# Patient Record
Sex: MALE | Race: WHITE | Employment: OTHER | URBAN - NONMETROPOLITAN AREA
[De-identification: names, ages, dates, MRNs, and addresses within clinical notes are randomized per-mention and may not be internally consistent; named-entity substitution may affect disease eponyms.]

---

## 2018-02-18 ENCOUNTER — HOSPITAL ENCOUNTER (INPATIENT)
Age: 68
LOS: 2 days | Discharge: HOME | DRG: 189 | End: 2018-02-20
Attending: EMERGENCY MEDICINE | Admitting: INTERNAL MEDICINE
Payer: MEDICARE

## 2018-02-18 ENCOUNTER — APPOINTMENT (OUTPATIENT)
Dept: GENERAL RADIOLOGY | Age: 68
DRG: 189 | End: 2018-02-18
Payer: MEDICARE

## 2018-02-18 DIAGNOSIS — J44.1 COPD EXACERBATION (HCC): Primary | ICD-10-CM

## 2018-02-18 DIAGNOSIS — N17.9 AKI (ACUTE KIDNEY INJURY) (HCC): ICD-10-CM

## 2018-02-18 LAB
ALLEN TEST: ABNORMAL
ANION GAP SERPL CALCULATED.3IONS-SCNC: 12 MMOL/L (ref 9–17)
BNP INTERPRETATION: NORMAL
BUN BLDV-MCNC: 41 MG/DL (ref 8–23)
BUN/CREAT BLD: 26 (ref 9–20)
CALCIUM SERPL-MCNC: 9.7 MG/DL (ref 8.6–10.4)
CARBOXYHEMOGLOBIN: ABNORMAL % (ref 0–5)
CHLORIDE BLD-SCNC: 100 MMOL/L (ref 98–107)
CO2: 27 MMOL/L (ref 20–31)
CREAT SERPL-MCNC: 1.58 MG/DL (ref 0.7–1.2)
DIRECT EXAM: NORMAL
FIO2: ABNORMAL
GFR AFRICAN AMERICAN: 53 ML/MIN
GFR NON-AFRICAN AMERICAN: 44 ML/MIN
GFR SERPL CREATININE-BSD FRML MDRD: ABNORMAL ML/MIN/{1.73_M2}
GFR SERPL CREATININE-BSD FRML MDRD: ABNORMAL ML/MIN/{1.73_M2}
GLUCOSE BLD-MCNC: 163 MG/DL (ref 70–99)
HCO3 VENOUS: 25.5 MMOL/L (ref 24–30)
HCT VFR BLD CALC: 33.1 % (ref 41–53)
HEMOGLOBIN: 11.7 G/DL (ref 13.5–17)
LACTIC ACID, WHOLE BLOOD: NORMAL MMOL/L (ref 0.7–2.1)
LACTIC ACID: 1.8 MMOL/L (ref 0.5–2.2)
Lab: NORMAL
MCH RBC QN AUTO: 33.3 PG (ref 26–34)
MCHC RBC AUTO-ENTMCNC: 35.4 G/DL (ref 31–37)
MCV RBC AUTO: 94 FL (ref 80–100)
METHEMOGLOBIN: ABNORMAL % (ref 0–1.9)
MODE: ABNORMAL
NEGATIVE BASE EXCESS, VEN: 0.7 MMOL/L (ref 0–2)
NOTIFICATION TIME: ABNORMAL
NOTIFICATION: ABNORMAL
NRBC AUTOMATED: ABNORMAL PER 100 WBC
O2 DEVICE/FLOW/%: ABNORMAL
O2 SAT, VEN: 94.8 % (ref 60–85)
OXYHEMOGLOBIN: ABNORMAL % (ref 95–98)
PATIENT TEMP: 37
PCO2, VEN, TEMP ADJ: ABNORMAL MMHG (ref 39–55)
PCO2, VEN: 47.6 (ref 39–55)
PDW BLD-RTO: 12.5 % (ref 12.1–15.2)
PEEP/CPAP: ABNORMAL
PH VENOUS: 7.35 (ref 7.32–7.42)
PH, VEN, TEMP ADJ: ABNORMAL (ref 7.32–7.42)
PLATELET # BLD: 289 K/UL (ref 140–450)
PMV BLD AUTO: 8.9 FL (ref 6–12)
PO2, VEN, TEMP ADJ: ABNORMAL MMHG (ref 30–50)
PO2, VEN: 77.5 (ref 30–50)
POSITIVE BASE EXCESS, VEN: ABNORMAL MMOL/L (ref 0–2)
POTASSIUM SERPL-SCNC: 4.3 MMOL/L (ref 3.7–5.3)
PRO-BNP: 235 PG/ML
PSV: ABNORMAL
PT. POSITION: ABNORMAL
RBC # BLD: 3.52 M/UL (ref 4.5–5.9)
RESPIRATORY RATE: ABNORMAL
SAMPLE SITE: ABNORMAL
SET RATE: ABNORMAL
SODIUM BLD-SCNC: 139 MMOL/L (ref 135–144)
SPECIMEN DESCRIPTION: NORMAL
STATUS: NORMAL
TEXT FOR RESPIRATORY: ABNORMAL
TOTAL HB: ABNORMAL G/DL (ref 12–16)
TOTAL RATE: ABNORMAL
TROPONIN INTERP: NORMAL
TROPONIN T: <0.03 NG/ML
VT: ABNORMAL
WBC # BLD: 8.8 K/UL (ref 3.5–11)

## 2018-02-18 PROCEDURE — 71045 X-RAY EXAM CHEST 1 VIEW: CPT

## 2018-02-18 PROCEDURE — 94645 CONT INHLJ TX EACH ADDL HOUR: CPT

## 2018-02-18 PROCEDURE — 83880 ASSAY OF NATRIURETIC PEPTIDE: CPT

## 2018-02-18 PROCEDURE — 87040 BLOOD CULTURE FOR BACTERIA: CPT

## 2018-02-18 PROCEDURE — 82805 BLOOD GASES W/O2 SATURATION: CPT

## 2018-02-18 PROCEDURE — 6360000002 HC RX W HCPCS: Performed by: INTERNAL MEDICINE

## 2018-02-18 PROCEDURE — 83605 ASSAY OF LACTIC ACID: CPT

## 2018-02-18 PROCEDURE — 6370000000 HC RX 637 (ALT 250 FOR IP): Performed by: INTERNAL MEDICINE

## 2018-02-18 PROCEDURE — 6360000002 HC RX W HCPCS: Performed by: EMERGENCY MEDICINE

## 2018-02-18 PROCEDURE — 36415 COLL VENOUS BLD VENIPUNCTURE: CPT

## 2018-02-18 PROCEDURE — 2580000003 HC RX 258: Performed by: EMERGENCY MEDICINE

## 2018-02-18 PROCEDURE — 99285 EMERGENCY DEPT VISIT HI MDM: CPT

## 2018-02-18 PROCEDURE — 94660 CPAP INITIATION&MGMT: CPT

## 2018-02-18 PROCEDURE — 96366 THER/PROPH/DIAG IV INF ADDON: CPT

## 2018-02-18 PROCEDURE — 93005 ELECTROCARDIOGRAM TRACING: CPT

## 2018-02-18 PROCEDURE — 96365 THER/PROPH/DIAG IV INF INIT: CPT

## 2018-02-18 PROCEDURE — 96375 TX/PRO/DX INJ NEW DRUG ADDON: CPT

## 2018-02-18 PROCEDURE — 2580000003 HC RX 258: Performed by: INTERNAL MEDICINE

## 2018-02-18 PROCEDURE — 87804 INFLUENZA ASSAY W/OPTIC: CPT

## 2018-02-18 PROCEDURE — 6360000002 HC RX W HCPCS

## 2018-02-18 PROCEDURE — 84484 ASSAY OF TROPONIN QUANT: CPT

## 2018-02-18 PROCEDURE — 80048 BASIC METABOLIC PNL TOTAL CA: CPT

## 2018-02-18 PROCEDURE — 2000000000 HC ICU R&B

## 2018-02-18 PROCEDURE — 94644 CONT INHLJ TX 1ST HOUR: CPT

## 2018-02-18 PROCEDURE — 85027 COMPLETE CBC AUTOMATED: CPT

## 2018-02-18 RX ORDER — CEFTRIAXONE 1 G/1
INJECTION, POWDER, FOR SOLUTION INTRAMUSCULAR; INTRAVENOUS
Status: COMPLETED
Start: 2018-02-18 | End: 2018-02-18

## 2018-02-18 RX ORDER — AMLODIPINE BESYLATE 10 MG/1
10 TABLET ORAL DAILY
Status: DISCONTINUED | OUTPATIENT
Start: 2018-02-19 | End: 2018-02-20 | Stop reason: HOSPADM

## 2018-02-18 RX ORDER — MAGNESIUM SULFATE IN WATER 40 MG/ML
2 INJECTION, SOLUTION INTRAVENOUS ONCE
Status: COMPLETED | OUTPATIENT
Start: 2018-02-18 | End: 2018-02-18

## 2018-02-18 RX ORDER — METHYLPREDNISOLONE SODIUM SUCCINATE 40 MG/ML
40 INJECTION, POWDER, LYOPHILIZED, FOR SOLUTION INTRAMUSCULAR; INTRAVENOUS EVERY 12 HOURS
Status: DISCONTINUED | OUTPATIENT
Start: 2018-02-18 | End: 2018-02-20 | Stop reason: HOSPADM

## 2018-02-18 RX ORDER — RANITIDINE 150 MG/1
150 CAPSULE ORAL 2 TIMES DAILY
COMMUNITY
End: 2018-02-23 | Stop reason: DRUGHIGH

## 2018-02-18 RX ORDER — SODIUM CHLORIDE 0.9 % (FLUSH) 0.9 %
10 SYRINGE (ML) INJECTION PRN
Status: DISCONTINUED | OUTPATIENT
Start: 2018-02-18 | End: 2018-02-20 | Stop reason: HOSPADM

## 2018-02-18 RX ORDER — SODIUM CHLORIDE 9 MG/ML
INJECTION, SOLUTION INTRAVENOUS CONTINUOUS
Status: DISCONTINUED | OUTPATIENT
Start: 2018-02-18 | End: 2018-02-19

## 2018-02-18 RX ORDER — IPRATROPIUM BROMIDE AND ALBUTEROL SULFATE 2.5; .5 MG/3ML; MG/3ML
1 SOLUTION RESPIRATORY (INHALATION)
Status: DISCONTINUED | OUTPATIENT
Start: 2018-02-19 | End: 2018-02-19

## 2018-02-18 RX ORDER — MONTELUKAST SODIUM 10 MG/1
10 TABLET ORAL NIGHTLY
Status: DISCONTINUED | OUTPATIENT
Start: 2018-02-18 | End: 2018-02-20 | Stop reason: HOSPADM

## 2018-02-18 RX ORDER — CETIRIZINE HYDROCHLORIDE 10 MG/1
10 TABLET ORAL DAILY
COMMUNITY

## 2018-02-18 RX ORDER — METHYLPREDNISOLONE SODIUM SUCCINATE 125 MG/2ML
125 INJECTION, POWDER, LYOPHILIZED, FOR SOLUTION INTRAMUSCULAR; INTRAVENOUS ONCE
Status: COMPLETED | OUTPATIENT
Start: 2018-02-18 | End: 2018-02-18

## 2018-02-18 RX ORDER — 0.9 % SODIUM CHLORIDE 0.9 %
500 INTRAVENOUS SOLUTION INTRAVENOUS ONCE
Status: COMPLETED | OUTPATIENT
Start: 2018-02-18 | End: 2018-02-18

## 2018-02-18 RX ORDER — SODIUM CHLORIDE 0.9 % (FLUSH) 0.9 %
10 SYRINGE (ML) INJECTION EVERY 12 HOURS SCHEDULED
Status: DISCONTINUED | OUTPATIENT
Start: 2018-02-18 | End: 2018-02-20 | Stop reason: HOSPADM

## 2018-02-18 RX ORDER — ONDANSETRON 2 MG/ML
4 INJECTION INTRAMUSCULAR; INTRAVENOUS EVERY 6 HOURS PRN
Status: DISCONTINUED | OUTPATIENT
Start: 2018-02-18 | End: 2018-02-20 | Stop reason: HOSPADM

## 2018-02-18 RX ORDER — FAMOTIDINE 20 MG/1
20 TABLET, FILM COATED ORAL 2 TIMES DAILY
Status: DISCONTINUED | OUTPATIENT
Start: 2018-02-18 | End: 2018-02-20 | Stop reason: HOSPADM

## 2018-02-18 RX ORDER — LISINOPRIL AND HYDROCHLOROTHIAZIDE 25; 20 MG/1; MG/1
1 TABLET ORAL DAILY
COMMUNITY
End: 2018-02-23 | Stop reason: ALTCHOICE

## 2018-02-18 RX ORDER — ACETAMINOPHEN 325 MG/1
650 TABLET ORAL EVERY 4 HOURS PRN
Status: DISCONTINUED | OUTPATIENT
Start: 2018-02-18 | End: 2018-02-20 | Stop reason: HOSPADM

## 2018-02-18 RX ORDER — ALBUTEROL SULFATE 2.5 MG/3ML
SOLUTION RESPIRATORY (INHALATION)
Status: COMPLETED
Start: 2018-02-18 | End: 2018-02-18

## 2018-02-18 RX ORDER — LISINOPRIL AND HYDROCHLOROTHIAZIDE 25; 20 MG/1; MG/1
1 TABLET ORAL DAILY
Status: DISCONTINUED | OUTPATIENT
Start: 2018-02-19 | End: 2018-02-20 | Stop reason: HOSPADM

## 2018-02-18 RX ADMIN — ALBUTEROL SULFATE 7.5 MG: 2.5 SOLUTION RESPIRATORY (INHALATION) at 21:08

## 2018-02-18 RX ADMIN — CEFTRIAXONE: 1 INJECTION, POWDER, FOR SOLUTION INTRAMUSCULAR; INTRAVENOUS at 23:32

## 2018-02-18 RX ADMIN — SODIUM CHLORIDE: 9 INJECTION, SOLUTION INTRAVENOUS at 22:54

## 2018-02-18 RX ADMIN — ALBUTEROL SULFATE 7.5 MG: 2.5 SOLUTION RESPIRATORY (INHALATION) at 20:08

## 2018-02-18 RX ADMIN — FAMOTIDINE 20 MG: 20 TABLET, FILM COATED ORAL at 22:59

## 2018-02-18 RX ADMIN — CEFTRIAXONE 1 G: 1 INJECTION, POWDER, FOR SOLUTION INTRAMUSCULAR; INTRAVENOUS at 23:32

## 2018-02-18 RX ADMIN — MAGNESIUM SULFATE HEPTAHYDRATE 2 G: 40 INJECTION, SOLUTION INTRAVENOUS at 19:56

## 2018-02-18 RX ADMIN — METHYLPREDNISOLONE SODIUM SUCCINATE 125 MG: 125 INJECTION, POWDER, FOR SOLUTION INTRAMUSCULAR; INTRAVENOUS at 19:55

## 2018-02-18 RX ADMIN — MONTELUKAST SODIUM 10 MG: 10 TABLET, FILM COATED ORAL at 22:59

## 2018-02-18 RX ADMIN — SODIUM CHLORIDE 500 ML: 9 INJECTION, SOLUTION INTRAVENOUS at 21:17

## 2018-02-18 ASSESSMENT — ENCOUNTER SYMPTOMS
NAUSEA: 0
ABDOMINAL DISTENTION: 0
COUGH: 1
SORE THROAT: 0
SHORTNESS OF BREATH: 1
VOMITING: 0
DIARRHEA: 0
WHEEZING: 0
CONSTIPATION: 0
RHINORRHEA: 1

## 2018-02-18 ASSESSMENT — PAIN SCALES - GENERAL: PAINLEVEL_OUTOF10: 0

## 2018-02-19 LAB
ANION GAP SERPL CALCULATED.3IONS-SCNC: 12 MMOL/L (ref 9–17)
BUN BLDV-MCNC: 40 MG/DL (ref 8–23)
BUN/CREAT BLD: 25 (ref 9–20)
CALCIUM SERPL-MCNC: 9.6 MG/DL (ref 8.6–10.4)
CHLORIDE BLD-SCNC: 100 MMOL/L (ref 98–107)
CO2: 26 MMOL/L (ref 20–31)
CREAT SERPL-MCNC: 1.61 MG/DL (ref 0.7–1.2)
EKG ATRIAL RATE: 88 BPM
EKG P AXIS: 83 DEGREES
EKG P-R INTERVAL: 142 MS
EKG Q-T INTERVAL: 358 MS
EKG QRS DURATION: 82 MS
EKG QTC CALCULATION (BAZETT): 433 MS
EKG R AXIS: -54 DEGREES
EKG T AXIS: 88 DEGREES
EKG VENTRICULAR RATE: 88 BPM
GFR AFRICAN AMERICAN: 52 ML/MIN
GFR NON-AFRICAN AMERICAN: 43 ML/MIN
GFR SERPL CREATININE-BSD FRML MDRD: ABNORMAL ML/MIN/{1.73_M2}
GFR SERPL CREATININE-BSD FRML MDRD: ABNORMAL ML/MIN/{1.73_M2}
GLUCOSE BLD-MCNC: 175 MG/DL (ref 70–99)
POTASSIUM SERPL-SCNC: 5 MMOL/L (ref 3.7–5.3)
SODIUM BLD-SCNC: 138 MMOL/L (ref 135–144)

## 2018-02-19 PROCEDURE — 6360000002 HC RX W HCPCS: Performed by: INTERNAL MEDICINE

## 2018-02-19 PROCEDURE — 94664 DEMO&/EVAL PT USE INHALER: CPT

## 2018-02-19 PROCEDURE — 80048 BASIC METABOLIC PNL TOTAL CA: CPT

## 2018-02-19 PROCEDURE — 6370000000 HC RX 637 (ALT 250 FOR IP): Performed by: INTERNAL MEDICINE

## 2018-02-19 PROCEDURE — 94660 CPAP INITIATION&MGMT: CPT

## 2018-02-19 PROCEDURE — 1200000000 HC SEMI PRIVATE

## 2018-02-19 PROCEDURE — 94640 AIRWAY INHALATION TREATMENT: CPT

## 2018-02-19 PROCEDURE — 2580000003 HC RX 258: Performed by: INTERNAL MEDICINE

## 2018-02-19 PROCEDURE — 36415 COLL VENOUS BLD VENIPUNCTURE: CPT

## 2018-02-19 RX ORDER — ALBUTEROL SULFATE 2.5 MG/3ML
2.5 SOLUTION RESPIRATORY (INHALATION) EVERY 4 HOURS PRN
Status: DISCONTINUED | OUTPATIENT
Start: 2018-02-19 | End: 2018-02-20 | Stop reason: HOSPADM

## 2018-02-19 RX ORDER — IPRATROPIUM BROMIDE AND ALBUTEROL SULFATE 2.5; .5 MG/3ML; MG/3ML
1 SOLUTION RESPIRATORY (INHALATION) 4 TIMES DAILY
Status: DISCONTINUED | OUTPATIENT
Start: 2018-02-19 | End: 2018-02-20 | Stop reason: HOSPADM

## 2018-02-19 RX ADMIN — METHYLPREDNISOLONE SODIUM SUCCINATE 40 MG: 40 INJECTION, POWDER, FOR SOLUTION INTRAMUSCULAR; INTRAVENOUS at 22:51

## 2018-02-19 RX ADMIN — MOMETASONE FUROATE AND FORMOTEROL FUMARATE DIHYDRATE 2 PUFF: 200; 5 AEROSOL RESPIRATORY (INHALATION) at 12:04

## 2018-02-19 RX ADMIN — Medication 10 ML: at 20:30

## 2018-02-19 RX ADMIN — MOMETASONE FUROATE AND FORMOTEROL FUMARATE DIHYDRATE 2 PUFF: 200; 5 AEROSOL RESPIRATORY (INHALATION) at 21:49

## 2018-02-19 RX ADMIN — MONTELUKAST SODIUM 10 MG: 10 TABLET, FILM COATED ORAL at 20:30

## 2018-02-19 RX ADMIN — LISINOPRIL AND HYDROCHLOROTHIAZIDE 1 TABLET: 25; 20 TABLET ORAL at 08:26

## 2018-02-19 RX ADMIN — FAMOTIDINE 20 MG: 20 TABLET, FILM COATED ORAL at 20:30

## 2018-02-19 RX ADMIN — METHYLPREDNISOLONE SODIUM SUCCINATE 40 MG: 40 INJECTION, POWDER, FOR SOLUTION INTRAMUSCULAR; INTRAVENOUS at 10:30

## 2018-02-19 RX ADMIN — Medication 10 ML: at 08:30

## 2018-02-19 RX ADMIN — FAMOTIDINE 20 MG: 20 TABLET, FILM COATED ORAL at 08:26

## 2018-02-19 RX ADMIN — IPRATROPIUM BROMIDE AND ALBUTEROL SULFATE 1 AMPULE: .5; 3 SOLUTION RESPIRATORY (INHALATION) at 11:54

## 2018-02-19 RX ADMIN — SODIUM CHLORIDE 1 G: 9 INJECTION INTRAMUSCULAR; INTRAVENOUS; SUBCUTANEOUS at 22:51

## 2018-02-19 RX ADMIN — ENOXAPARIN SODIUM 40 MG: 40 INJECTION SUBCUTANEOUS at 08:26

## 2018-02-19 RX ADMIN — AMLODIPINE BESYLATE 10 MG: 10 TABLET ORAL at 08:26

## 2018-02-19 RX ADMIN — SODIUM CHLORIDE: 9 INJECTION, SOLUTION INTRAVENOUS at 06:43

## 2018-02-19 RX ADMIN — IPRATROPIUM BROMIDE AND ALBUTEROL SULFATE 1 AMPULE: .5; 3 SOLUTION RESPIRATORY (INHALATION) at 17:21

## 2018-02-19 RX ADMIN — IPRATROPIUM BROMIDE AND ALBUTEROL SULFATE 1 AMPULE: .5; 3 SOLUTION RESPIRATORY (INHALATION) at 07:01

## 2018-02-19 RX ADMIN — IPRATROPIUM BROMIDE AND ALBUTEROL SULFATE 1 AMPULE: .5; 3 SOLUTION RESPIRATORY (INHALATION) at 21:48

## 2018-02-19 RX ADMIN — METHYLPREDNISOLONE SODIUM SUCCINATE 40 MG: 40 INJECTION, POWDER, FOR SOLUTION INTRAMUSCULAR; INTRAVENOUS at 04:47

## 2018-02-19 ASSESSMENT — PAIN SCALES - GENERAL
PAINLEVEL_OUTOF10: 0

## 2018-02-19 NOTE — H&P
Component Value Date    WBC 8.8 2018    HGB 11.7 (L) 2018     2018       Lab Results   Component Value Date    BUN 40 (H) 2018    CREATININE 1.61 (H) 2018     2018    K 5.0 2018    CALCIUM 9.6 2018     2018    CO2 26 2018    LABGLOM 43 (L) 2018       No results found for: Kathryn Post, EPITHUA, LEUKOCYTESUR, SPECGRAV, GLUCOSEU, KETUA, PROTEINU, HGBUR, CASTUA, CRYSTUA, BACTERIA, YEAST    Lab Results   Component Value Date    TROPONINT <0.03 2018    PROBNP 235 2018       Xr Chest 1 Vw    Result Date: 2018  FINAL REPORT EXAM:  XR CHEST 1 VIEW HISTORY:  shortness of breath,diffuse wheezing X 2 DAYS TECHNIQUE:  Portable frontal chest x-ray Comparison: None FINDINGS:  Lungs are well expanded and mildly hyperlucent most suggestive of underlying emphysema. There is aortic atherosclerotic calcification. There are no focal infiltrates. Costophrenic angles are sharp laterally. Bones are osteopenic. Impression:  Probable underlying emphysema. No acute cardiopulmonary disease. Electronically Signed By: Benedicto Zimmerman   on  2018  20:23        Assessment:    Active Problems:    COPD exacerbation ([address]  Resolved Problems:    * No resolved hospital problems.  *      Patient Active Problem List    Diagnosis Date Noted    COPD exacerbation ([facility] [address] 2018       Plan:     · This patient requires inpatient admission because of COPD exacerbation  · Factors affecting the medical complexity of this patient include   · Steroids, nebulizers, and antibiotics  · Estimated length of stay is 3 days  ·   · High risk medication monitorin Dirk Jeong md  2018, 5:10 PM

## 2018-02-19 NOTE — ED PROVIDER NOTES
(2.5 MG/3ML) 0.083% nebulizer solution     Liliana Fitzgerald: cabinet override    0.9 % sodium chloride bolus       DIAGNOSTIC RESULTS / EMERGENCY DEPARTMENT COURSE / MDM     LABS:  Results for orders placed or performed during the hospital encounter of 02/18/18   Rapid influenza A/B antigens   Result Value Ref Range    Specimen Description . NASOPHARYNGEAL SWAB     Special Requests NOT REPORTED     Direct Exam       Presumptive negative for the presence of Influenza A and Influenza B antigen.     Direct Exam        PCR confirmation of negative results is recommended, since the antigen present    Direct Exam        in the specimen may be below the detection limit of the test.    Direct Exam       Performed at [facility] [address] [facility]    Direct Exam  ([phone]     Status FINAL 02/18/2018    CBC   Result Value Ref Range    WBC 8.8 3.5 - 11.0 k/uL    RBC 3.52 (L) 4.5 - 5.9 m/uL    Hemoglobin 11.7 (L) 13.5 - 17.0 g/dL    Hematocrit 33.1 (L) 41 - 53 %    MCV 94.0 80 - 100 fL    MCH 33.3 26 - 34 pg    MCHC 35.4 31 - 37 g/dL    RDW 12.5 12.1 - 15.2 %    Platelets 337 450 - 012 k/uL    MPV 8.9 6.0 - 12.0 fL    NRBC Automated NOT REPORTED per 100 WBC   Basic Metabolic Panel   Result Value Ref Range    Glucose 163 (H) 70 - 99 mg/dL    BUN 41 (H) 8 - 23 mg/dL    CREATININE 1.58 (H) 0.70 - 1.20 mg/dL    Bun/Cre Ratio 26 (H) 9 - 20    Calcium 9.7 8.6 - 10.4 mg/dL    Sodium 139 135 - 144 mmol/L    Potassium 4.3 3.7 - 5.3 mmol/L    Chloride 100 98 - 107 mmol/L    CO2 27 20 - 31 mmol/L    Anion Gap 12 9 - 17 mmol/L    GFR Non-African American 44 (L) >60 mL/min    GFR  53 (L) >60 mL/min    GFR Comment          GFR Staging         Troponin   Result Value Ref Range    Troponin T <0.03 <0.03 ng/mL    Troponin Interp         Lactic Acid, Plasma   Result Value Ref Range    Lactic Acid 1.8 0.5 - 2.2 mmol/L    Lactic Acid, Whole Blood NOT REPORTED 0.7 - 2.1 mmol/L   Brain

## 2018-02-19 NOTE — PLAN OF CARE
Problem: Gas Exchange - Impaired:  Goal: Levels of oxygenation will improve  Levels of oxygenation will improve  Outcome: Ongoing  Lung sounds noted to be diminished with expiratory wheezing. Pt on bipap 30%. SOB with exertion and at rest noted. Will continue to monitor. Problem: SAFETY  Goal: Free from accidental physical injury  Outcome: Ongoing  Fall risk assessment complete. Fall band on patient and fall sign on doorway. Bed in lowest position with bed wheels locked. Call light within reach. Will continue to monitor. Problem: PAIN  Goal: Patient's pain/discomfort is manageable  Outcome: Ongoing  Pain assessment complete during hourly rounding. Pain scale of 0-10 being used to assess patients pain level. Pain medication administered as ordered. Will continue to monitor. Problem: SKIN INTEGRITY  Goal: Skin integrity is maintained or improved  Outcome: Ongoing  Frequent and close monitoring of pt's skin being assessed with assessments and prn. Pt turns and repositions self frequently in bed. Will continue to monitor for any s/s of impaired skin integrity.

## 2018-02-19 NOTE — ED NOTES
Patient improved with sitting down. SOB worse with activity.       1108 Kris Hernandez RN  02/18/18 2013

## 2018-02-19 NOTE — ED NOTES
Respiratory called made aware of tx and venous blood gas.       1108 Kris Hernandez RN  02/18/18 2007

## 2018-02-20 VITALS
HEART RATE: 94 BPM | BODY MASS INDEX: 25.62 KG/M2 | HEIGHT: 66 IN | SYSTOLIC BLOOD PRESSURE: 144 MMHG | RESPIRATION RATE: 18 BRPM | WEIGHT: 159.4 LBS | TEMPERATURE: 98.4 F | OXYGEN SATURATION: 96 % | DIASTOLIC BLOOD PRESSURE: 81 MMHG

## 2018-02-20 PROCEDURE — 6370000000 HC RX 637 (ALT 250 FOR IP): Performed by: INTERNAL MEDICINE

## 2018-02-20 PROCEDURE — 94664 DEMO&/EVAL PT USE INHALER: CPT

## 2018-02-20 PROCEDURE — 94640 AIRWAY INHALATION TREATMENT: CPT

## 2018-02-20 RX ORDER — PREDNISONE 10 MG/1
TABLET ORAL
Qty: 30 TABLET | Refills: 0 | Status: SHIPPED | OUTPATIENT
Start: 2018-02-20 | End: 2018-04-06 | Stop reason: ALTCHOICE

## 2018-02-20 RX ORDER — CEFUROXIME AXETIL 250 MG/1
250 TABLET ORAL 2 TIMES DAILY
Qty: 14 TABLET | Refills: 0 | Status: SHIPPED | OUTPATIENT
Start: 2018-02-20 | End: 2018-02-27

## 2018-02-20 RX ADMIN — IPRATROPIUM BROMIDE AND ALBUTEROL SULFATE 1 AMPULE: .5; 3 SOLUTION RESPIRATORY (INHALATION) at 05:57

## 2018-02-20 NOTE — PROGRESS NOTES
2nd continuous nebulizer treatment in line via BIPAP, given per verbal order Dr. Eden Hernández. 3 unit dose albuterol and 3 ml NS used per neb insert.
Nutrition Assessment    Type and Reason for Visit: Initial    Nutrition Recommendations:  Encourage oral fluids    Malnutrition Assessment:  · Malnutrition Status: No malnutrition  · Context: Acute illness or injury  · Findings of the 6 clinical characteristics of malnutrition (Minimum of 2 out of 6 clinical characteristics is required to make the diagnosis of moderate or severe Protein Calorie Malnutrition based on AND/ASPEN Guidelines):  1. Energy Intake-Not available,      2. Weight Loss-No significant weight loss,    3. Fat Loss-No significant subcutaneous fat loss,    4. Muscle Loss-No significant muscle mass loss,    5. Fluid Accumulation-Mild fluid accumulation, Extremities  6.  Strength-Not measured    Nutrition Diagnosis:   · Problem: Altered nutrition-related lab values  · Etiology: related to Endocrine dysfunction     Signs and symptoms:  as evidenced by Lab values (glucose elevations with steroids)    Nutrition Assessment:  · Subjective Assessment: \"getting fat\". Good appetite, with smaller breakfast usually.    · Nutrition-Focused Physical Findings:  edema in LE  · Wound Type: None  · Current Nutrition Therapies:  · Oral Diet Orders: General   · Oral Diet intake: Unable to assess ( No records)  · Oral Nutrition Supplement (ONS) Orders: None  · Anthropometric Measures:  · Ht: 5' 6\" (167.6 cm)   · Current Body Wt: 162 lb 3.2 oz (73.6 kg)  · Admission Body Wt: 152 lb (68.9 kg)  · Usual Body Wt:  (used to be in in 150s, but really doesn't know)  · BMI Classification: BMI 25.0 - 29.9 Overweight    Lab Results   Component Value Date     02/19/2018    K 5.0 02/19/2018     02/19/2018    CO2 26 02/19/2018    BUN 40 (H) 02/19/2018    CREATININE 1.61 (H) 02/19/2018    GLUCOSE 175 (H) 02/19/2018    CALCIUM 9.6 02/19/2018    LABGLOM 43 (L) 02/19/2018    GFRAA 52 (L) 02/19/2018     No results found for: LABA1C  No results found for: EAG  No results found for: VITD25    Estimated Intake vs Estimated
Pt admitted from ER via bed with wife present. Pt A&O, pleasant. SOB with exertion and at rest noted. Oriented pt to room, call light and surroundings, verbalizes understanding. Pt placed on 30% bipap with Sp02 maintained greater than 90%. Denies any pain or needs at this time. Call light in reach.
RESPIRATORY ASSESSMENT PROTOCOL                                                                                              Patient Name: Ismael [facility] Room#: O291/W286-76 : 1950     Admitting diagnosis: COPD exacerbation ([facility] [J44.1]       Medical History:   Past Medical History:   Diagnosis Date    COPD (chronic obstructive pulmonary disease) ([facility]     Essential hypertension        PATIENT ASSESSMENT    LABORATORY DATA  Hematology:   Lab Results   Component Value Date    WBC 8.8 2018    RBC 3.52 2018    HGB 11.7 2018    HCT 33.1 2018     2018     Chemistry:  No results found for: PHART, TUR5EPL, PO2ART, A6GCZTEP, JGT2XEY, PBEA    Blood Culture:   Sputum Culture:     VITALS  Pulse: 83   Resp: 11  BP: (!) 130/90  SpO2: 96 % O2 Device: Bi-PAP  Temp: 98.6 °F (37 °C)  Comment:     SKIN COLOR  [x] Normal  [] Pale  [] Dusky  [] Cyanotic      RESPIRATORY PATTERN  [] Normal  [x] Dyspnea  [] Cheyne-Jensen  [] Kussmaul  [] Biots    AMBULATORY  [x] Yes  [] No  [] With Assistance    PEAK FLOW  Predicted:     Personal Best:        VITAL CAPACITY  Predicted value:  ml  Actual Value:  ml  30% of Predicted:  Ml    Patient Acuity 0 1 2 3 4 Score   Level of Concious (LOC) [x]  Alert & Oriented or Pt normal LOC []  Confused;follows directions []  Confused & uncooper-ative []  Obtunded []  Comatose 0   Respiratory Rate  (RR) []  Reg. rate & pattern. 12 - 20 bpm  []  Increased RR. Greater than 20 bpm   []  SOB w/ exertion or RR greater than 24 bpm []  Access- ory muscle use at rest. Abn.  resp. [x]  SOB at rest.   4   Bilateral Breath Sounds (BBS) []  Clear []  Diminish-ed bases  []  Diminish-ed t/o, or rales   []  Sporadic, scattered wheezes or rhonchi [x]  Persistentwheezes and, or absent BBS 4   Cough [x]  Strong, effective, & non-prod. []  Effective & prod.  Less than 25 ml (2 TBSP) over past 24 hrs []  Ineffective & non-prod to less than 25 ML over past 24 hrs []  Ineffective and,
[]  Ineffective and, or greater than 25 ml sputum prod. past 24 hrs. []  Nonspon- taneous; Requires suctioning 0   Pulmonary History  (PULM HX) []  No smoking and no chronic pulmonaryhistory []  Former smoker. Quit over 12 mos. ago []  Current smoker or quit w/ in 12 mos []  Pulm. History and, or 20 pk/yr smoking hx [x]  Admitted w/ acute pulm. dx and, or has been admitted w/ pulm. dx 2 or more times over past 12 mos 4   Surgical History this Admit  (SURG HX) [x]  No surgery []  General surgery []  Lower abdominal []  Thoracic or upper abdominal   []  Thoracic w/ pulm. disease 0   Chest X-Ray (CXR)/CT Scan [x]  Clear or not applicable []  Not available []  Atelect- asis or pleural effusions []  Localized infiltrate or pulm. edema []  Con-solidated Infiltrates, bilateral, or in more than 1 lobe 0   Slow or Forced VC, FEV1 OR PEFR (PULM FXN)  [x]  80% or greater, or not indicated []  Pt. unable to perform []  FEV1 or PEFR or VC 51-79%. []  FEV1 or PEFR or VC  30-49%   []  FEV1 or PEFR or VC less than 30%   0   TOTAL ACUITY: 9       CARE PLAN    If Acuity Level is 2, 3, or 4 in any of the following:    [x] BILATERAL BREATH SOUNDS (BBS)     [x] PULMONARY HISTORY (PULM HX)  [] PULMONARY FUNCTION (PULM FX)    Goal: Improve respiratory functions in patients with airway disease and decrease WOB    [x] AEROSOL PROTOCOL    Total Acuity:   16-32  []  Secondary Assessment in 24 hrs Total Acuity:  9-15  [x]  Secondary Assessment in 24 hrs Total Acuity:  4-8  []  Secondary Assessment in 48 hrs Total Acuity:  0-3  []  Secondary Assessment in 72 hrs   HHN AEROSOL THERAPY with  [physician-ordered bronchodilator(s)] q 4 & Albuterol PRN q2 hrs. Breath-Actuated Neb if BBS Acuity = 4, and pt. can use MP. Notify physician if condition deteriorates. [facility]N AEROSOL THERAPY with  [physician-ordered bronchodilator(s)]  QID and Albuterol PRN q4 hrs. Breath-Actuated Neb if BBS Acuity = 4, and pt. can use MP.   Notify physician if condition

## 2018-02-20 NOTE — DISCHARGE SUMMARY
capsule  Inhale 18 mcg into the lungs daily. Consultants:  none     Hospital Course:   Karen Ellington is a 75 y.o. male admitted with COPD exacerbation    Exam: regular. Trace wheezes left.     Disposition:   Home    Patient will be followed by Rachel Awan MD in 2 weeks    Signed:  Rachel Awan  2/22/2018, 3:56 PM

## 2018-02-22 PROBLEM — N17.9 ACUTE NONTRAUMATIC KIDNEY INJURY (HCC): Status: ACTIVE | Noted: 2018-02-22

## 2018-02-22 PROBLEM — J96.01 ACUTE RESPIRATORY FAILURE WITH HYPOXIA (HCC): Status: ACTIVE | Noted: 2018-02-22

## 2018-02-23 ENCOUNTER — TELEPHONE (OUTPATIENT)
Dept: PHARMACY | Facility: CLINIC | Age: 68
End: 2018-02-23

## 2018-02-23 DIAGNOSIS — J44.1 COPD EXACERBATION (HCC): Primary | ICD-10-CM

## 2018-02-23 PROCEDURE — 1111F DSCHRG MED/CURRENT MED MERGE: CPT | Performed by: PHARMACIST

## 2018-02-23 RX ORDER — AMLODIPINE BESYLATE 10 MG/1
10 TABLET ORAL DAILY
Status: ON HOLD | COMMUNITY
End: 2019-05-21 | Stop reason: HOSPADM

## 2018-02-23 RX ORDER — RANITIDINE 150 MG/1
150 TABLET ORAL DAILY
COMMUNITY
End: 2018-04-06 | Stop reason: ALTCHOICE

## 2018-02-23 RX ORDER — LISINOPRIL 20 MG/1
40 TABLET ORAL DAILY
Status: ON HOLD | COMMUNITY
End: 2018-10-31 | Stop reason: HOSPADM

## 2018-02-23 NOTE — TELEPHONE ENCOUNTER
CLINICAL PHARMACY NOTE - Post-Discharge Transitions of Care ([facility]BIKA)  Patient outreach to review discharge medications and provide medication review and management. Spoke with patient. Non-face-to-face services provided:  Assessment and support for treatment adherence and medication management-Medication Review. SUBJECTIVE/OBJECTIVE:     Mady Lewis is a 75 y.o. male discharged from [facility] on 02/20/2018. Primary diagnosis: COPD exacerbation    Discharge Medications (as per discharging medication list found on the AVS)  New Medications  Medication Sig Comments    predniSONE (DELTASONE) 10 MG tablet 4 tabs daily for 3 days, 3 tabs daily for 3 days, 2 tabs daily for 3 days, 1 tabs daily for 3 days Taking as prescribed    cefUROXime (CEFTIN) 250 MG tablet Take 1 tablet by mouth 2 times daily for 7 days Taking as prescribed     Continued Medications  Medication Sig Comments    ranitidine (ZANTAC) 150 MG capsule Take 150 mg by mouth 2 times daily Takes once daily    cetirizine (ZYRTEC) 10 MG tablet Take 10 mg by mouth daily Taking as prescribed    albuterol (PROVENTIL) (2.5 MG/3ML) 0.083% nebulizer solution Take 3 mLs by nebulization every 6 hours as needed for Wheezing Nebulizer treatment Taking as prescribed    montelukast (SINGULAIR) 10 MG tablet Take 10 mg by mouth nightly Taking as prescribed    tiotropium (SPIRIVA) 18 MCG inhalation capsule Inhale 18 mcg into the lungs daily. Patient has respimat inhaler    budesonide-formoterol (SYMBICORT) 160-4.5 MCG/ACT AERO Inhale 2 puffs into the lungs 2 times daily. Taking as prescribed    albuterol (PROVENTIL HFA;VENTOLIN HFA) 108 (90 BASE) MCG/ACT inhaler Inhale 2 puffs into the lungs every 6 hours as needed.  Taking as prescribed    amLODIPine (NORVASC) 5 MG tablet Take 10 mg by mouth daily  Takes 10mg tabs    lisinopril-hydrochlorothiazide (PRINZIDE;ZESTORETIC) 20-25 MG per tablet Take 1 tablet by mouth daily Only on lisinopril

## 2018-02-24 LAB
CULTURE: NORMAL
Lab: NORMAL
Lab: NORMAL
SPECIMEN DESCRIPTION: NORMAL
SPECIMEN DESCRIPTION: NORMAL
STATUS: NORMAL
STATUS: NORMAL

## 2018-03-06 PROBLEM — J44.9 CHRONIC OBSTRUCTIVE PULMONARY DISEASE (HCC): Status: ACTIVE | Noted: 2018-02-18

## 2018-06-01 PROBLEM — C61 PROSTATE CANCER (HCC): Status: ACTIVE | Noted: 2018-06-01

## 2018-08-06 ENCOUNTER — OFFICE VISIT (OUTPATIENT)
Dept: UROLOGY | Age: 68
End: 2018-08-06
Payer: MEDICARE

## 2018-08-06 VITALS
WEIGHT: 167 LBS | HEIGHT: 66 IN | BODY MASS INDEX: 26.84 KG/M2 | DIASTOLIC BLOOD PRESSURE: 76 MMHG | SYSTOLIC BLOOD PRESSURE: 142 MMHG

## 2018-08-06 DIAGNOSIS — C61 PROSTATE CANCER (HCC): Primary | ICD-10-CM

## 2018-08-06 PROCEDURE — 99204 OFFICE O/P NEW MOD 45 MIN: CPT | Performed by: UROLOGY

## 2018-08-06 ASSESSMENT — ENCOUNTER SYMPTOMS
ABDOMINAL PAIN: 0
EYE REDNESS: 0
BACK PAIN: 0
VOMITING: 0
SHORTNESS OF BREATH: 0
COUGH: 0
WHEEZING: 0
NAUSEA: 0
EYE PAIN: 0
COLOR CHANGE: 0

## 2018-08-06 NOTE — PROGRESS NOTES
albuterol (PROVENTIL) (2.5 MG/3ML) 0.083% nebulizer solution Take 3 mLs by nebulization every 6 hours as needed for Wheezing Nebulizer treatment 120 each 0     No facility-administered encounter medications on file as of 8/6/2018. Current Outpatient Prescriptions on File Prior to Visit   Medication Sig Dispense Refill    amLODIPine (NORVASC) 10 MG tablet Take 10 mg by mouth daily      lisinopril (PRINIVIL;ZESTRIL) 20 MG tablet Take 40 mg by mouth daily      tiotropium (SPIRIVA RESPIMAT) 1.25 MCG/ACT AERS inhaler Inhale 2 puffs into the lungs daily      cetirizine (ZYRTEC) 10 MG tablet Take 10 mg by mouth daily      montelukast (SINGULAIR) 10 MG tablet Take 10 mg by mouth nightly      albuterol sulfate  (90 Base) MCG/ACT inhaler Inhale 2 puffs into the lungs every 6 hours as needed for Wheezing or Shortness of Breath 1 Inhaler 11    albuterol (PROVENTIL) (2.5 MG/3ML) 0.083% nebulizer solution Take 3 mLs by nebulization every 6 hours as needed for Wheezing Nebulizer treatment 120 each 0     No current facility-administered medications on file prior to visit. Patient has no known allergies. History reviewed. No pertinent family history. History   Smoking Status    Former Smoker    Types: Cigarettes    Quit date: 7/30/2013   Smokeless Tobacco    Never Used        History   Alcohol Use No       Vitals:    08/06/18 1140   BP: (!) 142/76     Constitutional: Patient in no acute distress; Neuro: alert and oriented to person place and time. Psych: Mood and affect normal.  Skin: Normal  Lungs: Respiratory effort normal  Cardiovascular:  Normal peripheral pulses  Abdomen: Soft, non-tender, non-distended with no CVA, flank pain, hepatosplenomegaly or hernia. Kidneys normal.  Bladder non-tender and not distended.   Lymphatics: no palpable lymphadenopathy  Penis normal and circumcised  Urethral meatus normal  Scrotal exam normal  Testicles normal bilaterally  Epididymis normal bilaterally  No

## 2018-08-20 ENCOUNTER — OFFICE VISIT (OUTPATIENT)
Dept: UROLOGY | Age: 68
End: 2018-08-20
Payer: MEDICARE

## 2018-08-20 VITALS
SYSTOLIC BLOOD PRESSURE: 142 MMHG | HEIGHT: 66 IN | WEIGHT: 168 LBS | DIASTOLIC BLOOD PRESSURE: 80 MMHG | BODY MASS INDEX: 27 KG/M2

## 2018-08-20 DIAGNOSIS — C61 PROSTATE CANCER (HCC): Primary | ICD-10-CM

## 2018-08-20 PROCEDURE — 99213 OFFICE O/P EST LOW 20 MIN: CPT | Performed by: UROLOGY

## 2018-08-20 PROCEDURE — 96402 CHEMO HORMON ANTINEOPL SQ/IM: CPT | Performed by: UROLOGY

## 2018-08-20 ASSESSMENT — ENCOUNTER SYMPTOMS
COLOR CHANGE: 0
VOMITING: 0
BACK PAIN: 0
SHORTNESS OF BREATH: 0
NAUSEA: 0
WHEEZING: 0
EYE REDNESS: 0
EYE PAIN: 0
COUGH: 0
ABDOMINAL PAIN: 0

## 2018-08-20 NOTE — PROGRESS NOTES
Patient given [facility] loading dose in abdomen subQ. Patient tolerated procedure well.     Lot # [id number]  Exp date: 05/2020

## 2018-08-20 NOTE — PROGRESS NOTES
Subjective:      Patient ID: Olga Ferreira is a 75 y.o. male. HPI  Patient is a 75 y.o. male in no acute distress and alert and oriented to person, place and time. History  Prostate bx 5/22/2018 2 cores [place] 3+4  PSA 14.59     Currently  Patient is here today to begin androgen deprivation therapy. He will do this in conjunction with IMRT radiation. Patient has been doing well. No recent gross hematuria dysuria. He reports no new symptoms. Patient reports no pain today. Patient is comfortable with his treatment choice. He has more questions concerning this. Past Medical History:   Diagnosis Date    [facility] (chronic obstructive [facility] disease) ([facility] [address]     Essential hypertension     Wears glasses      Past Surgical History:   Procedure Laterality Date    COLONOSCOPY  2015    Normal    HERNIA REPAIR Right 1975     Family History   Problem Relation Age of Onset    No Known Problems Father     Cancer Mother      Current Outpatient Prescriptions on File Prior to Visit   Medication Sig Dispense Refill    albuterol sulfate  (90 Base) MCG/ACT inhaler Inhale 2 puffs into the lungs every 6 hours as needed for Wheezing or Shortness of Breath 1 Inhaler 11    amLODIPine (NORVASC) 10 MG tablet Take 10 mg by mouth daily      lisinopril (PRINIVIL;ZESTRIL) 20 MG tablet Take 40 mg by mouth daily      tiotropium (SPIRIVA RESPIMAT) 1.25 MCG/ACT AERS inhaler Inhale 2 puffs into the lungs daily      cetirizine (ZYRTEC) 10 MG tablet Take 10 mg by mouth daily      montelukast (SINGULAIR) 10 MG tablet Take 10 mg by mouth nightly      albuterol (PROVENTIL) (2.5 MG/3ML) 0.083% nebulizer solution Take 3 mLs by nebulization every 6 hours as needed for Wheezing Nebulizer treatment 120 each 0     No current facility-administered medications on file prior to visit.         Outpatient Encounter Prescriptions as of 8/20/2018   Medication Sig Dispense Refill    albuterol sulfate  (90 Base) MCG/ACT inhaler

## 2018-09-24 ENCOUNTER — OFFICE VISIT (OUTPATIENT)
Dept: UROLOGY | Age: 68
End: 2018-09-24
Payer: MEDICARE

## 2018-09-24 VITALS
DIASTOLIC BLOOD PRESSURE: 78 MMHG | HEIGHT: 66 IN | SYSTOLIC BLOOD PRESSURE: 140 MMHG | WEIGHT: 168 LBS | BODY MASS INDEX: 27 KG/M2

## 2018-09-24 DIAGNOSIS — C61 PROSTATE CANCER (HCC): Primary | ICD-10-CM

## 2018-09-24 PROCEDURE — 96402 CHEMO HORMON ANTINEOPL SQ/IM: CPT | Performed by: NURSE PRACTITIONER

## 2018-09-24 PROCEDURE — 99213 OFFICE O/P EST LOW 20 MIN: CPT | Performed by: NURSE PRACTITIONER

## 2018-09-24 ASSESSMENT — ENCOUNTER SYMPTOMS
EYE REDNESS: 0
BACK PAIN: 0
CONSTIPATION: 0
EYE PAIN: 0
NAUSEA: 0
ABDOMINAL PAIN: 0
SHORTNESS OF BREATH: 0
COUGH: 0
VOMITING: 0
COLOR CHANGE: 0
WHEEZING: 0

## 2018-09-24 NOTE — PROGRESS NOTES
Subjective:      Patient ID: Susi Gordon is a 75 y.o. male. HPI  Patient is a 75 y.o. male in no acute distress and alert and oriented to person, place and time. History  5/22/2018 Prostate biopsy, two cores [place] 3+4. PSA 14.59     8/2018 Started Daria Gaytan in conjunction with IMRT radiation. Today  Here today for a one month follow-up for prostate cancer and for firmagon injection. He tolerated his last injection with minimal complaints. He does have a raised area where he received the last injection. He denies hot flashes, pain at the injection site, weight loss, nausea or vomiting. He starts radiation tomorrow with Dr. Gretchen Anderson. Past Medical History:   Diagnosis Date    COPD (chronic obstructive pulmonary disease) ([facility] [address]     Essential hypertension     Wears glasses      Past Surgical History:   Procedure Laterality Date    COLONOSCOPY  2015    Normal    HERNIA REPAIR Right 1975     Family History   Problem Relation Age of Onset    No Known Problems Father     Cancer Mother      Current Outpatient Prescriptions on File Prior to Visit   Medication Sig Dispense Refill    albuterol sulfate  (90 Base) MCG/ACT inhaler Inhale 2 puffs into the lungs every 6 hours as needed for Wheezing or Shortness of Breath 1 Inhaler 11    amLODIPine (NORVASC) 10 MG tablet Take 10 mg by mouth daily      lisinopril (PRINIVIL;ZESTRIL) 20 MG tablet Take 40 mg by mouth daily      tiotropium (SPIRIVA RESPIMAT) 1.25 MCG/ACT AERS inhaler Inhale 2 puffs into the lungs daily      cetirizine (ZYRTEC) 10 MG tablet Take 10 mg by mouth daily      albuterol (PROVENTIL) (2.5 MG/3ML) 0.083% nebulizer solution Take 3 mLs by nebulization every 6 hours as needed for Wheezing Nebulizer treatment 120 each 0    montelukast (SINGULAIR) 10 MG tablet Take 10 mg by mouth nightly       No current facility-administered medications on file prior to visit.         Outpatient Encounter Prescriptions as of 9/24/2018   Medication Sig Dispense Refill    albuterol sulfate  (90 Base) MCG/ACT inhaler Inhale 2 puffs into the lungs every 6 hours as needed for Wheezing or Shortness of Breath 1 Inhaler 11    amLODIPine (NORVASC) 10 MG tablet Take 10 mg by mouth daily      lisinopril (PRINIVIL;ZESTRIL) 20 MG tablet Take 40 mg by mouth daily      tiotropium (SPIRIVA RESPIMAT) 1.25 MCG/ACT AERS inhaler Inhale 2 puffs into the lungs daily      cetirizine (ZYRTEC) 10 MG tablet Take 10 mg by mouth daily      albuterol (PROVENTIL) (2.5 MG/3ML) 0.083% nebulizer solution Take 3 mLs by nebulization every 6 hours as needed for Wheezing Nebulizer treatment 120 each 0    montelukast (SINGULAIR) 10 MG tablet Take 10 mg by mouth nightly       No facility-administered encounter medications on file as of 9/24/2018. Patient has no known allergies. History   Smoking Status    Former Smoker    Types: Cigarettes    Quit date: 7/30/2013   Smokeless Tobacco    Never Used       History   Alcohol Use No     Comment: rare       Vitals:    09/24/18 0950   BP: (!) 140/78       Constitutional: Patient in no acute distress; Neuro: alert and oriented to person place and time. Psych: Mood and affect normal.  Skin: Normal  Lungs: Respiratory effort normal  Cardiovascular:  Normal peripheral pulses  Abdomen: Soft, non-tender, non-distended with no CVA, flank pain, hepatosplenomegaly or hernia. Kidneys normal.  Bladder non-tender and not distended. Lymphatics: no palpable lymphadenopathy  Penis normal  Urethral meatus normal  Scrotal exam normal  Testicles normal bilaterally    Lab Results   Component Value Date    PSA 14.59 03/01/2018     Lab Results   Component Value Date    BUN 39 03/01/2018     Lab Results   Component Value Date    CREATININE 1.63 03/01/2018       No results found for this visit on 09/24/18. Review of Systems   Constitutional: Negative for appetite change, chills and fever. Eyes: Negative for pain, redness and visual disturbance. Respiratory: Negative for cough, shortness of breath and wheezing. Cardiovascular: Negative for chest pain and leg swelling. Gastrointestinal: Negative for abdominal pain, constipation, nausea and vomiting. Genitourinary: Negative for decreased urine volume, difficulty urinating, discharge, dysuria, enuresis, flank pain, frequency, hematuria, penile pain, scrotal swelling, testicular pain and urgency. Musculoskeletal: Negative for back pain, joint swelling and myalgias. Skin: Negative for color change, rash and wound. Neurological: Negative for dizziness, tremors and numbness. Hematological: Negative for adenopathy. Does not bruise/bleed easily. Objective:   Physical Exam    Assessment:       Diagnosis Orders   1. Prostate cancer ([facility] [address] / Biopsy 2018  ME INJECTION,THERAP/PROPH/DIAGNOST, IM OR SUBCUT    degarelix (FIRMAGON) 80 mg subcutaneous           Plan:      He will return in one month for repeat Firmagon injection.          EVER Salas - CNP

## 2018-10-05 ENCOUNTER — TELEPHONE (OUTPATIENT)
Dept: UROLOGY | Age: 68
End: 2018-10-05

## 2018-10-08 NOTE — TELEPHONE ENCOUNTER
Patient called and reports that he is having some blood tinged seepage from injection site on abdomen. Denies fever/chills.  Agreeable to scheduling

## 2018-10-10 ENCOUNTER — OFFICE VISIT (OUTPATIENT)
Dept: UROLOGY | Age: 68
End: 2018-10-10
Payer: MEDICARE

## 2018-10-10 VITALS
HEIGHT: 66 IN | WEIGHT: 168 LBS | DIASTOLIC BLOOD PRESSURE: 80 MMHG | SYSTOLIC BLOOD PRESSURE: 130 MMHG | BODY MASS INDEX: 27 KG/M2

## 2018-10-10 DIAGNOSIS — C61 PROSTATE CANCER (HCC): Primary | ICD-10-CM

## 2018-10-10 DIAGNOSIS — L02.211 ABDOMINAL WALL ABSCESS: ICD-10-CM

## 2018-10-10 PROCEDURE — 99214 OFFICE O/P EST MOD 30 MIN: CPT | Performed by: UROLOGY

## 2018-10-10 RX ORDER — CEPHALEXIN 500 MG/1
500 CAPSULE ORAL 3 TIMES DAILY
Qty: 30 CAPSULE | Refills: 0 | Status: SHIPPED | OUTPATIENT
Start: 2018-10-10 | End: 2018-10-20

## 2018-10-10 ASSESSMENT — ENCOUNTER SYMPTOMS
EYE PAIN: 0
EYE REDNESS: 0
COUGH: 0
WHEEZING: 0
ABDOMINAL PAIN: 0
VOMITING: 0
NAUSEA: 0
SHORTNESS OF BREATH: 0
COLOR CHANGE: 0
BACK PAIN: 0

## 2018-10-25 ENCOUNTER — HOSPITAL ENCOUNTER (OUTPATIENT)
Age: 68
Setting detail: SPECIMEN
Discharge: HOME | End: 2018-10-25
Payer: MEDICARE

## 2018-10-25 ENCOUNTER — OFFICE VISIT (OUTPATIENT)
Dept: UROLOGY | Age: 68
End: 2018-10-25
Payer: MEDICARE

## 2018-10-25 VITALS
HEART RATE: 64 BPM | HEIGHT: 66 IN | SYSTOLIC BLOOD PRESSURE: 140 MMHG | BODY MASS INDEX: 26.68 KG/M2 | TEMPERATURE: 98 F | WEIGHT: 166 LBS | DIASTOLIC BLOOD PRESSURE: 80 MMHG | RESPIRATION RATE: 20 BRPM

## 2018-10-25 VITALS — WEIGHT: 166 LBS | BODY MASS INDEX: 26.79 KG/M2 | DIASTOLIC BLOOD PRESSURE: 68 MMHG | SYSTOLIC BLOOD PRESSURE: 126 MMHG

## 2018-10-25 DIAGNOSIS — L02.211 ABDOMINAL WALL ABSCESS: Primary | ICD-10-CM

## 2018-10-25 DIAGNOSIS — C61 PROSTATE CANCER (HCC): Primary | ICD-10-CM

## 2018-10-25 DIAGNOSIS — L02.211 ABDOMINAL WALL ABSCESS: ICD-10-CM

## 2018-10-25 PROCEDURE — 87070 CULTURE OTHR SPECIMN AEROBIC: CPT

## 2018-10-25 PROCEDURE — 10060 I&D ABSCESS SIMPLE/SINGLE: CPT | Performed by: NURSE PRACTITIONER

## 2018-10-25 PROCEDURE — 87205 SMEAR GRAM STAIN: CPT

## 2018-10-25 PROCEDURE — 99214 OFFICE O/P EST MOD 30 MIN: CPT | Performed by: NURSE PRACTITIONER

## 2018-10-25 PROCEDURE — 96402 CHEMO HORMON ANTINEOPL SQ/IM: CPT | Performed by: NURSE PRACTITIONER

## 2018-10-25 RX ORDER — SULFAMETHOXAZOLE AND TRIMETHOPRIM 800; 160 MG/1; MG/1
1 TABLET ORAL 2 TIMES DAILY
Qty: 20 TABLET | Refills: 0 | Status: ON HOLD | OUTPATIENT
Start: 2018-10-25 | End: 2018-10-31 | Stop reason: HOSPADM

## 2018-10-25 ASSESSMENT — ENCOUNTER SYMPTOMS
EYE PAIN: 0
COLOR CHANGE: 0
COUGH: 0
SHORTNESS OF BREATH: 0
VOMITING: 0
WHEEZING: 0
NAUSEA: 0
EYE REDNESS: 0
CONSTIPATION: 0
BACK PAIN: 0
ABDOMINAL PAIN: 0

## 2018-10-25 NOTE — PROGRESS NOTES
nebulization every 6 hours as needed for Wheezing Nebulizer treatment 120 each 0    montelukast (SINGULAIR) 10 MG tablet Take 10 mg by mouth nightly       No current facility-administered medications on file prior to visit. Outpatient Encounter Prescriptions as of 10/25/2018   Medication Sig Dispense Refill    albuterol sulfate  (90 Base) MCG/ACT inhaler Inhale 2 puffs into the lungs every 6 hours as needed for Wheezing or Shortness of Breath 1 Inhaler 11    amLODIPine (NORVASC) 10 MG tablet Take 10 mg by mouth daily      lisinopril (PRINIVIL;ZESTRIL) 20 MG tablet Take 40 mg by mouth daily      tiotropium (SPIRIVA RESPIMAT) 1.25 MCG/ACT AERS inhaler Inhale 2 puffs into the lungs daily      cetirizine (ZYRTEC) 10 MG tablet Take 10 mg by mouth daily      albuterol (PROVENTIL) (2.5 MG/3ML) 0.083% nebulizer solution Take 3 mLs by nebulization every 6 hours as needed for Wheezing Nebulizer treatment 120 each 0    montelukast (SINGULAIR) 10 MG tablet Take 10 mg by mouth nightly      [] degarelix (FIRMAGON) 80 mg subcutaneous        No facility-administered encounter medications on file as of 10/25/2018. Patient has no known allergies. History   Smoking Status    Former Smoker    Types: Cigarettes    Quit date: 2013   Smokeless Tobacco    Never Used       History   Alcohol Use No     Comment: rare       Vitals:    10/25/18 0848   BP: (!) 140/80   Pulse: 64   Resp: 20   Temp: 98 °F (36.7 °C)       Constitutional: Patient in no acute distress; Neuro: alert and oriented to person place and time. Psych: Mood and affect normal.  Skin: Normal  Lungs: Respiratory effort normal  Cardiovascular:  Normal peripheral pulses  Abdomen: There is a 4 cm fluctuant, indurated erythematous area to the right lower quadrant with spontaneous serous sanguinous drainage. Soft, non-tender, non-distended with no CVA, flank pain, hepatosplenomegaly or hernia.   Kidneys normal.  Bladder non-tender and

## 2018-10-26 ENCOUNTER — OFFICE VISIT (OUTPATIENT)
Dept: UROLOGY | Age: 68
End: 2018-10-26

## 2018-10-26 VITALS — WEIGHT: 166 LBS | SYSTOLIC BLOOD PRESSURE: 114 MMHG | DIASTOLIC BLOOD PRESSURE: 62 MMHG | BODY MASS INDEX: 26.79 KG/M2

## 2018-10-26 DIAGNOSIS — L02.211 ABDOMINAL WALL ABSCESS: Primary | ICD-10-CM

## 2018-10-26 PROCEDURE — 99024 POSTOP FOLLOW-UP VISIT: CPT | Performed by: NURSE PRACTITIONER

## 2018-10-27 LAB
CULTURE: ABNORMAL
DIRECT EXAM: ABNORMAL
DIRECT EXAM: ABNORMAL
Lab: ABNORMAL
SPECIMEN DESCRIPTION: ABNORMAL
STATUS: ABNORMAL

## 2018-10-29 ENCOUNTER — APPOINTMENT (OUTPATIENT)
Dept: GENERAL RADIOLOGY | Age: 68
DRG: 191 | End: 2018-10-29
Payer: MEDICARE

## 2018-10-29 ENCOUNTER — HOSPITAL ENCOUNTER (INPATIENT)
Age: 68
LOS: 2 days | Discharge: HOME | DRG: 191 | End: 2018-10-31
Attending: EMERGENCY MEDICINE | Admitting: INTERNAL MEDICINE
Payer: MEDICARE

## 2018-10-29 DIAGNOSIS — J44.1 COPD EXACERBATION (HCC): Primary | ICD-10-CM

## 2018-10-29 DIAGNOSIS — N17.9 AKI (ACUTE KIDNEY INJURY) (HCC): ICD-10-CM

## 2018-10-29 LAB
ABSOLUTE EOS #: 0.65 K/UL (ref 0–0.44)
ABSOLUTE IMMATURE GRANULOCYTE: 0.04 K/UL (ref 0–0.3)
ABSOLUTE LYMPH #: 0.77 K/UL (ref 1.1–3.7)
ABSOLUTE MONO #: 0.72 K/UL (ref 0.1–1.2)
ALBUMIN SERPL-MCNC: 4 G/DL (ref 3.5–5.2)
ALBUMIN/GLOBULIN RATIO: 1.1 (ref 1–2.5)
ALP BLD-CCNC: 83 U/L (ref 40–129)
ALT SERPL-CCNC: 28 U/L (ref 5–41)
ANION GAP SERPL CALCULATED.3IONS-SCNC: 11 MMOL/L (ref 9–17)
AST SERPL-CCNC: 25 U/L
BASOPHILS # BLD: 0 % (ref 0–2)
BASOPHILS ABSOLUTE: <0.03 K/UL (ref 0–0.2)
BILIRUB SERPL-MCNC: 0.22 MG/DL (ref 0.3–1.2)
BUN BLDV-MCNC: 51 MG/DL (ref 8–23)
BUN/CREAT BLD: 21 (ref 9–20)
CALCIUM SERPL-MCNC: 9.7 MG/DL (ref 8.6–10.4)
CHLORIDE BLD-SCNC: 101 MMOL/L (ref 98–107)
CO2: 24 MMOL/L (ref 20–31)
CREAT SERPL-MCNC: 2.38 MG/DL (ref 0.7–1.2)
DIFFERENTIAL TYPE: ABNORMAL
EKG ATRIAL RATE: 84 BPM
EKG P AXIS: 83 DEGREES
EKG P-R INTERVAL: 158 MS
EKG Q-T INTERVAL: 362 MS
EKG QRS DURATION: 88 MS
EKG QTC CALCULATION (BAZETT): 427 MS
EKG R AXIS: -18 DEGREES
EKG T AXIS: 79 DEGREES
EKG VENTRICULAR RATE: 84 BPM
EOSINOPHILS RELATIVE PERCENT: 10 % (ref 1–4)
GFR AFRICAN AMERICAN: 33 ML/MIN
GFR NON-AFRICAN AMERICAN: 27 ML/MIN
GFR SERPL CREATININE-BSD FRML MDRD: ABNORMAL ML/MIN/{1.73_M2}
GFR SERPL CREATININE-BSD FRML MDRD: ABNORMAL ML/MIN/{1.73_M2}
GLUCOSE BLD-MCNC: 124 MG/DL (ref 70–99)
HCT VFR BLD CALC: 32.8 % (ref 40.7–50.3)
HEMOGLOBIN: 10.9 G/DL (ref 13–17)
IMMATURE GRANULOCYTES: 1 %
LACTIC ACID: 1.1 MMOL/L (ref 0.5–2.2)
LYMPHOCYTES # BLD: 11 % (ref 24–43)
MCH RBC QN AUTO: 32.5 PG (ref 25.2–33.5)
MCHC RBC AUTO-ENTMCNC: 33.2 G/DL (ref 28.4–34.8)
MCV RBC AUTO: 97.9 FL (ref 82.6–102.9)
MONOCYTES # BLD: 11 % (ref 3–12)
NRBC AUTOMATED: 0 PER 100 WBC
PDW BLD-RTO: 13.2 % (ref 11.8–14.4)
PLATELET # BLD: 177 K/UL (ref 138–453)
PLATELET ESTIMATE: ABNORMAL
PMV BLD AUTO: 10.9 FL (ref 8.1–13.5)
POTASSIUM SERPL-SCNC: 5.3 MMOL/L (ref 3.7–5.3)
RBC # BLD: 3.35 M/UL (ref 4.21–5.77)
RBC # BLD: ABNORMAL 10*6/UL
SEG NEUTROPHILS: 67 % (ref 36–65)
SEGMENTED NEUTROPHILS ABSOLUTE COUNT: 4.54 K/UL (ref 1.5–8.1)
SODIUM BLD-SCNC: 136 MMOL/L (ref 135–144)
TOTAL PROTEIN: 7.5 G/DL (ref 6.4–8.3)
TROPONIN INTERP: NORMAL
TROPONIN T: <0.03 NG/ML
WBC # BLD: 6.7 K/UL (ref 3.5–11.3)
WBC # BLD: ABNORMAL 10*3/UL

## 2018-10-29 PROCEDURE — 80053 COMPREHEN METABOLIC PANEL: CPT

## 2018-10-29 PROCEDURE — 83605 ASSAY OF LACTIC ACID: CPT

## 2018-10-29 PROCEDURE — 6360000002 HC RX W HCPCS: Performed by: EMERGENCY MEDICINE

## 2018-10-29 PROCEDURE — 2580000003 HC RX 258: Performed by: EMERGENCY MEDICINE

## 2018-10-29 PROCEDURE — 6370000000 HC RX 637 (ALT 250 FOR IP): Performed by: INTERNAL MEDICINE

## 2018-10-29 PROCEDURE — G0378 HOSPITAL OBSERVATION PER HR: HCPCS

## 2018-10-29 PROCEDURE — 87205 SMEAR GRAM STAIN: CPT

## 2018-10-29 PROCEDURE — 6360000002 HC RX W HCPCS: Performed by: INTERNAL MEDICINE

## 2018-10-29 PROCEDURE — 1200000000 HC SEMI PRIVATE

## 2018-10-29 PROCEDURE — 36415 COLL VENOUS BLD VENIPUNCTURE: CPT

## 2018-10-29 PROCEDURE — 94760 N-INVAS EAR/PLS OXIMETRY 1: CPT

## 2018-10-29 PROCEDURE — 87040 BLOOD CULTURE FOR BACTERIA: CPT

## 2018-10-29 PROCEDURE — 96372 THER/PROPH/DIAG INJ SC/IM: CPT

## 2018-10-29 PROCEDURE — 71045 X-RAY EXAM CHEST 1 VIEW: CPT

## 2018-10-29 PROCEDURE — 94664 DEMO&/EVAL PT USE INHALER: CPT

## 2018-10-29 PROCEDURE — 94640 AIRWAY INHALATION TREATMENT: CPT

## 2018-10-29 PROCEDURE — 85025 COMPLETE CBC W/AUTO DIFF WBC: CPT

## 2018-10-29 PROCEDURE — 84484 ASSAY OF TROPONIN QUANT: CPT

## 2018-10-29 PROCEDURE — 99285 EMERGENCY DEPT VISIT HI MDM: CPT

## 2018-10-29 PROCEDURE — 87070 CULTURE OTHR SPECIMN AEROBIC: CPT

## 2018-10-29 PROCEDURE — 6370000000 HC RX 637 (ALT 250 FOR IP): Performed by: EMERGENCY MEDICINE

## 2018-10-29 PROCEDURE — 93005 ELECTROCARDIOGRAM TRACING: CPT

## 2018-10-29 PROCEDURE — 96374 THER/PROPH/DIAG INJ IV PUSH: CPT

## 2018-10-29 RX ORDER — AMLODIPINE BESYLATE 10 MG/1
10 TABLET ORAL DAILY
Status: DISCONTINUED | OUTPATIENT
Start: 2018-10-29 | End: 2018-10-31 | Stop reason: HOSPADM

## 2018-10-29 RX ORDER — MONTELUKAST SODIUM 10 MG/1
10 TABLET ORAL NIGHTLY
Status: DISCONTINUED | OUTPATIENT
Start: 2018-10-29 | End: 2018-10-31 | Stop reason: HOSPADM

## 2018-10-29 RX ORDER — ALBUTEROL SULFATE 2.5 MG/3ML
2.5 SOLUTION RESPIRATORY (INHALATION) EVERY 6 HOURS PRN
Status: DISCONTINUED | OUTPATIENT
Start: 2018-10-29 | End: 2018-10-29

## 2018-10-29 RX ORDER — ALBUTEROL SULFATE 2.5 MG/3ML
2.5 SOLUTION RESPIRATORY (INHALATION) 4 TIMES DAILY
Status: DISCONTINUED | OUTPATIENT
Start: 2018-10-29 | End: 2018-10-30

## 2018-10-29 RX ORDER — ALBUTEROL SULFATE 2.5 MG/3ML
2.5 SOLUTION RESPIRATORY (INHALATION) EVERY 4 HOURS PRN
Status: DISCONTINUED | OUTPATIENT
Start: 2018-10-29 | End: 2018-10-31 | Stop reason: HOSPADM

## 2018-10-29 RX ORDER — ALBUTEROL SULFATE 2.5 MG/3ML
2.5 SOLUTION RESPIRATORY (INHALATION)
Status: DISCONTINUED | OUTPATIENT
Start: 2018-10-29 | End: 2018-10-29

## 2018-10-29 RX ORDER — IPRATROPIUM BROMIDE AND ALBUTEROL SULFATE 2.5; .5 MG/3ML; MG/3ML
1 SOLUTION RESPIRATORY (INHALATION) ONCE
Status: COMPLETED | OUTPATIENT
Start: 2018-10-29 | End: 2018-10-29

## 2018-10-29 RX ORDER — ALBUTEROL SULFATE 2.5 MG/3ML
2.5 SOLUTION RESPIRATORY (INHALATION)
Status: DISPENSED | OUTPATIENT
Start: 2018-10-29 | End: 2018-10-29

## 2018-10-29 RX ORDER — SODIUM CHLORIDE 0.9 % (FLUSH) 0.9 %
10 SYRINGE (ML) INJECTION EVERY 12 HOURS SCHEDULED
Status: DISCONTINUED | OUTPATIENT
Start: 2018-10-29 | End: 2018-10-31 | Stop reason: HOSPADM

## 2018-10-29 RX ORDER — ONDANSETRON 2 MG/ML
4 INJECTION INTRAMUSCULAR; INTRAVENOUS EVERY 6 HOURS PRN
Status: DISCONTINUED | OUTPATIENT
Start: 2018-10-29 | End: 2018-10-31 | Stop reason: HOSPADM

## 2018-10-29 RX ORDER — CETIRIZINE HYDROCHLORIDE 10 MG/1
5 TABLET ORAL DAILY
Status: DISCONTINUED | OUTPATIENT
Start: 2018-10-29 | End: 2018-10-31 | Stop reason: HOSPADM

## 2018-10-29 RX ORDER — SODIUM CHLORIDE 9 MG/ML
INJECTION, SOLUTION INTRAVENOUS CONTINUOUS
Status: DISCONTINUED | OUTPATIENT
Start: 2018-10-29 | End: 2018-10-31 | Stop reason: HOSPADM

## 2018-10-29 RX ORDER — DOXYCYCLINE HYCLATE 100 MG/1
100 CAPSULE ORAL EVERY 12 HOURS
Status: DISCONTINUED | OUTPATIENT
Start: 2018-10-29 | End: 2018-10-31 | Stop reason: HOSPADM

## 2018-10-29 RX ORDER — FAMOTIDINE 20 MG/1
20 TABLET, FILM COATED ORAL DAILY
Status: DISCONTINUED | OUTPATIENT
Start: 2018-10-29 | End: 2018-10-31 | Stop reason: HOSPADM

## 2018-10-29 RX ORDER — METHYLPREDNISOLONE SODIUM SUCCINATE 40 MG/ML
40 INJECTION, POWDER, LYOPHILIZED, FOR SOLUTION INTRAMUSCULAR; INTRAVENOUS DAILY
Status: DISCONTINUED | OUTPATIENT
Start: 2018-10-30 | End: 2018-10-31 | Stop reason: HOSPADM

## 2018-10-29 RX ORDER — SODIUM CHLORIDE 0.9 % (FLUSH) 0.9 %
10 SYRINGE (ML) INJECTION PRN
Status: DISCONTINUED | OUTPATIENT
Start: 2018-10-29 | End: 2018-10-31 | Stop reason: HOSPADM

## 2018-10-29 RX ORDER — METHYLPREDNISOLONE SODIUM SUCCINATE 125 MG/2ML
125 INJECTION, POWDER, LYOPHILIZED, FOR SOLUTION INTRAMUSCULAR; INTRAVENOUS ONCE
Status: COMPLETED | OUTPATIENT
Start: 2018-10-29 | End: 2018-10-29

## 2018-10-29 RX ADMIN — DOXYCYCLINE HYCLATE 100 MG: 100 CAPSULE ORAL at 10:22

## 2018-10-29 RX ADMIN — ALBUTEROL SULFATE 2.5 MG: 2.5 SOLUTION RESPIRATORY (INHALATION) at 07:14

## 2018-10-29 RX ADMIN — DOXYCYCLINE HYCLATE 100 MG: 100 CAPSULE ORAL at 20:45

## 2018-10-29 RX ADMIN — SODIUM CHLORIDE: 9 INJECTION, SOLUTION INTRAVENOUS at 18:58

## 2018-10-29 RX ADMIN — SODIUM CHLORIDE: 9 INJECTION, SOLUTION INTRAVENOUS at 06:40

## 2018-10-29 RX ADMIN — IPRATROPIUM BROMIDE AND ALBUTEROL SULFATE 1 AMPULE: .5; 3 SOLUTION RESPIRATORY (INHALATION) at 07:09

## 2018-10-29 RX ADMIN — ALBUTEROL SULFATE 2.5 MG: 2.5 SOLUTION RESPIRATORY (INHALATION) at 23:29

## 2018-10-29 RX ADMIN — METHYLPREDNISOLONE SODIUM SUCCINATE 125 MG: 125 INJECTION, POWDER, FOR SOLUTION INTRAMUSCULAR; INTRAVENOUS at 07:06

## 2018-10-29 RX ADMIN — ENOXAPARIN SODIUM 30 MG: 30 INJECTION SUBCUTANEOUS at 10:23

## 2018-10-29 RX ADMIN — ALBUTEROL SULFATE 2.5 MG: 2.5 SOLUTION RESPIRATORY (INHALATION) at 16:12

## 2018-10-29 RX ADMIN — ALBUTEROL SULFATE 2.5 MG: 2.5 SOLUTION RESPIRATORY (INHALATION) at 11:19

## 2018-10-29 RX ADMIN — FAMOTIDINE 20 MG: 20 TABLET ORAL at 10:22

## 2018-10-29 RX ADMIN — MONTELUKAST SODIUM 10 MG: 10 TABLET, FILM COATED ORAL at 20:45

## 2018-10-29 ASSESSMENT — ENCOUNTER SYMPTOMS
CHEST TIGHTNESS: 1
FACIAL SWELLING: 0
DIARRHEA: 0
WHEEZING: 1
SHORTNESS OF BREATH: 1
ABDOMINAL PAIN: 1
BACK PAIN: 0
COUGH: 1

## 2018-10-29 NOTE — ED PROVIDER NOTES
RESULTS     EKG: All EKG's are interpreted by the Emergency Department Physician who either signs or Co-signsthis chart in the absence of a cardiologist.    Sinus rhythm. Rate of 84. Normal intervals. No acute ischemic change. RADIOLOGY:   Non-plain filmimages such as CT, Ultrasound and MRI are read by the radiologist. Plain radiographic images are visualized and preliminarily interpreted by the emergency physician with the below findings:        Interpretation per the Radiologist below, if available at the time ofthis note:    XR CHEST PORTABLE   Final Result   No acute cardiopulmonary pathology. No focal airspace consolidation. COPD. ED BEDSIDE ULTRASOUND:   Performed by ED Physician - none    LABS:  Labs Reviewed   SPUTUM CULTURE - Abnormal; Notable for the following:        Result Value    Direct Exam < 10 EPITHELIAL CELLS/LPF (*)     Direct Exam <10 NEUTROPHILS/LPF (*)     Direct Exam RARE GRAM POSITIVE COCCI (*)     Direct Exam RARE GRAM NEGATIVE RODS (*)     All other components within normal limits   CBC WITH AUTO DIFFERENTIAL - Abnormal; Notable for the following:     RBC 3.35 (*)     Hemoglobin 10.9 (*)     Hematocrit 32.8 (*)     Seg Neutrophils 67 (*)     Lymphocytes 11 (*)     Eosinophils % 10 (*)     Immature Granulocytes 1 (*)     Absolute Lymph # 0.77 (*)     Absolute Eos # 0.65 (*)     All other components within normal limits   COMPREHENSIVE METABOLIC PANEL W/ REFLEX TO MG FOR LOW K - Abnormal; Notable for the following:     Glucose 124 (*)     BUN 51 (*)     CREATININE 2.38 (*)     Bun/Cre Ratio 21 (*)     Total Bilirubin 0.22 (*)     GFR Non- 27 (*)     GFR  33 (*)     All other components within normal limits   CULTURE BLOOD #1   CULTURE BLOOD #2   TROPONIN   LACTIC ACID       All other labs were within normal range or not returned as of this dictation.     EMERGENCY DEPARTMENT COURSE and DIFFERENTIAL DIAGNOSIS/MDM:   Vitals:    Vitals: 10/29/18 0815 10/29/18 0830 10/29/18 0918 10/29/18 1415   BP: (!) 153/86 (!) 137/95 139/75 (!) 132/49   Pulse: 85 89 92 92   Resp: 25 20 20 20   Temp:   98.8 °F (37.1 °C) 99.2 °F (37.3 °C)   TempSrc:   Temporal Temporal   SpO2: 97% 94% 94% 96%   Weight:   164 lb 14.4 oz (74.8 kg)    Height:   5' 6\" (1.676 m)        The patient presents with examination and history consistent with an acute COPD exacerbation. He was hypoxic and On arrival.  He was requiring supplemental oxygen, which he does not have a history of. He had diffuse in-store next-door wheezing and diminished air movement in all lung fields. IV was established. He was given Solu-Medrol. Blood cultures were obtained. Patient was also given nebulized breathing treatments. After treatment, he had some improvement of his aeration, but did have persistent wheezing and bronchospasm. X-ray shows no evidence of focal infiltrate. Screening labs do show renal insufficiency which does appear to be acute on chronic. Patient was hydrated. Given his oxygen requirement and persistent bronchospasm, so the patient is going to require admission. MDM        CONSULTS:  IP CONSULT TO CASE MANAGEMENT  IP CONSULT TO UROLOGY    PROCEDURES:  Unless otherwise noted below, none     Procedures    FINAL IMPRESSION      1. COPD exacerbation ([facility] [address]        DISPOSITION/PLAN   DISPOSITION        PATIENT REFERRED TO:  Cheryl Rizo MD  [place] [address], [facility]  [place] [address]  [phone]            DISCHARGE MEDICATIONS:  Current Discharge Medication List             (Please note that portions of this note were completed with a voice recognitionprogram.  Efforts were made to edit the dictations but occasionally words are mis-transcribed. )    Ines Barth MD (electronically signed)  Attending Emergency Physician         Ines Barth MD  10/29/18 4669

## 2018-10-29 NOTE — ED NOTES
Dr Amanda Puri office called and informed pt was in ER and not going to make 8am appt for wound check     Yanira Clement RN  10/29/18 0159

## 2018-10-29 NOTE — PROGRESS NOTES
[facility] Department of Pharmacy   Pharmacy Renal Adjustment Note    Vahid Hazel is a 75 y.o. male. Pharmacist assessment of renally cleared medications. Recent Labs      10/29/18   0640   CREATININE  2.38*     Estimated Creatinine Clearance: 27 mL/min (A) (based on SCr of 2.38 mg/dL (H)). Height:   Ht Readings from Last 1 Encounters:   10/29/18 5' 6\" (1.676 m)     Weight:  Wt Readings from Last 1 Encounters:   10/29/18 164 lb 14.4 oz (74.8 kg)       The following medication has been adjusted based upon renal function:             Lovenox adjusted to 30 mg sq daily for Crcl < 30 ml/min.         Kelley Colmenares,10/29/2018,9:27 AM

## 2018-10-29 NOTE — CONSULTS
(1.676 m) 168 lb (76.2 kg)     Constitutional: Patient resting comfortably, in no acute distress. Neuro: Alert and oriented to person place and time. Cranial nerves grossly intact. Psych: Mood and affect normal.  Skin: Warm, dry, non-diaphoretic  HEENT: normocephalic, atraumatic  Lymphatics: No palpable lymphadenopathy  Lungs: Respiratory effort normal, unlabored  Cardiovascular:  Normal peripheral pulses  Abdomen: Soft, tender, non-distended with no organomegaly or palpable masses. : No CVA tenderness bilat. Bladder non-tender and not distended. Genital/Rectal: not done  Extremities: Calves are non-tender, equal in circumference bilat without swelling, warmth, or erythema. LABS:  Recent Labs      10/29/18   0640   WBC  6.7   HGB  10.9*   HCT  32.8*   MCV  97.9   PLT  177     Recent Labs      10/29/18   0640   NA  136   K  5.3   CL  101   CO2  24   BUN  51*   CREATININE  2.38*     Lab Results   Component Value Date    PSA 14.59 03/01/2018       Urinalysis: No results for input(s): COLORU, PHUR, LABCAST, WBCUA, RBCUA, MUCUS, TRICHOMONAS, YEAST, BACTERIA, CLARITYU, SPECGRAV, LEUKOCYTESUR, UROBILINOGEN, [place] in the last 72 hours. Invalid input(s): NITRATE, GLUCOSEUKETONESUAMORPHOUS     Additional Lab/culture results:  none    Imaging Results:  none    ASSESSMENT AND PLAN:  Impression:    Patient Active Problem List   Diagnosis    Chronic obstructive pulmonary disease ([facility] [address]    Acute nontraumatic kidney injury ([facility] [address]    Acute respiratory failure with hypoxia ([address]    Prostate cancer ([address] / Biopsy 2018    Abdominal wall abscess    COPD exacerbation ([address]       Plan:   There is small amount of serosang. Drainage noted on gauze dressing. There is no surrounding erythema or induration.      Packing removed. Repacked the wound with iodoform and was covered with gauze. He tolerated without complaints. He is afebrile. Wound culture is negative. Finish bactrim as prescribed.

## 2018-10-29 NOTE — PROGRESS NOTES
RESPIRATORY ASSESSMENT PROTOCOL                                                                                              Patient Name: [facility] Room#: [id number] : 1950     Admitting diagnosis: COPD exacerbation ([facility] [J44.1]       Medical History:   Past Medical History:   Diagnosis Date    COPD (chronic obstructive pulmonary disease) ([facility]     Essential hypertension     Prostate cancer ([place] [address]     Wears glasses        PATIENT ASSESSMENT    LABORATORY DATA  Hematology:   Lab Results   Component Value Date    WBC 6.7 10/29/2018    RBC 3.35 10/29/2018    HGB 10.9 10/29/2018    HCT 32.8 10/29/2018     10/29/2018     Chemistry:  No results found for: PHART, TEU3TSJ, PO2ART, F6GKUCBA, PTR5PEF, PBEA    VITALS  Pulse: 92   Resp: 20  BP: 139/75  SpO2: 94 % O2 Device: None (Room air)  Temp: 98.8 °F (37.1 °C)    SKIN COLOR  [x] Normal  [] Pale  [] Dusky  [] Cyanotic    RESPIRATORY PATTERN  [] Normal  [x] Dyspnea  [] Cheyne-Jensen  [] Kussmaul  [] Biots    AMBULATORY  [x] Yes  [] No  [] With Assistance      Patient Acuity 0 1 2 3 4 Score   Level of Concious (LOC) [x]  Alert & Oriented or Pt normal LOC []  Confused;follows directions []  Confused & uncooper-ative []  Obtunded []  Comatose 0   Respiratory Rate  (RR) []  Reg. rate & pattern. 12 - 20 bpm  []  Increased RR. Greater than 20 bpm   [x]  SOB w/ exertion or RR greater than 24 bpm []  Access- ory muscle use at rest. Abn.  resp. []  SOB at rest.   2   Bilateral Breath Sounds (BBS) []  Clear []  Diminish-ed bases  []  Diminish-ed t/o, or rales   []  Sporadic, scattered wheezes or rhonchi [x]  Persistentwheezes and, or absent BBS 4   Cough []  Strong, effective, & non-prod. [x]  Effective & prod. Less than 25 ml (2 TBSP) over past 24 hrs []  Ineffective & non-prod to less than 25 ML over past 24 hrs []  Ineffective and, or greater than 25 ml sputum prod. past 24 hrs. []  Nonspon- taneous;  Requires suctioning 1   Pulmonary History  (PULM HX) []  No smoking and no chronic pulmonaryhistory []  Former smoker. Quit over 12 mos. ago []  Current smoker or quit w/ in 12 mos []  Pulm. History and, or 20 pk/yr smoking hx [x]  Admitted w/ acute pulm. dx and, or has been admitted w/ pulm. dx 2 or more times over past 12 mos 4   Surgical History this Admit  (SURG HX) [x]  No surgery []  General surgery []  Lower abdominal []  Thoracic or upper abdominal   []  Thoracic w/ pulm. disease 0   Chest X-Ray (CXR)/CT Scan [x]  Clear or not applicable []  Not available []  Atelect- asis or pleural effusions []  Localized infiltrate or pulm. edema []  Con-solidated Infiltrates, bilateral, or in more than 1 lobe 0   Slow or Forced VC, FEV1 OR PEFR (PULM FXN)  [x]  80% or greater, or not indicated []  Pt. unable to perform []  FEV1 or PEFR or VC 51-79%. []  FEV1 or PEFR or VC  30-49%   []  FEV1 or PEFR or VC less than 30%   0   TOTAL ACUITY: 11       CARE PLAN    If Acuity Level is 2, 3, or 4 in any of the following:    [] BILATERAL BREATH SOUNDS (BBS)     [] PULMONARY HISTORY (PULM HX)  [] PULMONARY FUNCTION (PULM FX)    Goal: Improve respiratory functions in patients with airway disease and decrease WOB    [x] AEROSOL PROTOCOL    Total Acuity:   16-32  []  Secondary Assessment in 24 hrs Total Acuity:  9-15  [x]  Secondary Assessment in 24 hrs Total Acuity:  4-8  []  Secondary Assessment in 48 hrs Total Acuity:  0-3  []  Secondary Assessment in 72 hrs   HHN AEROSOL THERAPY with  [physician-ordered bronchodilator(s)] q 4 & Albuterol PRN q2 hrs. Breath-Actuated Neb if BBS Acuity = 4, and pt. can use MP. Notify physician if condition deteriorates. HHN AEROSOL THERAPY with  [physician-ordered bronchodilator(s)]  QID and Albuterol PRN q4 hrs. Breath-Actuated Neb if BBS Acuity = 4, and pt. can use MP. Notify physician if condition deteriorates. MDI THERAPY with  2 actuations of [physician-ordered bronchodilator(s)] via spacer TID Albuterol and PRNq4 hrs.    If unable to

## 2018-10-30 LAB
ANION GAP SERPL CALCULATED.3IONS-SCNC: 11 MMOL/L (ref 9–17)
BUN BLDV-MCNC: 46 MG/DL (ref 8–23)
BUN/CREAT BLD: 23 (ref 9–20)
CALCIUM SERPL-MCNC: 9.4 MG/DL (ref 8.6–10.4)
CHLORIDE BLD-SCNC: 106 MMOL/L (ref 98–107)
CO2: 22 MMOL/L (ref 20–31)
CREAT SERPL-MCNC: 1.97 MG/DL (ref 0.7–1.2)
GFR AFRICAN AMERICAN: 41 ML/MIN
GFR NON-AFRICAN AMERICAN: 34 ML/MIN
GFR SERPL CREATININE-BSD FRML MDRD: ABNORMAL ML/MIN/{1.73_M2}
GFR SERPL CREATININE-BSD FRML MDRD: ABNORMAL ML/MIN/{1.73_M2}
GLUCOSE BLD-MCNC: 111 MG/DL (ref 70–99)
POTASSIUM SERPL-SCNC: 5.6 MMOL/L (ref 3.7–5.3)
SODIUM BLD-SCNC: 139 MMOL/L (ref 135–144)

## 2018-10-30 PROCEDURE — 6360000002 HC RX W HCPCS: Performed by: INTERNAL MEDICINE

## 2018-10-30 PROCEDURE — 96372 THER/PROPH/DIAG INJ SC/IM: CPT

## 2018-10-30 PROCEDURE — 94640 AIRWAY INHALATION TREATMENT: CPT

## 2018-10-30 PROCEDURE — 94760 N-INVAS EAR/PLS OXIMETRY 1: CPT

## 2018-10-30 PROCEDURE — 6370000000 HC RX 637 (ALT 250 FOR IP): Performed by: INTERNAL MEDICINE

## 2018-10-30 PROCEDURE — G0378 HOSPITAL OBSERVATION PER HR: HCPCS

## 2018-10-30 PROCEDURE — 36415 COLL VENOUS BLD VENIPUNCTURE: CPT

## 2018-10-30 PROCEDURE — 2580000003 HC RX 258: Performed by: EMERGENCY MEDICINE

## 2018-10-30 PROCEDURE — 94664 DEMO&/EVAL PT USE INHALER: CPT

## 2018-10-30 PROCEDURE — 96376 TX/PRO/DX INJ SAME DRUG ADON: CPT

## 2018-10-30 PROCEDURE — 1200000000 HC SEMI PRIVATE

## 2018-10-30 PROCEDURE — 80048 BASIC METABOLIC PNL TOTAL CA: CPT

## 2018-10-30 RX ORDER — IPRATROPIUM BROMIDE AND ALBUTEROL SULFATE 2.5; .5 MG/3ML; MG/3ML
1 SOLUTION RESPIRATORY (INHALATION) 4 TIMES DAILY
Status: DISCONTINUED | OUTPATIENT
Start: 2018-10-30 | End: 2018-10-31

## 2018-10-30 RX ORDER — BUDESONIDE AND FORMOTEROL FUMARATE DIHYDRATE 160; 4.5 UG/1; UG/1
2 AEROSOL RESPIRATORY (INHALATION) 2 TIMES DAILY
COMMUNITY
End: 2022-09-13

## 2018-10-30 RX ADMIN — FAMOTIDINE 20 MG: 20 TABLET ORAL at 08:17

## 2018-10-30 RX ADMIN — MONTELUKAST SODIUM 10 MG: 10 TABLET, FILM COATED ORAL at 20:31

## 2018-10-30 RX ADMIN — IPRATROPIUM BROMIDE AND ALBUTEROL SULFATE 1 AMPULE: .5; 3 SOLUTION RESPIRATORY (INHALATION) at 15:33

## 2018-10-30 RX ADMIN — ALBUTEROL SULFATE 2.5 MG: 2.5 SOLUTION RESPIRATORY (INHALATION) at 06:09

## 2018-10-30 RX ADMIN — DOXYCYCLINE HYCLATE 100 MG: 100 CAPSULE ORAL at 08:17

## 2018-10-30 RX ADMIN — CETIRIZINE HYDROCHLORIDE 5 MG: 10 TABLET, FILM COATED ORAL at 08:18

## 2018-10-30 RX ADMIN — IPRATROPIUM BROMIDE AND ALBUTEROL SULFATE 1 AMPULE: .5; 3 SOLUTION RESPIRATORY (INHALATION) at 19:23

## 2018-10-30 RX ADMIN — ENOXAPARIN SODIUM 30 MG: 30 INJECTION SUBCUTANEOUS at 08:24

## 2018-10-30 RX ADMIN — AMLODIPINE BESYLATE 10 MG: 10 TABLET ORAL at 08:17

## 2018-10-30 RX ADMIN — MOMETASONE FUROATE AND FORMOTEROL FUMARATE DIHYDRATE 2 PUFF: 200; 5 AEROSOL RESPIRATORY (INHALATION) at 19:23

## 2018-10-30 RX ADMIN — DOXYCYCLINE HYCLATE 100 MG: 100 CAPSULE ORAL at 20:31

## 2018-10-30 RX ADMIN — METHYLPREDNISOLONE SODIUM SUCCINATE 40 MG: 40 INJECTION, POWDER, LYOPHILIZED, FOR SOLUTION INTRAMUSCULAR; INTRAVENOUS at 08:27

## 2018-10-30 RX ADMIN — SODIUM CHLORIDE: 9 INJECTION, SOLUTION INTRAVENOUS at 14:30

## 2018-10-30 RX ADMIN — SODIUM CHLORIDE: 9 INJECTION, SOLUTION INTRAVENOUS at 04:47

## 2018-10-30 RX ADMIN — MOMETASONE FUROATE AND FORMOTEROL FUMARATE DIHYDRATE 2 PUFF: 200; 5 AEROSOL RESPIRATORY (INHALATION) at 11:10

## 2018-10-30 RX ADMIN — IPRATROPIUM BROMIDE AND ALBUTEROL SULFATE 1 AMPULE: .5; 3 SOLUTION RESPIRATORY (INHALATION) at 11:10

## 2018-10-30 NOTE — PLAN OF CARE
Problem: SAFETY  Goal: Free from accidental physical injury  Outcome: Ongoing  Independent in room . Problem: DAILY CARE  Goal: Daily care needs are met  Outcome: Ongoing  Independent with ADLs    Problem: PAIN  Goal: Patient's pain/discomfort is manageable  Outcome: Ongoing  Continuing to monitor/assess pain at least every 4 hours. Informed patient of adverse complications of uncontrolled pain. Verbalized understanding and agrees to report increases in pain level. Educated that pain medication addiction risk is greatly reduced when medication is used for acute pain on a short term basis. Plan patient's day so activities occur at the peak level of medication. Will be medicated before procedures and activities that increase pain so to prevent uncontrollable pain. Will provide distractions such as TV, music, reading, and/or visitors. Will inform physician of ineffective pain control. Will continue to monitor and medicate as ordered. Problem: SKIN INTEGRITY  Goal: Skin integrity is maintained or improved  Outcome: Ongoing  Monitoring for skin breakdown with assessments and as needed. Repositioning and turning patient at least every two hours as patient allows. Heels elevated off of bed. Encouraging good oral intake.            Problem: DISCHARGE BARRIERS  Goal: Patient's continuum of care needs are met  Outcome: Ongoing  Return to home at discharge

## 2018-10-30 NOTE — PROGRESS NOTES
RESPIRATORY ASSESSMENT PROTOCOL                                                                                              Patient Name: Shayna Zelaya Room#: [id number] : 1950     Admitting diagnosis: COPD exacerbation ([place] [address] [J44.1]       Medical History:   Past Medical History:   Diagnosis Date    COPD (chronic obstructive pulmonary disease) ([place] [address]     Essential hypertension     Prostate cancer ([place] [address].)     Wears glasses        PATIENT ASSESSMENT    LABORATORY DATA  Hematology:   Lab Results   Component Value Date    WBC 6.7 10/29/2018    RBC 3.35 10/29/2018    HGB 10.9 10/29/2018    HCT 32.8 10/29/2018     10/29/2018     Chemistry:  No results found for: PHART, IST6BDL, PO2ART, X3FHYAOS, SUE1MGO, PBEA    Blood Culture:   Sputum Culture:     VITALS  Pulse: 88   Resp: 22  BP: (!) 150/75  SpO2: 96 % O2 Device: None (Room air)  Temp: 98.7 °F (37.1 °C)  Comment:     SKIN COLOR  [x] Normal  [] Pale  [] Dusky  [] Cyanotic      RESPIRATORY PATTERN  [] Normal  [x] Dyspnea  [] Cheyne-Jensen  [] Kussmaul  [] Biots    AMBULATORY  [x] Yes  [] No  [] With Assistance    PEAK FLOW  Predicted:     Personal Best:        VITAL CAPACITY  Predicted value:  ml  Actual Value:  ml  30% of Predicted:  Ml    Patient Acuity 0 1 2 3 4 Score   Level of Concious (LOC) [x]  Alert & Oriented or Pt normal LOC []  Confused;follows directions []  Confused & uncooper-ative []  Obtunded []  Comatose 0   Respiratory Rate  (RR) []  Reg. rate & pattern. 12 - 20 bpm  []  Increased RR. Greater than 20 bpm   [x]  SOB w/ exertion or RR greater than 24 bpm []  Access- ory muscle use at rest. Abn.  resp. []  SOB at rest.   2   Bilateral Breath Sounds (BBS) []  Clear []  Diminish-ed bases  []  Diminish-ed t/o, or rales   []  Sporadic, scattered wheezes or rhonchi [x]  Persistentwheezes and, or absent BBS 4   Cough [x]  Strong, effective, & non-prod. []  Effective & prod.  Less than 25 ml (2 TBSP) over past 24 hrs []  Ineffective & non-prod

## 2018-10-30 NOTE — H&P
Acute respiratory failure with hypoxia (HCC) 2018    Chronic obstructive pulmonary disease ([facility] 2018       Plan:     · This patient requires inpatient admission because of COPD exacerbation  · Factors affecting the medical complexity of this patient include Principal Problem:  ·   COPD exacerbation ([facility]  · Active Problems:  ·   Prostate cancer ([facility] / Biopsy 2018  · Resolved Problems:  ·   * No resolved hospital problems. *  ·   · Estimated length of stay is 3 days  · Steroids, antibiotics, nebs. · High risk medication monitorin    CORE MEASURES  Core measures including DVT prophylaxis, Code Status, Nutrition, Therapy Options, chart reviewed and advance directives reviewed at this visit.     Ronald Wilks MD  10/30/2018, 1:17 PM

## 2018-10-30 NOTE — PROGRESS NOTES
Urology Progress Note    Subjective:   Denies abdominal pain, fevers. Objective:  BUN 46, creatinine 1.97. Bactrim changed to doxycycline appropriately based on renal function. Afebrile      REVIEW OF SYSTEMS:  Constitutional: Negative for fever, chills and unexpected weight change. Respiratory: Negative for shortness of breath and wheezing. Cardiovascular: Negative for chest pain and palpitations. Gastrointestinal: Negative for nausea, vomiting or abdominal pain. Endocrine: Negative for polydipsia and polyuria. Genitourinary: Negative for dysuria or gross hematuria. Musculoskeletal: Negative for myalgias and joint swelling. Skin: Negative for rash and wound. Neurological: Negative for dizziness and headaches. Hematological: Negative for adenopathy. Does not bruise/bleed easily. PHYSICAL EXAM:  Constitutional: Patient resting comfortably, in no acute distress. Neuro: Alert and oriented to person place and time. Cranial nerves grossly intact. Psych: Mood and affect normal.  Skin: Warm, dry, non-diaphoretic  HEENT: normocephalic, atraumatic  Lymphatics: No palpable lymphadenopathy  Lungs: Respiratory effort normal, unlabored  Cardiovascular:  Normal peripheral pulses  Abdomen: Soft, non-tender, non-distended with no organomegaly or palpable masses. : No CVA tenderness bilat. Bladder non-tender and not distended. Genital/Rectal: not done  Extremities: Calves are non-tender, equal in circumference bilat without swelling, warmth, or erythema.      Patient Vitals for the past 24 hrs:   BP Temp Temp src Pulse Resp SpO2 Weight   10/30/18 1432 (!) 146/75 98.9 °F (37.2 °C) Temporal 96 20 97 % -   10/30/18 1110 - - - - - 97 % -   10/30/18 0657 (!) 144/80 98.7 °F (37.1 °C) Temporal 92 24 97 % -   10/30/18 0315 - - - - - - 171 lb 1.6 oz (77.6 kg)   10/30/18 0000 - - - 88 - - -   10/29/18 2345 (!) 150/75 98.7 °F (37.1 °C) Temporal 88 22 96 % -   10/29/18 2331 - - - - - 96 % -   10/29/18 1855 123/70 98.4 °F (36.9 °C) Temporal 94 20 96 % -       Intake/Output Summary (Last 24 hours) at 10/30/18 1656  Last data filed at 10/30/18 1430   Gross per 24 hour   Intake             2590 ml   Output                0 ml   Net             2590 ml       Recent Labs      10/29/18   0640   WBC  6.7   HGB  10.9*   HCT  32.8*   MCV  97.9   PLT  177     Recent Labs      10/29/18   0640  10/30/18   0807   NA  136  139   K  5.3  5.6*   CL  101  106   CO2  24  22   BUN  51*  46*   CREATININE  2.38*  1.97*       No results for input(s): COLORU, PHUR, LABCAST, WBCUA, RBCUA, MUCUS, TRICHOMONAS, YEAST, BACTERIA, CLARITYU, SPECGRAV, LEUKOCYTESUR, UROBILINOGEN, BILIRUBINUR, BLOODU in the last 72 hours. Invalid input(s): NITRATE, GLUCOSEUKETONESUAMORPHOUS    Additional Lab/culture results: none    Interval Imaging Findings: none    Assessment:    Patient Active Problem List   Diagnosis    Chronic obstructive pulmonary disease ([address]    Acute nontraumatic kidney injury ([address]    Acute respiratory failure with hypoxia ([address]    Prostate cancer ([address] / Biopsy 2018    Abdominal wall abscess    COPD exacerbation ([address]       Plan: No drainage on dressing when removed. There is no surrounding erythema or induration to site of I&D. Packing removed. Repacked the wound with iodoform and was covered with gauze. He tolerated without complaints. He is afebrile. Wound culture is negative. Finish doxycycline as prescribed.        I have discussed the care of this patient including pertinent history and exam findings, lab and imaging results, assessment, orders and plan as documented above with Jim Mims MD.    ------------------------------------------------  Susan Armstrong  4:56 PM 10/30/2018

## 2018-10-30 NOTE — PLAN OF CARE
Problem: SAFETY  Goal: Free from accidental physical injury  Outcome: Ongoing  No accidents or injuries noted at this time. Problem: DAILY CARE  Goal: Daily care needs are met  Outcome: Ongoing  Daily cares assessed and needs met according to patient condition. Patient reminded to ask for help when needed. Problem: PAIN  Goal: Patient's pain/discomfort is manageable  Outcome: Ongoing  Denies any pain    Problem: SKIN INTEGRITY  Goal: Skin integrity is maintained or improved  Outcome: Ongoing  Skin assessment performed at regular intervals. No redness or open areas noted. Patient reminded to turn and relieve areas known to breakdown. Continue to monitor. Problem: KNOWLEDGE DEFICIT  Goal: Patient/S.O. demonstrates understanding of disease process, treatment plan, medications, and discharge instructions. Outcome: Ongoing  Continuing to monitoring patient's understanding and compliance with care plan. Patient demonstrates an understanding of the disease process, treatment plan, medications and discharge instructions. Continue to update at regular intervals.

## 2018-10-31 VITALS
SYSTOLIC BLOOD PRESSURE: 158 MMHG | HEART RATE: 80 BPM | TEMPERATURE: 97.6 F | HEIGHT: 66 IN | BODY MASS INDEX: 27.83 KG/M2 | WEIGHT: 173.2 LBS | RESPIRATION RATE: 18 BRPM | DIASTOLIC BLOOD PRESSURE: 99 MMHG | OXYGEN SATURATION: 96 %

## 2018-10-31 PROBLEM — N17.9 AKI (ACUTE KIDNEY INJURY) (HCC): Status: ACTIVE | Noted: 2018-10-31

## 2018-10-31 LAB
ANION GAP SERPL CALCULATED.3IONS-SCNC: 11 MMOL/L (ref 9–17)
BUN BLDV-MCNC: 43 MG/DL (ref 8–23)
BUN/CREAT BLD: 23 (ref 9–20)
CALCIUM SERPL-MCNC: 9.7 MG/DL (ref 8.6–10.4)
CHLORIDE BLD-SCNC: 103 MMOL/L (ref 98–107)
CO2: 24 MMOL/L (ref 20–31)
CREAT SERPL-MCNC: 1.85 MG/DL (ref 0.7–1.2)
CULTURE: ABNORMAL
DIRECT EXAM: ABNORMAL
GFR AFRICAN AMERICAN: 44 ML/MIN
GFR NON-AFRICAN AMERICAN: 37 ML/MIN
GFR SERPL CREATININE-BSD FRML MDRD: ABNORMAL ML/MIN/{1.73_M2}
GFR SERPL CREATININE-BSD FRML MDRD: ABNORMAL ML/MIN/{1.73_M2}
GLUCOSE BLD-MCNC: 97 MG/DL (ref 70–99)
Lab: ABNORMAL
POTASSIUM SERPL-SCNC: 5.2 MMOL/L (ref 3.7–5.3)
SODIUM BLD-SCNC: 138 MMOL/L (ref 135–144)
SPECIMEN DESCRIPTION: ABNORMAL
STATUS: ABNORMAL

## 2018-10-31 PROCEDURE — 6370000000 HC RX 637 (ALT 250 FOR IP): Performed by: INTERNAL MEDICINE

## 2018-10-31 PROCEDURE — 90686 IIV4 VACC NO PRSV 0.5 ML IM: CPT | Performed by: INTERNAL MEDICINE

## 2018-10-31 PROCEDURE — 94640 AIRWAY INHALATION TREATMENT: CPT

## 2018-10-31 PROCEDURE — 80048 BASIC METABOLIC PNL TOTAL CA: CPT

## 2018-10-31 PROCEDURE — G0378 HOSPITAL OBSERVATION PER HR: HCPCS

## 2018-10-31 PROCEDURE — 96376 TX/PRO/DX INJ SAME DRUG ADON: CPT

## 2018-10-31 PROCEDURE — 96372 THER/PROPH/DIAG INJ SC/IM: CPT

## 2018-10-31 PROCEDURE — 94664 DEMO&/EVAL PT USE INHALER: CPT

## 2018-10-31 PROCEDURE — 6360000002 HC RX W HCPCS: Performed by: INTERNAL MEDICINE

## 2018-10-31 PROCEDURE — G0008 ADMIN INFLUENZA VIRUS VAC: HCPCS | Performed by: INTERNAL MEDICINE

## 2018-10-31 PROCEDURE — 36415 COLL VENOUS BLD VENIPUNCTURE: CPT

## 2018-10-31 PROCEDURE — 2580000003 HC RX 258: Performed by: EMERGENCY MEDICINE

## 2018-10-31 RX ORDER — CARVEDILOL 3.12 MG/1
3.12 TABLET ORAL 2 TIMES DAILY
Qty: 60 TABLET | Refills: 3 | Status: SHIPPED | OUTPATIENT
Start: 2018-10-31 | End: 2018-11-27 | Stop reason: DRUGHIGH

## 2018-10-31 RX ORDER — ALBUTEROL SULFATE 2.5 MG/3ML
2.5 SOLUTION RESPIRATORY (INHALATION) EVERY 4 HOURS PRN
Qty: 120 EACH | Refills: 3 | Status: SHIPPED | OUTPATIENT
Start: 2018-10-31 | End: 2018-12-19 | Stop reason: SDUPTHER

## 2018-10-31 RX ORDER — IPRATROPIUM BROMIDE AND ALBUTEROL SULFATE 2.5; .5 MG/3ML; MG/3ML
1 SOLUTION RESPIRATORY (INHALATION) 3 TIMES DAILY
Status: DISCONTINUED | OUTPATIENT
Start: 2018-10-31 | End: 2018-10-31 | Stop reason: HOSPADM

## 2018-10-31 RX ORDER — PREDNISONE 10 MG/1
TABLET ORAL
Qty: 30 TABLET | Refills: 0 | Status: SHIPPED | OUTPATIENT
Start: 2018-10-31 | End: 2018-11-28 | Stop reason: ALTCHOICE

## 2018-10-31 RX ORDER — DOXYCYCLINE HYCLATE 100 MG/1
100 CAPSULE ORAL EVERY 12 HOURS
Qty: 10 CAPSULE | Refills: 0 | Status: SHIPPED | OUTPATIENT
Start: 2018-10-31 | End: 2018-11-05

## 2018-10-31 RX ADMIN — CETIRIZINE HYDROCHLORIDE 5 MG: 10 TABLET, FILM COATED ORAL at 08:21

## 2018-10-31 RX ADMIN — AMLODIPINE BESYLATE 10 MG: 10 TABLET ORAL at 08:21

## 2018-10-31 RX ADMIN — ENOXAPARIN SODIUM 40 MG: 40 INJECTION SUBCUTANEOUS at 08:21

## 2018-10-31 RX ADMIN — FAMOTIDINE 20 MG: 20 TABLET ORAL at 08:21

## 2018-10-31 RX ADMIN — SODIUM CHLORIDE: 9 INJECTION, SOLUTION INTRAVENOUS at 00:38

## 2018-10-31 RX ADMIN — INFLUENZA A VIRUS A/MICHIGAN/45/2015 X-275 (H1N1) ANTIGEN (FORMALDEHYDE INACTIVATED), INFLUENZA A VIRUS A/SINGAPORE/INFIMH-16-0019/2016 IVR-186 (H3N2) ANTIGEN (FORMALDEHYDE INACTIVATED), INFLUENZA B VIRUS B/PHUKET/3073/2013 ANTIGEN (FORMALDEHYDE INACTIVATED), AND INFLUENZA B VIRUS B/MARYLAND/15/2016 BX-69A ANTIGEN (FORMALDEHYDE INACTIVATED) 0.5 ML: 15; 15; 15; 15 INJECTION, SUSPENSION INTRAMUSCULAR at 18:37

## 2018-10-31 RX ADMIN — DOXYCYCLINE HYCLATE 100 MG: 100 CAPSULE ORAL at 08:21

## 2018-10-31 RX ADMIN — METHYLPREDNISOLONE SODIUM SUCCINATE 40 MG: 40 INJECTION, POWDER, LYOPHILIZED, FOR SOLUTION INTRAMUSCULAR; INTRAVENOUS at 08:20

## 2018-10-31 RX ADMIN — IPRATROPIUM BROMIDE AND ALBUTEROL SULFATE 1 AMPULE: .5; 3 SOLUTION RESPIRATORY (INHALATION) at 05:07

## 2018-10-31 RX ADMIN — IPRATROPIUM BROMIDE AND ALBUTEROL SULFATE 1 AMPULE: .5; 3 SOLUTION RESPIRATORY (INHALATION) at 15:09

## 2018-10-31 RX ADMIN — IPRATROPIUM BROMIDE AND ALBUTEROL SULFATE 1 AMPULE: .5; 3 SOLUTION RESPIRATORY (INHALATION) at 10:02

## 2018-10-31 RX ADMIN — MOMETASONE FUROATE AND FORMOTEROL FUMARATE DIHYDRATE 2 PUFF: 200; 5 AEROSOL RESPIRATORY (INHALATION) at 10:05

## 2018-10-31 RX ADMIN — SODIUM CHLORIDE: 9 INJECTION, SOLUTION INTRAVENOUS at 12:16

## 2018-10-31 NOTE — PROGRESS NOTES
Progress Note    SUBJECTIVE:  FU related to  / sob better. Off oxygen. Walked  No sputum. Kidney function better. OBJECTIVE:    Vitals:   TEMPERATURE:  Current - Temp: 97.6 °F (36.4 °C);  Max - Temp  Av.8 °F (36.6 °C)  Min: 97.3 °F (36.3 °C)  Max: 98.9 °F (37.2 °C)  RESPIRATIONS RANGE: Resp  Av  Min: 18  Max: 22  PULSE RANGE: Pulse  Av.6  Min: 75  Max: 96  BLOOD PRESSURE RANGE:  Systolic (43FGL), KNB:293 , Min:132 , DWU:046   ; Diastolic (13BRL), SOLIS:09, Min:72, Max:99    PULSE OXIMETRY RANGE: SpO2  Av.4 %  Min: 95 %  Max: 98 %  24HR INTAKE/OUTPUT:      Intake/Output Summary (Last 24 hours) at 10/31/18 0733  Last data filed at 10/31/18 0455   Gross per 24 hour   Intake             3088 ml   Output                0 ml   Net             3088 ml     -----------------------------------------------------------------  Exam:  General: A & O x3  HEENT: Supple neck & negative  Heart: Regular  Lungs: wheezes bilaterally  Abdomen: Normal & soft, No tenderness and BS normal  Extremities:  No edema   Neuro: NonFocal     -----------------------------------------------------------------  Diagnostic Data:  Lab Results   Component Value Date    WBC 6.7 10/29/2018    HGB 10.9 (L) 10/29/2018     10/29/2018       Lab Results   Component Value Date    BUN 43 (H) 10/31/2018    CREATININE 1.85 (H) 10/31/2018     10/31/2018    K 5.2 10/31/2018    CALCIUM 9.7 10/31/2018     10/31/2018    CO2 24 10/31/2018    LABGLOM 37 (L) 10/31/2018       No results found for: Sueellen Fus, EPITHUA, LEUKOCYTESUR, SPECGRAV, GLUCOSEU, KETUA, PROTEINU, HGBUR, CASTUA, CRYSTUA, BACTERIA, YEAST    Lab Results   Component Value Date    TROPONINT <0.03 10/29/2018    PROBNP 235 2018       Xr Chest Portable    Result Date: 10/29/2018  EXAMINATION: SINGLE XRAY VIEW OF THE CHEST 10/29/2018 6:37 am COMPARISON: 2018 HISTORY: ORDERING SYSTEM PROVIDED HISTORY: sob TECHNOLOGIST PROVIDED HISTORY: sob FINDINGS: Single

## 2018-10-31 NOTE — PROGRESS NOTES
than 25 ML over past 24 hrs []  Ineffective and, or greater than 25 ml sputum prod. past 24 hrs. []  Nonspon- taneous; Requires suctioning 1   Pulmonary History  (PULM HX) []  No smoking and no chronic pulmonaryhistory []  Former smoker. Quit over 12 mos. ago []  Current smoker or quit w/ in 12 mos []  Pulm. History and, or 20 pk/yr smoking hx [x]  Admitted w/ acute pulm. dx and, or has been admitted w/ pulm. dx 2 or more times over past 12 mos 4   Surgical History this Admit  (SURG HX) [x]  No surgery []  General surgery []  Lower abdominal []  Thoracic or upper abdominal   []  Thoracic w/ pulm. disease 0   Chest X-Ray (CXR)/CT Scan [x]  Clear or not applicable []  Not available []  Atelect- asis or pleural effusions []  Localized infiltrate or pulm. edema []  Con-solidated Infiltrates, bilateral, or in more than 1 lobe 0   Slow or Forced VC, FEV1 OR PEFR (PULM FXN)  [x]  80% or greater, or not indicated []  Pt. unable to perform []  FEV1 or PEFR or VC 51-79%. []  FEV1 or PEFR or VC  30-49%   []  FEV1 or PEFR or VC less than 30%   0   TOTAL ACUITY: 8       CARE PLAN    If Acuity Level is 2, 3, or 4 in any of the following:    [x] BILATERAL BREATH SOUNDS (BBS)     [x] PULMONARY HISTORY (PULM HX)  [] PULMONARY FUNCTION (PULM FX)    Goal: Improve respiratory functions in patients with airway disease and decrease WOB    [x] AEROSOL PROTOCOL    Total Acuity:   16-32  []  Secondary Assessment in 24 hrs Total Acuity:  9-15  []  Secondary Assessment in 24 hrs Total Acuity:  4-8  [x]  Secondary Assessment in 48 hrs Total Acuity:  0-3  []  Secondary Assessment in 72 hrs   HHN AEROSOL THERAPY with  [physician-ordered bronchodilator(s)] q 4 & Albuterol PRN q2 hrs. Breath-Actuated Neb if BBS Acuity = 4, and pt. can use MP. Notify physician if condition deteriorates. HHN AEROSOL THERAPY with  [physician-ordered bronchodilator(s)]  QID and Albuterol PRN q4 hrs.    Breath-Actuated Neb if BBS Acuity = 4, and pt. can use

## 2018-11-01 ENCOUNTER — CARE COORDINATION (OUTPATIENT)
Dept: CASE MANAGEMENT | Age: 68
End: 2018-11-01

## 2018-11-01 ENCOUNTER — TELEPHONE (OUTPATIENT)
Dept: PHARMACY | Facility: CLINIC | Age: 68
End: 2018-11-01

## 2018-11-01 DIAGNOSIS — J44.1 COPD EXACERBATION (HCC): Primary | ICD-10-CM

## 2018-11-01 PROCEDURE — 1111F DSCHRG MED/CURRENT MED MERGE: CPT | Performed by: PHARMACIST

## 2018-11-03 LAB
CULTURE: NORMAL
CULTURE: NORMAL
Lab: NORMAL
Lab: NORMAL
SPECIMEN DESCRIPTION: NORMAL
SPECIMEN DESCRIPTION: NORMAL
STATUS: NORMAL
STATUS: NORMAL

## 2018-11-12 ENCOUNTER — HOSPITAL ENCOUNTER (OUTPATIENT)
Age: 68
Discharge: HOME | End: 2018-11-12
Payer: MEDICARE

## 2018-11-12 DIAGNOSIS — C61 PROSTATE CANCER (HCC): ICD-10-CM

## 2018-11-12 LAB — PROSTATE SPECIFIC ANTIGEN: 0.36 UG/L

## 2018-11-12 PROCEDURE — 36415 COLL VENOUS BLD VENIPUNCTURE: CPT

## 2018-11-12 PROCEDURE — 84153 ASSAY OF PSA TOTAL: CPT

## 2018-11-28 ENCOUNTER — OFFICE VISIT (OUTPATIENT)
Dept: UROLOGY | Age: 68
End: 2018-11-28
Payer: MEDICARE

## 2018-11-28 VITALS
DIASTOLIC BLOOD PRESSURE: 100 MMHG | WEIGHT: 174 LBS | BODY MASS INDEX: 27.97 KG/M2 | SYSTOLIC BLOOD PRESSURE: 182 MMHG | HEIGHT: 66 IN

## 2018-11-28 DIAGNOSIS — C61 PROSTATE CANCER (HCC): ICD-10-CM

## 2018-11-28 DIAGNOSIS — L02.211 ABDOMINAL WALL ABSCESS: Primary | ICD-10-CM

## 2018-11-28 PROCEDURE — 99214 OFFICE O/P EST MOD 30 MIN: CPT | Performed by: NURSE PRACTITIONER

## 2018-11-28 ASSESSMENT — ENCOUNTER SYMPTOMS
VOMITING: 0
ABDOMINAL PAIN: 0
CONSTIPATION: 0
WHEEZING: 0
NAUSEA: 0
SHORTNESS OF BREATH: 0
EYE REDNESS: 0
COLOR CHANGE: 0
BACK PAIN: 0
EYE PAIN: 0
COUGH: 0

## 2018-11-28 NOTE — PROGRESS NOTES
Diagnosis Orders   1. Abdominal wall abscess From injection  External Referral To Wound Clinic   2. Prostate cancer [facility]) / Biopsy 2018             Plan: We will order Eligard for patient instead of Lupron. We will call him when this is available. He will keep his follow-up appointment with radiation oncology this week. I would like to refer him to wound care for further evaluation of the nonhealing abdominal wall abscess. I do feel that this is a reaction to the [place] injection.         Reynaldo Araujo, APRN - CNP

## 2018-12-14 ENCOUNTER — HOSPITAL ENCOUNTER (INPATIENT)
Age: 68
LOS: 2 days | Discharge: HOME | DRG: 193 | End: 2018-12-16
Attending: EMERGENCY MEDICINE | Admitting: INTERNAL MEDICINE
Payer: MEDICARE

## 2018-12-14 ENCOUNTER — APPOINTMENT (OUTPATIENT)
Dept: CT IMAGING | Age: 68
DRG: 193 | End: 2018-12-14
Payer: MEDICARE

## 2018-12-14 ENCOUNTER — NURSE ONLY (OUTPATIENT)
Dept: UROLOGY | Age: 68
End: 2018-12-14
Payer: MEDICARE

## 2018-12-14 ENCOUNTER — APPOINTMENT (OUTPATIENT)
Dept: NON INVASIVE DIAGNOSTICS | Age: 68
DRG: 193 | End: 2018-12-14
Payer: MEDICARE

## 2018-12-14 ENCOUNTER — APPOINTMENT (OUTPATIENT)
Dept: GENERAL RADIOLOGY | Age: 68
DRG: 193 | End: 2018-12-14
Payer: MEDICARE

## 2018-12-14 VITALS
DIASTOLIC BLOOD PRESSURE: 113 MMHG | BODY MASS INDEX: 27.48 KG/M2 | WEIGHT: 171 LBS | HEIGHT: 66 IN | SYSTOLIC BLOOD PRESSURE: 215 MMHG

## 2018-12-14 DIAGNOSIS — J18.9 PNEUMONIA DUE TO ORGANISM: Primary | ICD-10-CM

## 2018-12-14 DIAGNOSIS — C61 PROSTATE CANCER (HCC): ICD-10-CM

## 2018-12-14 DIAGNOSIS — J44.1 COPD EXACERBATION (HCC): ICD-10-CM

## 2018-12-14 DIAGNOSIS — F41.1 ANXIETY STATE: ICD-10-CM

## 2018-12-14 DIAGNOSIS — I10 ESSENTIAL HYPERTENSION: ICD-10-CM

## 2018-12-14 DIAGNOSIS — J96.01 ACUTE RESPIRATORY FAILURE WITH HYPOXIA (HCC): ICD-10-CM

## 2018-12-14 DIAGNOSIS — I16.1 HYPERTENSIVE EMERGENCY: ICD-10-CM

## 2018-12-14 DIAGNOSIS — R06.02 SOB (SHORTNESS OF BREATH): Primary | ICD-10-CM

## 2018-12-14 PROBLEM — N18.30 STAGE 3 CHRONIC KIDNEY DISEASE (HCC): Status: ACTIVE | Noted: 2018-12-14

## 2018-12-14 PROBLEM — F41.9 ANXIETY: Status: ACTIVE | Noted: 2018-12-14

## 2018-12-14 LAB
ABSOLUTE EOS #: 0.5 K/UL (ref 0–0.44)
ABSOLUTE IMMATURE GRANULOCYTE: 0.06 K/UL (ref 0–0.3)
ABSOLUTE LYMPH #: 0.76 K/UL (ref 1.1–3.7)
ABSOLUTE MONO #: 1.13 K/UL (ref 0.1–1.2)
ANION GAP SERPL CALCULATED.3IONS-SCNC: 10 MMOL/L (ref 9–17)
BASOPHILS # BLD: 0 % (ref 0–2)
BASOPHILS ABSOLUTE: 0.05 K/UL (ref 0–0.2)
BUN BLDV-MCNC: 34 MG/DL (ref 8–23)
BUN/CREAT BLD: 27 (ref 9–20)
CALCIUM SERPL-MCNC: 9.6 MG/DL (ref 8.6–10.4)
CHLORIDE BLD-SCNC: 102 MMOL/L (ref 98–107)
CO2: 26 MMOL/L (ref 20–31)
CREAT SERPL-MCNC: 1.25 MG/DL (ref 0.7–1.2)
DIFFERENTIAL TYPE: ABNORMAL
EKG ATRIAL RATE: 94 BPM
EKG P AXIS: 81 DEGREES
EKG P-R INTERVAL: 160 MS
EKG Q-T INTERVAL: 366 MS
EKG QRS DURATION: 92 MS
EKG QTC CALCULATION (BAZETT): 457 MS
EKG R AXIS: -14 DEGREES
EKG T AXIS: 90 DEGREES
EKG VENTRICULAR RATE: 94 BPM
EOSINOPHILS RELATIVE PERCENT: 4 % (ref 1–4)
GFR AFRICAN AMERICAN: >60 ML/MIN
GFR NON-AFRICAN AMERICAN: 57 ML/MIN
GFR SERPL CREATININE-BSD FRML MDRD: ABNORMAL ML/MIN/{1.73_M2}
GFR SERPL CREATININE-BSD FRML MDRD: ABNORMAL ML/MIN/{1.73_M2}
GLUCOSE BLD-MCNC: 113 MG/DL (ref 70–99)
HCT VFR BLD CALC: 33.7 % (ref 40.7–50.3)
HEMOGLOBIN: 11.4 G/DL (ref 13–17)
IMMATURE GRANULOCYTES: 0 %
LACTIC ACID, SEPSIS WHOLE BLOOD: ABNORMAL MMOL/L (ref 0.5–1.9)
LACTIC ACID, SEPSIS WHOLE BLOOD: NORMAL MMOL/L (ref 0.5–1.9)
LACTIC ACID, SEPSIS: 1 MMOL/L (ref 0.5–1.9)
LACTIC ACID, SEPSIS: 2 MMOL/L (ref 0.5–1.9)
LV EF: 55 %
LVEF MODALITY: NORMAL
LYMPHOCYTES # BLD: 5 % (ref 24–43)
MCH RBC QN AUTO: 32.6 PG (ref 25.2–33.5)
MCHC RBC AUTO-ENTMCNC: 33.8 G/DL (ref 28.4–34.8)
MCV RBC AUTO: 96.3 FL (ref 82.6–102.9)
MONOCYTES # BLD: 8 % (ref 3–12)
NRBC AUTOMATED: 0 PER 100 WBC
PDW BLD-RTO: 14 % (ref 11.8–14.4)
PLATELET # BLD: 257 K/UL (ref 138–453)
PLATELET ESTIMATE: ABNORMAL
PMV BLD AUTO: 10 FL (ref 8.1–13.5)
POTASSIUM SERPL-SCNC: 4 MMOL/L (ref 3.7–5.3)
RBC # BLD: 3.5 M/UL (ref 4.21–5.77)
RBC # BLD: ABNORMAL 10*6/UL
SEG NEUTROPHILS: 83 % (ref 36–65)
SEGMENTED NEUTROPHILS ABSOLUTE COUNT: 11.62 K/UL (ref 1.5–8.1)
SODIUM BLD-SCNC: 138 MMOL/L (ref 135–144)
WBC # BLD: 14.1 K/UL (ref 3.5–11.3)
WBC # BLD: ABNORMAL 10*3/UL

## 2018-12-14 PROCEDURE — 6370000000 HC RX 637 (ALT 250 FOR IP): Performed by: EMERGENCY MEDICINE

## 2018-12-14 PROCEDURE — G8980 MOBILITY D/C STATUS: HCPCS

## 2018-12-14 PROCEDURE — 71046 X-RAY EXAM CHEST 2 VIEWS: CPT

## 2018-12-14 PROCEDURE — 6370000000 HC RX 637 (ALT 250 FOR IP): Performed by: INTERNAL MEDICINE

## 2018-12-14 PROCEDURE — 6360000002 HC RX W HCPCS: Performed by: EMERGENCY MEDICINE

## 2018-12-14 PROCEDURE — 85025 COMPLETE CBC W/AUTO DIFF WBC: CPT

## 2018-12-14 PROCEDURE — G8998 SWALLOW D/C STATUS: HCPCS

## 2018-12-14 PROCEDURE — 2000000000 HC ICU R&B

## 2018-12-14 PROCEDURE — 71260 CT THORAX DX C+: CPT

## 2018-12-14 PROCEDURE — G8979 MOBILITY GOAL STATUS: HCPCS

## 2018-12-14 PROCEDURE — 6370000000 HC RX 637 (ALT 250 FOR IP): Performed by: PHYSICIAN ASSISTANT

## 2018-12-14 PROCEDURE — 99213 OFFICE O/P EST LOW 20 MIN: CPT | Performed by: NURSE PRACTITIONER

## 2018-12-14 PROCEDURE — G8978 MOBILITY CURRENT STATUS: HCPCS

## 2018-12-14 PROCEDURE — 94640 AIRWAY INHALATION TREATMENT: CPT

## 2018-12-14 PROCEDURE — 87040 BLOOD CULTURE FOR BACTERIA: CPT

## 2018-12-14 PROCEDURE — 6360000002 HC RX W HCPCS: Performed by: INTERNAL MEDICINE

## 2018-12-14 PROCEDURE — 99285 EMERGENCY DEPT VISIT HI MDM: CPT

## 2018-12-14 PROCEDURE — G8996 SWALLOW CURRENT STATUS: HCPCS

## 2018-12-14 PROCEDURE — 2580000003 HC RX 258: Performed by: INTERNAL MEDICINE

## 2018-12-14 PROCEDURE — 2500000003 HC RX 250 WO HCPCS: Performed by: PHYSICIAN ASSISTANT

## 2018-12-14 PROCEDURE — 2700000000 HC OXYGEN THERAPY PER DAY

## 2018-12-14 PROCEDURE — 2580000003 HC RX 258: Performed by: EMERGENCY MEDICINE

## 2018-12-14 PROCEDURE — 83605 ASSAY OF LACTIC ACID: CPT

## 2018-12-14 PROCEDURE — 6360000004 HC RX CONTRAST MEDICATION: Performed by: EMERGENCY MEDICINE

## 2018-12-14 PROCEDURE — C8929 TTE W OR WO FOL WCON,DOPPLER: HCPCS

## 2018-12-14 PROCEDURE — 93005 ELECTROCARDIOGRAM TRACING: CPT

## 2018-12-14 PROCEDURE — 92610 EVALUATE SWALLOWING FUNCTION: CPT

## 2018-12-14 PROCEDURE — 36415 COLL VENOUS BLD VENIPUNCTURE: CPT

## 2018-12-14 PROCEDURE — 96361 HYDRATE IV INFUSION ADD-ON: CPT

## 2018-12-14 PROCEDURE — 6360000004 HC RX CONTRAST MEDICATION: Performed by: INTERNAL MEDICINE

## 2018-12-14 PROCEDURE — 80048 BASIC METABOLIC PNL TOTAL CA: CPT

## 2018-12-14 PROCEDURE — 96365 THER/PROPH/DIAG IV INF INIT: CPT

## 2018-12-14 PROCEDURE — 94664 DEMO&/EVAL PT USE INHALER: CPT

## 2018-12-14 RX ORDER — SODIUM CHLORIDE 0.9 % (FLUSH) 0.9 %
10 SYRINGE (ML) INJECTION EVERY 12 HOURS SCHEDULED
Status: DISCONTINUED | OUTPATIENT
Start: 2018-12-14 | End: 2018-12-16 | Stop reason: HOSPADM

## 2018-12-14 RX ORDER — CLONIDINE HYDROCHLORIDE 0.1 MG/1
0.1 TABLET ORAL ONCE
Status: COMPLETED | OUTPATIENT
Start: 2018-12-14 | End: 2018-12-14

## 2018-12-14 RX ORDER — SODIUM CHLORIDE 9 MG/ML
INJECTION, SOLUTION INTRAVENOUS CONTINUOUS
Status: DISCONTINUED | OUTPATIENT
Start: 2018-12-14 | End: 2018-12-14

## 2018-12-14 RX ORDER — ALBUTEROL SULFATE 2.5 MG/3ML
2.5 SOLUTION RESPIRATORY (INHALATION) ONCE
Status: COMPLETED | OUTPATIENT
Start: 2018-12-14 | End: 2018-12-14

## 2018-12-14 RX ORDER — IPRATROPIUM BROMIDE AND ALBUTEROL SULFATE 2.5; .5 MG/3ML; MG/3ML
1 SOLUTION RESPIRATORY (INHALATION) ONCE
Status: COMPLETED | OUTPATIENT
Start: 2018-12-14 | End: 2018-12-14

## 2018-12-14 RX ORDER — ALBUTEROL SULFATE 2.5 MG/3ML
2.5 SOLUTION RESPIRATORY (INHALATION) EVERY 4 HOURS PRN
Status: DISCONTINUED | OUTPATIENT
Start: 2018-12-14 | End: 2018-12-16 | Stop reason: HOSPADM

## 2018-12-14 RX ORDER — LISINOPRIL 10 MG/1
10 TABLET ORAL ONCE
Status: COMPLETED | OUTPATIENT
Start: 2018-12-14 | End: 2018-12-14

## 2018-12-14 RX ORDER — ONDANSETRON 2 MG/ML
4 INJECTION INTRAMUSCULAR; INTRAVENOUS EVERY 6 HOURS PRN
Status: DISCONTINUED | OUTPATIENT
Start: 2018-12-14 | End: 2018-12-16 | Stop reason: HOSPADM

## 2018-12-14 RX ORDER — IPRATROPIUM BROMIDE AND ALBUTEROL SULFATE 2.5; .5 MG/3ML; MG/3ML
1 SOLUTION RESPIRATORY (INHALATION)
Status: DISCONTINUED | OUTPATIENT
Start: 2018-12-14 | End: 2018-12-15

## 2018-12-14 RX ORDER — CARVEDILOL 6.25 MG/1
6.25 TABLET ORAL 2 TIMES DAILY
Status: DISCONTINUED | OUTPATIENT
Start: 2018-12-14 | End: 2018-12-15

## 2018-12-14 RX ORDER — AMLODIPINE BESYLATE 10 MG/1
10 TABLET ORAL DAILY
Status: DISCONTINUED | OUTPATIENT
Start: 2018-12-15 | End: 2018-12-16 | Stop reason: HOSPADM

## 2018-12-14 RX ORDER — ALPRAZOLAM 0.25 MG/1
0.25 TABLET ORAL 3 TIMES DAILY PRN
Status: DISCONTINUED | OUTPATIENT
Start: 2018-12-14 | End: 2018-12-16 | Stop reason: HOSPADM

## 2018-12-14 RX ORDER — ALBUTEROL SULFATE 2.5 MG/3ML
2.5 SOLUTION RESPIRATORY (INHALATION)
Status: DISCONTINUED | OUTPATIENT
Start: 2019-01-03 | End: 2018-12-14

## 2018-12-14 RX ORDER — ENALAPRILAT 2.5 MG/2ML
1.25 INJECTION INTRAVENOUS EVERY 6 HOURS PRN
Status: DISCONTINUED | OUTPATIENT
Start: 2018-12-14 | End: 2018-12-16 | Stop reason: HOSPADM

## 2018-12-14 RX ORDER — ALBUTEROL SULFATE 2.5 MG/3ML
2.5 SOLUTION RESPIRATORY (INHALATION)
Status: DISCONTINUED | OUTPATIENT
Start: 2018-12-14 | End: 2018-12-14

## 2018-12-14 RX ORDER — FAMOTIDINE 20 MG/1
20 TABLET, FILM COATED ORAL 2 TIMES DAILY
Status: DISCONTINUED | OUTPATIENT
Start: 2018-12-14 | End: 2018-12-16 | Stop reason: HOSPADM

## 2018-12-14 RX ORDER — ALPRAZOLAM 0.25 MG/1
0.25 TABLET ORAL ONCE
Status: COMPLETED | OUTPATIENT
Start: 2018-12-14 | End: 2018-12-14

## 2018-12-14 RX ORDER — LEVOFLOXACIN 5 MG/ML
750 INJECTION, SOLUTION INTRAVENOUS ONCE
Status: COMPLETED | OUTPATIENT
Start: 2018-12-14 | End: 2018-12-14

## 2018-12-14 RX ORDER — MONTELUKAST SODIUM 10 MG/1
10 TABLET ORAL NIGHTLY
Status: DISCONTINUED | OUTPATIENT
Start: 2018-12-15 | End: 2018-12-16 | Stop reason: HOSPADM

## 2018-12-14 RX ORDER — SODIUM CHLORIDE 9 MG/ML
INJECTION, SOLUTION INTRAVENOUS CONTINUOUS
Status: DISCONTINUED | OUTPATIENT
Start: 2018-12-14 | End: 2018-12-14 | Stop reason: SDUPTHER

## 2018-12-14 RX ORDER — IPRATROPIUM BROMIDE AND ALBUTEROL SULFATE 2.5; .5 MG/3ML; MG/3ML
2 SOLUTION RESPIRATORY (INHALATION) ONCE
Status: COMPLETED | OUTPATIENT
Start: 2018-12-14 | End: 2018-12-14

## 2018-12-14 RX ORDER — CETIRIZINE HYDROCHLORIDE 10 MG/1
10 TABLET ORAL DAILY
Status: DISCONTINUED | OUTPATIENT
Start: 2018-12-15 | End: 2018-12-16 | Stop reason: HOSPADM

## 2018-12-14 RX ORDER — PREDNISONE 20 MG/1
60 TABLET ORAL ONCE
Status: COMPLETED | OUTPATIENT
Start: 2018-12-14 | End: 2018-12-14

## 2018-12-14 RX ORDER — SODIUM CHLORIDE 0.9 % (FLUSH) 0.9 %
10 SYRINGE (ML) INJECTION PRN
Status: DISCONTINUED | OUTPATIENT
Start: 2018-12-14 | End: 2018-12-16 | Stop reason: HOSPADM

## 2018-12-14 RX ORDER — PREDNISONE 20 MG/1
20 TABLET ORAL 2 TIMES DAILY WITH MEALS
Status: DISCONTINUED | OUTPATIENT
Start: 2018-12-14 | End: 2018-12-16 | Stop reason: HOSPADM

## 2018-12-14 RX ORDER — SODIUM CHLORIDE, SODIUM LACTATE, POTASSIUM CHLORIDE, CALCIUM CHLORIDE 600; 310; 30; 20 MG/100ML; MG/100ML; MG/100ML; MG/100ML
1000 INJECTION, SOLUTION INTRAVENOUS ONCE
Status: COMPLETED | OUTPATIENT
Start: 2018-12-14 | End: 2018-12-14

## 2018-12-14 RX ADMIN — CARVEDILOL 6.25 MG: 6.25 TABLET, FILM COATED ORAL at 20:23

## 2018-12-14 RX ADMIN — ALBUTEROL SULFATE 2.5 MG: 2.5 SOLUTION RESPIRATORY (INHALATION) at 11:50

## 2018-12-14 RX ADMIN — SODIUM CHLORIDE, POTASSIUM CHLORIDE, SODIUM LACTATE AND CALCIUM CHLORIDE 1000 ML: 600; 310; 30; 20 INJECTION, SOLUTION INTRAVENOUS at 10:41

## 2018-12-14 RX ADMIN — CEFTRIAXONE SODIUM 1 G: 1 INJECTION, POWDER, FOR SOLUTION INTRAMUSCULAR; INTRAVENOUS at 11:56

## 2018-12-14 RX ADMIN — FAMOTIDINE 20 MG: 20 TABLET ORAL at 14:44

## 2018-12-14 RX ADMIN — PREDNISONE 60 MG: 20 TABLET ORAL at 09:15

## 2018-12-14 RX ADMIN — CEFTRIAXONE 1 G: 1 INJECTION, POWDER, FOR SOLUTION INTRAMUSCULAR; INTRAVENOUS at 20:23

## 2018-12-14 RX ADMIN — SODIUM CHLORIDE: 9 INJECTION, SOLUTION INTRAVENOUS at 13:31

## 2018-12-14 RX ADMIN — PREDNISONE 20 MG: 20 TABLET ORAL at 17:00

## 2018-12-14 RX ADMIN — IPRATROPIUM BROMIDE AND ALBUTEROL SULFATE 1 AMPULE: .5; 3 SOLUTION RESPIRATORY (INHALATION) at 16:14

## 2018-12-14 RX ADMIN — FAMOTIDINE 20 MG: 20 TABLET ORAL at 20:23

## 2018-12-14 RX ADMIN — LEVOFLOXACIN 750 MG: 5 INJECTION, SOLUTION INTRAVENOUS at 11:55

## 2018-12-14 RX ADMIN — SODIUM CHLORIDE: 9 INJECTION, SOLUTION INTRAVENOUS at 12:15

## 2018-12-14 RX ADMIN — MOMETASONE FUROATE AND FORMOTEROL FUMARATE DIHYDRATE 2 PUFF: 200; 5 AEROSOL RESPIRATORY (INHALATION) at 20:51

## 2018-12-14 RX ADMIN — ALBUTEROL SULFATE 2.5 MG: 2.5 SOLUTION RESPIRATORY (INHALATION) at 09:35

## 2018-12-14 RX ADMIN — IPRATROPIUM BROMIDE AND ALBUTEROL SULFATE 2 AMPULE: .5; 3 SOLUTION RESPIRATORY (INHALATION) at 09:21

## 2018-12-14 RX ADMIN — ENOXAPARIN SODIUM 40 MG: 40 INJECTION SUBCUTANEOUS at 14:43

## 2018-12-14 RX ADMIN — SILVER SULFADIAZINE: 10 CREAM TOPICAL at 14:43

## 2018-12-14 RX ADMIN — LISINOPRIL 10 MG: 10 TABLET ORAL at 11:55

## 2018-12-14 RX ADMIN — ALBUTEROL SULFATE 2.5 MG: 2.5 SOLUTION RESPIRATORY (INHALATION) at 09:27

## 2018-12-14 RX ADMIN — ALPRAZOLAM 0.25 MG: 0.25 TABLET ORAL at 11:55

## 2018-12-14 RX ADMIN — Medication 10 ML: at 20:23

## 2018-12-14 RX ADMIN — CLONIDINE HYDROCHLORIDE 0.1 MG: 0.1 TABLET ORAL at 11:57

## 2018-12-14 RX ADMIN — PERFLUTREN 1.1 MG: 6.52 INJECTION, SUSPENSION INTRAVENOUS at 14:42

## 2018-12-14 RX ADMIN — ENALAPRILAT 1.25 MG: 1.25 INJECTION INTRAVENOUS at 13:33

## 2018-12-14 RX ADMIN — IPRATROPIUM BROMIDE AND ALBUTEROL SULFATE 1 AMPULE: .5; 3 SOLUTION RESPIRATORY (INHALATION) at 11:40

## 2018-12-14 RX ADMIN — IPRATROPIUM BROMIDE AND ALBUTEROL SULFATE 1 AMPULE: .5; 3 SOLUTION RESPIRATORY (INHALATION) at 20:43

## 2018-12-14 RX ADMIN — AZITHROMYCIN MONOHYDRATE 500 MG: 500 INJECTION, POWDER, LYOPHILIZED, FOR SOLUTION INTRAVENOUS at 20:23

## 2018-12-14 RX ADMIN — IOPAMIDOL 75 ML: 755 INJECTION, SOLUTION INTRAVENOUS at 10:09

## 2018-12-14 ASSESSMENT — PAIN SCALES - GENERAL
PAINLEVEL_OUTOF10: 0
PAINLEVEL_OUTOF10: 0

## 2018-12-14 NOTE — ED PROVIDER NOTES
[facility] ED  eMERGENCY dEPARTMENT eNCOUnter      Pt Name: Adan Zabala  MRN: [id number]  [place] 1950  Date of evaluation: 12/14/2018  Provider: Mireya Rice, [address]       Chief Complaint   Patient presents with    Hypertension     c/o high blood pressure, taking bp meds and has appt today with Dr [place] Records of Breath     hx copd but is having increased sob. HISTORY OF PRESENT ILLNESS   (Location/Symptom, Timing/Onset, Context/Setting, Quality, Duration, Modifying Factors, Severity) Note limiting factors. HPI    Adan Zabala is a 70 y.o. male who presents to the emergency department   Complaining of his COPD issues. Patient has a history of COPD. He is not oxygen dependent. I received a call from [address] [place] [address] and the patient was sent down to [facility] department to be seen. He was up therefore for outpatient treatment for his prostate cancer which has not metastasized. At the office today,  he seemed to be breathing a little bit harder. And was noted to have  high blood pressure. Patient, however, has appointment Dr. Stephenie Sykes today at 12:30. Patient states he was recently admitted to the hospital over a month ago for the same and feels about the same as he did a month ago. He denies cardiac pain, fever, chills, he denies neck pain, or vision guillermo,e abdominal pain or any other complaints. Conversational  Pleasant, but talks at times in short sentences and does seem to have some respiratory distress. There is use of accessory muscle/abdominal muscles Quite lungs and wheezing throughout. Nursing Notes were reviewed. REVIEW OF SYSTEMS    (2+ for level 4;10+ for level 5)   Review of Systems   ALL other systems reviewed as pertinent and otherwise acutely negative except as in the [address]     PAST MEDICAL HISTORY     Past Medical History:   Diagnosis Date    COPD (chronic obstructive pulmonary disease) ([address]     Essential

## 2018-12-15 LAB
ABSOLUTE EOS #: 0 K/UL (ref 0–0.44)
ABSOLUTE IMMATURE GRANULOCYTE: 0 K/UL (ref 0–0.3)
ABSOLUTE LYMPH #: 0.99 K/UL (ref 1.1–3.7)
ABSOLUTE MONO #: 0.85 K/UL (ref 0.1–1.2)
ALBUMIN SERPL-MCNC: 3.5 G/DL (ref 3.5–5.2)
ALBUMIN/GLOBULIN RATIO: 1.2 (ref 1–2.5)
ALP BLD-CCNC: 68 U/L (ref 40–129)
ALT SERPL-CCNC: 17 U/L (ref 5–41)
ANION GAP SERPL CALCULATED.3IONS-SCNC: 10 MMOL/L (ref 9–17)
AST SERPL-CCNC: 18 U/L
BASOPHILS # BLD: 0 % (ref 0–2)
BASOPHILS ABSOLUTE: 0 K/UL (ref 0–0.2)
BILIRUB SERPL-MCNC: <0.1 MG/DL (ref 0.3–1.2)
BUN BLDV-MCNC: 34 MG/DL (ref 8–23)
BUN/CREAT BLD: 24 (ref 9–20)
CALCIUM SERPL-MCNC: 9.6 MG/DL (ref 8.6–10.4)
CHLORIDE BLD-SCNC: 99 MMOL/L (ref 98–107)
CO2: 25 MMOL/L (ref 20–31)
CREAT SERPL-MCNC: 1.39 MG/DL (ref 0.7–1.2)
DIFFERENTIAL TYPE: ABNORMAL
EOSINOPHILS RELATIVE PERCENT: 0 % (ref 1–4)
GFR AFRICAN AMERICAN: >60 ML/MIN
GFR NON-AFRICAN AMERICAN: 51 ML/MIN
GFR SERPL CREATININE-BSD FRML MDRD: ABNORMAL ML/MIN/{1.73_M2}
GFR SERPL CREATININE-BSD FRML MDRD: ABNORMAL ML/MIN/{1.73_M2}
GLUCOSE BLD-MCNC: 138 MG/DL (ref 70–99)
HCT VFR BLD CALC: 28.1 % (ref 40.7–50.3)
HEMOGLOBIN: 9.6 G/DL (ref 13–17)
IMMATURE GRANULOCYTES: 0 %
LYMPHOCYTES # BLD: 7 % (ref 24–43)
MCH RBC QN AUTO: 32.8 PG (ref 25.2–33.5)
MCHC RBC AUTO-ENTMCNC: 34.2 G/DL (ref 28.4–34.8)
MCV RBC AUTO: 95.9 FL (ref 82.6–102.9)
MONOCYTES # BLD: 6 % (ref 3–12)
MORPHOLOGY: NORMAL
NRBC AUTOMATED: 0 PER 100 WBC
PDW BLD-RTO: 14.1 % (ref 11.8–14.4)
PLATELET # BLD: 204 K/UL (ref 138–453)
PLATELET ESTIMATE: ABNORMAL
PMV BLD AUTO: 10.1 FL (ref 8.1–13.5)
POTASSIUM SERPL-SCNC: 4.2 MMOL/L (ref 3.7–5.3)
RBC # BLD: 2.93 M/UL (ref 4.21–5.77)
RBC # BLD: ABNORMAL 10*6/UL
SEG NEUTROPHILS: 87 % (ref 36–65)
SEGMENTED NEUTROPHILS ABSOLUTE COUNT: 12.26 K/UL (ref 1.5–8.1)
SODIUM BLD-SCNC: 134 MMOL/L (ref 135–144)
TOTAL PROTEIN: 6.5 G/DL (ref 6.4–8.3)
WBC # BLD: 14.1 K/UL (ref 3.5–11.3)
WBC # BLD: ABNORMAL 10*3/UL

## 2018-12-15 PROCEDURE — 2500000003 HC RX 250 WO HCPCS: Performed by: PHYSICIAN ASSISTANT

## 2018-12-15 PROCEDURE — 97165 OT EVAL LOW COMPLEX 30 MIN: CPT

## 2018-12-15 PROCEDURE — 94664 DEMO&/EVAL PT USE INHALER: CPT

## 2018-12-15 PROCEDURE — 80053 COMPREHEN METABOLIC PANEL: CPT

## 2018-12-15 PROCEDURE — 6370000000 HC RX 637 (ALT 250 FOR IP): Performed by: INTERNAL MEDICINE

## 2018-12-15 PROCEDURE — 85025 COMPLETE CBC W/AUTO DIFF WBC: CPT

## 2018-12-15 PROCEDURE — 6360000002 HC RX W HCPCS: Performed by: INTERNAL MEDICINE

## 2018-12-15 PROCEDURE — 94640 AIRWAY INHALATION TREATMENT: CPT

## 2018-12-15 PROCEDURE — 6370000000 HC RX 637 (ALT 250 FOR IP): Performed by: PHYSICIAN ASSISTANT

## 2018-12-15 PROCEDURE — 2580000003 HC RX 258: Performed by: INTERNAL MEDICINE

## 2018-12-15 PROCEDURE — 36415 COLL VENOUS BLD VENIPUNCTURE: CPT

## 2018-12-15 PROCEDURE — 97530 THERAPEUTIC ACTIVITIES: CPT

## 2018-12-15 PROCEDURE — 1200000000 HC SEMI PRIVATE

## 2018-12-15 PROCEDURE — 2700000000 HC OXYGEN THERAPY PER DAY

## 2018-12-15 RX ORDER — IPRATROPIUM BROMIDE AND ALBUTEROL SULFATE 2.5; .5 MG/3ML; MG/3ML
1 SOLUTION RESPIRATORY (INHALATION) 4 TIMES DAILY
Status: DISCONTINUED | OUTPATIENT
Start: 2018-12-15 | End: 2018-12-15

## 2018-12-15 RX ORDER — LISINOPRIL 10 MG/1
10 TABLET ORAL DAILY
Status: DISCONTINUED | OUTPATIENT
Start: 2018-12-15 | End: 2018-12-16 | Stop reason: HOSPADM

## 2018-12-15 RX ORDER — CARVEDILOL 25 MG/1
25 TABLET ORAL 2 TIMES DAILY
Status: DISCONTINUED | OUTPATIENT
Start: 2018-12-15 | End: 2018-12-16 | Stop reason: HOSPADM

## 2018-12-15 RX ORDER — IPRATROPIUM BROMIDE AND ALBUTEROL SULFATE 2.5; .5 MG/3ML; MG/3ML
1 SOLUTION RESPIRATORY (INHALATION) 3 TIMES DAILY
Status: DISCONTINUED | OUTPATIENT
Start: 2018-12-15 | End: 2018-12-16 | Stop reason: HOSPADM

## 2018-12-15 RX ADMIN — AMLODIPINE BESYLATE 10 MG: 10 TABLET ORAL at 08:02

## 2018-12-15 RX ADMIN — PREDNISONE 20 MG: 20 TABLET ORAL at 07:59

## 2018-12-15 RX ADMIN — MOMETASONE FUROATE AND FORMOTEROL FUMARATE DIHYDRATE 2 PUFF: 200; 5 AEROSOL RESPIRATORY (INHALATION) at 20:21

## 2018-12-15 RX ADMIN — IPRATROPIUM BROMIDE AND ALBUTEROL SULFATE 1 AMPULE: .5; 3 SOLUTION RESPIRATORY (INHALATION) at 06:10

## 2018-12-15 RX ADMIN — MOMETASONE FUROATE AND FORMOTEROL FUMARATE DIHYDRATE 2 PUFF: 200; 5 AEROSOL RESPIRATORY (INHALATION) at 10:07

## 2018-12-15 RX ADMIN — IPRATROPIUM BROMIDE AND ALBUTEROL SULFATE 1 AMPULE: .5; 3 SOLUTION RESPIRATORY (INHALATION) at 10:07

## 2018-12-15 RX ADMIN — FAMOTIDINE 20 MG: 20 TABLET ORAL at 20:08

## 2018-12-15 RX ADMIN — IPRATROPIUM BROMIDE AND ALBUTEROL SULFATE 1 AMPULE: .5; 3 SOLUTION RESPIRATORY (INHALATION) at 15:39

## 2018-12-15 RX ADMIN — AZITHROMYCIN MONOHYDRATE 500 MG: 500 INJECTION, POWDER, LYOPHILIZED, FOR SOLUTION INTRAVENOUS at 20:01

## 2018-12-15 RX ADMIN — CARVEDILOL 25 MG: 25 TABLET, FILM COATED ORAL at 08:02

## 2018-12-15 RX ADMIN — LISINOPRIL 10 MG: 10 TABLET ORAL at 08:03

## 2018-12-15 RX ADMIN — Medication 10 ML: at 08:05

## 2018-12-15 RX ADMIN — FAMOTIDINE 20 MG: 20 TABLET ORAL at 08:03

## 2018-12-15 RX ADMIN — CETIRIZINE HYDROCHLORIDE 10 MG: 10 TABLET, FILM COATED ORAL at 08:02

## 2018-12-15 RX ADMIN — CARVEDILOL 25 MG: 25 TABLET, FILM COATED ORAL at 20:08

## 2018-12-15 RX ADMIN — Medication 10 ML: at 20:01

## 2018-12-15 RX ADMIN — SILVER SULFADIAZINE: 10 CREAM TOPICAL at 08:15

## 2018-12-15 RX ADMIN — CEFTRIAXONE 1 G: 1 INJECTION, POWDER, FOR SOLUTION INTRAMUSCULAR; INTRAVENOUS at 21:10

## 2018-12-15 RX ADMIN — MONTELUKAST SODIUM 10 MG: 10 TABLET, FILM COATED ORAL at 20:08

## 2018-12-15 RX ADMIN — ENALAPRILAT 1.25 MG: 1.25 INJECTION INTRAVENOUS at 06:13

## 2018-12-15 RX ADMIN — ENOXAPARIN SODIUM 40 MG: 40 INJECTION SUBCUTANEOUS at 08:03

## 2018-12-15 RX ADMIN — PREDNISONE 20 MG: 20 TABLET ORAL at 16:23

## 2018-12-15 RX ADMIN — IPRATROPIUM BROMIDE AND ALBUTEROL SULFATE 1 AMPULE: .5; 3 SOLUTION RESPIRATORY (INHALATION) at 20:21

## 2018-12-15 ASSESSMENT — PAIN SCALES - GENERAL
PAINLEVEL_OUTOF10: 0

## 2018-12-15 NOTE — PLAN OF CARE
Problem: Falls - Risk of:  Goal: Will remain free from falls  Will remain free from falls   Outcome: Ongoing  Bed in low position. Wheels locked. 2/4 side rails are up. Fall band on. Call light within reach. Problem: Gas Exchange - Impaired:  Goal: Levels of oxygenation will improve  Levels of oxygenation will improve  Outcome: Ongoing  SpO2 was 97% on 2L NC, will continue to monitor. Problem: Breathing Pattern - Ineffective:  Goal: Ability to achieve and maintain a regular respiratory rate will improve  Ability to achieve and maintain a regular respiratory rate will improve  Outcome: Ongoing  Respiratory rate is WNL, will continue to monitor.

## 2018-12-16 VITALS
BODY MASS INDEX: 27.9 KG/M2 | DIASTOLIC BLOOD PRESSURE: 108 MMHG | OXYGEN SATURATION: 97 % | HEIGHT: 66 IN | WEIGHT: 173.6 LBS | HEART RATE: 88 BPM | RESPIRATION RATE: 18 BRPM | TEMPERATURE: 97.7 F | SYSTOLIC BLOOD PRESSURE: 180 MMHG

## 2018-12-16 LAB
ABSOLUTE EOS #: 0.24 K/UL (ref 0–0.44)
ABSOLUTE IMMATURE GRANULOCYTE: 0.07 K/UL (ref 0–0.3)
ABSOLUTE LYMPH #: 0.96 K/UL (ref 1.1–3.7)
ABSOLUTE MONO #: 1.07 K/UL (ref 0.1–1.2)
ALBUMIN SERPL-MCNC: 3.7 G/DL (ref 3.5–5.2)
ALBUMIN/GLOBULIN RATIO: 1.1 (ref 1–2.5)
ALP BLD-CCNC: 72 U/L (ref 40–129)
ALT SERPL-CCNC: 23 U/L (ref 5–41)
ANION GAP SERPL CALCULATED.3IONS-SCNC: 10 MMOL/L (ref 9–17)
AST SERPL-CCNC: 19 U/L
BASOPHILS # BLD: 0 % (ref 0–2)
BASOPHILS ABSOLUTE: 0.04 K/UL (ref 0–0.2)
BILIRUB SERPL-MCNC: <0.1 MG/DL (ref 0.3–1.2)
BUN BLDV-MCNC: 41 MG/DL (ref 8–23)
BUN/CREAT BLD: 26 (ref 9–20)
CALCIUM SERPL-MCNC: 10 MG/DL (ref 8.6–10.4)
CHLORIDE BLD-SCNC: 99 MMOL/L (ref 98–107)
CO2: 27 MMOL/L (ref 20–31)
CREAT SERPL-MCNC: 1.57 MG/DL (ref 0.7–1.2)
DIFFERENTIAL TYPE: ABNORMAL
EOSINOPHILS RELATIVE PERCENT: 2 % (ref 1–4)
GFR AFRICAN AMERICAN: 53 ML/MIN
GFR NON-AFRICAN AMERICAN: 44 ML/MIN
GFR SERPL CREATININE-BSD FRML MDRD: ABNORMAL ML/MIN/{1.73_M2}
GFR SERPL CREATININE-BSD FRML MDRD: ABNORMAL ML/MIN/{1.73_M2}
GLUCOSE BLD-MCNC: 116 MG/DL (ref 70–99)
HCT VFR BLD CALC: 29.3 % (ref 40.7–50.3)
HEMOGLOBIN: 9.9 G/DL (ref 13–17)
IMMATURE GRANULOCYTES: 1 %
LYMPHOCYTES # BLD: 8 % (ref 24–43)
MCH RBC QN AUTO: 32.2 PG (ref 25.2–33.5)
MCHC RBC AUTO-ENTMCNC: 33.8 G/DL (ref 28.4–34.8)
MCV RBC AUTO: 95.4 FL (ref 82.6–102.9)
MONOCYTES # BLD: 8 % (ref 3–12)
NRBC AUTOMATED: 0 PER 100 WBC
PDW BLD-RTO: 14.3 % (ref 11.8–14.4)
PLATELET # BLD: 225 K/UL (ref 138–453)
PLATELET ESTIMATE: ABNORMAL
PMV BLD AUTO: 10.6 FL (ref 8.1–13.5)
POTASSIUM SERPL-SCNC: 4.1 MMOL/L (ref 3.7–5.3)
RBC # BLD: 3.07 M/UL (ref 4.21–5.77)
RBC # BLD: ABNORMAL 10*6/UL
SEG NEUTROPHILS: 81 % (ref 36–65)
SEGMENTED NEUTROPHILS ABSOLUTE COUNT: 10.47 K/UL (ref 1.5–8.1)
SODIUM BLD-SCNC: 136 MMOL/L (ref 135–144)
TOTAL PROTEIN: 7 G/DL (ref 6.4–8.3)
WBC # BLD: 12.9 K/UL (ref 3.5–11.3)
WBC # BLD: ABNORMAL 10*3/UL

## 2018-12-16 PROCEDURE — 36415 COLL VENOUS BLD VENIPUNCTURE: CPT

## 2018-12-16 PROCEDURE — 85025 COMPLETE CBC W/AUTO DIFF WBC: CPT

## 2018-12-16 PROCEDURE — 6370000000 HC RX 637 (ALT 250 FOR IP): Performed by: INTERNAL MEDICINE

## 2018-12-16 PROCEDURE — 80053 COMPREHEN METABOLIC PANEL: CPT

## 2018-12-16 PROCEDURE — 6360000002 HC RX W HCPCS: Performed by: INTERNAL MEDICINE

## 2018-12-16 PROCEDURE — 2580000003 HC RX 258: Performed by: INTERNAL MEDICINE

## 2018-12-16 PROCEDURE — 94640 AIRWAY INHALATION TREATMENT: CPT

## 2018-12-16 PROCEDURE — 6370000000 HC RX 637 (ALT 250 FOR IP): Performed by: PHYSICIAN ASSISTANT

## 2018-12-16 RX ORDER — LISINOPRIL 10 MG/1
10 TABLET ORAL DAILY
Qty: 30 TABLET | Refills: 3 | Status: SHIPPED | OUTPATIENT
Start: 2018-12-17 | End: 2018-12-19 | Stop reason: SDUPTHER

## 2018-12-16 RX ORDER — CEFUROXIME AXETIL 500 MG/1
500 TABLET ORAL 2 TIMES DAILY
Qty: 20 TABLET | Refills: 0 | Status: SHIPPED | OUTPATIENT
Start: 2018-12-16 | End: 2018-12-26

## 2018-12-16 RX ORDER — CARVEDILOL 25 MG/1
25 TABLET ORAL 2 TIMES DAILY
Qty: 60 TABLET | Refills: 3 | Status: SHIPPED | OUTPATIENT
Start: 2018-12-16 | End: 2019-07-17 | Stop reason: SDUPTHER

## 2018-12-16 RX ADMIN — ENOXAPARIN SODIUM 40 MG: 40 INJECTION SUBCUTANEOUS at 08:51

## 2018-12-16 RX ADMIN — CARVEDILOL 25 MG: 25 TABLET, FILM COATED ORAL at 08:51

## 2018-12-16 RX ADMIN — SILVER SULFADIAZINE: 10 CREAM TOPICAL at 08:58

## 2018-12-16 RX ADMIN — MOMETASONE FUROATE AND FORMOTEROL FUMARATE DIHYDRATE 2 PUFF: 200; 5 AEROSOL RESPIRATORY (INHALATION) at 09:29

## 2018-12-16 RX ADMIN — FAMOTIDINE 20 MG: 20 TABLET ORAL at 08:51

## 2018-12-16 RX ADMIN — IPRATROPIUM BROMIDE AND ALBUTEROL SULFATE 1 AMPULE: .5; 3 SOLUTION RESPIRATORY (INHALATION) at 09:28

## 2018-12-16 RX ADMIN — Medication 10 ML: at 08:52

## 2018-12-16 RX ADMIN — LISINOPRIL 10 MG: 10 TABLET ORAL at 08:51

## 2018-12-16 RX ADMIN — PREDNISONE 20 MG: 20 TABLET ORAL at 08:51

## 2018-12-16 RX ADMIN — CETIRIZINE HYDROCHLORIDE 10 MG: 10 TABLET, FILM COATED ORAL at 08:51

## 2018-12-16 RX ADMIN — AMLODIPINE BESYLATE 10 MG: 10 TABLET ORAL at 08:51

## 2018-12-16 ASSESSMENT — PAIN SCALES - GENERAL
PAINLEVEL_OUTOF10: 0
PAINLEVEL_OUTOF10: 0

## 2018-12-16 NOTE — DISCHARGE SUMMARY
Physician Discharge Summary       Patient ID:  Toshia Benítez  [id number]  1950    Admission date: 12/14/2018    Discharge date: 12/16/2018     Admitting Physician: Ambrosio Gtz MD     Primary Care Physician: Ambrosio Gtz MD     Primary Discharge Diagnoses:   Patient Active Problem List    Diagnosis Date Noted    Acute respiratory failure with hypoxemia ([address] 12/14/2018    CAP (community acquired pneumonia) 12/14/2018    Stage 3 chronic kidney disease ([address] 12/14/2018    Uncontrolled hypertension 12/14/2018    Anxiety 12/14/2018    MANDEEP (acute kidney injury) ([address] suspect Acute On Chronic with PreRenal Component 10/31/2018    COPD exacerbation ([address] 10/29/2018    Abdominal wall abscess From injection 10/25/2018    Prostate cancer ([address] / Biopsy 2018 06/01/2018    Acute nontraumatic kidney injury ([address] 02/22/2018    Acute respiratory failure with hypoxia ([address] 02/22/2018    Chronic obstructive pulmonary disease ([address] 02/18/2018       Additional Diagnoses:       Diagnosis Date    COPD (chronic obstructive pulmonary disease) ([address]     Essential hypertension     Prostate cancer ([address]     Wears glasses        Physical exam:      -----------------------------------------------------------------  Exam:  GEN:   A & O x3, no apparent distress  EYES: No gross abnormalities. NECK: normal, supple, no lymphadenopathy,  no carotid bruits  PULM: decreased breath sounds noted- chronic. clear  COR: regular rate & rhythm, no murmurs and no gallops  ABD:  soft, non-tender, non-distended, normal bowel sounds, no masses or organomegaly  EXT:   no cyanosis, clubbing or edema present    NEURO: Gait normal. Reflexes normal and symmetric. Sensation grossly normal  SKIN:  no rashes or significant lesions  -----------------------------------------------------------------          Hospital Course: The patient was admitted for the above.   He was treated with IV antibiotics, respiratory therapy, increased anti tablet  Take 1 tablet by mouth 2 times daily             cefUROXime (CEFTIN) 500 MG tablet  Take 1 tablet by mouth 2 times daily for 10 days             cetirizine (ZYRTEC) 10 MG tablet  Take 10 mg by mouth daily             lisinopril (PRINIVIL;ZESTRIL) 10 MG tablet  Take 1 tablet by mouth daily             montelukast (SINGULAIR) 10 MG tablet  Take 10 mg by mouth nightly             tiotropium (SPIRIVA RESPIMAT) 1.25 MCG/ACT AERS inhaler  Inhale 2 puffs into the lungs daily                 · Resume all home medications unless otherwise directed  · Add coreg 25mg, prinivil 10mg  · Stop taking coreg 6.25mg    Patient Instructions:   · Activity: ambulate in house  · Diet: regular diet  · Wound Care: none needed  · Other:   · Follow up with Dr Lilly Vale in 1 week as directed      Total time spent on discharge services: 35 minutes  Including the following activities:  · Evaluation and Management of patient  · Discussion with patient and/or surrogate about current care plan  · Coordination with Case Management and/or   · Coordination of care with Consultants (if applicable)   · Coordination of care with Receiving Facility Physician (if applicable)  · Completion of DME forms (if applicable)  · Preparation of Discharge Summary  · Preparation of Medication Reconciliation  · Preparation of Discharge Prescriptions        Signed:  Veronica Ramirez M.D.  12/16/2018  9:38 AM

## 2018-12-16 NOTE — PROGRESS NOTES
Discussed discharge plans with the patient and the wife. Patient is a 70year old male here with Acute respiratory failure with hypoxemia . He is alert and oriented. Patient is  and lives at home with his wife. He uses a C-pap machine at home. Patient's wife does the cooking and the cleaning. He manages his medications and drives. His PCP is Dr. Ellie Burgos. He has medical insurance. Patient gets his medication thru the [facility]. The discharge plan is home with no services at this time. Both him and his do not feel they need home health.     GEM Jordan
Echocardiogram/Doppler with Definity done at bedside. Instructed on policies and procedures.
Patient removed old bandage on radiation burn and cleaned the area with soap and water. Silver sulfadiazine applied per patient and new bandage applied at this time.
Progress Note    SUBJECTIVE:  Patient seen for pneumonia, respiratory failure, hypertension. All improved, patient has no C/O , wants to go home. ROS:   Constitutional: negative  for fevers, and negative for chills. Respiratory: negative for shortness of breath, negative for cough, and negative for wheezing  Cardiovascular: negative for chest pain, and negative for palpitations  Gastrointestinal: negative for abdominal pain, negative for nausea,negative for vomiting, negative for diarrhea, and negative for constipation     All other systems were reviewed with the patient and are negative unless otherwise stated in HPI    OBJECTIVE:    EXAM:  Vitals:    12/16/18 0851   BP: (!) 180/108   Pulse: 88   Resp:    Temp:    SpO2:       Weight: 173 lb 9.6 oz (78.7 kg)    Height: 5' 6\" (167.6 cm)     GEN:   A & O x3, no apparent distress  EYES: No gross abnormalities.   NECK: normal, supple, no lymphadenopathy,   PULM: clear to auscultation bilaterally- no wheezes, rales or rhonchi, normal air movement, no respiratory distress  COR: regular rate & rhythm and no murmurs  ABD:  soft, non-tender, non-distended, normal bowel sounds, no masses or organomegaly  EXT:   no cyanosis, clubbing or edema present    NEURO: negative  SKIN:  no rashes or significant lesions        CBC with Differential:    Lab Results   Component Value Date    WBC 12.9 12/16/2018    RBC 3.07 12/16/2018    HGB 9.9 12/16/2018    HCT 29.3 12/16/2018     12/16/2018    MCV 95.4 12/16/2018    MCH 32.2 12/16/2018    MCHC 33.8 12/16/2018    RDW 14.3 12/16/2018    LYMPHOPCT 8 12/16/2018    MONOPCT 8 12/16/2018    BASOPCT 0 12/16/2018    MONOSABS 1.07 12/16/2018    LYMPHSABS 0.96 12/16/2018    EOSABS 0.24 12/16/2018    BASOSABS 0.04 12/16/2018    DIFFTYPE NOT REPORTED 12/16/2018     BMP:    Lab Results   Component Value Date     12/16/2018    K 4.1 12/16/2018    CL 99 12/16/2018    CO2 27 12/16/2018    BUN 41 12/16/2018    LABALBU 3.7 12/16/2018
RESPIRATORY ASSESSMENT PROTOCOL                                                                                              Patient Name: Ty Murphy Room#: 0106/8380-01 : 1950     Admitting diagnosis: Acute respiratory failure with hypoxemia ([facility] [J96.01]       Medical History:   Past Medical History:   Diagnosis Date    COPD (chronic obstructive pulmonary disease) ([facility]     Essential hypertension     Prostate cancer ([place] [address].)     Wears glasses        PATIENT ASSESSMENT    LABORATORY DATA  Hematology:   Lab Results   Component Value Date    WBC 14.1 12/15/2018    RBC 2.93 12/15/2018    HGB 9.6 12/15/2018    HCT 28.1 12/15/2018     12/15/2018     Chemistry:  No results found for: PHART, KLU0NSG, PO2ART, Z1WTUUCP, CNT7GMF, PBEA    Blood Culture:   Sputum Culture:     VITALS  Pulse: 87   Resp: 18  BP: (!) 153/83  SpO2: 97 % O2 Device: None (Room air)  Temp: 97.6 °F (36.4 °C)  Comment:   SKIN COLOR  [] Normal  [] Pale  [] Dusky  [] Cyanotic      RESPIRATORY PATTERN  [] Normal  [] Dyspnea  [] Cheyne-Jensen  [] Kussmaul  [] Biots  AMBULATORY  [] Yes  [] No  [] With Assistance    PEAK FLOW  Predicted:     Personal Best:        VITAL CAPACITY  Predicted value:  ml  Actual Value:  ml  30% of Predicted:  ml   Patient Acuity 0 1 2 3 4 Score   Level of Concious (LOC) [x]  Alert & Oriented or Pt normal LOC []  Confused;follows directions []  Confused & uncooper-ative []  Obtunded []  Comatose 0   Respiratory Rate  (RR) [x]  Reg. rate & pattern. 12 - 20 bpm  []  Increased RR. Greater than 20 bpm   []  SOB w/ exertion or RR greater than 24 bpm []  Access- ory muscle use at rest. Abn.  resp. []  SOB at rest.   0   Bilateral Breath Sounds (BBS) []  Clear []  Diminish-ed bases  []  Diminish-ed t/o, or rales   [x]  Sporadic, scattered wheezes or rhonchi []  Persistentwheezes and, or absent BBS 3   Cough []  Strong, effective, & non-prod. [x]  Effective & prod.  Less than 25 ml (2 TBSP) over past 24 hrs
Pulmonary History  (PULM HX) []  No smoking and no chronic pulmonaryhistory []  Former smoker. Quit over 12 mos. ago []  Current smoker or quit w/ in 12 mos []  Pulm. History and, or 20 pk/yr smoking hx [x]  Admitted w/ acute pulm. dx and, or has been admitted w/ pulm. dx 2 or more times over past 12 mos 4   Surgical History this Admit  (SURG HX) [x]  No surgery []  General surgery []  Lower abdominal []  Thoracic or upper abdominal   []  Thoracic w/ pulm. disease 0   Chest X-Ray (CXR)/CT Scan [x]  Clear or not applicable []  Not available []  Atelect- asis or pleural effusions []  Localized infiltrate or pulm. edema []  Con-solidated Infiltrates, bilateral, or in more than 1 lobe 0   Slow or Forced VC, FEV1 OR PEFR (PULM FXN)  [x]  80% or greater, or not indicated []  Pt. unable to perform []  FEV1 or PEFR or VC 51-79%. []  FEV1 or PEFR or VC  30-49%   []  FEV1 or PEFR or VC less than 30%   0   TOTAL ACUITY: 11       CARE PLAN    If Acuity Level is 2, 3, or 4 in any of the following:    [] BILATERAL BREATH SOUNDS (BBS)     [] PULMONARY HISTORY (PULM HX)  [] PULMONARY FUNCTION (PULM FX)    Goal: Improve respiratory functions in patients with airway disease and decrease WOB    [x] AEROSOL PROTOCOL    Total Acuity:   16-32  []  Secondary Assessment in 24 hrs Total Acuity:  9-15  [x]  Secondary Assessment in 24 hrs Total Acuity:  4-8  []  Secondary Assessment in 48 hrs Total Acuity:  0-3  []  Secondary Assessment in 72 hrs   HHN AEROSOL THERAPY with  [physician-ordered bronchodilator(s)] q 4 & Albuterol PRN q2 hrs. Breath-Actuated Neb if BBS Acuity = 4, and pt. can use MP. Notify physician if condition deteriorates. HHN AEROSOL THERAPY with  [physician-ordered bronchodilator(s)]  QID and Albuterol PRN q4 hrs. Breath-Actuated Neb if BBS Acuity = 4, and pt. can use MP. Notify physician if condition deteriorates.  MDI THERAPY with  2 actuations of [physician-ordered bronchodilator(s)] via spacer TID Albuterol and
Ill-defined peribronchovascular opacities are identified involving the right upper lobe. This is seen to a lesser degree within the left lower lobe. No suspicious noncalcified lung nodule or mass. Calcified granulomas right lower lobe. No focal consolidation or pulmonary edema. No evidence of pleural effusion or pneumothorax. Upper Abdomen: No acute findings. Liver and splenic granulomas. Soft Tissues/Bones: No acute bone or soft tissue abnormality. *No CT evidence of acute pulmonary embolism. *Ill-defined peribronovascular opacities are identified involving the right upper lobe and lesser degree left lower lobe. This appears to be superimposed on emphysematous change. Developing infectious or inflammatory process cannot be excluded. Repeat CT after therapy is recommended to confirm resolution. ASSESSMENT:  Pneumonia, COPD exacerbation, hypoxemia, ---all improved                              Hypertension ---improved, still too high            Hospital Problems:  Principal Problem:    Acute respiratory failure with hypoxemia ([address]  Active Problems:    Prostate cancer ([address] / Biopsy 2018    COPD exacerbation ([address]    CAP (community acquired pneumonia)    Stage 3 chronic kidney disease ([address]    Uncontrolled hypertension    Anxiety  Resolved Problems:    * No resolved hospital problems.  *      All Problems:  Patient Active Problem List   Diagnosis    Chronic obstructive pulmonary disease ([address]    Acute nontraumatic kidney injury ([address]    Acute respiratory failure with hypoxia (HCC)    Prostate cancer ([address] / Biopsy 2018    Abdominal wall abscess From injection    COPD exacerbation ([address]    MANDEEP (acute kidney injury) ([address] suspect Acute On Chronic with PreRenal Component    Acute respiratory failure with hypoxemia ([address]    CAP (community acquired pneumonia)    Stage 3 chronic kidney disease ([address]    Uncontrolled hypertension    Anxiety       PLAN:  · Add prinivil, increase coreg  · Continue
restricted  Mobility: Walking and Moving Around Goal Status (): At least 1 percent but less than 20 percent impaired, limited or restricted  Mobility: Walking and Moving Around Discharge Status ():  At least 1 percent but less than 20 percent impaired, limited or restricted    Goals  Patient Goals   Patient goals : Return home       Therapy Time   Individual Concurrent Group Co-treatment   Time In 1505         Time Out 1517         Minutes 12                 Ajay Pike, [address] [facility], [place]

## 2018-12-17 ENCOUNTER — CARE COORDINATION (OUTPATIENT)
Dept: CASE MANAGEMENT | Age: 68
End: 2018-12-17

## 2018-12-17 DIAGNOSIS — J96.01 ACUTE RESPIRATORY FAILURE WITH HYPOXEMIA (HCC): Primary | ICD-10-CM

## 2018-12-17 PROCEDURE — 1111F DSCHRG MED/CURRENT MED MERGE: CPT | Performed by: INTERNAL MEDICINE

## 2019-02-05 ENCOUNTER — HOSPITAL ENCOUNTER (OUTPATIENT)
Dept: VASCULAR LAB | Age: 69
Discharge: HOME | End: 2019-02-07
Payer: MEDICARE

## 2019-02-05 DIAGNOSIS — M79.89 RIGHT LEG SWELLING: ICD-10-CM

## 2019-02-05 PROCEDURE — 93971 EXTREMITY STUDY: CPT

## 2019-02-21 ENCOUNTER — TELEPHONE (OUTPATIENT)
Dept: UROLOGY | Age: 69
End: 2019-02-21

## 2019-02-21 DIAGNOSIS — C61 PROSTATE CANCER (HCC): Primary | ICD-10-CM

## 2019-02-25 ENCOUNTER — HOSPITAL ENCOUNTER (OUTPATIENT)
Age: 69
Discharge: HOME | End: 2019-02-25
Payer: MEDICARE

## 2019-02-25 DIAGNOSIS — C61 PROSTATE CANCER (HCC): ICD-10-CM

## 2019-02-25 LAB — PROSTATE SPECIFIC ANTIGEN: 0.08 UG/L

## 2019-02-25 PROCEDURE — 36415 COLL VENOUS BLD VENIPUNCTURE: CPT

## 2019-02-25 PROCEDURE — 84153 ASSAY OF PSA TOTAL: CPT

## 2019-02-28 ENCOUNTER — OFFICE VISIT (OUTPATIENT)
Dept: UROLOGY | Age: 69
End: 2019-02-28
Payer: MEDICARE

## 2019-02-28 VITALS
DIASTOLIC BLOOD PRESSURE: 70 MMHG | TEMPERATURE: 98.4 F | BODY MASS INDEX: 30.02 KG/M2 | WEIGHT: 186 LBS | SYSTOLIC BLOOD PRESSURE: 160 MMHG

## 2019-02-28 DIAGNOSIS — C61 PROSTATE CANCER (HCC): Primary | ICD-10-CM

## 2019-02-28 PROCEDURE — 99213 OFFICE O/P EST LOW 20 MIN: CPT | Performed by: UROLOGY

## 2019-02-28 PROCEDURE — 96402 CHEMO HORMON ANTINEOPL SQ/IM: CPT | Performed by: UROLOGY

## 2019-03-08 ENCOUNTER — TELEPHONE (OUTPATIENT)
Dept: UROLOGY | Age: 69
End: 2019-03-08

## 2019-03-19 ENCOUNTER — HOSPITAL ENCOUNTER (OUTPATIENT)
Age: 69
Discharge: HOME | End: 2019-03-21
Payer: MEDICARE

## 2019-04-11 ENCOUNTER — OFFICE VISIT (OUTPATIENT)
Dept: PULMONOLOGY | Age: 69
End: 2019-04-11
Payer: MEDICARE

## 2019-04-11 ENCOUNTER — HOSPITAL ENCOUNTER (OUTPATIENT)
Age: 69
Discharge: HOME | End: 2019-04-11
Payer: MEDICARE

## 2019-04-11 VITALS
HEIGHT: 66 IN | HEART RATE: 57 BPM | RESPIRATION RATE: 24 BRPM | WEIGHT: 186.9 LBS | BODY MASS INDEX: 30.04 KG/M2 | OXYGEN SATURATION: 96 % | DIASTOLIC BLOOD PRESSURE: 91 MMHG | SYSTOLIC BLOOD PRESSURE: 158 MMHG | TEMPERATURE: 98.5 F

## 2019-04-11 DIAGNOSIS — R05.3 CHRONIC COUGH: ICD-10-CM

## 2019-04-11 DIAGNOSIS — J44.9 CHRONIC OBSTRUCTIVE PULMONARY DISEASE, UNSPECIFIED COPD TYPE (HCC): ICD-10-CM

## 2019-04-11 DIAGNOSIS — R06.09 DYSPNEA ON EXERTION: ICD-10-CM

## 2019-04-11 DIAGNOSIS — R05.3 CHRONIC COUGH: Primary | ICD-10-CM

## 2019-04-11 DIAGNOSIS — N18.9 CHRONIC KIDNEY DISEASE, UNSPECIFIED CKD STAGE: ICD-10-CM

## 2019-04-11 DIAGNOSIS — Z99.89 OSA ON CPAP: ICD-10-CM

## 2019-04-11 DIAGNOSIS — J84.9 ILD (INTERSTITIAL LUNG DISEASE) (HCC): ICD-10-CM

## 2019-04-11 DIAGNOSIS — G47.33 OSA ON CPAP: ICD-10-CM

## 2019-04-11 LAB
ABSOLUTE EOS #: 0 K/UL (ref 0–0.44)
ABSOLUTE IMMATURE GRANULOCYTE: 0 K/UL (ref 0–0.3)
ABSOLUTE LYMPH #: 0.74 K/UL (ref 1.1–3.7)
ABSOLUTE MONO #: 0.21 K/UL (ref 0.1–1.2)
ALBUMIN SERPL-MCNC: 3.9 G/DL (ref 3.5–5.2)
ALBUMIN/GLOBULIN RATIO: 1.1 (ref 1–2.5)
ALP BLD-CCNC: 80 U/L (ref 40–129)
ALT SERPL-CCNC: 17 U/L (ref 5–41)
ANION GAP SERPL CALCULATED.3IONS-SCNC: 12 MMOL/L (ref 9–17)
AST SERPL-CCNC: 14 U/L
BASOPHILS # BLD: 1 % (ref 0–2)
BASOPHILS ABSOLUTE: 0.11 K/UL (ref 0–0.2)
BILIRUB SERPL-MCNC: 0.23 MG/DL (ref 0.3–1.2)
BUN BLDV-MCNC: 33 MG/DL (ref 8–23)
BUN/CREAT BLD: 24 (ref 9–20)
C-REACTIVE PROTEIN: 11.1 MG/L (ref 0–5)
CALCIUM SERPL-MCNC: 10 MG/DL (ref 8.6–10.4)
CHLORIDE BLD-SCNC: 98 MMOL/L (ref 98–107)
CO2: 28 MMOL/L (ref 20–31)
CREAT SERPL-MCNC: 1.37 MG/DL (ref 0.7–1.2)
DIFFERENTIAL TYPE: ABNORMAL
EOSINOPHILS RELATIVE PERCENT: 0 % (ref 1–4)
GFR AFRICAN AMERICAN: >60 ML/MIN
GFR NON-AFRICAN AMERICAN: 52 ML/MIN
GFR SERPL CREATININE-BSD FRML MDRD: ABNORMAL ML/MIN/{1.73_M2}
GFR SERPL CREATININE-BSD FRML MDRD: ABNORMAL ML/MIN/{1.73_M2}
GLUCOSE BLD-MCNC: 118 MG/DL (ref 70–99)
HCT VFR BLD CALC: 39.2 % (ref 40.7–50.3)
HEMOGLOBIN: 12.3 G/DL (ref 13–17)
IMMATURE GRANULOCYTES: 0 %
LYMPHOCYTES # BLD: 7 % (ref 24–43)
MCH RBC QN AUTO: 29.6 PG (ref 25.2–33.5)
MCHC RBC AUTO-ENTMCNC: 31.4 G/DL (ref 28.4–34.8)
MCV RBC AUTO: 94.2 FL (ref 82.6–102.9)
MONOCYTES # BLD: 2 % (ref 3–12)
MORPHOLOGY: NORMAL
NRBC AUTOMATED: 0 PER 100 WBC
PDW BLD-RTO: 15.3 % (ref 11.8–14.4)
PLATELET # BLD: 274 K/UL (ref 138–453)
PLATELET ESTIMATE: ABNORMAL
PMV BLD AUTO: 10.6 FL (ref 8.1–13.5)
POTASSIUM SERPL-SCNC: 4.7 MMOL/L (ref 3.7–5.3)
RBC # BLD: 4.16 M/UL (ref 4.21–5.77)
RBC # BLD: ABNORMAL 10*6/UL
SEG NEUTROPHILS: 90 % (ref 36–65)
SEGMENTED NEUTROPHILS ABSOLUTE COUNT: 9.44 K/UL (ref 1.5–8.1)
SODIUM BLD-SCNC: 138 MMOL/L (ref 135–144)
TOTAL PROTEIN: 7.4 G/DL (ref 6.4–8.3)
WBC # BLD: 10.5 K/UL (ref 3.5–11.3)
WBC # BLD: ABNORMAL 10*3/UL

## 2019-04-11 PROCEDURE — 86038 ANTINUCLEAR ANTIBODIES: CPT

## 2019-04-11 PROCEDURE — 80053 COMPREHEN METABOLIC PANEL: CPT

## 2019-04-11 PROCEDURE — 85025 COMPLETE CBC W/AUTO DIFF WBC: CPT

## 2019-04-11 PROCEDURE — 83516 IMMUNOASSAY NONANTIBODY: CPT

## 2019-04-11 PROCEDURE — 99205 OFFICE O/P NEW HI 60 MIN: CPT | Performed by: INTERNAL MEDICINE

## 2019-04-11 PROCEDURE — 86140 C-REACTIVE PROTEIN: CPT

## 2019-04-11 PROCEDURE — 36415 COLL VENOUS BLD VENIPUNCTURE: CPT

## 2019-04-11 NOTE — PROGRESS NOTES
OUTPATIENT PULMONARY CONSULT NOTE      Patient:  Abilio Correa  MRN: [id number]    Consulting Physician: Lennon Dancer, MD  Reason for Consult: Dyspnea/chronic cough/COPD  Primacy Care Physician: Lennon Dancer, MD    HISTORY OF PRESENT ILLNESS:   The patient is a 70 y.o. male     He is referred here for chronic cough and shortness of breath. He has history of COPD and he is on bronchodilator but he was in usual state of health until November December when he started having shortness of breath, cough cough was persistent in all the time mostly dry and sometimes productive of sputum, he had an admission in the hospital when he was treated with steroids and was discharge on taper dose of prednisone, he took medication for a few weeks and then when he stopped medication he started having the symptoms again a few weeks later and went back on steroids, he had 2 courses at least of taper dose of steroids and recurrence of symptoms and he was started on prednisone 20 mg a day for about 3 weeks now. According to patient above symptoms started after he was started on shots for prostate cancer, he does have history of prostrate cancer and he had radiation therapy for prostate cancer last year and then he was started on Degarelixon by urology and according to patient he ended up with the infection and he was having increasing shortness of breath and cough, the medication was changed to Leuprolide which she received in February and the next dose will be in July this year. Cough is persistent in all the time, only responded to steroids, he is on Symbicort and his Spiriva but without much relief until he takes prednisone, the cough is mostly dry and he feels like taking in his throat. He also have postnasal dripping nasal congestion and feels like drainage into his throat from back of the nose.   He does have shortness of breath on activity and exertion, shortness of breath has been getting worse since December. When he was in the hospital in December he had a CT scan of the chest done which shows a hazy tree-in-bud infiltrate in right upper lobe otherwise no evidence of chronic interstitial lung disease or fibrosis. He had a chest x-ray done in March just 4 weeks ago which did not show any acute changes. He denies gastroesophageal reflux symptoms and heartburn. He also complain of difficulty sleeping since he was started on shots for prostate cancer. He is complaining of facial swelling, increasing weight gain, pedal edema which she claimed that this started after he is to be on steroids. His last labs in December 2018 showed a creatinine of 1.57, a CBC shows a hemoglobin of 9.9 and platelet of 032 at that time  He had an echocardiogram in December which shows normal LV function. He has history of smoking for 30 years 1 pack per day and he quit about 10 years ago. He use to work as  in [employer]  He denies any other exposure, he denies any pets at home, denies any recent travel.       Past Medical History:        Diagnosis Date    [facility] obstructive [facility]) [facility]     Essential hypertension     Prostate cancer ([facility]     Wears glasses        Past Surgical History:        Procedure Laterality Date    ANKLE SURGERY      COLONOSCOPY  2015    Normal    HERNIA REPAIR Right 1975       Allergies:    No Known Allergies      Home Meds:   Outpatient Encounter Medications as of 4/11/2019   Medication Sig Dispense Refill    predniSONE (DELTASONE) 20 MG tablet Take 1 tablet by mouth daily 30 tablet 0    Cholecalciferol (VITAMIN D3) 5000 units TABS Take 1,000 Units by mouth      lisinopril (PRINIVIL;ZESTRIL) 10 MG tablet Take 1 tablet by mouth daily 30 tablet 3    albuterol sulfate  (90 Base) MCG/ACT inhaler Inhale 2 puffs into the lungs every 6 hours as needed for Wheezing or Shortness of Breath 1 Inhaler 11    albuterol (PROVENTIL) (2.5 MG/3ML) 0.083% nebulizer solution Take 3 mLs by nebulization every 4 hours as needed for Wheezing 120 each 3    carvedilol (COREG) 25 MG tablet Take 1 tablet by mouth 2 times daily 60 tablet 3    budesonide-formoterol (SYMBICORT) 160-4.5 MCG/ACT AERO Inhale 2 puffs into the lungs 2 times daily      amLODIPine (NORVASC) 10 MG tablet Take 10 mg by mouth daily      tiotropium (SPIRIVA RESPIMAT) 1.25 MCG/ACT AERS inhaler Inhale 2 puffs into the lungs daily      cetirizine (ZYRTEC) 10 MG tablet Take 10 mg by mouth daily      montelukast (SINGULAIR) 10 MG tablet Take 10 mg by mouth nightly       No facility-administered encounter medications on file as of 4/11/2019. Social History:   TOBACCO:   reports that he quit smoking about 5 years ago. His smoking use included cigarettes. He has a 30.00 pack-year smoking history. He has never used smokeless tobacco.  ETOH:   reports that he does not drink alcohol.   OCCUPATION:  Used to work as  in [employer]    Family History:       Problem Relation Age of Onset    No Known Problems Father     Cancer Mother        Immunizations:    Immunization History   Administered Date(s) Administered    Influenza Virus Vaccine 10/03/2016    Influenza, Negrita Dukes, 6 mo and older, IM, PF (Flulaval, Fluarix) 10/31/2018    Pneumococcal 13-valent Conjugate ([id number]) 12/28/2015    Pneumococcal Polysaccharide ([id number]) 12/05/2016    Zoster Live (Zostavax) 03/01/2016         REVIEW OF SYSTEMS:    CONSTITUTIONAL:   positive for weight gain, negative for  fevers, chills, sweats, malaise, anorexia and weight loss  EYES:  negative for  double vision, blurred vision, dry eyes, eye discharge, visual disturbance, irritation, redness and icterus  HEENT:  negative for  tinnitus, earaches, nasal congestion, epistaxis, sore mouth, sore throat, hoarseness and voice change  RESPIRATORY:  positive for  dyspnea, dry cough, cough with sputum, wheezing, negative for chest pain, pleuritic pain and cyanosis  CARDIOVASCULAR:  positive for dyspnea and edema, negative for  chest pain, palpitations, orthopnea, PND, exertional chest pressure/discomfort, syncope  GASTROINTESTINAL:  negative for nausea, vomiting, diarrhea, constipation, abdominal pain, pruritus, jaundice, dysphagia, reflux, regurgitation, hematemesis and hemtochezia  GENITOURINARY:  negative for frequency, dysuria, nocturia, urinary incontinence, hesitancy, decreased stream and hematuria  HEMATOLOGIC/LYMPHATIC:  negative for easy bruising, bleeding, lymphadenopathy and petechiae  ALLERGIC/IMMUNOLOGIC:  negative for recurrent infections, urticaria, hay fever, angioedema, anaphylaxis and drug reactions  ENDOCRINE:  negative for heat intolerance, cold intolerance, tremor, weight changes and change in bowel habits  MUSCULOSKELETAL:  negative for  myalgias, arthralgias, joint swelling, stiff joints, decreased range of motion and muscle weakness  NEUROLOGICAL:  negative for headaches, dizziness, seizures, memory problems, speech problems, visual disturbance, coordination problems, gait problems, tremor, dysphagia, weakness, syncope and tingling  BEHAVIOR/PSYCH:  negative             Physical Exam:    Vitals: BP (!) 158/91   Pulse 57   Temp 98.5 °F (36.9 °C)   Resp 24   Ht 5' 6\" (1.676 m)   Wt 186 lb 14.4 oz (84.8 kg)   SpO2 96%   BMI 30.17 kg/m²   Last 3 weights: Wt Readings from Last 3 Encounters:   04/11/19 186 lb 14.4 oz (84.8 kg)   02/28/19 186 lb (84.4 kg)   02/22/19 182 lb (82.6 kg)     Body mass index is 30.17 kg/m².     Physical Examination:   General appearance - oriented to person, place, and time, overweight and acyanotic, in no respiratory distress  Mental status - alert, oriented to person, place, and time  Eyes - pupils equal and reactive, extraocular eye movements intact, sclera anicteric but pale  Ears - right ear normal, left ear normal  Nose - normal and patent, no erythema, discharge or polyps  Mouth - mucous membranes moist, pharynx normal without lesions  Neck - supple, no significant adenopathy, short and thick neck  Chest - no tachypnea, retractions or cyanosis, no dullness on percussion, bilaterally symmetrical chest movement he has bilateral air entry with bilateral scattered rhonchi and wheezing more on the right than left, difficult to hear any crackles because of scattered rhonchi  Heart - normal rate, regular rhythm, normal S1, S2, no murmurs, rubs, clicks or gallops  Abdomen - soft, nontender, nondistended, no masses or organomegaly  Neurological - alert, oriented, normal speech, no focal findings or movement disorder noted}  Extremities - pedal edema +++, intact peripheral pulses  Skin - normal coloration and turgor, no rashes, no suspicious skin lesions noted       LABS:    CBC:   WBC   Date Value Ref Range Status   12/16/2018 12.9 (H) 3.5 - 11.3 k/uL Final   12/15/2018 14.1 (H) 3.5 - 11.3 k/uL Final   12/14/2018 14.1 (H) 3.5 - 11.3 k/uL Final     Hemoglobin   Date Value Ref Range Status   12/16/2018 9.9 (L) 13.0 - 17.0 g/dL Final   12/15/2018 9.6 (L) 13.0 - 17.0 g/dL Final   12/14/2018 11.4 (L) 13.0 - 17.0 g/dL Final     Platelets   Date Value Ref Range Status   12/16/2018 225 138 - 453 k/uL Final   12/15/2018 204 138 - 453 k/uL Final   12/14/2018 257 138 - 453 k/uL Final     BMP:   Sodium   Date Value Ref Range Status   12/16/2018 136 135 - 144 mmol/L Final   12/15/2018 134 (L) 135 - 144 mmol/L Final   12/14/2018 138 135 - 144 mmol/L Final     Potassium   Date Value Ref Range Status   12/16/2018 4.1 3.7 - 5.3 mmol/L Final   12/15/2018 4.2 3.7 - 5.3 mmol/L Final   12/14/2018 4.0 3.7 - 5.3 mmol/L Final     Chloride   Date Value Ref Range Status   12/16/2018 99 98 - 107 mmol/L Final   12/15/2018 99 98 - 107 mmol/L Final   12/14/2018 102 98 - 107 mmol/L Final     CO2   Date Value Ref Range Status   12/16/2018 27 20 - 31 mmol/L Final   12/15/2018 25 20 - 31 mmol/L Final   12/14/2018 26 20 - 31 mmol/L Final     BUN   Date Value Chronic kidney disease/rule out nephrotic syndrome  8. Anemia etiology not known. 9.      Prostrate cancer. 10.    Hypertension      He is having symptoms which is chronic now for almost 4 months since December with cough and worsening shortness of breath responded only to steroid, chest x-ray in March did not show acute infiltrate but cannot rule out interstitial changes, he also have chronic kidney disease, and anemia, hypertension and bilaterally. Edema is more than would be expected from his steroids and would recommend to have urinalysis and renal workup for nephrotic syndrome. Plan and recommendation    Recommend to stop the lisinopril for 2-3 weeks to determine if cough improved and to change lisinopril to ARB's. Recommended to repeat CBC with differential and recommend anemia workup. Will check CMP with liver enzymes and BUN and creatinine. Recommended to get urinalysis to check for protein and if positive then to check for nephrotic range proteinuria. Pulmonary function test to be done. High-resolution CT of the chest.  MANUEL with reflex. ANCA.  CRP. 6 minute walk test.    Recommended to follow-up with Dr. Gisell Clemente for anemia and chronic kidney disease and I would recommend to have nephrology evaluation and to check and follow-up urinalysis. Continue with Symbicort. Currently on prednisone 20 mg per Dr. Gisell Clemente. Continue with his Spiriva once daily. On Singulair. Vaccinations recommended   Up to date with vaccinations from [facility] an active lifestyle   CXR reviewed  CT chest reviewed from December 2018. CPAP at least 4 hrs qhs  Wt loss is recommended and discussed  Follow good sleep hygeine instructions  Use humidifier  Questions answered pertaining to diagnosis and management explained importance of compliance with therapy     Questions answered to pt's/family's satisfaction  It was my pleasure to evaluate Damien Victor today.   Please call with questions. Follow-up in the office in 2-3 weeks. Kassi Arechiga MD             4/11/2019, 10:20 AM    Please note that this chart was generated using voice recognition Dragon dictation software. Although every effort was made to ensure the accuracy of this automated transcription, some errors in transcription may have occurred.

## 2019-04-12 LAB
ANCA MYELOPEROXIDASE: 11 AU/ML
ANCA PROTEINASE 3: 17 AU/ML
ANTI-NUCLEAR ANTIBODY (ANA): NEGATIVE

## 2019-04-16 ENCOUNTER — HOSPITAL ENCOUNTER (OUTPATIENT)
Age: 69
Discharge: HOME | End: 2019-04-16
Payer: MEDICARE

## 2019-04-16 DIAGNOSIS — N18.9 CHRONIC KIDNEY DISEASE, UNSPECIFIED CKD STAGE: ICD-10-CM

## 2019-04-16 LAB
-: ABNORMAL
AMORPHOUS: ABNORMAL
BACTERIA: ABNORMAL
BILIRUBIN URINE: NEGATIVE
CASTS UA: ABNORMAL /LPF
CASTS UA: ABNORMAL /LPF
COLOR: YELLOW
COMMENT UA: ABNORMAL
CRYSTALS, UA: ABNORMAL /HPF
EPITHELIAL CELLS UA: ABNORMAL /HPF (ref 0–5)
GLUCOSE URINE: NEGATIVE
KETONES, URINE: NEGATIVE
LEUKOCYTE ESTERASE, URINE: NEGATIVE
MUCUS: ABNORMAL
NITRITE, URINE: NEGATIVE
OTHER OBSERVATIONS UA: ABNORMAL
PH UA: 5.5 (ref 5–9)
PROTEIN UA: ABNORMAL
RBC UA: ABNORMAL /HPF (ref 0–2)
RENAL EPITHELIAL, UA: ABNORMAL /HPF
SPECIFIC GRAVITY UA: 1.02 (ref 1.01–1.02)
TRICHOMONAS: ABNORMAL
TURBIDITY: CLEAR
URINE HGB: NEGATIVE
UROBILINOGEN, URINE: NORMAL
WBC UA: ABNORMAL /HPF (ref 0–5)
YEAST: ABNORMAL

## 2019-04-16 PROCEDURE — 81001 URINALYSIS AUTO W/SCOPE: CPT

## 2019-04-23 ENCOUNTER — HOSPITAL ENCOUNTER (OUTPATIENT)
Dept: PULMONOLOGY | Age: 69
Discharge: HOME | End: 2019-04-23
Payer: MEDICARE

## 2019-04-23 ENCOUNTER — HOSPITAL ENCOUNTER (OUTPATIENT)
Dept: CT IMAGING | Age: 69
Discharge: HOME | End: 2019-04-25
Payer: MEDICARE

## 2019-04-23 ENCOUNTER — TELEPHONE (OUTPATIENT)
Dept: PULMONOLOGY | Age: 69
End: 2019-04-23

## 2019-04-23 DIAGNOSIS — J84.9 ILD (INTERSTITIAL LUNG DISEASE) (HCC): ICD-10-CM

## 2019-04-23 DIAGNOSIS — R06.09 DYSPNEA ON EXERTION: ICD-10-CM

## 2019-04-23 LAB
DISTANCE WALKED: NORMAL FT
SPO2: NORMAL %

## 2019-04-23 PROCEDURE — 71250 CT THORAX DX C-: CPT

## 2019-04-23 PROCEDURE — 6360000002 HC RX W HCPCS: Performed by: INTERNAL MEDICINE

## 2019-04-23 PROCEDURE — 94664 DEMO&/EVAL PT USE INHALER: CPT

## 2019-04-23 PROCEDURE — 94729 DIFFUSING CAPACITY: CPT

## 2019-04-23 PROCEDURE — 94726 PLETHYSMOGRAPHY LUNG VOLUMES: CPT

## 2019-04-23 PROCEDURE — 94060 EVALUATION OF WHEEZING: CPT

## 2019-04-23 RX ORDER — ALBUTEROL SULFATE 2.5 MG/3ML
2.5 SOLUTION RESPIRATORY (INHALATION) ONCE
Status: COMPLETED | OUTPATIENT
Start: 2019-04-23 | End: 2019-04-23

## 2019-04-23 RX ADMIN — ALBUTEROL SULFATE 2.5 MG: 2.5 SOLUTION RESPIRATORY (INHALATION) at 10:26

## 2019-04-24 NOTE — PROCEDURES
[address]                               PULMONARY FUNCTION    PATIENT NAME: You Walton                     :        1950  MED REC NO:   [id number]                              ROOM:  ACCOUNT NO:   [de-identified]                           ADMIT DATE: 2019  PROVIDER:     Roxi Mike    DATE OF PROCEDURE:  2019    INTERPRETATION:  Spirometry shows an obstructive ventilatory pattern,  which is stage IV obstruction, and it does not improve with  bronchodilator therapy. The FEV1 is 0.68 liter. Lung volumes show air  trapping. Diffusion capacity is decreased at 51% predicted. Airway  resistance is increased and specific conductance is decreased. Flow  volume loop shows obstructive ventilatory pattern. IMPRESSION:  Stage IV COPD with an FEV1 of 0.68 liter. Even though  there is lack of response to bronchodilator in the test, a clinical  improvement with bronchodilator therapy cannot be excluded.         Mannie Ordonez    D: 2019 7:10:36       T: 2019 9:44:53     [id number]  Job#: [id number]     Doc#: [id number]    CC:  Alton Cheek

## 2019-04-24 NOTE — TELEPHONE ENCOUNTER
His CT scan doesn't look bad  He has protein in urine and as I recommended when he came to office he need to see nephrology

## 2019-04-24 NOTE — TELEPHONE ENCOUNTER
Spoke with Micheline Giang and advised him of what Dr. Dione Allen stated below. He voiced understanding and has an appt with him tomorrow.

## 2019-04-25 ENCOUNTER — OFFICE VISIT (OUTPATIENT)
Dept: PULMONOLOGY | Age: 69
End: 2019-04-25
Payer: MEDICARE

## 2019-04-25 VITALS
TEMPERATURE: 98.2 F | HEIGHT: 66 IN | HEART RATE: 54 BPM | SYSTOLIC BLOOD PRESSURE: 156 MMHG | OXYGEN SATURATION: 96 % | BODY MASS INDEX: 29.83 KG/M2 | RESPIRATION RATE: 24 BRPM | DIASTOLIC BLOOD PRESSURE: 93 MMHG | WEIGHT: 185.6 LBS

## 2019-04-25 DIAGNOSIS — R06.02 SHORTNESS OF BREATH: ICD-10-CM

## 2019-04-25 DIAGNOSIS — R05.3 CHRONIC COUGH: ICD-10-CM

## 2019-04-25 DIAGNOSIS — Z99.89 OSA ON CPAP: ICD-10-CM

## 2019-04-25 DIAGNOSIS — G47.33 OSA ON CPAP: ICD-10-CM

## 2019-04-25 DIAGNOSIS — J47.9 BRONCHIECTASIS WITHOUT COMPLICATION (HCC): ICD-10-CM

## 2019-04-25 DIAGNOSIS — J44.9 STAGE 4 VERY SEVERE COPD BY GOLD CLASSIFICATION (HCC): Primary | ICD-10-CM

## 2019-04-25 PROCEDURE — 99215 OFFICE O/P EST HI 40 MIN: CPT | Performed by: INTERNAL MEDICINE

## 2019-04-25 NOTE — PROGRESS NOTES
OUTPATIENT PULMONARY PROGRESS NOTE      Patient:  Reid Mackey  MRN: [id number]    Consulting Physician: Miki Carreon MD  Reason for Consult: Dyspnea/chronic cough/COPD  Primacy Care Physician: Miki Carreon MD    HISTORY OF PRESENT ILLNESS:   The patient is a 70 y.o. male   He was seen last time for history of chronic cough and shortness of breath which had been getting worse for last 6 months started after he was started on hormonal therapy for prostate cancer  Since he was seen last time he is off ACE inhibitor's he claims the cough is slightly better but not resolved but he does not have that continuous persistent cough all the time which was making him choke. Dr. Anay Haile has been coming down on his prednisone and last time when I saw him he was on 20 mg and currently he is on 7.5 mg a day. He continued to have facial edema and also pedal edema which is about the same without much change. On last visit which was a first visit I have ordered MANUEL and ANCA which are both negative, CRP is 11 elevated, echo showed evidence of pulmonary hypertension. 6 minute walk test was done he did not have significant hypoxia and lowest oxygen saturation was 91%. His urinalysis shows 2+ proteins and his last creatinine was 1.37. His pulmonary function test is consistent with severe with FEV1 of 0.69 L, lung volumes consistent with air trapping and moderately reduction in diffusion capacity 51%. He had a high-resolution CT chest done which did not show evidence of bronchiectasis, interstitial lung disease, honeycombing, did show emphysema and mild areas of tree/bud/bronchiolitis. He is using Symbicort and Spiriva he does not usually require albuterol on a daily basis. He is on CPAP and is very compliant with CPAP. Initial history/evaluation and previous workup  He is referred here for chronic cough and shortness of breath.   He has history of COPD and he is on bronchodilator but he was in usual state of health until November December when he started having shortness of breath, cough cough was persistent in all the time mostly dry and sometimes productive of sputum, he had an admission in the hospital when he was treated with steroids and was discharge on taper dose of prednisone, he took medication for a few weeks and then when he stopped medication he started having the symptoms again a few weeks later and went back on steroids, he had 3 courses at least of taper dose of steroids and recurrence of symptoms and he was started on prednisone 20 mg a day for about 3-4 weeks now. According to patient above symptoms started after he was started on shots for prostate cancer, he does have history of prostrate cancer and he had radiation therapy for prostate cancer last year and then he was started on Degarelixon by urology and according to patient he ended up with the infection and he was having increasing shortness of breath and cough, the medication was changed to Leuprolide which he received in February and the next dose will be in July this year. Cough is persistent in all the time, only responded to steroids, he is on Symbicort and his Spiriva but without much relief until he takes prednisone, the cough is mostly dry and he feels like to cling in his throat. He also have postnasal dripping nasal congestion  He does have shortness of breath on activity and exertion, shortness of breath has been getting worse since December. When he was in the hospital in December he had a CT scan of the chest done which was negative for pulmonary embolism, which shows a hazy tree-in-bud infiltrate in right upper lobe otherwise no evidence of chronic interstitial lung disease or fibrosis. He had a chest x-ray done in March just 4 weeks ago which did not show any acute changes. He denies gastroesophageal reflux symptoms and heartburn. He also complain of difficulty sleeping since he was started on shots for prostate cancer.     He is complaining of facial swelling, increasing weight gain, pedal edema which she claimed that this started after he is on steroids. His last labs in December 2018 showed a creatinine of 1.57, a CBC shows a hemoglobin of 9.9 and platelet of 427 at that time  He had an echocardiogram in December 2018 which shows normal LV function, mild diastolic dysfunction and no mention of pulmonary hypertension. He has history of smoking for 30 years 1 pack per day and he quit about 10 years ago. He use to work as  in [employer]  He denies any other exposure, he denies any pets at home, denies any recent travel.       Past Medical History:        Diagnosis Date    COPD [facility]chronic obstructive [facility] disease) ([facility]     Essential hypertension     Prostate cancer ([facility]     Wears glasses        Past Surgical History:        Procedure Laterality Date    ANKLE SURGERY      COLONOSCOPY  2015    Normal    HERNIA REPAIR Right 1975       Allergies:    No Known Allergies      Home Meds:   Outpatient Encounter Medications as of 4/25/2019   Medication Sig Dispense Refill    predniSONE (DELTASONE) 2.5 MG tablet Take 7.5mg for 3 weeks 21 tablet 0    predniSONE (DELTASONE) 5 MG tablet Take 7.5mg daily for 3 weeks then take 5mg for 3 weeks then stop 45 tablet 0    Cholecalciferol (VITAMIN D3) 5000 units TABS Take 1,000 Units by mouth      albuterol sulfate  (90 Base) MCG/ACT inhaler Inhale 2 puffs into the lungs every 6 hours as needed for Wheezing or Shortness of Breath 1 Inhaler 11    albuterol (PROVENTIL) (2.5 MG/3ML) 0.083% nebulizer solution Take 3 mLs by nebulization every 4 hours as needed for Wheezing 120 each 3    carvedilol (COREG) 25 MG tablet Take 1 tablet by mouth 2 times daily 60 tablet 3    budesonide-formoterol (SYMBICORT) 160-4.5 MCG/ACT AERO Inhale 2 puffs into the lungs 2 times daily      amLODIPine (NORVASC) 10 MG tablet Take 10 mg by mouth daily      tiotropium (SPIRIVA RESPIMAT) 1.25 MCG/ACT AERS inhaler Inhale 2 puffs into the lungs daily      cetirizine (ZYRTEC) 10 MG tablet Take 10 mg by mouth daily      montelukast (SINGULAIR) 10 MG tablet Take 10 mg by mouth nightly      lisinopril (PRINIVIL;ZESTRIL) 10 MG tablet Take 1 tablet by mouth daily (Patient taking differently: Take 10 mg by mouth daily States discontinued) 30 tablet 3     No facility-administered encounter medications on file as of 4/25/2019. Social History:   TOBACCO:   reports that he quit smoking about 5 years ago. His smoking use included cigarettes. He has a 30.00 pack-year smoking history. He has never used smokeless tobacco.  ETOH:   reports that he does not drink alcohol.   OCCUPATION:  Used to work as  in [employer]    Family History:       Problem Relation Age of Onset    No Known Problems Father     Cancer Mother        Immunizations:    Immunization History   Administered Date(s) Administered    Influenza Virus Vaccine 10/03/2016    Influenza, [place], 6 mo and older, IM, PF (Flulaval, Fluarix) 10/31/2018    Pneumococcal 13-valent Conjugate ([id number]) 12/28/2015    Pneumococcal Polysaccharide ([id number]) 12/05/2016    Zoster Live (Zostavax) 03/01/2016         REVIEW OF SYSTEMS:    CONSTITUTIONAL:   positive for weight gain, negative for  fevers, chills, sweats, malaise, anorexia and weight loss  EYES:  negative for  double vision, blurred vision, dry eyes, eye discharge, visual disturbance, irritation, redness and icterus  HEENT:  negative for  tinnitus, earaches, nasal congestion, epistaxis, sore mouth, sore throat, hoarseness and voice change  RESPIRATORY:  positive for  dyspnea, dry cough, cough with sputum, wheezing, negative for chest pain, pleuritic pain and cyanosis  CARDIOVASCULAR:  positive for dyspnea and edema, negative for  chest pain, palpitations, orthopnea, PND, exertional chest pressure/discomfort, syncope  GASTROINTESTINAL:  negative for nausea, vomiting, diarrhea, constipation, abdominal pain, pruritus, jaundice, dysphagia, reflux, regurgitation, hematemesis and hemtochezia  GENITOURINARY:  negative for frequency, dysuria, nocturia, urinary incontinence, hesitancy, decreased stream and hematuria  HEMATOLOGIC/LYMPHATIC:  negative for easy bruising, bleeding, lymphadenopathy and petechiae  ALLERGIC/IMMUNOLOGIC:  negative for recurrent infections, urticaria, hay fever, angioedema, anaphylaxis and drug reactions  ENDOCRINE:  negative for heat intolerance, cold intolerance, tremor, weight changes and change in bowel habits  MUSCULOSKELETAL:  negative for  myalgias, arthralgias, joint swelling, stiff joints, decreased range of motion and muscle weakness  NEUROLOGICAL:  negative for headaches, dizziness, seizures, memory problems, speech problems, visual disturbance, coordination problems, gait problems, tremor, dysphagia, weakness, syncope and tingling  BEHAVIOR/PSYCH:  negative             Physical Exam:    Vitals: BP (!) 156/93 Comment: recheck  Pulse 54   Temp 98.2 °F (36.8 °C) (Tympanic)   Resp 24   Ht 5' 6\" (1.676 m)   Wt 185 lb 9.6 oz (84.2 kg)   SpO2 96% Comment: room air  BMI 29.96 kg/m²   Last 3 weights: Wt Readings from Last 3 Encounters:   04/25/19 185 lb 9.6 oz (84.2 kg)   04/17/19 185 lb (83.9 kg)   04/11/19 186 lb 14.4 oz (84.8 kg)     Body mass index is 29.96 kg/m².     Physical Examination:   General appearance - oriented to person, place, and time, overweight and acyanotic, in no respiratory distress  Mental status - alert, oriented to person, place, and time  Eyes - pupils equal and reactive, extraocular eye movements intact, sclera anicteric but pale  Ears - right ear normal, left ear normal  Nose - normal and patent, no erythema, discharge or polyps  Mouth - mucous membranes moist, pharynx normal without lesions  Neck - supple, no significant adenopathy, short and thick neck  Chest - no tachypnea, retractions or cyanosis, no dullness on percussion, bilaterally symmetrical chest movement he has bilateral air entry with distant bilateral breath sound, no rhonchi and wheezing today and no crackles on exam.  Heart - normal rate, regular rhythm, normal S1, S2, no murmurs, rubs, clicks or gallops  Abdomen - soft, nontender, nondistended, no masses or organomegaly  Neurological - alert, oriented, normal speech, no focal findings or movement disorder noted}  Extremities - pedal edema +++, intact peripheral pulses  Skin - normal coloration and turgor, no rashes, no suspicious skin lesions noted       LABS:    CBC:   WBC   Date Value Ref Range Status   04/11/2019 10.5 3.5 - 11.3 k/uL Final   12/16/2018 12.9 (H) 3.5 - 11.3 k/uL Final   12/15/2018 14.1 (H) 3.5 - 11.3 k/uL Final     Hemoglobin   Date Value Ref Range Status   04/11/2019 12.3 (L) 13.0 - 17.0 g/dL Final   12/16/2018 9.9 (L) 13.0 - 17.0 g/dL Final   12/15/2018 9.6 (L) 13.0 - 17.0 g/dL Final     Platelets   Date Value Ref Range Status   04/11/2019 274 138 - 453 k/uL Final   12/16/2018 225 138 - 453 k/uL Final   12/15/2018 204 138 - 453 k/uL Final     BMP:   Sodium   Date Value Ref Range Status   04/11/2019 138 135 - 144 mmol/L Final   12/16/2018 136 135 - 144 mmol/L Final   12/15/2018 134 (L) 135 - 144 mmol/L Final     Potassium   Date Value Ref Range Status   04/11/2019 4.7 3.7 - 5.3 mmol/L Final   12/16/2018 4.1 3.7 - 5.3 mmol/L Final   12/15/2018 4.2 3.7 - 5.3 mmol/L Final     Chloride   Date Value Ref Range Status   04/11/2019 98 98 - 107 mmol/L Final   12/16/2018 99 98 - 107 mmol/L Final   12/15/2018 99 98 - 107 mmol/L Final     CO2   Date Value Ref Range Status   04/11/2019 28 20 - 31 mmol/L Final   12/16/2018 27 20 - 31 mmol/L Final   12/15/2018 25 20 - 31 mmol/L Final     BUN   Date Value Ref Range Status   04/11/2019 33 (H) 8 - 23 mg/dL Final   12/16/2018 41 (H) 8 - 23 mg/dL Final   12/15/2018 34 (H) 8 - 23 mg/dL Final     CREATININE   Date Value Ref Range Status   04/11/2019 1.37 (H) 0.70 - 1.20 mg/dL Final   12/16/2018 1.57 (H) 0.70 - 1.20 mg/dL Final   12/15/2018 1.39 (H) 0.70 - 1.20 mg/dL Final     Glucose   Date Value Ref Range Status   04/11/2019 118 (H) 70 - 99 mg/dL Final   12/16/2018 116 (H) 70 - 99 mg/dL Final   12/15/2018 138 (H) 70 - 99 mg/dL Final     Hepatic:   AST   Date Value Ref Range Status   04/11/2019 14 <40 U/L Final   12/16/2018 19 <40 U/L Final   12/15/2018 18 <40 U/L Final     ALT   Date Value Ref Range Status   04/11/2019 17 5 - 41 U/L Final   12/16/2018 23 5 - 41 U/L Final   12/15/2018 17 5 - 41 U/L Final     Total Bilirubin   Date Value Ref Range Status   04/11/2019 0.23 (L) 0.3 - 1.2 mg/dL Final   12/16/2018 <0.10 (L) 0.3 - 1.2 mg/dL Final   12/15/2018 <0.10 (L) 0.3 - 1.2 mg/dL Final     Alkaline Phosphatase   Date Value Ref Range Status   04/11/2019 80 40 - 129 U/L Final   12/16/2018 72 40 - 129 U/L Final   12/15/2018 68 40 - 129 U/L Final     Amylase: No results found for: AMYLASE  Lipase: No results found for: LIPASE  CARDIAC ENZYMES: No results found for: CKTOTAL, CKMB, CKMBINDEX, TROPONINI  BNP:   BNP   Date Value Ref Range Status   07/12/2013 28 <100 pg/mL Final     Comment:           100 pg/mL is a suggested decision/cutoff point for aid in the diagnosis of   congestive heart failure.   Gender and age differences with BNP may exist.  Performed at [facility] [address], [place] [address] [phone]     Lipids:   Cholesterol, Total   Date Value Ref Range Status   09/28/2016 221 mg/dL Final     HDL   Date Value Ref Range Status   09/28/2016 64 35 - 70 mg/dL Final       INR: No results found for: INR  Thyroid: No results found for: TSH  Urinalysis:   Bacteria, UA   Date Value Ref Range Status   04/16/2019 NOT REPORTED None Final     Color, UA   Date Value Ref Range Status   04/16/2019 YELLOW YELLOW Final     pH, UA   Date Value Ref Range Status   04/16/2019 5.5 5.0 - 9.0 Final     Protein, UA   Date Value Ref Range Status   04/16/2019 2+ (A) NEGATIVE Final infectious bronchiolitis. 2. Interval resolution of previously seen right upper lobe   peribronchovascular opacities. 3. No evidence of interstitial lung disease. 4. Stable chronic findings including emphysema. 12/14/18  *No CT evidence of acute pulmonary embolism. *Ill-defined peribronovascular opacities are identified involving the right   upper lobe and lesser degree left lower lobe.  This appears to be   superimposed on emphysematous change.  Developing infectious or inflammatory   process cannot be excluded.  Repeat CT after therapy is recommended to   confirm resolution. ECHO: 12/14/18  Poor image quality, likely due to patient body habitus and/or lung disease. Because of poor image quality, Definity echo contrast was used to better  assess left ventricular wall motion and thickness. Global left ventricular function is difficult to assess but appears normal  with an estimated EF of >55%. Mild left ventricular hypertrophy and with normal left ventricular cavity  size. No definite specific wall motion abnormalities were identified. No significant valvular abnormalities. Evidence of mild diastolic dysfunction is seen. Assessment and Plan       ICD-10-CM    1. Stage 4 very severe COPD by GOLD classification ([address] J44.9    2. RUDY on CPAP G47.33     Z99.89    3. Bronchiectasis without complication ([address] S52.4    4. Shortness of breath R06.02    5. Chronic cough R05      6. Cushingoid feature secondary to steroids   7. Bilateral pedal pitting edema more than would be expected from steroid. Chronic kidney disease/nephrotic syndrome  8. Anemia etiology not known. 9.      Prostrate cancer.   10.    Hypertension    Patient clearly relate his symptoms of cough and worsening shortness of breath after institution of hormonal therapy for prostate cancer and as workup so far shows that he has severe COPD with emphysema but no definite evidence of interstitial lung disease, his echo findings did not suggest pulmonary hypertension, his urine analysis shows 2+ proteinuria with chronic kidney disease and likely nephrotic syndrome and would suggest nephrology evaluation, no role for steroids chronically in patients with COPD and it may be advisable to or hormonal therapy for prostate cancer if urology and patient desire and follow-up his symptoms otherwise he is having significant symptoms from chronic steroid therapy. As mentioned MANUEL and ANCA negative, CRP is mildly elevated. Plan and recommendation    Recommend to continue to hold lisinopril and try ARB's. Recommended anemia workup. Suggest nephrology evaluation and patient is going to discuss with Dr. Karen Gray  Pulmonary function test seen. 6 minute walk test.  High-resolution CT of the chest images reviewed. Continue with Symbicort. Currently on prednisone 7.5 mg per Dr. Karen Gray. Continue with his Spiriva once daily. Continue Singulair. Vaccinations recommended   Up to date with vaccinations from pulm perspective   Maintain an active lifestyle     CPAP at least 4 hrs qhs  Wt loss is recommended and discussed  Follow good sleep hygeine instructions  Use humidifier  Questions answered pertaining to diagnosis and management explained importance of compliance with therapy     Questions answered to pt's/family's satisfaction  It was my pleasure to evaluate Xin Rodriguez today. Please call with questions. Follow-up in the office in 4-5 weeks. Jennifer Encarnacion MD             4/25/2019, 4:49 PM    Please note that this chart was generated using voice recognition Dragon dictation software. Although every effort was made to ensure the accuracy of this automated transcription, some errors in transcription may have occurred.

## 2019-05-16 ENCOUNTER — OFFICE VISIT (OUTPATIENT)
Dept: PULMONOLOGY | Age: 69
End: 2019-05-16
Payer: MEDICARE

## 2019-05-16 VITALS
OXYGEN SATURATION: 95 % | RESPIRATION RATE: 24 BRPM | TEMPERATURE: 98.6 F | WEIGHT: 189.9 LBS | BODY MASS INDEX: 30.52 KG/M2 | HEIGHT: 66 IN | DIASTOLIC BLOOD PRESSURE: 88 MMHG | SYSTOLIC BLOOD PRESSURE: 148 MMHG | HEART RATE: 63 BPM

## 2019-05-16 DIAGNOSIS — J47.9 BRONCHIECTASIS WITHOUT COMPLICATION (HCC): ICD-10-CM

## 2019-05-16 DIAGNOSIS — Z99.89 OSA ON CPAP: ICD-10-CM

## 2019-05-16 DIAGNOSIS — R80.9 PROTEINURIA, UNSPECIFIED TYPE: ICD-10-CM

## 2019-05-16 DIAGNOSIS — J44.9 COPD, SEVERE (HCC): Primary | ICD-10-CM

## 2019-05-16 DIAGNOSIS — G47.33 OSA ON CPAP: ICD-10-CM

## 2019-05-16 PROCEDURE — 99214 OFFICE O/P EST MOD 30 MIN: CPT | Performed by: INTERNAL MEDICINE

## 2019-05-16 NOTE — PROGRESS NOTES
OUTPATIENT PULMONARY PROGRESS NOTE      Patient:  Pollo Brown  MRN: [id number]    Consulting Physician: Ventura Cruz MD  Reason for Consult: Dyspnea/chronic cough/COPD  Primacy Care Physician: Ventura Cruz MD    HISTORY OF PRESENT ILLNESS:   The patient is a 70 y.o. male   He was seen last time 4- 5 weeks ago and schedule an appointment for follow-up today  Initially seen recently for history of chronic cough and shortness of breath which had been getting worse for last 6 months started after he was started on hormonal therapy for prostate cancer. Dr. Riaz Bush has been coming down on his prednisone and last time when I saw him he was on 7.5 mg a day and now he is on 5 mg a day for last few weeks. Since is coming down on prednisone had lost some weight his facial edema is slightly better but his pedal edema continued, on last visit I have done a urine protein which shows he has 2+ protein and I recommended him to follow with Dr. Riaz Bush for nephrology evaluation at his chronic kidney disease generalized edema and proteinuria. His dyspnea on exertion is slowly and gradually better he is slightly more active than before. His cough as mentioned is better he denies wheezing and denies sputum production except occasionally. Denies nocturnal cough wheezing or chest tightness. He is been off ACE inhibitor's for more than a month now and he feels like that his cough is better than when he was on Ace inhibitors. He is using Symbicort and Spiriva. Albuterol aerosol using it twice daily and inhaler every other day  He is on CPAP and is very compliant with CPAP. On previous visit he had MANUEL and ANCA which are both negative, CRP is 11 elevated, echo showed evidence of pulmonary hypertension. 6 minute walk test was done he did not have significant hypoxia and lowest oxygen saturation was 91%.   His pulmonary function test is consistent with severe with FEV1 of 0.69 L, lung volumes consistent with air trapping and moderately reduction in diffusion capacity 51%. He had a high-resolution CT chest done which did not show evidence of bronchiectasis, interstitial lung disease, honeycombing, did show emphysema and mild areas of tree/bud/bronchiolitis. Initial history/evaluation and previous workup  He is referred here for chronic cough and shortness of breath. He has history of COPD and he is on bronchodilator but he was in usual state of health until November December when he started having shortness of breath, cough cough was persistent in all the time mostly dry and sometimes productive of sputum, he had an admission in the hospital when he was treated with steroids and was discharge on taper dose of prednisone, he took medication for a few weeks and then when he stopped medication he started having the symptoms again a few weeks later and went back on steroids, he had 3 courses at least of taper dose of steroids and recurrence of symptoms and he was started on prednisone 20 mg a day for about 3-4 weeks now. According to patient above symptoms started after he was started on shots for prostate cancer, he does have history of prostrate cancer and he had radiation therapy for prostate cancer last year and then he was started on Degarelixon by urology and according to patient he ended up with the infection and he was having increasing shortness of breath and cough, the medication was changed to Leuprolide which he received in February and the next dose will be in July this year. Cough is persistent in all the time, only responded to steroids, he is on Symbicort and his Spiriva but without much relief until he takes prednisone, the cough is mostly dry and he feels like to cling in his throat. He also have postnasal dripping nasal congestion  He does have shortness of breath on activity and exertion, shortness of breath has been getting worse since December.   When he was in the hospital in December he had a CT scan of the chest done which was negative for pulmonary embolism, which shows a hazy tree-in-bud infiltrate in right upper lobe otherwise no evidence of chronic interstitial lung disease or fibrosis. He had a chest x-ray done in March just 4 weeks ago which did not show any acute changes. He denies gastroesophageal reflux symptoms and heartburn. He also complain of difficulty sleeping since he was started on shots for prostate cancer. He is complaining of facial swelling, increasing weight gain, pedal edema which she claimed that this started after he is on steroids. His last labs in December 2018 showed a creatinine of 1.57, a CBC shows a hemoglobin of 9.9 and platelet of 612 at that time  He had an echocardiogram in December 2018 which shows normal LV function, mild diastolic dysfunction and no mention of pulmonary hypertension. He has history of smoking for 30 years 1 pack per day and he quit about 10 years ago. He use to work as  in [employer]  He denies any other exposure, he denies any pets at home, denies any recent travel.       Past Medical History:        Diagnosis Date    [facility]chronic obstructive pulmonary disease) [facility] [address]     Essential hypertension     Prostate cancer ([facility] [address]     Wears glasses        Past Surgical History:        Procedure Laterality Date    ANKLE SURGERY      COLONOSCOPY  2015    Normal    HERNIA REPAIR Right 1975       Allergies:    No Known Allergies      Home Meds:   Outpatient Encounter Medications as of 5/16/2019   Medication Sig Dispense Refill    predniSONE (DELTASONE) 5 MG tablet Take 7.5mg daily for 3 weeks then take 5mg for 3 weeks then stop 45 tablet 0    Cholecalciferol (VITAMIN D3) 5000 units TABS Take 1,000 Units by mouth      lisinopril (PRINIVIL;ZESTRIL) 10 MG tablet Take 1 tablet by mouth daily (Patient taking differently: Take 10 mg by mouth daily States discontinued) 30 tablet 3    albuterol sulfate  (90 Base) MCG/ACT inhaler Inhale 2 puffs into the lungs every 6 hours as needed for Wheezing or Shortness of Breath 1 Inhaler 11    albuterol (PROVENTIL) (2.5 MG/3ML) 0.083% nebulizer solution Take 3 mLs by nebulization every 4 hours as needed for Wheezing 120 each 3    carvedilol (COREG) 25 MG tablet Take 1 tablet by mouth 2 times daily 60 tablet 3    budesonide-formoterol (SYMBICORT) 160-4.5 MCG/ACT AERO Inhale 2 puffs into the lungs 2 times daily      amLODIPine (NORVASC) 10 MG tablet Take 10 mg by mouth daily      tiotropium (SPIRIVA RESPIMAT) 1.25 MCG/ACT AERS inhaler Inhale 2 puffs into the lungs daily      cetirizine (ZYRTEC) 10 MG tablet Take 10 mg by mouth daily      montelukast (SINGULAIR) 10 MG tablet Take 10 mg by mouth nightly      predniSONE (DELTASONE) 2.5 MG tablet Take 7.5mg for 3 weeks 21 tablet 0     No facility-administered encounter medications on file as of 5/16/2019. Social History:   TOBACCO:   reports that he quit smoking about 5 years ago. His smoking use included cigarettes. He has a 30.00 pack-year smoking history. He has never used smokeless tobacco.  ETOH:   reports that he does not drink alcohol.   OCCUPATION:  Used to work as  in [employer]    Family History:       Problem Relation Age of Onset    No Known Problems Father     Cancer Mother        Immunizations:    Immunization History   Administered Date(s) Administered    Influenza Virus Vaccine 10/03/2016    Influenza, Anne Marie Shruti, 6 mo and older, IM, PF (Flulaval, Fluarix) 10/31/2018    Pneumococcal 13-valent Conjugate ([id number]) 12/28/2015    Pneumococcal Polysaccharide ([id number]) 12/05/2016    Zoster Live (Zostavax) 03/01/2016         REVIEW OF SYSTEMS:    CONSTITUTIONAL:   positive for weight gain, negative for  fevers, chills, sweats, malaise, anorexia and weight loss  EYES:  negative for  double vision, blurred vision, dry eyes, eye discharge, visual disturbance, irritation, redness and icterus  HEENT:  negative for  tinnitus, earaches, nasal congestion, epistaxis, sore mouth, sore throat, hoarseness and voice change  RESPIRATORY:  positive for  dyspnea, dry cough, negative for cough with sputum, wheezing, negative for chest pain, pleuritic pain and cyanosis  CARDIOVASCULAR:  positive for dyspnea and edema, negative for  chest pain, palpitations, orthopnea, PND, exertional chest pressure/discomfort, syncope  GASTROINTESTINAL:  negative for nausea, vomiting, diarrhea, constipation, abdominal pain, pruritus, jaundice, dysphagia, reflux, regurgitation, hematemesis and hemtochezia  GENITOURINARY:  negative for frequency, dysuria, nocturia, urinary incontinence, hesitancy, decreased stream and hematuria  HEMATOLOGIC/LYMPHATIC:  negative for easy bruising, bleeding, lymphadenopathy and petechiae  ALLERGIC/IMMUNOLOGIC:  negative for recurrent infections, urticaria, hay fever, angioedema, anaphylaxis and drug reactions  ENDOCRINE:  negative for heat intolerance, cold intolerance, tremor, weight changes and change in bowel habits  MUSCULOSKELETAL:  negative for  myalgias, arthralgias, joint swelling, stiff joints, decreased range of motion and muscle weakness  NEUROLOGICAL:  negative for headaches, dizziness, seizures, memory problems, speech problems, visual disturbance, coordination problems, gait problems, tremor, dysphagia, weakness, syncope and tingling  BEHAVIOR/PSYCH:  negative             Physical Exam:    Vitals: BP (!) 148/88   Pulse 63   Temp 98.6 °F (37 °C)   Resp 24   Ht 5' 6\" (1.676 m)   Wt 189 lb 14.4 oz (86.1 kg)   SpO2 95%   BMI 30.65 kg/m²   Last 3 weights: Wt Readings from Last 3 Encounters:   05/16/19 189 lb 14.4 oz (86.1 kg)   04/25/19 185 lb 9.6 oz (84.2 kg)   04/17/19 185 lb (83.9 kg)     Body mass index is 30.65 kg/m².     Physical Examination:   General appearance - oriented to person, place, and time, overweight and acyanotic, in no respiratory distress  Mental status - alert, oriented to person, place, and time  Eyes - pupils equal and reactive, extraocular eye movements intact, sclera anicteric but pale  Ears - right ear normal, left ear normal  Nose - normal and patent, no erythema, discharge or polyps  Mouth - mucous membranes moist, pharynx normal without lesions  Neck - supple, no significant adenopathy, short and thick neck  Chest - no tachypnea, retractions or cyanosis, no dullness on percussion, bilaterally symmetrical chest movement he has bilateral air entry with distant bilateral breath sound, no rhonchi and wheezing and no crackles on exam.  Heart - normal rate, regular rhythm, normal S1, S2, no murmurs, rubs, clicks or gallops  Abdomen - soft, nontender, nondistended, no masses or organomegaly  Neurological - alert, oriented, normal speech, no focal findings or movement disorder noted  Extremities - pedal edema +++, intact peripheral pulses  Skin - normal coloration and turgor, no rashes, no suspicious skin lesions noted       LABS:    CBC:   WBC   Date Value Ref Range Status   04/11/2019 10.5 3.5 - 11.3 k/uL Final   12/16/2018 12.9 (H) 3.5 - 11.3 k/uL Final   12/15/2018 14.1 (H) 3.5 - 11.3 k/uL Final     Hemoglobin   Date Value Ref Range Status   04/11/2019 12.3 (L) 13.0 - 17.0 g/dL Final   12/16/2018 9.9 (L) 13.0 - 17.0 g/dL Final   12/15/2018 9.6 (L) 13.0 - 17.0 g/dL Final     Platelets   Date Value Ref Range Status   04/11/2019 274 138 - 453 k/uL Final   12/16/2018 225 138 - 453 k/uL Final   12/15/2018 204 138 - 453 k/uL Final     BMP:   Sodium   Date Value Ref Range Status   04/11/2019 138 135 - 144 mmol/L Final   12/16/2018 136 135 - 144 mmol/L Final   12/15/2018 134 (L) 135 - 144 mmol/L Final     Potassium   Date Value Ref Range Status   04/11/2019 4.7 3.7 - 5.3 mmol/L Final   12/16/2018 4.1 3.7 - 5.3 mmol/L Final   12/15/2018 4.2 3.7 - 5.3 mmol/L Final     Chloride   Date Value Ref Range Status   04/11/2019 98 98 - 107 mmol/L Final   12/16/2018 99 98 - 107 mmol/L Final   12/15/2018 99 98 - 107 mmol/L Final     CO2   Date Value Ref Range Status   04/11/2019 28 20 - 31 mmol/L Final   12/16/2018 27 20 - 31 mmol/L Final   12/15/2018 25 20 - 31 mmol/L Final     BUN   Date Value Ref Range Status   04/11/2019 33 (H) 8 - 23 mg/dL Final   12/16/2018 41 (H) 8 - 23 mg/dL Final   12/15/2018 34 (H) 8 - 23 mg/dL Final     CREATININE   Date Value Ref Range Status   04/11/2019 1.37 (H) 0.70 - 1.20 mg/dL Final   12/16/2018 1.57 (H) 0.70 - 1.20 mg/dL Final   12/15/2018 1.39 (H) 0.70 - 1.20 mg/dL Final     Glucose   Date Value Ref Range Status   04/11/2019 118 (H) 70 - 99 mg/dL Final   12/16/2018 116 (H) 70 - 99 mg/dL Final   12/15/2018 138 (H) 70 - 99 mg/dL Final     Hepatic:   AST   Date Value Ref Range Status   04/11/2019 14 <40 U/L Final   12/16/2018 19 <40 U/L Final   12/15/2018 18 <40 U/L Final     ALT   Date Value Ref Range Status   04/11/2019 17 5 - 41 U/L Final   12/16/2018 23 5 - 41 U/L Final   12/15/2018 17 5 - 41 U/L Final     Total Bilirubin   Date Value Ref Range Status   04/11/2019 0.23 (L) 0.3 - 1.2 mg/dL Final   12/16/2018 <0.10 (L) 0.3 - 1.2 mg/dL Final   12/15/2018 <0.10 (L) 0.3 - 1.2 mg/dL Final     Alkaline Phosphatase   Date Value Ref Range Status   04/11/2019 80 40 - 129 U/L Final   12/16/2018 72 40 - 129 U/L Final   12/15/2018 68 40 - 129 U/L Final     Amylase: No results found for: AMYLASE  Lipase: No results found for: LIPASE  CARDIAC ENZYMES: No results found for: CKTOTAL, CKMB, CKMBINDEX, TROPONINI  BNP:   BNP   Date Value Ref Range Status   07/12/2013 28 <100 pg/mL Final     Comment:           100 pg/mL is a suggested decision/cutoff point for aid in the diagnosis of   congestive heart failure.   Gender and age differences with BNP may exist.  Performed at [facility] [address], [place] [address] [phone]     Lipids:   Cholesterol, Total   Date Value Ref Range Status   09/28/2016 221 mg/dL Final     HDL   Date Value Ref Range Status   09/28/2016 64 35 - 70 mg/dL Final INR: No results found for: INR  Thyroid: No results found for: TSH  Urinalysis:   Bacteria, UA   Date Value Ref Range Status   04/16/2019 NOT REPORTED None Final     Color, UA   Date Value Ref Range Status   04/16/2019 YELLOW YELLOW Final     pH, UA   Date Value Ref Range Status   04/16/2019 5.5 5.0 - 9.0 Final     Protein, UA   Date Value Ref Range Status   04/16/2019 2+ (A) NEGATIVE Final     RBC, UA   Date Value Ref Range Status   04/16/2019 None 0 - 2 /HPF Final     Specific Gravity, UA   Date Value Ref Range Status   04/16/2019 1.025 (H) 1.010 - 1.020 Final     Bilirubin Urine   Date Value Ref Range Status   04/16/2019 NEGATIVE NEGATIVE Final     Nitrite, Urine   Date Value Ref Range Status   04/16/2019 NEGATIVE NEGATIVE Final     WBC, UA   Date Value Ref Range Status   04/16/2019 0 TO 2 0 - 5 /HPF Final     Leukocyte Esterase, Urine   Date Value Ref Range Status   04/16/2019 NEGATIVE NEGATIVE Final     Glucose, Ur   Date Value Ref Range Status   04/16/2019 NEGATIVE NEGATIVE Final     Cultures:-  -----------------------------------------------------------------    ABGs: No results found for: PHART, PO2ART, YSY5OFD    Pulmonary Functions Testing Results:  DATE OF PROCEDURE:  04/23/2019     INTERPRETATION:  Spirometry shows an obstructive ventilatory pattern,  which is stage IV obstruction, and it does not improve with  bronchodilator therapy. The FEV1 is 0.68 liter. Lung volumes show air  trapping. Diffusion capacity is decreased at 51% predicted. Airway  resistance is increased and specific conductance is decreased. Flow  volume loop shows obstructive ventilatory pattern    6 minute walk test 04/23/19  Lowest oxygen saturation 91%.     No results found for: FEV1, FVC, TPU5LHR, TLC, DLCO    CXR  03/19/19  Frontal and lateral views of the chest are submitted for review.  The cardiac   silhouette is normal in size.  Lung parenchyma is clear without focal   airspace consolidation, sizeable pleural effusion, or pneumothorax. Calcified right upper lobe nodule.  Trachea is midline.  Osseous structures   and soft tissues are grossly intact. CT Scans    HRCT Chest  04/23/19  1. Scattered tree-in-bud opacities are seen in the peripheral left lower   lobe, posterior right upper lobe near the fissure, peripheral superior   segment right lower lobe and infrahilar right lower lobe suggesting   infectious bronchiolitis. 2. Interval resolution of previously seen right upper lobe   peribronchovascular opacities. 3. No evidence of interstitial lung disease. 4. Stable chronic findings including emphysema. 12/14/18  *No CT evidence of acute pulmonary embolism. *Ill-defined peribronovascular opacities are identified involving the right   upper lobe and lesser degree left lower lobe.  This appears to be   superimposed on emphysematous change.  Developing infectious or inflammatory   process cannot be excluded.  Repeat CT after therapy is recommended to   confirm resolution. ECHO: 12/14/18  Poor image quality, likely due to patient body habitus and/or lung disease. Because of poor image quality, Definity echo contrast was used to better  assess left ventricular wall motion and thickness. Global left ventricular function is difficult to assess but appears normal  with an estimated EF of >55%. Mild left ventricular hypertrophy and with normal left ventricular cavity  size. No definite specific wall motion abnormalities were identified. No significant valvular abnormalities. Evidence of mild diastolic dysfunction is seen. Assessment and Plan       ICD-10-CM    1. COPD, severe ([address] J44.9    2. RUDY on CPAP G47.33     Z99.89    3. Proteinuria, unspecified type R80.9    4. Bronchiectasis without complication ([address] S88.3      6. Cushingoid feature secondary to steroids   7. Bilateral pedal pitting edema more than would be expected from steroid. Chronic kidney disease/nephrotic syndrome  8. Anemia etiology not known. 9.      Prostrate cancer. 10.    Hypertension    Patient clearly relate his symptoms of cough and worsening shortness of breath after institution of hormonal therapy for prostate cancer and as workup so far shows that he has severe COPD with emphysema but no definite evidence of interstitial lung disease, his echo findings did not suggest pulmonary hypertension, his urine analysis shows 2+ proteinuria with chronic kidney disease and likely nephrotic syndrome and would suggest nephrology evaluation, no role for steroids chronically in patients with COPD and it may be advisable to or hormonal therapy for prostate cancer if urology and patient desire and follow-up his symptoms otherwise he is having significant symptoms from chronic steroid therapy. As mentioned MANUEL and ANCA negative, CRP is mildly elevated. Plan and recommendation    Recommend to continue to hold lisinopril. Recommended anemia workup. Suggest nephrology evaluation and patient is going to discuss with Dr. Genaro Gonzalez with Symbicort. Continued taper and wean off prednisone for Dr. Gayla Trujillo with his Spiriva once daily. Continue Singulair. Vaccinations recommended   Up to date with vaccinations from pulm perspective   Maintain an active lifestyle     CPAP at least 4 hrs qhs  Wt loss is recommended and discussed  Follow good sleep hygeine instructions  Use humidifier  Questions answered pertaining to diagnosis and management explained importance of compliance with therapy     Questions answered to pt's/family's satisfaction  It was my pleasure to evaluate Daren Diss today. Please call with questions. Follow-up in the office in 3 months. Mirtha Fairbanks MD             5/16/2019, 10:20 AM    Please note that this chart was generated using voice recognition Dragon dictation software.  Although every effort was made to ensure the accuracy of this automated transcription, some errors in transcription may have occurred.

## 2019-05-16 NOTE — PATIENT INSTRUCTIONS
SURVEY:    You may be receiving a survey from [employer] regarding your visit today. Please complete the survey to enable us to provide the highest quality of care to you and your family. If you cannot score us a very good on any question, please call the office to discuss how we could have made your experience a very good one. Thank you. Please recheck your blood pressure when you go home and make sure you take your prescribed medication if applicable . Please follow up with your PCP or ER if needed.

## 2019-05-18 ENCOUNTER — APPOINTMENT (OUTPATIENT)
Dept: GENERAL RADIOLOGY | Age: 69
DRG: 190 | End: 2019-05-18
Payer: MEDICARE

## 2019-05-18 ENCOUNTER — HOSPITAL ENCOUNTER (INPATIENT)
Age: 69
LOS: 3 days | Discharge: HOME | DRG: 190 | End: 2019-05-21
Attending: EMERGENCY MEDICINE | Admitting: FAMILY MEDICINE
Payer: MEDICARE

## 2019-05-18 DIAGNOSIS — I48.0 PAF (PAROXYSMAL ATRIAL FIBRILLATION) (HCC): ICD-10-CM

## 2019-05-18 DIAGNOSIS — G45.9 TIA (TRANSIENT ISCHEMIC ATTACK): ICD-10-CM

## 2019-05-18 DIAGNOSIS — J44.1 COPD EXACERBATION (HCC): Primary | ICD-10-CM

## 2019-05-18 DIAGNOSIS — R60.9 EDEMA, UNSPECIFIED TYPE: ICD-10-CM

## 2019-05-18 DIAGNOSIS — I65.22 CAROTID ARTERY STENOSIS, SYMPTOMATIC, LEFT: ICD-10-CM

## 2019-05-18 PROBLEM — I10 ESSENTIAL HYPERTENSION, BENIGN: Status: ACTIVE | Noted: 2019-05-18

## 2019-05-18 PROBLEM — I50.811 ACUTE RIGHT-SIDED HEART FAILURE (HCC): Status: ACTIVE | Noted: 2019-05-18

## 2019-05-18 PROBLEM — J18.9 RIGHT LOWER LOBE PNEUMONIA: Status: ACTIVE | Noted: 2019-05-18

## 2019-05-18 LAB
ABSOLUTE EOS #: 0.38 K/UL (ref 0–0.44)
ABSOLUTE IMMATURE GRANULOCYTE: 0.08 K/UL (ref 0–0.3)
ABSOLUTE LYMPH #: 0.83 K/UL (ref 1.1–3.7)
ABSOLUTE MONO #: 1.43 K/UL (ref 0.1–1.2)
ALBUMIN SERPL-MCNC: 3.9 G/DL (ref 3.5–5.2)
ALBUMIN/GLOBULIN RATIO: 1.1 (ref 1–2.5)
ALP BLD-CCNC: 91 U/L (ref 40–129)
ALT SERPL-CCNC: 16 U/L (ref 5–41)
ANION GAP SERPL CALCULATED.3IONS-SCNC: 10 MMOL/L (ref 9–17)
AST SERPL-CCNC: 16 U/L
BASOPHILS # BLD: 0 % (ref 0–2)
BASOPHILS ABSOLUTE: 0.04 K/UL (ref 0–0.2)
BILIRUB SERPL-MCNC: 0.24 MG/DL (ref 0.3–1.2)
BNP INTERPRETATION: ABNORMAL
BUN BLDV-MCNC: 29 MG/DL (ref 8–23)
BUN/CREAT BLD: 20 (ref 9–20)
CALCIUM SERPL-MCNC: 9.4 MG/DL (ref 8.6–10.4)
CHLORIDE BLD-SCNC: 100 MMOL/L (ref 98–107)
CO2: 30 MMOL/L (ref 20–31)
CREAT SERPL-MCNC: 1.43 MG/DL (ref 0.7–1.2)
DIFFERENTIAL TYPE: ABNORMAL
EKG ATRIAL RATE: 84 BPM
EKG P AXIS: 89 DEGREES
EKG P-R INTERVAL: 160 MS
EKG Q-T INTERVAL: 362 MS
EKG QRS DURATION: 92 MS
EKG QTC CALCULATION (BAZETT): 427 MS
EKG R AXIS: 7 DEGREES
EKG T AXIS: 97 DEGREES
EKG VENTRICULAR RATE: 84 BPM
EOSINOPHILS RELATIVE PERCENT: 3 % (ref 1–4)
GFR AFRICAN AMERICAN: 59 ML/MIN
GFR NON-AFRICAN AMERICAN: 49 ML/MIN
GFR SERPL CREATININE-BSD FRML MDRD: ABNORMAL ML/MIN/{1.73_M2}
GFR SERPL CREATININE-BSD FRML MDRD: ABNORMAL ML/MIN/{1.73_M2}
GLUCOSE BLD-MCNC: 153 MG/DL (ref 70–99)
HCT VFR BLD CALC: 36.1 % (ref 40.7–50.3)
HCT VFR BLD CALC: 36.3 % (ref 40.7–50.3)
HEMOGLOBIN: 11.5 G/DL (ref 13–17)
HEMOGLOBIN: 11.5 G/DL (ref 13–17)
IMMATURE GRANULOCYTES: 1 %
LYMPHOCYTES # BLD: 6 % (ref 24–43)
MCH RBC QN AUTO: 29.8 PG (ref 25.2–33.5)
MCH RBC QN AUTO: 30.3 PG (ref 25.2–33.5)
MCHC RBC AUTO-ENTMCNC: 31.7 G/DL (ref 28.4–34.8)
MCHC RBC AUTO-ENTMCNC: 31.9 G/DL (ref 28.4–34.8)
MCV RBC AUTO: 94 FL (ref 82.6–102.9)
MCV RBC AUTO: 95.3 FL (ref 82.6–102.9)
MONOCYTES # BLD: 10 % (ref 3–12)
NRBC AUTOMATED: 0 PER 100 WBC
NRBC AUTOMATED: 0 PER 100 WBC
PDW BLD-RTO: 14.7 % (ref 11.8–14.4)
PDW BLD-RTO: 14.8 % (ref 11.8–14.4)
PLATELET # BLD: 239 K/UL (ref 138–453)
PLATELET # BLD: 247 K/UL (ref 138–453)
PLATELET ESTIMATE: ABNORMAL
PMV BLD AUTO: 10.6 FL (ref 8.1–13.5)
PMV BLD AUTO: 9.9 FL (ref 8.1–13.5)
POTASSIUM SERPL-SCNC: 3.9 MMOL/L (ref 3.7–5.3)
PRO-BNP: 1147 PG/ML
RBC # BLD: 3.79 M/UL (ref 4.21–5.77)
RBC # BLD: 3.86 M/UL (ref 4.21–5.77)
RBC # BLD: ABNORMAL 10*6/UL
SEG NEUTROPHILS: 80 % (ref 36–65)
SEGMENTED NEUTROPHILS ABSOLUTE COUNT: 11.01 K/UL (ref 1.5–8.1)
SODIUM BLD-SCNC: 140 MMOL/L (ref 135–144)
TOTAL PROTEIN: 7.5 G/DL (ref 6.4–8.3)
TROPONIN INTERP: NORMAL
TROPONIN T: <0.03 NG/ML
TROPONIN, HIGH SENSITIVITY: NORMAL NG/L (ref 0–22)
WBC # BLD: 13.8 K/UL (ref 3.5–11.3)
WBC # BLD: 14 K/UL (ref 3.5–11.3)
WBC # BLD: ABNORMAL 10*3/UL

## 2019-05-18 PROCEDURE — 2580000003 HC RX 258: Performed by: FAMILY MEDICINE

## 2019-05-18 PROCEDURE — 96375 TX/PRO/DX INJ NEW DRUG ADDON: CPT

## 2019-05-18 PROCEDURE — 71046 X-RAY EXAM CHEST 2 VIEWS: CPT

## 2019-05-18 PROCEDURE — 85025 COMPLETE CBC W/AUTO DIFF WBC: CPT

## 2019-05-18 PROCEDURE — 94667 MNPJ CHEST WALL 1ST: CPT

## 2019-05-18 PROCEDURE — 2700000000 HC OXYGEN THERAPY PER DAY

## 2019-05-18 PROCEDURE — 80053 COMPREHEN METABOLIC PANEL: CPT

## 2019-05-18 PROCEDURE — 94760 N-INVAS EAR/PLS OXIMETRY 1: CPT

## 2019-05-18 PROCEDURE — 94664 DEMO&/EVAL PT USE INHALER: CPT

## 2019-05-18 PROCEDURE — 36415 COLL VENOUS BLD VENIPUNCTURE: CPT

## 2019-05-18 PROCEDURE — 85027 COMPLETE CBC AUTOMATED: CPT

## 2019-05-18 PROCEDURE — 93005 ELECTROCARDIOGRAM TRACING: CPT

## 2019-05-18 PROCEDURE — 84484 ASSAY OF TROPONIN QUANT: CPT

## 2019-05-18 PROCEDURE — 99285 EMERGENCY DEPT VISIT HI MDM: CPT

## 2019-05-18 PROCEDURE — 6370000000 HC RX 637 (ALT 250 FOR IP): Performed by: EMERGENCY MEDICINE

## 2019-05-18 PROCEDURE — 6370000000 HC RX 637 (ALT 250 FOR IP): Performed by: FAMILY MEDICINE

## 2019-05-18 PROCEDURE — 83880 ASSAY OF NATRIURETIC PEPTIDE: CPT

## 2019-05-18 PROCEDURE — 94640 AIRWAY INHALATION TREATMENT: CPT

## 2019-05-18 PROCEDURE — 2500000003 HC RX 250 WO HCPCS: Performed by: FAMILY MEDICINE

## 2019-05-18 PROCEDURE — 1200000000 HC SEMI PRIVATE

## 2019-05-18 PROCEDURE — 6360000002 HC RX W HCPCS: Performed by: EMERGENCY MEDICINE

## 2019-05-18 PROCEDURE — 6360000002 HC RX W HCPCS: Performed by: FAMILY MEDICINE

## 2019-05-18 PROCEDURE — 87040 BLOOD CULTURE FOR BACTERIA: CPT

## 2019-05-18 PROCEDURE — 96374 THER/PROPH/DIAG INJ IV PUSH: CPT

## 2019-05-18 RX ORDER — METHYLPREDNISOLONE SODIUM SUCCINATE 125 MG/2ML
125 INJECTION, POWDER, LYOPHILIZED, FOR SOLUTION INTRAMUSCULAR; INTRAVENOUS ONCE
Status: COMPLETED | OUTPATIENT
Start: 2019-05-18 | End: 2019-05-18

## 2019-05-18 RX ORDER — FAMOTIDINE 20 MG/1
20 TABLET, FILM COATED ORAL 2 TIMES DAILY
Status: DISCONTINUED | OUTPATIENT
Start: 2019-05-18 | End: 2019-05-19

## 2019-05-18 RX ORDER — METHYLPREDNISOLONE SODIUM SUCCINATE 125 MG/2ML
80 INJECTION, POWDER, LYOPHILIZED, FOR SOLUTION INTRAMUSCULAR; INTRAVENOUS 2 TIMES DAILY
Status: DISCONTINUED | OUTPATIENT
Start: 2019-05-18 | End: 2019-05-19

## 2019-05-18 RX ORDER — IPRATROPIUM BROMIDE AND ALBUTEROL SULFATE 2.5; .5 MG/3ML; MG/3ML
1 SOLUTION RESPIRATORY (INHALATION)
Status: DISCONTINUED | OUTPATIENT
Start: 2019-05-18 | End: 2019-05-18

## 2019-05-18 RX ORDER — FUROSEMIDE 10 MG/ML
20 INJECTION INTRAMUSCULAR; INTRAVENOUS ONCE
Status: COMPLETED | OUTPATIENT
Start: 2019-05-18 | End: 2019-05-18

## 2019-05-18 RX ORDER — ONDANSETRON 2 MG/ML
4 INJECTION INTRAMUSCULAR; INTRAVENOUS EVERY 6 HOURS PRN
Status: DISCONTINUED | OUTPATIENT
Start: 2019-05-18 | End: 2019-05-21 | Stop reason: HOSPADM

## 2019-05-18 RX ORDER — SODIUM CHLORIDE 0.9 % (FLUSH) 0.9 %
10 SYRINGE (ML) INJECTION EVERY 12 HOURS SCHEDULED
Status: DISCONTINUED | OUTPATIENT
Start: 2019-05-18 | End: 2019-05-21 | Stop reason: HOSPADM

## 2019-05-18 RX ORDER — METOPROLOL TARTRATE 5 MG/5ML
5 INJECTION INTRAVENOUS EVERY 6 HOURS PRN
Status: DISCONTINUED | OUTPATIENT
Start: 2019-05-18 | End: 2019-05-20

## 2019-05-18 RX ORDER — ALBUTEROL SULFATE 2.5 MG/3ML
2.5 SOLUTION RESPIRATORY (INHALATION) EVERY 4 HOURS PRN
Status: DISCONTINUED | OUTPATIENT
Start: 2019-05-18 | End: 2019-05-21 | Stop reason: HOSPADM

## 2019-05-18 RX ORDER — ACETAMINOPHEN 325 MG/1
650 TABLET ORAL EVERY 4 HOURS PRN
Status: DISCONTINUED | OUTPATIENT
Start: 2019-05-18 | End: 2019-05-21 | Stop reason: HOSPADM

## 2019-05-18 RX ORDER — IPRATROPIUM BROMIDE AND ALBUTEROL SULFATE 2.5; .5 MG/3ML; MG/3ML
1 SOLUTION RESPIRATORY (INHALATION) 4 TIMES DAILY
Status: DISCONTINUED | OUTPATIENT
Start: 2019-05-18 | End: 2019-05-21 | Stop reason: HOSPADM

## 2019-05-18 RX ORDER — IPRATROPIUM BROMIDE AND ALBUTEROL SULFATE 2.5; .5 MG/3ML; MG/3ML
1 SOLUTION RESPIRATORY (INHALATION) ONCE
Status: COMPLETED | OUTPATIENT
Start: 2019-05-18 | End: 2019-05-18

## 2019-05-18 RX ORDER — FUROSEMIDE 10 MG/ML
40 INJECTION INTRAMUSCULAR; INTRAVENOUS 2 TIMES DAILY
Status: DISCONTINUED | OUTPATIENT
Start: 2019-05-18 | End: 2019-05-21

## 2019-05-18 RX ORDER — SODIUM CHLORIDE 0.9 % (FLUSH) 0.9 %
10 SYRINGE (ML) INJECTION PRN
Status: DISCONTINUED | OUTPATIENT
Start: 2019-05-18 | End: 2019-05-21 | Stop reason: HOSPADM

## 2019-05-18 RX ADMIN — METOPROLOL TARTRATE 5 MG: 5 INJECTION INTRAVENOUS at 17:36

## 2019-05-18 RX ADMIN — IPRATROPIUM BROMIDE AND ALBUTEROL SULFATE 1 AMPULE: .5; 3 SOLUTION RESPIRATORY (INHALATION) at 16:40

## 2019-05-18 RX ADMIN — Medication 10 ML: at 17:36

## 2019-05-18 RX ADMIN — CEFTRIAXONE 1 G: 1 INJECTION, POWDER, FOR SOLUTION INTRAMUSCULAR; INTRAVENOUS at 07:33

## 2019-05-18 RX ADMIN — FUROSEMIDE 40 MG: 10 INJECTION, SOLUTION INTRAMUSCULAR; INTRAVENOUS at 09:08

## 2019-05-18 RX ADMIN — FAMOTIDINE 20 MG: 20 TABLET ORAL at 20:04

## 2019-05-18 RX ADMIN — FUROSEMIDE 40 MG: 10 INJECTION, SOLUTION INTRAMUSCULAR; INTRAVENOUS at 17:32

## 2019-05-18 RX ADMIN — METHYLPREDNISOLONE SODIUM SUCCINATE 125 MG: 125 INJECTION, POWDER, FOR SOLUTION INTRAMUSCULAR; INTRAVENOUS at 03:12

## 2019-05-18 RX ADMIN — FUROSEMIDE 20 MG: 10 INJECTION, SOLUTION INTRAVENOUS at 04:07

## 2019-05-18 RX ADMIN — IPRATROPIUM BROMIDE AND ALBUTEROL SULFATE 1 AMPULE: .5; 3 SOLUTION RESPIRATORY (INHALATION) at 03:12

## 2019-05-18 RX ADMIN — Medication 10 ML: at 07:34

## 2019-05-18 RX ADMIN — IPRATROPIUM BROMIDE AND ALBUTEROL SULFATE 1 AMPULE: .5; 3 SOLUTION RESPIRATORY (INHALATION) at 20:42

## 2019-05-18 RX ADMIN — IPRATROPIUM BROMIDE AND ALBUTEROL SULFATE 1 AMPULE: .5; 3 SOLUTION RESPIRATORY (INHALATION) at 12:10

## 2019-05-18 RX ADMIN — AZITHROMYCIN DIHYDRATE 500 MG: 500 INJECTION, POWDER, LYOPHILIZED, FOR SOLUTION INTRAVENOUS at 07:33

## 2019-05-18 RX ADMIN — METHYLPREDNISOLONE SODIUM SUCCINATE 80 MG: 125 INJECTION, POWDER, FOR SOLUTION INTRAMUSCULAR; INTRAVENOUS at 09:08

## 2019-05-18 RX ADMIN — FAMOTIDINE 20 MG: 20 TABLET ORAL at 09:08

## 2019-05-18 RX ADMIN — Medication 10 ML: at 09:08

## 2019-05-18 RX ADMIN — ENOXAPARIN SODIUM 40 MG: 40 INJECTION SUBCUTANEOUS at 09:07

## 2019-05-18 RX ADMIN — METHYLPREDNISOLONE SODIUM SUCCINATE 80 MG: 125 INJECTION, POWDER, FOR SOLUTION INTRAMUSCULAR; INTRAVENOUS at 20:04

## 2019-05-18 RX ADMIN — Medication 10 ML: at 20:05

## 2019-05-18 ASSESSMENT — PAIN SCALES - GENERAL
PAINLEVEL_OUTOF10: 0

## 2019-05-18 NOTE — ED NOTES
Paged Dr Esha Ndiaye as hospitalist via answering service, connected call to Dr Litzy Jung  05/18/19 [phone]

## 2019-05-18 NOTE — ED PROVIDER NOTES
[facility] COMPLAINT   Chief Complaint   Patient presents with    Shortness of Breath     began 5/17/19 evening      HPI   Reid Mackey is a 70 y.o. male who presents with shortness of breath. The onset was yesterday evening. He has had problems like this before and is known to have copd and emphysema. The duration has been constant since the onset. The shortness of breath worsens with exertion. The quality of shortness of breath as a dyspnea. Patient also notes lots of weight gain over the past year. I noted a lot of pitting edema, which he thought was new since being started on steroids months ago. The patient has associated cough. The quality of the cough is a non  productive cough. He has no chest pain. REVIEW OF SYSTEMS   Cardiac: Denies palpitations, Denies syncope  Respiratory: +SOB and cough as above  Neurologic: Denies syncope or LOC  GI: Denies Vomiting, Denies Diarrhea  General: Denies measured Fever  All other review of systems otherwise negative.      PAST MEDICAL & SURGICAL HISTORY   Past Medical History:   Diagnosis Date    COPD (chronic obstructive pulmonary disease) ([facility] [place] [address]     Essential hypertension     Prostate cancer ([facility] [address]     Wears glasses      Past Surgical History:   Procedure Laterality Date    ANKLE SURGERY      COLONOSCOPY  2015    Normal    HERNIA REPAIR Right 1975      CURRENT MEDICATIONS   Current Outpatient Rx   Medication Sig Dispense Refill    predniSONE (DELTASONE) 2.5 MG tablet Take 7.5mg for 3 weeks 21 tablet 0    predniSONE (DELTASONE) 5 MG tablet Take 7.5mg daily for 3 weeks then take 5mg for 3 weeks then stop 45 tablet 0    Cholecalciferol (VITAMIN D3) 5000 units TABS Take 1,000 Units by mouth      albuterol sulfate  (90 Base) MCG/ACT inhaler Inhale 2 puffs into the lungs every 6 hours as needed for Wheezing or Shortness of Breath 1 Inhaler 11    albuterol (PROVENTIL) (2.5 MG/3ML) 0.083% nebulizer solution Take 3 mLs by nebulization every 4 hours as needed for Wheezing 120 each 3    carvedilol (COREG) 25 MG tablet Take 1 tablet by mouth 2 times daily 60 tablet 3    budesonide-formoterol (SYMBICORT) 160-4.5 MCG/ACT AERO Inhale 2 puffs into the lungs 2 times daily      amLODIPine (NORVASC) 10 MG tablet Take 10 mg by mouth daily      tiotropium (SPIRIVA RESPIMAT) 1.25 MCG/ACT AERS inhaler Inhale 2 puffs into the lungs daily      cetirizine (ZYRTEC) 10 MG tablet Take 10 mg by mouth daily      montelukast (SINGULAIR) 10 MG tablet Take 10 mg by mouth nightly      lisinopril (PRINIVIL;ZESTRIL) 10 MG tablet Take 1 tablet by mouth daily (Patient taking differently: Take 10 mg by mouth daily States discontinued) 30 tablet 3      ALLERGIES   No Known Allergies   SOCIAL & FAMILY HISTORY   Social History     Socioeconomic History    Marital status:      Spouse name: None    Number of children: None    Years of education: None    Highest education level: None   Occupational History    None   Social Needs    Financial resource strain: None    Food insecurity:     Worry: None     Inability: None    Transportation needs:     Medical: None     Non-medical: None   Tobacco Use    Smoking status: Former Smoker     Packs/day: 1.00     Years: 30.00     Pack years: 30.00     Types: Cigarettes     Last attempt to quit: 2013     Years since quittin.8    Smokeless tobacco: Never Used   Substance and Sexual Activity    Alcohol use: No     Comment: rare    Drug use: No    Sexual activity: None   Lifestyle    Physical activity:     Days per week: None     Minutes per session: None    Stress: None   Relationships    Social connections:     Talks on phone: None     Gets together: None     Attends Christianity service: None     Active member of club or organization: None     Attends meetings of clubs or organizations: None     Relationship status: None    Intimate partner violence:     Fear of current or ex partner: None     Emotionally abused: None     Physically abused: None     Forced sexual activity: None   Other Topics Concern    None   Social History Narrative    None     Family History   Problem Relation Age of Onset    No Known Problems Father     Cancer Mother         PHYSICAL EXAM   VITAL SIGNS: BP (!) 187/104   Pulse 74   Temp 97.1 °F (36.2 °C) (Tympanic)   Resp 20   SpO2 98%    Constitutional: Well developed, well nourished, no acute distress   HENT: Atraumatic, wet mucus membranes  Neck: supple, no JVD   Respiratory: Lungs reveal very diminished lung sounds bilaterally  Cardiovascular: regular rate, no murmurs  GI: Soft, nontender, normal bowel sounds  Musculoskeletal: 2+ bilateral LE edema, no acute deformities  Integument: Skin is warm and dry, no petechiae   Neurologic: Alert & oriented, normal speech  Psych: Pleasant affect, the patient does not appear anxious     EKG (Interpreted by me)  sinus rhythm at 84 BPM, no stemi      RADIOLOGY/PROCEDURES   XR CHEST STANDARD (2 VW)   Preliminary Result   1. Subtle infiltrates noted in the right lung base, concerning for a   pneumonia. Follow-up radiographs are recommended. ED COURSE & MEDICAL DECISION MAKING   Pertinent Labs & Imaging studies reviewed and interpreted. (See chart for details)   The patient was placed in the telemetry monitor-        See electronic medical record for any other medications ordered. Vitals:    05/18/19 0409   BP:    Pulse:    Resp: 20   Temp:    SpO2: 98%     Consultation: Dr. Alea Butterfield  was consulted for admission (paged at 4:21 AM)  Differential Diagnosis: COPD exacerbation, foreign body aspiration, Congestive Heart Failure, Myocardial Infarction, Pulmonary Embolus, Pneumonia, Pneumothorax, other     CRITICAL CARE NOTE:  There was a high probability of clinically significant life-threatening deterioration of the patient's condition requiring my urgent intervention. Total critical care time is 30 minutes.    This

## 2019-05-18 NOTE — H&P
(PROVENTIL) (2.5 MG/3ML) 0.083% nebulizer solution Take 3 mLs by nebulization every 4 hours as needed for Wheezing 12/19/18  Yes EVER Ramsay CNP   carvedilol (COREG) 25 MG tablet Take 1 tablet by mouth 2 times daily 12/16/18  Yes Moustapha Millan MD   budesonide-formoterol (SYMBICORT) 160-4.5 MCG/ACT AERO Inhale 2 puffs into the lungs 2 times daily   Yes Historical Provider, MD   amLODIPine (NORVASC) 10 MG tablet Take 10 mg by mouth daily   Yes Historical Provider, MD   tiotropium (SPIRIVA RESPIMAT) 1.25 MCG/ACT AERS inhaler Inhale 2 puffs into the lungs daily   Yes Historical Provider, MD   cetirizine (ZYRTEC) 10 MG tablet Take 10 mg by mouth daily   Yes Historical Provider, MD   montelukast (SINGULAIR) 10 MG tablet Take 10 mg by mouth nightly   Yes Historical Provider, MD   lisinopril (PRINIVIL;ZESTRIL) 10 MG tablet Take 1 tablet by mouth daily  Patient taking differently: Take 10 mg by mouth daily States discontinued 1/2/19   EVER Ramsay CNP       Review of Systems:  Constitutional:negative  for fevers, and negative for chills. Eyes: negative for visual disturbance   ENT: negative for sore throat, negative nasal congestion, and negative for earache  Respiratory: positive for shortness of breath, negative for cough, and positive for wheezing  Cardiovascular: negative for chest pain, negative for palpitations, and negative for syncope. Positive for pnd  Gastrointestinal: negative for abdominal pain, negative for nausea,negative for vomiting, negative for diarrhea, negative for constipation, and negative for hematochezia or melena  Genitourinary: negative for dysuria, negative for urinary urgency, negative for urinary frequency, and negative for hematuria  Skin: negative for skin rash, and negative for skin lesions  Neurological: negative for unilateral weakness, numbness or tingling.     Physical Exam:    Vitals:   Vitals:    05/18/19 0730   BP: (!) 191/107   Pulse: 77   Resp: 20 Temp: 98.2 °F (36.8 °C)   SpO2: 95%     Weight: 190 lb 8 oz (86.4 kg)   Height: 5' 7\" (170.2 cm)     GEN:  alert and oriented to person, place and time and inmild distress  EYES: No gross abnormalities. NECK: normal, supple, no lymphadenopathy,  no carotid bruits  PULM: wheezing present- bilat expiratory, rales present- both bases and decreased breath sounds noted- both bases  COR: regular rate & rhythm, no gallops and has bilat leg edema  ABD:  soft, non-tender, non-distended, normal bowel sounds, no masses or organomegaly  EXT:   Has bilat leg edema,no calf tenderness  NEURO: follows commands, JUAREZ, no deficits  SKIN:  no rashes or significant lesions  -----------------------------------------------------------------  Diagnostic Data:   Lab Results   Component Value Date    WBC 14.0 (H) 05/18/2019    HGB 11.5 (L) 05/18/2019     05/18/2019       Lab Results   Component Value Date    BUN 29 (H) 05/18/2019    CREATININE 1.43 (H) 05/18/2019     05/18/2019    K 3.9 05/18/2019    CALCIUM 9.4 05/18/2019     05/18/2019    CO2 30 05/18/2019    LABGLOM 49 (L) 05/18/2019       Lab Results   Component Value Date    WBCUA 0 TO 2 04/16/2019    RBCUA None 04/16/2019    EPITHUA 0 TO 2 04/16/2019    LEUKOCYTESUR NEGATIVE 04/16/2019    SPECGRAV 1.025 (H) 04/16/2019    GLUCOSEU NEGATIVE 04/16/2019    KETUA NEGATIVE 04/16/2019    PROTEINU 2+ (A) 04/16/2019    HGBUR NEGATIVE 04/16/2019    CRYSTUA NOT REPORTED 04/16/2019    BACTERIA NOT REPORTED 04/16/2019    YEAST NOT REPORTED 04/16/2019       Lab Results   Component Value Date    TROPONINT <0.03 05/18/2019    PROBNP 1,147 (H) 05/18/2019       Xr Chest Standard (2 Vw)    Result Date: 5/18/2019  1. Subtle infiltrates noted in the right lung base, concerning for a pneumonia. Follow-up radiographs are recommended.          Assessment:    Principal Problem:    COPD exacerbation (HCC)  Active Problems:    Chronic obstructive pulmonary disease (HCC)    Acute respiratory

## 2019-05-18 NOTE — PROGRESS NOTES
than 25 ML over past 24 hrs []  Ineffective and, or greater than 25 ml sputum prod. past 24 hrs. []  Nonspon- taneous; Requires suctioning 0   Pulmonary History  (PULM HX) []  No smoking and no chronic pulmonaryhistory []  Former smoker. Quit over 12 mos. ago []  Current smoker or quit w/ in 12 mos []  Pulm. History and, or 20 pk/yr smoking hx [x]  Admitted w/ acute pulm. dx and, or has been admitted w/ pulm. dx 2 or more times over past 12 mos 4   Surgical History this Admit  (SURG HX) [x]  No surgery []  General surgery []  Lower abdominal []  Thoracic or upper abdominal   []  Thoracic w/ pulm. disease 0   Chest X-Ray (CXR)/CT Scan []  Clear or not applicable []  Not available []  Atelect- asis or pleural effusions [x]  Localized infiltrate or pulm. edema []  Con-solidated Infiltrates, bilateral, or in more than 1 lobe 3   Slow or Forced VC, FEV1 OR PEFR (PULM FXN)  [x]  80% or greater, or not indicated []  Pt. unable to perform []  FEV1 or PEFR or VC 51-79%. []  FEV1 or PEFR or VC  30-49%   []  FEV1 or PEFR or VC less than 30%   0   TOTAL ACUITY: 12       CARE PLAN    If Acuity Level is 2, 3, or 4 in any of the following:    [] BILATERAL BREATH SOUNDS (BBS)     [] PULMONARY HISTORY (PULM HX)  [] PULMONARY FUNCTION (PULM FX)    Goal: Improve respiratory functions in patients with airway disease and decrease WOB    [x] AEROSOL PROTOCOL    Total Acuity:   16-32  []  Secondary Assessment in 24 hrs Total Acuity:  9-15  [x]  Secondary Assessment in 24 hrs Total Acuity:  4-8  []  Secondary Assessment in 48 hrs Total Acuity:  0-3  []  Secondary Assessment in 72 hrs   HHN AEROSOL THERAPY with  [physician-ordered bronchodilator(s)] q 4 & Albuterol PRN q2 hrs. Breath-Actuated Neb if BBS Acuity = 4, and pt. can use MP. Notify physician if condition deteriorates. [facility]N AEROSOL THERAPY with  [physician-ordered bronchodilator(s)]  QID and Albuterol PRN q4 hrs.    Breath-Actuated Neb if BBS Acuity = 4, and pt. can use MP.  Notify physician if condition deteriorates. MDI THERAPY with  2 actuations of [physician-ordered bronchodilator(s)] via spacer TID Albuterol and PRNq4 hrs. If unable to utilize MDI: HHN [physician-ordered bronchodilator(s)] TID and Albuterol PRN q4 hrs. Notify physician if condition deteriorates. MDI THERAPY with  [physician-ordered bronchodilator(s)] via spacer TID PRN. If unable to utilize MDI: HHN [physician-ordered bronchodilator(s)] TID PRN. Notify physician if condition deteriorates. If Acuity Level is 2, 3, or 4 in any of the following:    [] COUGH     [] SURGICAL HISTORY (SURG HX)  [] CHEST XRAY (CXR)    Goal: Improvement in sputum mobilization in patients with ineffective airway clearance. Reverse atelectasis. [x] Bronchopulmonary Hygiene Protocol    Total Acuity:   16-32  []  Secondary Assessment in 24 hrs Total Acuity:  9-15  [x]  Secondary Assessment in 24 hrs Total Acuity:  4-8  []  Secondary Assessment in 48 hrs Total Acuity:  0-3  []  Secondary Assessment in 72 hrs   METANEB QID with [physician-ordered bronchodilator(s)] if CXR Acuity = 4; otherwise:  PD&P, PEP, or Vest QID & PRN  NT Sxn PRN for ineffective cough  METANEB QID with [physician-ordered bronchodilator(s)] if CXR Acuity = 4; otherwise:  PD&P, PEP, or Vest TID & PRN  NT Sxn PRN for ineffective cough  Instruct patient to self-perform IS q1hr WA   Directed Cough self-performed q1hr WA     If Acuity Level is 2 or above in the following:    [] PULMONARY HISTORY (PULM HX)    Goal: Assist patient in quitting smoking to slow or stop the progression of lung disease.     [] Smoking Cessation Protocol    SMOKING CESSATION EDUCATION provided according to policy [id number]_146: (rohit with an X)  ____Yes    ____ No     ____ NA    Smoking Cessation Booklet given:  ____Yes  ____No ____Patient Jackie Gil

## 2019-05-19 LAB
ALBUMIN SERPL-MCNC: 3.7 G/DL (ref 3.5–5.2)
ALBUMIN/GLOBULIN RATIO: 1 (ref 1–2.5)
ALP BLD-CCNC: 80 U/L (ref 40–129)
ALT SERPL-CCNC: 14 U/L (ref 5–41)
ANION GAP SERPL CALCULATED.3IONS-SCNC: 16 MMOL/L (ref 9–17)
AST SERPL-CCNC: 13 U/L
BILIRUB SERPL-MCNC: 0.17 MG/DL (ref 0.3–1.2)
BUN BLDV-MCNC: 34 MG/DL (ref 8–23)
BUN/CREAT BLD: 23 (ref 9–20)
CALCIUM SERPL-MCNC: 9.6 MG/DL (ref 8.6–10.4)
CHLORIDE BLD-SCNC: 92 MMOL/L (ref 98–107)
CO2: 30 MMOL/L (ref 20–31)
CREAT SERPL-MCNC: 1.47 MG/DL (ref 0.7–1.2)
EKG ATRIAL RATE: 144 BPM
EKG ATRIAL RATE: 99 BPM
EKG P AXIS: 80 DEGREES
EKG P-R INTERVAL: 168 MS
EKG P-R INTERVAL: 170 MS
EKG Q-T INTERVAL: 326 MS
EKG Q-T INTERVAL: 378 MS
EKG QRS DURATION: 86 MS
EKG QRS DURATION: 98 MS
EKG QTC CALCULATION (BAZETT): 472 MS
EKG QTC CALCULATION (BAZETT): 485 MS
EKG R AXIS: -28 DEGREES
EKG R AXIS: 7 DEGREES
EKG T AXIS: 117 DEGREES
EKG T AXIS: 88 DEGREES
EKG VENTRICULAR RATE: 126 BPM
EKG VENTRICULAR RATE: 99 BPM
GFR AFRICAN AMERICAN: 58 ML/MIN
GFR NON-AFRICAN AMERICAN: 47 ML/MIN
GFR SERPL CREATININE-BSD FRML MDRD: ABNORMAL ML/MIN/{1.73_M2}
GFR SERPL CREATININE-BSD FRML MDRD: ABNORMAL ML/MIN/{1.73_M2}
GLUCOSE BLD-MCNC: 158 MG/DL (ref 70–99)
HCT VFR BLD CALC: 34.7 % (ref 40.7–50.3)
HEMOGLOBIN: 11.5 G/DL (ref 13–17)
MAGNESIUM: 1.9 MG/DL (ref 1.6–2.6)
MCH RBC QN AUTO: 29.8 PG (ref 25.2–33.5)
MCHC RBC AUTO-ENTMCNC: 33.1 G/DL (ref 28.4–34.8)
MCV RBC AUTO: 89.9 FL (ref 82.6–102.9)
NRBC AUTOMATED: 0 PER 100 WBC
PDW BLD-RTO: 14.6 % (ref 11.8–14.4)
PLATELET # BLD: 259 K/UL (ref 138–453)
PMV BLD AUTO: 10.7 FL (ref 8.1–13.5)
POTASSIUM SERPL-SCNC: 3.3 MMOL/L (ref 3.7–5.3)
RBC # BLD: 3.86 M/UL (ref 4.21–5.77)
SODIUM BLD-SCNC: 138 MMOL/L (ref 135–144)
TOTAL PROTEIN: 7.4 G/DL (ref 6.4–8.3)
WBC # BLD: 14.6 K/UL (ref 3.5–11.3)

## 2019-05-19 PROCEDURE — 2500000003 HC RX 250 WO HCPCS: Performed by: FAMILY MEDICINE

## 2019-05-19 PROCEDURE — 6360000002 HC RX W HCPCS: Performed by: FAMILY MEDICINE

## 2019-05-19 PROCEDURE — 83735 ASSAY OF MAGNESIUM: CPT

## 2019-05-19 PROCEDURE — 6370000000 HC RX 637 (ALT 250 FOR IP): Performed by: FAMILY MEDICINE

## 2019-05-19 PROCEDURE — 2700000000 HC OXYGEN THERAPY PER DAY

## 2019-05-19 PROCEDURE — 80053 COMPREHEN METABOLIC PANEL: CPT

## 2019-05-19 PROCEDURE — 2580000003 HC RX 258: Performed by: FAMILY MEDICINE

## 2019-05-19 PROCEDURE — 36415 COLL VENOUS BLD VENIPUNCTURE: CPT

## 2019-05-19 PROCEDURE — 85027 COMPLETE CBC AUTOMATED: CPT

## 2019-05-19 PROCEDURE — 94760 N-INVAS EAR/PLS OXIMETRY 1: CPT

## 2019-05-19 PROCEDURE — 93005 ELECTROCARDIOGRAM TRACING: CPT

## 2019-05-19 PROCEDURE — 1200000000 HC SEMI PRIVATE

## 2019-05-19 PROCEDURE — 94664 DEMO&/EVAL PT USE INHALER: CPT

## 2019-05-19 PROCEDURE — 94640 AIRWAY INHALATION TREATMENT: CPT

## 2019-05-19 PROCEDURE — 94668 MNPJ CHEST WALL SBSQ: CPT

## 2019-05-19 RX ORDER — CARVEDILOL 25 MG/1
25 TABLET ORAL 2 TIMES DAILY
Status: DISCONTINUED | OUTPATIENT
Start: 2019-05-19 | End: 2019-05-21 | Stop reason: HOSPADM

## 2019-05-19 RX ORDER — FAMOTIDINE 20 MG/1
20 TABLET, FILM COATED ORAL DAILY
Status: DISCONTINUED | OUTPATIENT
Start: 2019-05-20 | End: 2019-05-21 | Stop reason: HOSPADM

## 2019-05-19 RX ORDER — AMLODIPINE BESYLATE 10 MG/1
10 TABLET ORAL DAILY
Status: DISCONTINUED | OUTPATIENT
Start: 2019-05-19 | End: 2019-05-20

## 2019-05-19 RX ORDER — METHYLPREDNISOLONE SODIUM SUCCINATE 40 MG/ML
40 INJECTION, POWDER, LYOPHILIZED, FOR SOLUTION INTRAMUSCULAR; INTRAVENOUS 2 TIMES DAILY
Status: DISCONTINUED | OUTPATIENT
Start: 2019-05-19 | End: 2019-05-21 | Stop reason: HOSPADM

## 2019-05-19 RX ADMIN — METHYLPREDNISOLONE SODIUM SUCCINATE 80 MG: 125 INJECTION, POWDER, FOR SOLUTION INTRAMUSCULAR; INTRAVENOUS at 09:18

## 2019-05-19 RX ADMIN — IPRATROPIUM BROMIDE AND ALBUTEROL SULFATE 1 AMPULE: .5; 3 SOLUTION RESPIRATORY (INHALATION) at 19:19

## 2019-05-19 RX ADMIN — Medication 10 ML: at 20:24

## 2019-05-19 RX ADMIN — METHYLPREDNISOLONE SODIUM SUCCINATE 40 MG: 40 INJECTION, POWDER, FOR SOLUTION INTRAMUSCULAR; INTRAVENOUS at 20:23

## 2019-05-19 RX ADMIN — FUROSEMIDE 40 MG: 10 INJECTION, SOLUTION INTRAMUSCULAR; INTRAVENOUS at 09:18

## 2019-05-19 RX ADMIN — Medication 10 ML: at 09:28

## 2019-05-19 RX ADMIN — FAMOTIDINE 20 MG: 20 TABLET ORAL at 09:18

## 2019-05-19 RX ADMIN — AMLODIPINE BESYLATE 10 MG: 10 TABLET ORAL at 10:54

## 2019-05-19 RX ADMIN — CEFTRIAXONE 1 G: 1 INJECTION, POWDER, FOR SOLUTION INTRAMUSCULAR; INTRAVENOUS at 06:58

## 2019-05-19 RX ADMIN — IPRATROPIUM BROMIDE AND ALBUTEROL SULFATE 1 AMPULE: .5; 3 SOLUTION RESPIRATORY (INHALATION) at 16:20

## 2019-05-19 RX ADMIN — AZITHROMYCIN DIHYDRATE 500 MG: 500 INJECTION, POWDER, LYOPHILIZED, FOR SOLUTION INTRAVENOUS at 07:15

## 2019-05-19 RX ADMIN — IPRATROPIUM BROMIDE AND ALBUTEROL SULFATE 1 AMPULE: .5; 3 SOLUTION RESPIRATORY (INHALATION) at 11:35

## 2019-05-19 RX ADMIN — FUROSEMIDE 40 MG: 10 INJECTION, SOLUTION INTRAMUSCULAR; INTRAVENOUS at 18:47

## 2019-05-19 RX ADMIN — ENOXAPARIN SODIUM 40 MG: 40 INJECTION SUBCUTANEOUS at 09:18

## 2019-05-19 RX ADMIN — CARVEDILOL 25 MG: 25 TABLET, FILM COATED ORAL at 10:54

## 2019-05-19 RX ADMIN — CARVEDILOL 25 MG: 25 TABLET, FILM COATED ORAL at 20:23

## 2019-05-19 RX ADMIN — IPRATROPIUM BROMIDE AND ALBUTEROL SULFATE 1 AMPULE: .5; 3 SOLUTION RESPIRATORY (INHALATION) at 05:43

## 2019-05-19 RX ADMIN — METOPROLOL TARTRATE 5 MG: 5 INJECTION INTRAVENOUS at 04:16

## 2019-05-19 ASSESSMENT — PAIN SCALES - GENERAL
PAINLEVEL_OUTOF10: 0

## 2019-05-19 NOTE — PROGRESS NOTES
Progress Note  Sol Forrester MD    SUBJECTIVE: Patient is seen for Principal Problem:    [facility]  Active Problems:    Chronic obstructive pulmonary disease ([facility] [address]    Acute respiratory failure with hypoxia (HCC)    Essential hypertension, benign    Acute right-sided heart failure (HCC)    Right lower lobe pneumonia ([facility] [address]  Resolved Problems:    * No resolved hospital problems. *     bp higher  Breathing better  Diuresing well    OBJECTIVE:    Vitals:   Vitals:    05/19/19 0705   BP: (!) 178/96   Pulse:    Resp:    Temp:    SpO2:      Weight: 186 lb 10 oz (84.7 kg)   Height: 5' 7\" (170.2 cm)   -----------------------------------------------------------------  Exam:    CONSTITUTIONAL:  awake, alert, cooperative, no apparent distress, and appears stated age  EYES:  Lids and lashes normal, pupils equal, round and reactive to light, extra ocular muscles intact, sclera clear, conjunctiva normal  ENT:  Normocephalic, without obvious abnormality, atraumatic, sinuses nontender on palpation, external ears without lesions, oral pharynx with moist mucus membranes, tonsils without erythema or exudates, gums normal and good dentition. NECK:  Supple, symmetrical, trachea midline, no adenopathy, thyroid symmetric, not enlarged and no tenderness, skin normal  HEMATOLOGIC/LYMPHATICS:  no cervical lymphadenopathy  LUNGS:  Has few rales,no rhonchi or wheezing  CARDIOVASCULAR:  Normal apical impulse, regular rate and rhythm, normal S1 and S2, no S3 or S4, and no murmur noted  ABDOMEN:  No scars, normal bowel sounds, soft, non-distended, non-tender, no masses palpated, no hepatosplenomegally    MUSCULOSKELETAL:  There is no redness, warmth, or swelling of the joints. Full range of motion noted. Motor strength is 5 out of 5 all extremities bilaterally. Tone is normal.  NEUROLOGIC:  Awake, alert, oriented to name, place and time. Cranial nerves II-XII are grossly intact. Motor is 5 out of 5 bilaterally.   Cerebellar finger to nose, heel to shin intact. Sensory is intact. Babinski down going, Romberg negative, and gait is normal.  SKIN:  no bruising or bleeding  EXT:     Has 1 + edema  -----------------------------------------------------------------  Diagnostic Data: Reviewed    More Recent Labs:    Results for orders placed or performed during the hospital encounter of 05/18/19   CULTURE BLOOD #1   Result Value Ref Range    Specimen Description . BLOOD     Special Requests  right ac 20ml     Culture NO GROWTH 5 HOURS    CULTURE BLOOD #2   Result Value Ref Range    Specimen Description . BLOOD     Special Requests  RT FA 20ML     Culture NO GROWTH 5 HOURS    CBC Auto Differential   Result Value Ref Range    WBC 13.8 (H) 3.5 - 11.3 k/uL    RBC 3.86 (L) 4.21 - 5.77 m/uL    Hemoglobin 11.5 (L) 13.0 - 17.0 g/dL    Hematocrit 36.3 (L) 40.7 - 50.3 %    MCV 94.0 82.6 - 102.9 fL    MCH 29.8 25.2 - 33.5 pg    MCHC 31.7 28.4 - 34.8 g/dL    RDW 14.8 (H) 11.8 - 14.4 %    Platelets 092 239 - 763 k/uL    MPV 9.9 8.1 - 13.5 fL    NRBC Automated 0.0 0.0 per 100 WBC    Differential Type NOT REPORTED     Seg Neutrophils 80 (H) 36 - 65 %    Lymphocytes 6 (L) 24 - 43 %    Monocytes 10 3 - 12 %    Eosinophils % 3 1 - 4 %    Basophils 0 0 - 2 %    Immature Granulocytes 1 (H) 0 %    Segs Absolute 11.01 (H) 1.50 - 8.10 k/uL    Absolute Lymph # 0.83 (L) 1.10 - 3.70 k/uL    Absolute Mono # 1.43 (H) 0.10 - 1.20 k/uL    Absolute Eos # 0.38 0.00 - 0.44 k/uL    Basophils # 0.04 0.00 - 0.20 k/uL    Absolute Immature Granulocyte 0.08 0.00 - 0.30 k/uL    WBC Morphology NOT REPORTED     RBC Morphology NOT REPORTED     Platelet Estimate NOT REPORTED    Comprehensive Metabolic Panel w/ Reflex to MG   Result Value Ref Range    Glucose 153 (H) 70 - 99 mg/dL    BUN 29 (H) 8 - 23 mg/dL    CREATININE 1.43 (H) 0.70 - 1.20 mg/dL    Bun/Cre Ratio 20 9 - 20    Calcium 9.4 8.6 - 10.4 mg/dL    Sodium 140 135 - 144 mmol/L    Potassium 3.9 3.7 - 5.3 mmol/L    Chloride 100 98 - 107 mmol/L    CO2 30 20 - 31 mmol/L    Anion Gap 10 9 - 17 mmol/L    Alkaline Phosphatase 91 40 - 129 U/L    ALT 16 5 - 41 U/L    AST 16 <40 U/L    Total Bilirubin 0.24 (L) 0.3 - 1.2 mg/dL    Total Protein 7.5 6.4 - 8.3 g/dL    Alb 3.9 3.5 - 5.2 g/dL    Albumin/Globulin Ratio 1.1 1.0 - 2.5    GFR Non- 49 (L) >60 mL/min    GFR  59 (L) >60 mL/min    GFR Comment          GFR Staging         Brain Natriuretic Peptide   Result Value Ref Range    Pro-BNP 1,147 (H) <300 pg/mL    BNP Interpretation Pro-BNP Reference Range:    Troponin   Result Value Ref Range    Troponin, High Sensitivity NOT REPORTED 0 - 22 ng/L    Troponin T <0.03 <0.03 ng/mL    Troponin Interp         CBC   Result Value Ref Range    WBC 14.0 (H) 3.5 - 11.3 k/uL    RBC 3.79 (L) 4.21 - 5.77 m/uL    Hemoglobin 11.5 (L) 13.0 - 17.0 g/dL    Hematocrit 36.1 (L) 40.7 - 50.3 %    MCV 95.3 82.6 - 102.9 fL    MCH 30.3 25.2 - 33.5 pg    MCHC 31.9 28.4 - 34.8 g/dL    RDW 14.7 (H) 11.8 - 14.4 %    Platelets 788 034 - 394 k/uL    MPV 10.6 8.1 - 13.5 fL    NRBC Automated 0.0 0.0 per 100 WBC   CBC   Result Value Ref Range    WBC 14.6 (H) 3.5 - 11.3 k/uL    RBC 3.86 (L) 4.21 - 5.77 m/uL    Hemoglobin 11.5 (L) 13.0 - 17.0 g/dL    Hematocrit 34.7 (L) 40.7 - 50.3 %    MCV 89.9 82.6 - 102.9 fL    MCH 29.8 25.2 - 33.5 pg    MCHC 33.1 28.4 - 34.8 g/dL    RDW 14.6 (H) 11.8 - 14.4 %    Platelets 278 439 - 858 k/uL    MPV 10.7 8.1 - 13.5 fL    NRBC Automated 0.0 0.0 per 100 WBC   EKG 12 Lead   Result Value Ref Range    Ventricular Rate 84 BPM    Atrial Rate 84 BPM    P-R Interval 160 ms    QRS Duration 92 ms    Q-T Interval 362 ms    QTc Calculation (Bazett) 427 ms    P Axis 89 degrees    R Axis 7 degrees    T Axis 97 degrees   EKG 12 Lead   Result Value Ref Range    Ventricular Rate 126 BPM    Atrial Rate 144 BPM    P-R Interval 168 ms    QRS Duration 86 ms    Q-T Interval 326 ms    QTc Calculation (Bazett) 472 ms    R Axis -28 degrees    T Axis 117 degrees   EKG 12 Lead   Result Value Ref Range    Ventricular Rate 99 BPM    Atrial Rate 99 BPM    P-R Interval 170 ms    QRS Duration 98 ms    Q-T Interval 378 ms    QTc Calculation (Bazett) 485 ms    P Axis 80 degrees    R Axis 7 degrees    T Axis 88 degrees       ASSESSMENT:   Patient Active Problem List    Diagnosis Date Noted    Essential hypertension, benign 05/18/2019    Acute right-sided heart failure ([address] 05/18/2019    Right lower lobe pneumonia ([address] 05/18/2019    Acute respiratory failure with hypoxemia ([address] 12/14/2018    CAP (community acquired pneumonia) 12/14/2018    Stage 3 chronic kidney disease ([address] 12/14/2018    Uncontrolled hypertension 12/14/2018    Anxiety 12/14/2018    MANDEEP (acute kidney injury) ([address] suspect Acute On Chronic with PreRenal Component 10/31/2018    COPD exacerbation ([address] 10/29/2018    Abdominal wall abscess From injection 10/25/2018    Prostate cancer ([address] / Biopsy 2018 06/01/2018    Acute nontraumatic kidney injury ([address] 02/22/2018    Acute respiratory failure with hypoxia (HCC) 02/22/2018    Chronic obstructive pulmonary disease ([address] 02/18/2018         PLAN:  · Continue diuresis  · Resume bp meds  · Continue oxygen,aerosols  · Decrease steroids      Beverley Carter M.D.

## 2019-05-19 NOTE — CONSULTS
[facility] Department of Pharmacy   Pharmacy Renal Adjustment Note    Xni Rodriguez is a 70 y.o. male. Pharmacist assessment of renally cleared medications. Recent Labs     05/18/19  0310 05/19/19  0533   CREATININE 1.43* 1.47*     Estimated Creatinine Clearance: 49 mL/min (A) (based on SCr of 1.47 mg/dL (H)). Height:   Ht Readings from Last 1 Encounters:   05/18/19 5' 7\" (1.702 m)     Weight:  Wt Readings from Last 1 Encounters:   05/19/19 186 lb 10 oz (84.7 kg)       The following medication has been adjusted based upon renal function:             Pepcid 20 mg po BID decreased to Pepcid 20 mg po daily for CrCl < 50 mL/min. Thank you. Rob Garcia.  Tolu,5/19/2019,3:14 PM

## 2019-05-19 NOTE — PROGRESS NOTES
RESPIRATORY ASSESSMENT PROTOCOL                                                                                              Patient Name: [facility] Room#: [id number] : 1950     Admitting diagnosis: COPD exacerbation ([place] [address].) [J44.1]       Medical History:   Past Medical History:   Diagnosis Date    COPD (chronic obstructive pulmonary disease) ([place] [address]     Essential hypertension     Prostate cancer ([place] [address].)     Wears glasses        PATIENT ASSESSMENT    LABORATORY DATA  Hematology:   Lab Results   Component Value Date    WBC 14.0 2019    RBC 3.79 2019    HGB 11.5 2019    HCT 36.1 2019     2019     Chemistry:  No results found for: PHART, TUB7WVI, PO2ART, F9UVFBUB, GJB1TJT, PBEA    Blood Culture:   Sputum Culture:     VITALS  Pulse: 95   Resp: 20  BP: (!) 163/94  SpO2: 97 % O2 Device: Nasal cannula  Temp: 97.2 °F (36.2 °C)  Comment:     SKIN COLOR  [x] Normal  [] Pale  [] Dusky  [] Cyanotic      RESPIRATORY PATTERN  [] Normal  [x] Dyspnea  [] Cheyne-Jensen  [] Kussmaul  [] Biots    AMBULATORY  [x] Yes  [] No  [] With Assistance    PEAK FLOW  Predicted:     Personal Best:        VITAL CAPACITY  Predicted value:  ml  Actual Value:  ml  30% of Predicted:  Ml    Patient Acuity 0 1 2 3 4 Score   Level of Concious (LOC) [x]  Alert & Oriented or Pt normal LOC []  Confused;follows directions []  Confused & uncooper-ative []  Obtunded []  Comatose 0   Respiratory Rate  (RR) []  Reg. rate & pattern. 12 - 20 bpm  []  Increased RR. Greater than 20 bpm   [x]  SOB w/ exertion or RR greater than 24 bpm []  Access- ory muscle use at rest. Abn.  resp. []  SOB at rest.   2   Bilateral Breath Sounds (BBS) []  Clear []  Diminish-ed bases  []  Diminish-ed t/o, or rales   [x]  Sporadic, scattered wheezes or rhonchi []  Persistentwheezes and, or absent BBS 3   Cough [x]  Strong, effective, & non-prod. []  Effective & prod.  Less than 25 ml (2 TBSP) over past 24 hrs []  Ineffective & non-prod MP.  Notify physician if condition deteriorates. MDI THERAPY with  2 actuations of [physician-ordered bronchodilator(s)] via spacer TID Albuterol and PRNq4 hrs. If unable to utilize MDI: HHN [physician-ordered bronchodilator(s)] TID and Albuterol PRN q4 hrs. Notify physician if condition deteriorates. MDI THERAPY with  [physician-ordered bronchodilator(s)] via spacer TID PRN. If unable to utilize MDI: HHN [physician-ordered bronchodilator(s)] TID PRN. Notify physician if condition deteriorates. If Acuity Level is 2, 3, or 4 in any of the following:    [] COUGH     [] SURGICAL HISTORY (SURG HX)  [x] CHEST XRAY (CXR)    Goal: Improvement in sputum mobilization in patients with ineffective airway clearance. Reverse atelectasis. [x] Bronchopulmonary Hygiene Protocol    Total Acuity:   16-32  []  Secondary Assessment in 24 hrs Total Acuity:  9-15  [x]  Secondary Assessment in 24 hrs Total Acuity:  4-8  []  Secondary Assessment in 48 hrs Total Acuity:  0-3  []  Secondary Assessment in 72 hrs   METANEB QID with [physician-ordered bronchodilator(s)] if CXR Acuity = 4; otherwise:  PD&P, PEP, or Vest QID & PRN  NT Sxn PRN for ineffective cough  METANEB QID with [physician-ordered bronchodilator(s)] if CXR Acuity = 4; otherwise:  PD&P, PEP, or Vest TID & PRN  NT Sxn PRN for ineffective cough  Instruct patient to self-perform IS q1hr WA   Directed Cough self-performed q1hr WA     If Acuity Level is 2 or above in the following:    [] PULMONARY HISTORY (PULM HX)    Goal: Assist patient in quitting smoking to slow or stop the progression of lung disease.     [] Smoking Cessation Protocol    SMOKING CESSATION EDUCATION provided according to policy [id number]_178: (rohit with an X)  ____Yes    ____ No     ____ NA    Smoking Cessation Booklet given:  ____Yes  ____No ____Patient Natacha Kitchen

## 2019-05-20 ENCOUNTER — APPOINTMENT (OUTPATIENT)
Dept: CT IMAGING | Age: 69
DRG: 190 | End: 2019-05-20
Payer: MEDICARE

## 2019-05-20 ENCOUNTER — APPOINTMENT (OUTPATIENT)
Dept: GENERAL RADIOLOGY | Age: 69
DRG: 190 | End: 2019-05-20
Payer: MEDICARE

## 2019-05-20 PROBLEM — R60.0 LOWER EXTREMITY EDEMA: Status: ACTIVE | Noted: 2019-05-20

## 2019-05-20 LAB
ALBUMIN SERPL-MCNC: 3.8 G/DL (ref 3.5–5.2)
ALBUMIN SERPL-MCNC: 4 G/DL (ref 3.5–5.2)
ALBUMIN/GLOBULIN RATIO: 1.1 (ref 1–2.5)
ALBUMIN/GLOBULIN RATIO: 1.2 (ref 1–2.5)
ALP BLD-CCNC: 76 U/L (ref 40–129)
ALP BLD-CCNC: 86 U/L (ref 40–129)
ALT SERPL-CCNC: 11 U/L (ref 5–41)
ALT SERPL-CCNC: 15 U/L (ref 5–41)
ANION GAP SERPL CALCULATED.3IONS-SCNC: 14 MMOL/L (ref 9–17)
ANION GAP SERPL CALCULATED.3IONS-SCNC: 16 MMOL/L (ref 9–17)
AST SERPL-CCNC: 13 U/L
AST SERPL-CCNC: 17 U/L
BILIRUB SERPL-MCNC: 0.16 MG/DL (ref 0.3–1.2)
BILIRUB SERPL-MCNC: <0.1 MG/DL (ref 0.3–1.2)
BUN BLDV-MCNC: 46 MG/DL (ref 8–23)
BUN BLDV-MCNC: 53 MG/DL (ref 8–23)
BUN/CREAT BLD: 29 (ref 9–20)
BUN/CREAT BLD: 30 (ref 9–20)
CALCIUM SERPL-MCNC: 9.6 MG/DL (ref 8.6–10.4)
CALCIUM SERPL-MCNC: 9.6 MG/DL (ref 8.6–10.4)
CHLORIDE BLD-SCNC: 88 MMOL/L (ref 98–107)
CHLORIDE BLD-SCNC: 93 MMOL/L (ref 98–107)
CO2: 32 MMOL/L (ref 20–31)
CO2: 34 MMOL/L (ref 20–31)
CREAT SERPL-MCNC: 1.57 MG/DL (ref 0.7–1.2)
CREAT SERPL-MCNC: 1.78 MG/DL (ref 0.7–1.2)
GFR AFRICAN AMERICAN: 46 ML/MIN
GFR AFRICAN AMERICAN: 53 ML/MIN
GFR NON-AFRICAN AMERICAN: 38 ML/MIN
GFR NON-AFRICAN AMERICAN: 44 ML/MIN
GFR SERPL CREATININE-BSD FRML MDRD: ABNORMAL ML/MIN/{1.73_M2}
GLUCOSE BLD-MCNC: 134 MG/DL (ref 70–99)
GLUCOSE BLD-MCNC: 155 MG/DL (ref 70–99)
HCT VFR BLD CALC: 34.3 % (ref 40.7–50.3)
HEMOGLOBIN: 11.3 G/DL (ref 13–17)
MAGNESIUM: 2.1 MG/DL (ref 1.6–2.6)
MCH RBC QN AUTO: 30.1 PG (ref 25.2–33.5)
MCHC RBC AUTO-ENTMCNC: 32.9 G/DL (ref 28.4–34.8)
MCV RBC AUTO: 91.5 FL (ref 82.6–102.9)
NRBC AUTOMATED: 0 PER 100 WBC
PDW BLD-RTO: 14.7 % (ref 11.8–14.4)
PLATELET # BLD: 259 K/UL (ref 138–453)
PMV BLD AUTO: 10.5 FL (ref 8.1–13.5)
POTASSIUM SERPL-SCNC: 3.4 MMOL/L (ref 3.7–5.3)
POTASSIUM SERPL-SCNC: 4.2 MMOL/L (ref 3.7–5.3)
RBC # BLD: 3.75 M/UL (ref 4.21–5.77)
SODIUM BLD-SCNC: 136 MMOL/L (ref 135–144)
SODIUM BLD-SCNC: 141 MMOL/L (ref 135–144)
TOTAL PROTEIN: 7.1 G/DL (ref 6.4–8.3)
TOTAL PROTEIN: 7.6 G/DL (ref 6.4–8.3)
WBC # BLD: 14.2 K/UL (ref 3.5–11.3)

## 2019-05-20 PROCEDURE — 80053 COMPREHEN METABOLIC PANEL: CPT

## 2019-05-20 PROCEDURE — 36415 COLL VENOUS BLD VENIPUNCTURE: CPT

## 2019-05-20 PROCEDURE — 6370000000 HC RX 637 (ALT 250 FOR IP): Performed by: INTERNAL MEDICINE

## 2019-05-20 PROCEDURE — 85027 COMPLETE CBC AUTOMATED: CPT

## 2019-05-20 PROCEDURE — 6370000000 HC RX 637 (ALT 250 FOR IP): Performed by: FAMILY MEDICINE

## 2019-05-20 PROCEDURE — 70450 CT HEAD/BRAIN W/O DYE: CPT

## 2019-05-20 PROCEDURE — 6360000002 HC RX W HCPCS: Performed by: FAMILY MEDICINE

## 2019-05-20 PROCEDURE — 94760 N-INVAS EAR/PLS OXIMETRY 1: CPT

## 2019-05-20 PROCEDURE — 94640 AIRWAY INHALATION TREATMENT: CPT

## 2019-05-20 PROCEDURE — 2580000003 HC RX 258: Performed by: FAMILY MEDICINE

## 2019-05-20 PROCEDURE — 93005 ELECTROCARDIOGRAM TRACING: CPT

## 2019-05-20 PROCEDURE — 94668 MNPJ CHEST WALL SBSQ: CPT

## 2019-05-20 PROCEDURE — 83735 ASSAY OF MAGNESIUM: CPT

## 2019-05-20 PROCEDURE — 94664 DEMO&/EVAL PT USE INHALER: CPT

## 2019-05-20 PROCEDURE — 1200000000 HC SEMI PRIVATE

## 2019-05-20 RX ORDER — LISINOPRIL 10 MG/1
10 TABLET ORAL DAILY
Status: DISCONTINUED | OUTPATIENT
Start: 2019-05-20 | End: 2019-05-21

## 2019-05-20 RX ORDER — METOPROLOL TARTRATE 5 MG/5ML
5 INJECTION INTRAVENOUS EVERY 6 HOURS PRN
Status: DISCONTINUED | OUTPATIENT
Start: 2019-05-20 | End: 2019-05-21 | Stop reason: HOSPADM

## 2019-05-20 RX ORDER — METOPROLOL TARTRATE 5 MG/5ML
5 INJECTION INTRAVENOUS EVERY 6 HOURS PRN
Status: DISCONTINUED | OUTPATIENT
Start: 2019-05-20 | End: 2019-05-20

## 2019-05-20 RX ORDER — AMLODIPINE BESYLATE 2.5 MG/1
2.5 TABLET ORAL DAILY
Status: DISCONTINUED | OUTPATIENT
Start: 2019-05-20 | End: 2019-05-21 | Stop reason: HOSPADM

## 2019-05-20 RX ADMIN — CEFTRIAXONE 1 G: 1 INJECTION, POWDER, FOR SOLUTION INTRAMUSCULAR; INTRAVENOUS at 08:00

## 2019-05-20 RX ADMIN — CARVEDILOL 25 MG: 25 TABLET, FILM COATED ORAL at 20:07

## 2019-05-20 RX ADMIN — FAMOTIDINE 20 MG: 20 TABLET ORAL at 07:57

## 2019-05-20 RX ADMIN — LISINOPRIL 10 MG: 10 TABLET ORAL at 07:59

## 2019-05-20 RX ADMIN — ENOXAPARIN SODIUM 40 MG: 40 INJECTION SUBCUTANEOUS at 08:01

## 2019-05-20 RX ADMIN — METHYLPREDNISOLONE SODIUM SUCCINATE 40 MG: 40 INJECTION, POWDER, FOR SOLUTION INTRAMUSCULAR; INTRAVENOUS at 08:08

## 2019-05-20 RX ADMIN — FUROSEMIDE 40 MG: 10 INJECTION, SOLUTION INTRAMUSCULAR; INTRAVENOUS at 08:09

## 2019-05-20 RX ADMIN — IPRATROPIUM BROMIDE AND ALBUTEROL SULFATE 1 AMPULE: .5; 3 SOLUTION RESPIRATORY (INHALATION) at 14:08

## 2019-05-20 RX ADMIN — IPRATROPIUM BROMIDE AND ALBUTEROL SULFATE 1 AMPULE: .5; 3 SOLUTION RESPIRATORY (INHALATION) at 05:25

## 2019-05-20 RX ADMIN — CARVEDILOL 25 MG: 25 TABLET, FILM COATED ORAL at 07:57

## 2019-05-20 RX ADMIN — IPRATROPIUM BROMIDE AND ALBUTEROL SULFATE 1 AMPULE: .5; 3 SOLUTION RESPIRATORY (INHALATION) at 19:19

## 2019-05-20 RX ADMIN — Medication 10 ML: at 20:10

## 2019-05-20 RX ADMIN — AZITHROMYCIN DIHYDRATE 500 MG: 500 INJECTION, POWDER, LYOPHILIZED, FOR SOLUTION INTRAVENOUS at 08:12

## 2019-05-20 RX ADMIN — FUROSEMIDE 40 MG: 10 INJECTION, SOLUTION INTRAMUSCULAR; INTRAVENOUS at 18:58

## 2019-05-20 RX ADMIN — Medication 10 ML: at 08:00

## 2019-05-20 RX ADMIN — IPRATROPIUM BROMIDE AND ALBUTEROL SULFATE 1 AMPULE: .5; 3 SOLUTION RESPIRATORY (INHALATION) at 09:22

## 2019-05-20 RX ADMIN — METHYLPREDNISOLONE SODIUM SUCCINATE 40 MG: 40 INJECTION, POWDER, FOR SOLUTION INTRAMUSCULAR; INTRAVENOUS at 20:06

## 2019-05-20 RX ADMIN — AMLODIPINE BESYLATE 2.5 MG: 2.5 TABLET ORAL at 07:59

## 2019-05-20 ASSESSMENT — PAIN SCALES - GENERAL
PAINLEVEL_OUTOF10: 0

## 2019-05-20 NOTE — PROGRESS NOTES
Writer made oncoming RN aware that 1800 Lasix dose was unable to be given d/t patient being down at CT. Also, that cardiology office never returned call regarding consult, so a new message needs left and paperwork faxed down.

## 2019-05-20 NOTE — PROGRESS NOTES
RESPIRATORY ASSESSMENT PROTOCOL                                                                                              Patient Name: Fani Mccall Room#: [id number] : 1950     Admitting diagnosis: COPD exacerbation ([place] [address].) [J44.1]       Medical History:   Past Medical History:   Diagnosis Date    COPD (chronic obstructive pulmonary disease) ([place] [address].)     Essential hypertension     Prostate cancer ([place] [address].)     Wears glasses        PATIENT ASSESSMENT    LABORATORY DATA  Hematology:   Lab Results   Component Value Date    WBC 14.2 2019    RBC 3.75 2019    HGB 11.3 2019    HCT 34.3 2019     2019     Chemistry:  No results found for: PHART, BXF0CHZ, PO2ART, L1FUVOFD, CTB3EAG, PBEA    Blood Culture:   Sputum Culture:     VITALS  Pulse: 83   Resp: 18  BP: (!) 170/80  SpO2: 96 % O2 Device: None (Room air)  Temp: 98 °F (36.7 °C)  Comment:   SKIN COLOR  [x] Normal  [] Pale  [] Dusky  [] Cyanotic      RESPIRATORY PATTERN  [x] Normal  [] Dyspnea  [] Cheyne-Jensen  [] Kussmaul  [] Biots  AMBULATORY  [x] Yes  [] No  [] With Assistance    PEAK FLOW  Predicted:     Personal Best:        VITAL CAPACITY  Predicted value:  ml  Actual Value:  ml  30% of Predicted:  ml  Patient Acuity 0 1 2 3 4 Score   Level of Concious (LOC) [x]  Alert & Oriented or Pt normal LOC []  Confused;follows directions []  Confused & uncooper-ative []  Obtunded []  Comatose 0   Respiratory Rate  (RR) [x]  Reg. rate & pattern. 12 - 20 bpm  []  Increased RR. Greater than 20 bpm   []  SOB w/ exertion or RR greater than 24 bpm []  Access- ory muscle use at rest. Abn.  resp. []  SOB at rest.   0   Bilateral Breath Sounds (BBS) []  Clear []  Diminish-ed bases  [x]  Diminish-ed t/o, or rales   []  Sporadic, scattered wheezes or rhonchi []  Persistentwheezes and, or absent BBS 2   Cough []  Strong, effective, & non-prod. [x]  Effective & prod.  Less than 25 ml (2 TBSP) over past 24 hrs []  Ineffective & non-prod to less than 25 ML over past 24 hrs []  Ineffective and, or greater than 25 ml sputum prod. past 24 hrs. []  Nonspon- taneous; Requires suctioning 1   Pulmonary History  (PULM HX) []  No smoking and no chronic pulmonaryhistory []  Former smoker. Quit over 12 mos. ago []  Current smoker or quit w/ in 12 mos []  Pulm. History and, or 20 pk/yr smoking hx [x]  Admitted w/ acute pulm. dx and, or has been admitted w/ pulm. dx 2 or more times over past 12 mos 4   Surgical History this Admit  (SURG HX) [x]  No surgery []  General surgery []  Lower abdominal []  Thoracic or upper abdominal   []  Thoracic w/ pulm. disease 0   Chest X-Ray (CXR)/CT Scan []  Clear or not applicable []  Not available []  Atelect- asis or pleural effusions [x]  Localized infiltrate or pulm. edema []  Con-solidated Infiltrates, bilateral, or in more than 1 lobe 3   Slow or Forced VC, FEV1 OR PEFR (PULM FXN)  [x]  80% or greater, or not indicated []  Pt. unable to perform []  FEV1 or PEFR or VC 51-79%. []  FEV1 or PEFR or VC  30-49%   []  FEV1 or PEFR or VC less than 30%   0   TOTAL ACUITY: 10       CARE PLAN    If Acuity Level is 2, 3, or 4 in any of the following:    [x] BILATERAL BREATH SOUNDS (BBS)     [x] PULMONARY HISTORY (PULM HX)  [] PULMONARY FUNCTION (PULM FX)    Goal: Improve respiratory functions in patients with airway disease and decrease WOB    [x] AEROSOL PROTOCOL    Total Acuity:   16-32  []  Secondary Assessment in 24 hrs Total Acuity:  9-15  [x]  Secondary Assessment in 24 hrs Total Acuity:  4-8  []  Secondary Assessment in 48 hrs Total Acuity:  0-3  []  Secondary Assessment in 72 hrs   HHN AEROSOL THERAPY with  [physician-ordered bronchodilator(s)] q 4 & Albuterol PRN q2 hrs. Breath-Actuated Neb if BBS Acuity = 4, and pt. can use MP. Notify physician if condition deteriorates. HHN AEROSOL THERAPY with  [physician-ordered bronchodilator(s)]  QID and Albuterol PRN q4 hrs.    Breath-Actuated Neb if BBS Acuity = 4, and pt. can use MP.  Notify physician if condition deteriorates. MDI THERAPY with  2 actuations of [physician-ordered bronchodilator(s)] via spacer TID Albuterol and PRNq4 hrs. If unable to utilize MDI: HHN [physician-ordered bronchodilator(s)] TID and Albuterol PRN q4 hrs. Notify physician if condition deteriorates. MDI THERAPY with  [physician-ordered bronchodilator(s)] via spacer TID PRN. If unable to utilize MDI: HHN [physician-ordered bronchodilator(s)] TID PRN. Notify physician if condition deteriorates. If Acuity Level is 2, 3, or 4 in any of the following:    [] COUGH     [] SURGICAL HISTORY (SURG HX)  [x] CHEST XRAY (CXR)    Goal: Improvement in sputum mobilization in patients with ineffective airway clearance. Reverse atelectasis. [x] Bronchopulmonary Hygiene Protocol    Total Acuity:   16-32  []  Secondary Assessment in 24 hrs Total Acuity:  9-15  [x]  Secondary Assessment in 24 hrs Total Acuity:  4-8  []  Secondary Assessment in 48 hrs Total Acuity:  0-3  []  Secondary Assessment in 72 hrs   METANEB QID with [physician-ordered bronchodilator(s)] if CXR Acuity = 4; otherwise:  PD&P, PEP, or Vest QID & PRN  NT Sxn PRN for ineffective cough  METANEB QID with [physician-ordered bronchodilator(s)] if CXR Acuity = 4; otherwise:  PD&P, PEP, or Vest TID & PRN  NT Sxn PRN for ineffective cough  Instruct patient to self-perform IS q1hr WA   Directed Cough self-performed q1hr WA     If Acuity Level is 2 or above in the following:    [x] PULMONARY HISTORY (PULM HX)    Goal: Assist patient in quitting smoking to slow or stop the progression of lung disease.     [x] Smoking Cessation Protocol    SMOKING CESSATION EDUCATION provided according to policy HJ_905: (rohit with an X)  ____Yes    ____ No     _x___ NA    Smoking Cessation Booklet given:  ____Yes  ____No ____Patient Layla Angelucci

## 2019-05-20 NOTE — PROGRESS NOTES
Discussed discharge plans with the patient. Patient  Is a 70year old male here with acute exacerbation of copd requiring iv steroids,aerosols. He is alert and oriented. Patient is  and lives at home with his wife. He uses a C-pap machine. Patient's wife does the cooking and the cleaning. He manages his own medications and drives. His PCP is Dr. Liudmila Gold. He has medical insurance and gets his medications from the [facility] ED. The discharge plan is home with no services. Patient has advance directives but they are not on file. JUVENALW to monitor and assist with any needs or concerns as they arise.     GEM Richter

## 2019-05-20 NOTE — PROGRESS NOTES
Writer left message with cardio office to please call back ASAP for a consult that PA would like done tonight. Direct number given for call back.

## 2019-05-20 NOTE — PROGRESS NOTES
Patient with brief vision loss out of one eye. Now resolved. Patient also went into afib. Rate controlled. BP stable. Getting CT head. Cardiology consult. Metoprolol prn for rate control. Therapeutic lovenox.     Fuentes Beltran PA-C  5/20/2019, 4:37 PM

## 2019-05-20 NOTE — PROGRESS NOTES
Hospitalist Progress Note    SUBJECTIVE/INTERVAL HISTORY:    Patient seen in follow up for PNA, BLE edema likely due to medication, COPD exacerbation, possible acute right sided heart failure, CKD, HTN, acute hypoxic respiratory failure. Feeling better. Able to walk with out significant SOB. Still feels as though his breathing is tight. Down 4#. Off O2. OBJECTIVE:    Vitals:   Temp: 98 °F (36.7 °C)  BP: (!) 170/80  Resp: 18  Pulse: 83  SpO2: 96 %  24HR INTAKE/OUTPUT:      Intake/Output Summary (Last 24 hours) at 5/20/2019 1319  Last data filed at 5/20/2019 1232  Gross per 24 hour   Intake 2630 ml   Output 550 ml   Net 2080 ml       -----------------------------------------------------------------  Review of Systems:  Constitutional:negative  for fevers, and negative for chills. Eyes: negative for visual disturbance   ENT: negative for sore throat, negative nasal congestion, and negative for earache  Respiratory: positive for shortness of breath, negative for cough, and positive for wheezing  Cardiovascular: negative for chest pain, negative for palpitations, and negative for syncope  Gastrointestinal: negative for abdominal pain, negative for nausea,negative for vomiting, negative for diarrhea, negative for constipation, and negative for hematochezia or melena  Genitourinary: negative for dysuria, negative for urinary urgency, negative for urinary frequency, and negative for hematuria  Skin: negative for skin rash, and negative for skin lesions  Neurological: negative for unilateral weakness, numbness or tingling.     Exam:  GEN:  alert and oriented to person, place and time, well-developed and well-nourished, in no acute distress  EYES: PERRL  NECK: normal  PULM: diminished bilaterally - tight, exp wheezes, no rales or rhonchi, no respiratory distress  COR: regular rate & rhythm and no murmurs  ABD:  soft, non-tender, non-distended, normal bowel sounds, no masses or organomegaly  EXT:   edema: 1+ affecting PLAN:    COPD exacerbation    Steroids   Nebs   Off O2   ambulate  Right lower lobe pneumonia    Continue abx   CXR in am    Acute respiratory failure with hypoxia   resolved    Stage 3 chronic kidney disease     Essential hypertension, benign   Lisinopril added    Possible Acute right-sided heart failure/Lower extremity edema from medication -    improved   norvasc lowered   Continue diuretics        · High risk medication: none    Discharge likely tomorrow    Laura Jimenes PA-C  5/20/2019, 1:19 PM

## 2019-05-21 ENCOUNTER — APPOINTMENT (OUTPATIENT)
Dept: GENERAL RADIOLOGY | Age: 69
DRG: 190 | End: 2019-05-21
Payer: MEDICARE

## 2019-05-21 ENCOUNTER — APPOINTMENT (OUTPATIENT)
Dept: VASCULAR LAB | Age: 69
DRG: 190 | End: 2019-05-21
Payer: MEDICARE

## 2019-05-21 VITALS
HEIGHT: 67 IN | HEART RATE: 68 BPM | OXYGEN SATURATION: 97 % | RESPIRATION RATE: 20 BRPM | WEIGHT: 184.2 LBS | DIASTOLIC BLOOD PRESSURE: 94 MMHG | SYSTOLIC BLOOD PRESSURE: 178 MMHG | BODY MASS INDEX: 28.91 KG/M2 | TEMPERATURE: 97.4 F

## 2019-05-21 PROBLEM — I48.0 PAF (PAROXYSMAL ATRIAL FIBRILLATION) (HCC): Status: ACTIVE | Noted: 2019-05-21

## 2019-05-21 PROBLEM — G45.9 TIA (TRANSIENT ISCHEMIC ATTACK): Status: ACTIVE | Noted: 2019-05-21

## 2019-05-21 PROBLEM — I65.22 LEFT CAROTID STENOSIS: Status: ACTIVE | Noted: 2019-05-21

## 2019-05-21 LAB
ALBUMIN SERPL-MCNC: 3.7 G/DL (ref 3.5–5.2)
ALBUMIN/GLOBULIN RATIO: 1.2 (ref 1–2.5)
ALP BLD-CCNC: 80 U/L (ref 40–129)
ALT SERPL-CCNC: 13 U/L (ref 5–41)
ANION GAP SERPL CALCULATED.3IONS-SCNC: 13 MMOL/L (ref 9–17)
AST SERPL-CCNC: 12 U/L
BILIRUB SERPL-MCNC: <0.1 MG/DL (ref 0.3–1.2)
BUN BLDV-MCNC: 52 MG/DL (ref 8–23)
BUN/CREAT BLD: 33 (ref 9–20)
CALCIUM SERPL-MCNC: 9.6 MG/DL (ref 8.6–10.4)
CHLORIDE BLD-SCNC: 92 MMOL/L (ref 98–107)
CO2: 34 MMOL/L (ref 20–31)
CREAT SERPL-MCNC: 1.6 MG/DL (ref 0.7–1.2)
EKG ATRIAL RATE: 88 BPM
EKG Q-T INTERVAL: 400 MS
EKG QRS DURATION: 106 MS
EKG QTC CALCULATION (BAZETT): 484 MS
EKG R AXIS: -20 DEGREES
EKG T AXIS: 82 DEGREES
EKG VENTRICULAR RATE: 88 BPM
GFR AFRICAN AMERICAN: 52 ML/MIN
GFR NON-AFRICAN AMERICAN: 43 ML/MIN
GFR SERPL CREATININE-BSD FRML MDRD: ABNORMAL ML/MIN/{1.73_M2}
GFR SERPL CREATININE-BSD FRML MDRD: ABNORMAL ML/MIN/{1.73_M2}
GLUCOSE BLD-MCNC: 142 MG/DL (ref 70–99)
HCT VFR BLD CALC: 35.3 % (ref 40.7–50.3)
HEMOGLOBIN: 11.6 G/DL (ref 13–17)
MCH RBC QN AUTO: 30.1 PG (ref 25.2–33.5)
MCHC RBC AUTO-ENTMCNC: 32.9 G/DL (ref 28.4–34.8)
MCV RBC AUTO: 91.7 FL (ref 82.6–102.9)
NRBC AUTOMATED: 0 PER 100 WBC
PDW BLD-RTO: 14.7 % (ref 11.8–14.4)
PLATELET # BLD: 272 K/UL (ref 138–453)
PMV BLD AUTO: 10.6 FL (ref 8.1–13.5)
POTASSIUM SERPL-SCNC: 4.2 MMOL/L (ref 3.7–5.3)
RBC # BLD: 3.85 M/UL (ref 4.21–5.77)
SODIUM BLD-SCNC: 139 MMOL/L (ref 135–144)
TOTAL PROTEIN: 6.9 G/DL (ref 6.4–8.3)
WBC # BLD: 12.5 K/UL (ref 3.5–11.3)

## 2019-05-21 PROCEDURE — 80053 COMPREHEN METABOLIC PANEL: CPT

## 2019-05-21 PROCEDURE — 6370000000 HC RX 637 (ALT 250 FOR IP): Performed by: FAMILY MEDICINE

## 2019-05-21 PROCEDURE — 6360000002 HC RX W HCPCS: Performed by: FAMILY MEDICINE

## 2019-05-21 PROCEDURE — 85027 COMPLETE CBC AUTOMATED: CPT

## 2019-05-21 PROCEDURE — 6360000002 HC RX W HCPCS: Performed by: PHYSICIAN ASSISTANT

## 2019-05-21 PROCEDURE — 2580000003 HC RX 258: Performed by: FAMILY MEDICINE

## 2019-05-21 PROCEDURE — 71045 X-RAY EXAM CHEST 1 VIEW: CPT

## 2019-05-21 PROCEDURE — 6370000000 HC RX 637 (ALT 250 FOR IP): Performed by: INTERNAL MEDICINE

## 2019-05-21 PROCEDURE — 36415 COLL VENOUS BLD VENIPUNCTURE: CPT

## 2019-05-21 PROCEDURE — 6370000000 HC RX 637 (ALT 250 FOR IP): Performed by: PHYSICIAN ASSISTANT

## 2019-05-21 PROCEDURE — 94640 AIRWAY INHALATION TREATMENT: CPT

## 2019-05-21 PROCEDURE — 93880 EXTRACRANIAL BILAT STUDY: CPT

## 2019-05-21 RX ORDER — AMLODIPINE BESYLATE 2.5 MG/1
2.5 TABLET ORAL DAILY
Qty: 30 TABLET | Refills: 0 | Status: SHIPPED | OUTPATIENT
Start: 2019-05-22 | End: 2019-06-19 | Stop reason: ALTCHOICE

## 2019-05-21 RX ORDER — CLOPIDOGREL BISULFATE 75 MG/1
75 TABLET ORAL DAILY
Status: DISCONTINUED | OUTPATIENT
Start: 2019-05-21 | End: 2019-05-21 | Stop reason: HOSPADM

## 2019-05-21 RX ORDER — CLOPIDOGREL BISULFATE 75 MG/1
75 TABLET ORAL DAILY
Qty: 30 TABLET | Refills: 0 | Status: SHIPPED | OUTPATIENT
Start: 2019-05-22 | End: 2019-06-21 | Stop reason: SDUPTHER

## 2019-05-21 RX ORDER — ASPIRIN 81 MG/1
81 TABLET, CHEWABLE ORAL DAILY
Qty: 30 TABLET | Refills: 0 | COMMUNITY
Start: 2019-05-22 | End: 2019-11-21

## 2019-05-21 RX ORDER — ASPIRIN 81 MG/1
81 TABLET, CHEWABLE ORAL DAILY
Status: DISCONTINUED | OUTPATIENT
Start: 2019-05-21 | End: 2019-05-21 | Stop reason: HOSPADM

## 2019-05-21 RX ORDER — HYDROCHLOROTHIAZIDE 25 MG/1
25 TABLET ORAL DAILY
Status: DISCONTINUED | OUTPATIENT
Start: 2019-05-21 | End: 2019-05-21 | Stop reason: HOSPADM

## 2019-05-21 RX ORDER — HYDROCHLOROTHIAZIDE 25 MG/1
25 TABLET ORAL DAILY
Qty: 30 TABLET | Refills: 0 | Status: SHIPPED | OUTPATIENT
Start: 2019-05-22 | End: 2019-06-21 | Stop reason: SDUPTHER

## 2019-05-21 RX ORDER — LISINOPRIL 20 MG/1
20 TABLET ORAL DAILY
Status: DISCONTINUED | OUTPATIENT
Start: 2019-05-21 | End: 2019-05-21 | Stop reason: HOSPADM

## 2019-05-21 RX ORDER — AMOXICILLIN AND CLAVULANATE POTASSIUM 875; 125 MG/1; MG/1
1 TABLET, FILM COATED ORAL 2 TIMES DAILY
Qty: 10 TABLET | Refills: 0 | Status: SHIPPED | OUTPATIENT
Start: 2019-05-21 | End: 2019-05-26

## 2019-05-21 RX ORDER — LISINOPRIL 20 MG/1
20 TABLET ORAL DAILY
Qty: 30 TABLET | Refills: 0 | Status: SHIPPED | OUTPATIENT
Start: 2019-05-21 | End: 2019-06-21

## 2019-05-21 RX ADMIN — ENOXAPARIN SODIUM 80 MG: 80 INJECTION SUBCUTANEOUS at 08:19

## 2019-05-21 RX ADMIN — CEFTRIAXONE 1 G: 1 INJECTION, POWDER, FOR SOLUTION INTRAMUSCULAR; INTRAVENOUS at 07:09

## 2019-05-21 RX ADMIN — ASPIRIN 81 MG 81 MG: 81 TABLET ORAL at 10:29

## 2019-05-21 RX ADMIN — AZITHROMYCIN DIHYDRATE 500 MG: 500 INJECTION, POWDER, LYOPHILIZED, FOR SOLUTION INTRAVENOUS at 07:15

## 2019-05-21 RX ADMIN — Medication 10 ML: at 08:23

## 2019-05-21 RX ADMIN — LISINOPRIL 20 MG: 20 TABLET ORAL at 08:18

## 2019-05-21 RX ADMIN — IPRATROPIUM BROMIDE AND ALBUTEROL SULFATE 1 AMPULE: .5; 3 SOLUTION RESPIRATORY (INHALATION) at 12:32

## 2019-05-21 RX ADMIN — AMLODIPINE BESYLATE 2.5 MG: 2.5 TABLET ORAL at 08:19

## 2019-05-21 RX ADMIN — CLOPIDOGREL BISULFATE 75 MG: 75 TABLET ORAL at 10:29

## 2019-05-21 RX ADMIN — METHYLPREDNISOLONE SODIUM SUCCINATE 40 MG: 40 INJECTION, POWDER, FOR SOLUTION INTRAMUSCULAR; INTRAVENOUS at 08:19

## 2019-05-21 RX ADMIN — IPRATROPIUM BROMIDE AND ALBUTEROL SULFATE 1 AMPULE: .5; 3 SOLUTION RESPIRATORY (INHALATION) at 05:12

## 2019-05-21 RX ADMIN — FAMOTIDINE 20 MG: 20 TABLET ORAL at 08:18

## 2019-05-21 RX ADMIN — CARVEDILOL 25 MG: 25 TABLET, FILM COATED ORAL at 08:19

## 2019-05-21 RX ADMIN — HYDROCHLOROTHIAZIDE 25 MG: 25 TABLET ORAL at 08:18

## 2019-05-21 ASSESSMENT — PAIN SCALES - GENERAL: PAINLEVEL_OUTOF10: 0

## 2019-05-21 NOTE — DISCHARGE SUMMARY
Discharge Summary    Josh Morfin  :  1950  MRN:  [id number]    Admit date:  2019      Discharge date: 2019     Admitting Physician:  Suhail Penny MD    Consultants:  none    Procedures: none    Complications: none    Discharge Condition: stable    Hospital Course:   Josh Morfin is a 70 y.o. male admitted with SOB, CHOW and increase BLE. Patient had been on steroids for his COPD and norvasc for his HTN. CXR showed RLL infiltrate. He was tight, wheezing and hypoxic. He was admitted for COPD exacerbation, right sided heart failure, edema possibly due to medication, and PNA. He was given steroids and nebs for his COPD exacerbation. He was started on abx for PNA. Patient was diuresed. Down 6# during his [place] felt the edema was secondary to norvasc. Norvasc was decreased. He was able to wean off O2. Instance of left eye vision loss 19. CT head showed no acute process. Stated that he occasionally has brief vision loss after injection given by outside provider. He thought it was just a side effect. Preliminary of carotid U/S showed 80-90% left internal carotid stenosis. Spoke with Dr. Myah Strauss. ASA and plavix added. Dr. Myah Strauss feels the transient vision loss was a TIA. Dr. Myah Strauss to set up vascular surgery appointment with Dr. Elif Prarish. Patient also had brief run of afib. Dr. Myah Strauss aware of run of afib 19. Feels the afib was related to his COPD. Will place referral for Holter monitor for next week. Continue coreg. CXR showed resolving infiltrate - will continue abx for 5 more days. Continue prior long prednisone taper he was on prior to admission. Discharge to home with close follow up with Dr. Han Mays.      Exam:  GEN:  alert and oriented to person, place and time, well-developed and well-nourished, in no acute distress  EYES:  PERRL  NECK: normal  PULM: diminished bilaterally - intermittent exp wheezes, no rales or rhonchi, no respiratory distress  COR:   regular rate & rhythm and no disease ([facility]    Essential hypertension, benign    Acute right-sided heart failure (HCC)    Right lower lobe pneumonia (HCC)    Lower extremity edema    PAF (paroxysmal atrial fibrillation) (HCC)    TIA (transient ischemic attack)    Left carotid stenosis  with amaurosis fugax  Resolved Problems:    * No resolved hospital problems.  *      Active Hospital Problems    Diagnosis Date Noted    PAF (paroxysmal atrial fibrillation) ([facility] [I48.0] 05/21/2019    TIA (transient ischemic attack) [G45.9] 05/21/2019    Left carotid stenosis  with amaurosis fugax [I65.22] 05/21/2019    Lower extremity edema [R60.0] 05/20/2019    Essential hypertension, benign [I10] 05/18/2019    Acute right-sided heart failure ([facility] [I50.811] 05/18/2019    Right lower lobe pneumonia ([facility] [J18.1] 05/18/2019    Stage 3 chronic kidney disease ([facility] [N18.3] 12/14/2018    COPD exacerbation ([facility] [J44.1] 10/29/2018    Acute respiratory failure with hypoxia (HCC) [J96.01] 02/22/2018    Chronic obstructive pulmonary disease ([facility] [J44.9] 02/18/2018       Discharge Medications:       Pipo Chino   Home Medication Instructions LIZZ:[id number]    Printed on:05/21/19 3565   Medication Information                      albuterol (PROVENTIL) (2.5 MG/3ML) 0.083% nebulizer solution  Take 3 mLs by nebulization every 4 hours as needed for Wheezing             albuterol sulfate  (90 Base) MCG/ACT inhaler  Inhale 2 puffs into the lungs every 6 hours as needed for Wheezing or Shortness of Breath             amLODIPine (NORVASC) 2.5 MG tablet  Take 1 tablet by mouth daily             amoxicillin-clavulanate (AUGMENTIN) 875-125 MG per tablet  Take 1 tablet by mouth 2 times daily for 5 days             aspirin 81 MG chewable tablet  Take 1 tablet by mouth daily             budesonide-formoterol (SYMBICORT) 160-4.5 MCG/ACT AERO  Inhale 2 puffs into the lungs 2 times daily             carvedilol (COREG) 25 MG tablet  Take 1 tablet by mouth 2 times daily             cetirizine (ZYRTEC) 10 MG tablet  Take 10 mg by mouth daily             Cholecalciferol (VITAMIN D3) 5000 units TABS  Take 1,000 Units by mouth             clopidogrel (PLAVIX) 75 MG tablet  Take 1 tablet by mouth daily             hydrochlorothiazide (HYDRODIURIL) 25 MG tablet  Take 1 tablet by mouth daily             lisinopril (PRINIVIL;ZESTRIL) 20 MG tablet  Take 1 tablet by mouth daily             montelukast (SINGULAIR) 10 MG tablet  Take 10 mg by mouth nightly             predniSONE (DELTASONE) 2.5 MG tablet  Take 7.5mg for 3 weeks             predniSONE (DELTASONE) 5 MG tablet  Take 7.5mg daily for 3 weeks then take 5mg for 3 weeks then stop             tiotropium (SPIRIVA RESPIMAT) 1.25 MCG/ACT AERS inhaler  Inhale 2 puffs into the lungs daily                 Patient Instructions:    Activity: activity as tolerated  Diet: cardiac diet  Wound Care: none needed  Other: none     Disposition:   Discharge to Home    Follow up:  Patient will be followed by Brielle Garcia MD 5/25/19  Vascular surgeon  holter monitor next week    CORE MEASURES on Discharge (if applicable)  ACE/ARB in CHF: Yes  Statin in [place]: NA  ASA in [place]: NA  Statin in CVA: NA  Antiplatelet in CVA: NA    Total time spent on discharge services: 45 minutes    Including the following activities:  Evaluation and Management of patient  Discussion with patient and/or surrogate about current care plan  Coordination with Case Management and/or   Coordination of care with Consultants (if applicable)   Coordination of care with Receiving Facility Physician (if applicable)  Completion of DME forms (if applicable)  Preparation of Discharge Summary  Preparation of Medication Reconciliation  Preparation of Discharge Prescriptions    Signed:  Brielle Garcia PA-C  5/21/2019, 3:24 PM

## 2019-05-21 NOTE — PROGRESS NOTES
Hospitalist Progress Note    SUBJECTIVE/INTERVAL HISTORY:    Patient seen in follow up for possible PNA, BLE edema likely due to medication, COPD exacerbation, possible acute right sided heart failure, CKD, HTN, acute hypoxic respiratory failure - resolved, TIA, left carotid stenosis. Feeling better. Down 6#. BP running a little high. Run of afib yesterday, now in NSR. Instance of left eye vision loss 5/20/19. CT head showed no acute process. Stated that he occasionally has brief vision loss after injection given by outside provider. He thought it was just a side effect. Preliminary of carotid U/S showed 80-90% left internal carotid stenosis. Spoke with Dr. Karen Gray. ASA and plavix added. Dr. Karen Gray feels the transient vision loss was a TIA. Dr. Karen Gray to set up vascular surgery appointment with Dr. Jarad Bowling. Dr. Karen Gray aware of run of afib 5/20/19. Feels was related to his COPD. Will place referral for Holter monitor for next week. CXR showed resolving infiltrate - will continue abx for 7 day course. CXR  Impression:     Near complete resolution of subtle right perihilar infiltrate. OBJECTIVE:    Vitals:   Temp: 97.4 °F (36.3 °C)  BP: (!) 178/94  Resp: 20  Pulse: 68  SpO2: 97 %  24HR INTAKE/OUTPUT:      Intake/Output Summary (Last 24 hours) at 5/21/2019 1147  Last data filed at 5/21/2019 1031  Gross per 24 hour   Intake 2870 ml   Output 3450 ml   Net -580 ml       -----------------------------------------------------------------  Review of Systems:  Constitutional:negative  for fevers, and negative for chills.   Eyes: positive for visual disturbance - now resolved  ENT: negative for sore throat, negative nasal congestion, and negative for earache  Respiratory: negative for shortness of breath, negative for cough, and positive for wheezing  Cardiovascular: negative for chest pain, negative for palpitations, and negative for syncope  Gastrointestinal: negative for abdominal pain, negative for nausea,negative for vomiting, negative for diarrhea, negative for constipation, and negative for hematochezia or melena  Genitourinary: negative for dysuria, negative for urinary urgency, negative for urinary frequency, and negative for hematuria  Skin: negative for skin rash, and negative for skin lesions  Neurological: negative for unilateral weakness, numbness or tingling. Exam:  GEN:  alert and oriented to person, place and time, well-developed and well-nourished, in no acute distress  EYES: PERRL  NECK: normal  PULM: diminished bilaterally - intermittent exp wheezes, no rales or rhonchi, no respiratory distress  COR: regular rate & rhythm and no murmurs  ABD:  soft, non-tender, non-distended, normal bowel sounds, no masses or organomegaly  EXT:   edema: 1+ affecting bilateral foot, bilateral ankle  NEURO: follows commands, JUAREZ, no deficits  SKIN:  no rashes or significant lesions      -----------------------------------------------------------------  Diagnostic Data:  Lab Results   Component Value Date    WBC 12.5 (H) 05/21/2019    HGB 11.6 (L) 05/21/2019    HCT 35.3 (L) 05/21/2019     05/21/2019    CHOL 221 09/28/2016    TRIG 71 09/28/2016    HDL 64 09/28/2016    ALT 13 05/21/2019    AST 12 05/21/2019     05/21/2019    K 4.2 05/21/2019    CL 92 (L) 05/21/2019    CREATININE 1.60 (H) 05/21/2019    BUN 52 (H) 05/21/2019    CO2 34 (H) 05/21/2019    PSA 0.08 02/25/2019    BNP 28 07/12/2013       ASSESSMENT:    Principal Problem:    COPD exacerbation (HCC)  Active Problems:    Chronic obstructive pulmonary disease (HCC)    Acute respiratory failure with hypoxia (HCC)    Stage 3 chronic kidney disease (HCC)    Essential hypertension, benign    Acute right-sided heart failure (HCC)    Right lower lobe pneumonia (HCC)    Lower extremity edema    PAF (paroxysmal atrial fibrillation) (HCC)    TIA (transient ischemic attack)    Left carotid stenosis  Resolved Problems:    * No resolved hospital problems.  *      Patient

## 2019-05-21 NOTE — PLAN OF CARE
Problem: Falls - Risk of:  Goal: Will remain free from falls  Description  Will remain free from falls  5/18/2019 8250 by Liza Bhardwaj RN  Outcome: Ongoing  Note:   Bed in lowest position. Wheels locked. Call light in reach. 2/4 siderails up. Bed alarm on. Patient has a Chowdary fall score of 35 requiring a stay with me as well as the bed/personal/chair alarms being set. Patient has been educated on using call light, alarms and stay with me.
Problem: Falls - Risk of:  Goal: Will remain free from falls  Description  Will remain free from falls  Outcome: Ongoing   A&Ox3, able to call for assistance when needed. Call light within reach at all times. Bed locked. Bed alarm and/or personal alarm on at all times. Non-skid socks on when out of bed. Assistance provided for transfers, assistive device used for ambulation. Will continue to monitor.
Problem: Falls - Risk of:  Goal: Will remain free from falls  Description  Will remain free from falls  Outcome: Ongoing  Note:   Bed in lowest position. Wheels locked. Call light in reach. 2/4 siderails up. Bed alarm on. Problem: Safety:  Goal: Free from accidental physical injury  Description  Free from accidental physical injury  Outcome: Ongoing  Note:   Wheels locked, call light within reach, siderails 2/4 up, and bed in lowest position. Patient will remain free of injury. Problem: Daily Care:  Goal: Daily care needs are met  Description  Daily care needs are met  Outcome: Ongoing  Note:   Patient is a standby assist and is able to perform ADLs (bathing, eating) without assistance. Will continue to assess. Problem: Skin Integrity:  Goal: Skin integrity will stabilize  Description  Skin integrity will stabilize  Outcome: Ongoing  Note:   Patient has scattered bruising throughout. No reddened or open areas. Will continue to assess. Problem: Pain:  Goal: Patient's pain/discomfort is manageable  Description  Patient's pain/discomfort is manageable  Outcome: Ongoing  Note:   Patient is able to verbalize pain. Patient stated no pain on 0-10 pain scale. Will continue to monitor. without assistance. Will continue to assess.
Pulse ox is within normal limits on room air. Will continue to monitor. 5/20/2019 1216 by Ophelia Blandon, JARETH  Outcome: Ongoing  Note:   Pulse ox in high 90s on RA. Patient able to remains free from dyspnea throughout shift.      Problem: Nutrition  Goal: Optimal nutrition therapy  5/20/2019 2052 by Ro Beltre, JARETH  Outcome: Ongoing  5/20/2019 1551 by Caprice Barth  Outcome: Ongoing  Note:   Nutrition Problem: Altered nutrition-related lab values  Intervention: Food and/or Nutrient Delivery: Continue current diet  Nutritional Goals: PO >75% of meals

## 2019-05-22 ENCOUNTER — CARE COORDINATION (OUTPATIENT)
Dept: CASE MANAGEMENT | Age: 69
End: 2019-05-22

## 2019-05-22 DIAGNOSIS — J44.9 CHRONIC OBSTRUCTIVE PULMONARY DISEASE, UNSPECIFIED COPD TYPE (HCC): Primary | ICD-10-CM

## 2019-05-22 NOTE — CARE COORDINATION
Yadiel [facility] Transitions Initial Follow Up Call    Call within 2 business days of discharge: Yes    Patient: Pollo Brown Patient : 1950   MRN: [id number]  Reason for Admission: COPD  Discharge Date: 19 RARS: Readmission Risk Score: 25      Last Discharge [facility]       Complaint Diagnosis Description Type Department Provider    19 Shortness of Breath COPD exacerbation ([facility] [address] . .. ED to Hosp-Admission (Discharged) (ADMIT) Bettye Ward MD; Nicanor MUJICA Eit. .. Spoke with: [address]: Left Hand    Non-face-to-face services provided:  Obtained and reviewed discharge summary and/or continuity of care documents  Assessment and support for treatment adherence and medication management-completed 1111f    Care Transitions 24 Hour Call    Do you have any ongoing symptoms?:  Yes  Patient-reported symptoms:  Other (Comment: edema)  Interventions for patient-reported symptoms:  Other  Do you have a copy of your discharge instructions?:  Yes  Do you have all of your prescriptions and are they filled?:  Yes  Have you been contacted by a [address]?:  No  Have you scheduled your follow up appointment?:  Yes  How are you going to get to your appointment?:  Car - drive self  Were you discharged with any Home Care or Post Acute Services:  No  Do you feel like you have everything you need to keep you well at home?:  Yes  Care Transitions Interventions       CTC spoke to [facility] for initial transitions call. Pt stated he is feeling well since discharge, continues to have LE edema. Stated he put on 40 lbs recently. Denies increased SOB, CP/pressure, palpitations, cough. Stated he has appt today with vascular surgeon. Pt has PCP appt on  and gets holter monitor on 19. Reviewed medications 1111f. Pt has all medications and no questions or concerns at this time. Stated his daughter is a nurse and assists as needed.  Stated appetite is good and elimination patterns normal. Agreed to f/u transitions calls.     Ap Bishop, RN BSN   Care Transitions Coordinator  [phone]     Follow Up  Future Appointments   Date Time Provider Kike Clair   5/25/2019  9:00 AM Bynum Najjar, MD [facility] Myrla Aas M   5/29/2019 11:00 AM [facility] EKG MONITOR RM MTHZ EKG [place]   6/5/2019 10:00 AM SCHEDULE, AFL MATTHIEU HERRERA   7/2/2019  8:30 AM EVER Asher - CNP [place] [facility]   7/17/2019 10:30 AM Bynum Najjar, MD [facility] Myrla Aas M   8/15/2019 11:15 AM MD Alfred Arcos [facility]       Ap Bishop, RN

## 2019-05-23 LAB
CULTURE: NORMAL
CULTURE: NORMAL
Lab: NORMAL
Lab: NORMAL
SPECIMEN DESCRIPTION: NORMAL
SPECIMEN DESCRIPTION: NORMAL

## 2019-05-25 PROBLEM — R80.9 PROTEINURIA: Status: ACTIVE | Noted: 2019-05-25

## 2019-05-28 ENCOUNTER — CARE COORDINATION (OUTPATIENT)
Dept: CASE MANAGEMENT | Age: 69
End: 2019-05-28

## 2019-05-28 NOTE — CARE COORDINATION
Yadiel 45 Transitions Follow Up Call    2019    Patient: Lo Chakraborty  Patient : 1950   MRN: [id number]  Reason for Admission:   Discharge Date: 19 RARS: Readmission Risk Score: 25         Spoke with: [id number] Transitions Subsequent and Final Call    Subsequent and Final Calls  Do you have any ongoing symptoms?:  No  Have your medications changed?:  No  Do you have any questions related to your medications?:  No  Do you currently have any active services?:  No  Do you have any needs or concerns that I can assist you with?:  No  Identified Barriers:  None  Care Transitions Interventions  No Identified Needs  Other Interventions: Follow Up : Per patient he is feeling better, he got a bathroom scale and is weighing daily, he does not recall his weight from today, but stated that he is \"down 20 lbs\" from d/c. He stated that he is building endurance and his breathing is \"getting better\", he denies wheezing, fever, chills, dizziness, cough, N/V/D, chest pain, sob. He saw PCP for f/u 19 and is having carotid stent placed tomorrow. He stated that he has everything he needs, he is eating and drinking fluids and having normal elimination patterns. He denies any issues, needs, or concerns to report at this time.      Future Appointments   Date Time Provider Kike Self   2019  8:30 AM [facility]mitz - [facility]NP [facility]   2019 10:30 AM MD Michael Hamilton   8/15/2019 11:15 AM MD Alfred Velasco [facility]       Nancy Raza RN BSN  Care Transitions Coordinator

## 2019-05-31 ENCOUNTER — CARE COORDINATION (OUTPATIENT)
Dept: CASE MANAGEMENT | Age: 69
End: 2019-05-31

## 2019-05-31 NOTE — CARE COORDINATION
5/31/19 Spoke with patient. He states that he is doing well. He has had F/U appointments with MD. And to get Labs drawn. He states that he is going to be going to Pulmonary rehab. He states that he is currently unable to walk any further than the mailbox  Without getting short of breath. He states that he used to be able to walk where ever he wanted. He has all Rx and is taking Rx as directed. Patient has no questions or concerns. Mya HURTADON RN.

## 2019-06-05 ENCOUNTER — CARE COORDINATION (OUTPATIENT)
Dept: CASE MANAGEMENT | Age: 69
End: 2019-06-05

## 2019-06-17 ENCOUNTER — HOSPITAL ENCOUNTER (OUTPATIENT)
Age: 69
Discharge: HOME | End: 2019-06-17
Payer: MEDICARE

## 2019-06-17 DIAGNOSIS — N17.9 AKI (ACUTE KIDNEY INJURY) (HCC): ICD-10-CM

## 2019-06-17 DIAGNOSIS — C61 PROSTATE CANCER (HCC): ICD-10-CM

## 2019-06-17 DIAGNOSIS — R80.9 PROTEINURIA, UNSPECIFIED TYPE: ICD-10-CM

## 2019-06-17 LAB
-: ABNORMAL
AMORPHOUS: ABNORMAL
ANION GAP SERPL CALCULATED.3IONS-SCNC: 11 MMOL/L (ref 9–17)
BACTERIA: ABNORMAL
BILIRUBIN URINE: NEGATIVE
BUN BLDV-MCNC: 44 MG/DL (ref 8–23)
BUN/CREAT BLD: 24 (ref 9–20)
CALCIUM SERPL-MCNC: 9.7 MG/DL (ref 8.6–10.4)
CASTS UA: ABNORMAL /LPF
CHLORIDE BLD-SCNC: 98 MMOL/L (ref 98–107)
CO2: 29 MMOL/L (ref 20–31)
COLOR: YELLOW
COMMENT UA: ABNORMAL
CREAT SERPL-MCNC: 1.86 MG/DL (ref 0.7–1.2)
CRYSTALS, UA: ABNORMAL /HPF
EPITHELIAL CELLS UA: ABNORMAL /HPF (ref 0–5)
GFR AFRICAN AMERICAN: 44 ML/MIN
GFR NON-AFRICAN AMERICAN: 36 ML/MIN
GFR SERPL CREATININE-BSD FRML MDRD: ABNORMAL ML/MIN/{1.73_M2}
GFR SERPL CREATININE-BSD FRML MDRD: ABNORMAL ML/MIN/{1.73_M2}
GLUCOSE BLD-MCNC: 109 MG/DL (ref 70–99)
GLUCOSE URINE: NEGATIVE
KETONES, URINE: NEGATIVE
LEUKOCYTE ESTERASE, URINE: NEGATIVE
MUCUS: ABNORMAL
NITRITE, URINE: NEGATIVE
OTHER OBSERVATIONS UA: ABNORMAL
PH UA: 6 (ref 5–9)
POTASSIUM SERPL-SCNC: 4.3 MMOL/L (ref 3.7–5.3)
PROSTATE SPECIFIC ANTIGEN: <0.01 UG/L
PROTEIN UA: ABNORMAL
RBC UA: ABNORMAL /HPF (ref 0–2)
RENAL EPITHELIAL, UA: ABNORMAL /HPF
SODIUM BLD-SCNC: 138 MMOL/L (ref 135–144)
SPECIFIC GRAVITY UA: 1.02 (ref 1.01–1.02)
TRICHOMONAS: ABNORMAL
TURBIDITY: CLEAR
URINE HGB: NEGATIVE
UROBILINOGEN, URINE: NORMAL
WBC UA: ABNORMAL /HPF (ref 0–5)
YEAST: ABNORMAL

## 2019-06-17 PROCEDURE — 80048 BASIC METABOLIC PNL TOTAL CA: CPT

## 2019-06-17 PROCEDURE — 81001 URINALYSIS AUTO W/SCOPE: CPT

## 2019-06-17 PROCEDURE — 84153 ASSAY OF PSA TOTAL: CPT

## 2019-06-17 PROCEDURE — 36415 COLL VENOUS BLD VENIPUNCTURE: CPT

## 2019-06-18 ENCOUNTER — TELEPHONE (OUTPATIENT)
Dept: PHARMACY | Facility: CLINIC | Age: 69
End: 2019-06-18

## 2019-06-18 DIAGNOSIS — I65.22 LEFT CAROTID STENOSIS: Primary | ICD-10-CM

## 2019-06-18 PROCEDURE — 1111F DSCHRG MED/CURRENT MED MERGE: CPT

## 2019-06-18 NOTE — TELEPHONE ENCOUNTER
CLINICAL PHARMACY NOTE  Post-Discharge Transitions of Care (AMBIKA)    Patient appears to have been discharged from [facility] on 05/30/2019. Please follow-up with patient to review medications.       30 days since discharge = 06/29/2019     [address]  [phone] or [phone] (Option 7)

## 2019-06-19 RX ORDER — ATORVASTATIN CALCIUM 80 MG/1
80 TABLET, FILM COATED ORAL DAILY
COMMUNITY
End: 2019-07-01 | Stop reason: SDUPTHER

## 2019-06-19 NOTE — TELEPHONE ENCOUNTER
CLINICAL PHARMACY NOTE  Post-Discharge Transitions of Care (AMBIKA)    Non-face-to-face services provided:  Assessment and support for treatment adherence and medication management-medication reconciliation      Dr. Maciel Rhodes MD to review with patient in upcoming office visit on 6/21/19:     - Patient states that his amlodipine was stopped at discharge from the hospital on 5/29/19. This does not reflect what his discharge summary states. Patient states that they stopped it bc his BP was low - has handwritten instructions at home. Patient states that his BP was low yesterday and did not take his lisinopril dose. I removed amlodipine from his home med list since patient states that he stopped this medication. Danial Pimentel, PharmD, [address]  Direct: [phone]  Department, toll free [phone], option 7    ============================================================  Subjective/Objective:  Rodrigue Hook is a 70 y.o. male. Patient was [facility] from [facility] on 5/[facility]/19 with a diagnosis of stenosis of left carotid artery. Patient outreach to review discharge medications and provide medication review and management. Spoke with patient    No Known Allergies    Discharge Medications (as per discharging medication list): There are NEW medications for you. START taking them after you leave the hospital   atorvastatin (LIPITOR) 80 MG tablet  · Taking, no issues to report.  Added to home med list.  Take 80 mg by mouth daily     These are medications you told us you were taking at home, CONTINUE taking them after you leave the hospital   Medication Sig    Cholecalciferol (VITAMIN D3) 5000 units CAPS Take 1,000 Units by mouth daily    lisinopril (PRINIVIL;ZESTRIL) 20 MG tablet Take 1 tablet by mouth daily    aspirin 81 MG chewable tablet Take 1 tablet by mouth daily    hydrochlorothiazide (HYDRODIURIL) 25 MG tablet Take 1 tablet by mouth daily  clopidogrel (PLAVIX) 75 MG tablet Take 1 tablet by mouth daily    albuterol sulfate  (90 Base) MCG/ACT inhaler Inhale 2 puffs into the lungs every 6 hours as needed for Wheezing or Shortness of Breath    albuterol (PROVENTIL) (2.5 MG/3ML) 0.083% nebulizer solution Take 3 mLs by nebulization every 4 hours as needed for Wheezing    carvedilol (COREG) 25 MG tablet Take 1 tablet by mouth 2 times daily    budesonide-formoterol (SYMBICORT) 160-4.5 MCG/ACT AERO Inhale 2 puffs into the lungs 2 times daily    tiotropium (SPIRIVA RESPIMAT) 1.25 MCG/ACT AERS inhaler Inhale 2 puffs into the lungs daily    cetirizine (ZYRTEC) 10 MG tablet Take 10 mg by mouth daily    montelukast (SINGULAIR) 10 MG tablet Take 10 mg by mouth nightly     These are the medications you have told us you were taking at home, STOP taking them after you leave the hospital   · None, but patient states that his instructions at home have \"stop amlodipine\" on them and he has since stopped this medication. Removed from home medication list.     Meds to Beds:NA    Estimated Creatinine Clearance: 39 mL/min (A) (based on SCr of 1.86 mg/dL (H)). Assessment/Plan:  - Medication reconciliation completed. Number of medications reviewed: 15    - Pt is not taking medications as directed by discharging physician. Number of discrepancies: 3. Instructions per discharge list provided except per below documentation. Identified medication discrepancies/issues:   · Category 1 (0)  · Category 2 (0)  · Category 3 (1):  1. Amlodipine - patient states that he was instructed to stop taking this medication d/t low BP's, but his discharge summary does not reflect these instructions. Note to PCP to discuss at [address] on 6/21/19. · Category 4 (2):  1. Atorvastatin was started at discharge. Added to home medication list.   2. Prednisone - patient has completed this course of therapy.  Removed from home med list.     - [facility] active medication list updated:  · Medications Added (1):  atorvastatin  · Medications Removed (2):  Prednisone and amlodipine. · Medications Changed (0)    - Identified Potential Medication Interactions: No clinically significant interactions identified via [employer] Interaction Analysis as category D or higher.    - Renal Dosing: No renal adjustments necessary.    - Follow up appointment date (7 days for more severe illness, 14 days for others):    · Patient reminded of upcoming appointment with PCP on 6/21/19.        Thank you,    Pollo Dewitt, PharmD, [address]  Direct: [phone]  Department, toll free [phone], option 7          For Pharmacy Admin Tracking Only    TCM Call Made?: No  [address] [facility] Patient?: Yes  Total # of Interventions Recommended: 3 -   Total # Interventions Accepted: 3  Intervention Severity:   - Level 1 Intervention Present?: No   - Level 2 #: 0   - Level 3 #: 1  Outreach Status: Review Complete  Care Coordinator Outreach to Patient?: Yes  Provider Contacted?: Yes - Note Routed, Routine  Time Spent (min): 30

## 2019-06-21 PROBLEM — I65.22 STENOSIS OF LEFT CAROTID ARTERY: Status: ACTIVE | Noted: 2019-05-22

## 2019-07-12 ENCOUNTER — OFFICE VISIT (OUTPATIENT)
Dept: UROLOGY | Age: 69
End: 2019-07-12
Payer: MEDICARE

## 2019-07-12 VITALS
SYSTOLIC BLOOD PRESSURE: 120 MMHG | WEIGHT: 182 LBS | DIASTOLIC BLOOD PRESSURE: 78 MMHG | BODY MASS INDEX: 29.25 KG/M2 | HEIGHT: 66 IN

## 2019-07-12 DIAGNOSIS — C61 PROSTATE CANCER (HCC): Primary | ICD-10-CM

## 2019-07-12 PROCEDURE — 99213 OFFICE O/P EST LOW 20 MIN: CPT | Performed by: NURSE PRACTITIONER

## 2019-07-12 PROCEDURE — 96402 CHEMO HORMON ANTINEOPL SQ/IM: CPT | Performed by: NURSE PRACTITIONER

## 2019-07-12 ASSESSMENT — ENCOUNTER SYMPTOMS
EYE PAIN: 0
WHEEZING: 0
BACK PAIN: 0
COLOR CHANGE: 0
ABDOMINAL PAIN: 0
CONSTIPATION: 0
COUGH: 0
SHORTNESS OF BREATH: 0
VOMITING: 0
NAUSEA: 0
EYE REDNESS: 0

## 2019-07-12 NOTE — PROGRESS NOTES
Patient was given Eligard 30 mg, (4 month) injection in left abdomen. Patient tolerated injection well.      [facility]:[id number]  Exp

## 2019-07-12 NOTE — PROGRESS NOTES
HPI:    Patient is a 70 y.o. male in no acute distress. He is alert and oriented to person, place, and time. History  5/22/2018 Prostate biopsy, two cores Aditya 3+4. PSA 14.59   PSA  6/2019 - <0.01  2/2019 - 0.08  11/2018 - 0.36  3/2018 - 14.59     8/2018 Started Estela He in conjunction with IMRT radiation. 11/2018 Changed from firmagon to Eligard     Today  Here today for a four-month follow-up for prostate cancer. Most recent PSA is <0.01. He did tolerate his last Eligard injection without complaints. He denies unintentional weight loss, decreased energy or appetite, new or worsening bone/hip/back pain. He denies any lower extremity numbness and tingling. He denies new or worsening LUTS. He denies gross hematuria or dysuria.      Past Medical History:   Diagnosis Date    [facility]) [facility] [address]     Essential hypertension     Prostate cancer ([facility] [address]     Wears glasses      Past Surgical History:   Procedure Laterality Date    ANKLE SURGERY      CAROTID STENT PLACEMENT Left 2019    COLONOSCOPY  2015    Normal    HERNIA REPAIR Right 1975     Outpatient Encounter Medications as of 7/12/2019   Medication Sig Dispense Refill    atorvastatin (LIPITOR) 80 MG tablet Take 1 tablet by mouth daily 90 tablet 3    hydrochlorothiazide (HYDRODIURIL) 25 MG tablet Take 1 tablet by mouth daily 30 tablet 5    clopidogrel (PLAVIX) 75 MG tablet Take 1 tablet by mouth daily 30 tablet 5    Cholecalciferol (VITAMIN D3) 5000 units CAPS Take 1,000 Units by mouth daily      aspirin 81 MG chewable tablet Take 1 tablet by mouth daily 30 tablet 0    albuterol sulfate  (90 Base) MCG/ACT inhaler Inhale 2 puffs into the lungs every 6 hours as needed for Wheezing or Shortness of Breath 1 Inhaler 11    albuterol (PROVENTIL) (2.5 MG/3ML) 0.083% nebulizer solution Take 3 mLs by nebulization every 4 hours as needed for Wheezing 120 each 3    carvedilol (COREG) 25 MG tablet Take 1 tablet by mouth

## 2019-08-01 ENCOUNTER — HOSPITAL ENCOUNTER (OUTPATIENT)
Dept: CARDIAC REHAB | Age: 69
Setting detail: THERAPIES SERIES
Discharge: HOME | End: 2019-08-01
Payer: MEDICARE

## 2019-08-01 VITALS
RESPIRATION RATE: 21 BRPM | HEIGHT: 66 IN | WEIGHT: 180 LBS | BODY MASS INDEX: 28.93 KG/M2 | OXYGEN SATURATION: 97 % | DIASTOLIC BLOOD PRESSURE: 88 MMHG | HEART RATE: 86 BPM | SYSTOLIC BLOOD PRESSURE: 142 MMHG

## 2019-08-01 PROCEDURE — G0424 PULMONARY REHAB W EXER: HCPCS

## 2019-08-01 ASSESSMENT — PATIENT HEALTH QUESTIONNAIRE - PHQ9: SUM OF ALL RESPONSES TO PHQ QUESTIONS 1-9: 3

## 2019-08-01 NOTE — PROGRESS NOTES
Pulmonary Rehab Initial History and Assessment    Melody Recinos 1950 8/1/2019    Living Will:  [x] Yes [] No   On File:  [] Yes [x] No   Durable Power of :  [x] Yes [] No     Medical History  Past Medical History:   Diagnosis Date    CAD (coronary artery disease)     Stent placement 2019    [facility] [address]     Essential hypertension     Hyperlipidemia     [facility] ([facility]     Wears glasses        Symptoms:  [x] Cough (frequency): occ. [x] Wheezing (Onset/Cause): frequently-better after breathing tx  [x] Fluid Retention (Where/When):ankles  [x] Sleeping Problems (# Hours):5-6 hrs  [x] Sputum (Volume/Color/Viscosity/Frequency): occ. [x] Dyspnea (onset/cause): with activity  [] Extra Pillows (Number): no pillows    Dyspnea Index (choose one):  [] Class 1 - No dyspnea except on strenuous exercise  [] Class 2 - SOB when walking up short hill  [] Class 3 - Dyspnea limits walking pace (slower than others) and stops to breathe.   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 [] True   [x] False   [] Undecided  11. Signs of infection include:   [] Normal temperature   [] Increased fatigue and lack of energy   [] Change in amount of sputum   [] Increase appetite   [x] B and C  12. Feeling full after a meal is normal and does not affect breathing. [] True   [x] False   [] Undecided  13. Smoking can cause lung disease. [x] True   [] False   [] Undecided  14. Work efficiency involves making tasks easier. [x] True   [] False   [] Undecided  15. If a little oxygen is good, then more is even better. [] True   [x] False   [] Undecided  16. I know the purpose for each of my medications. [x] True   [] False   [] Undecided  17. Relaxation makes you breath better. [x] True   [] False   [] Undecided  18. Drinking water has no effect on the mucous produced in your lungs. [] True   [x] False   [] Undecided  19. Exercise does not help people with lung disease. [] True   [x] False   [] Undecided  20. Cleaning my home equipment at least every 2 days with white vinegar and water (1:2) is vital to reduce the change of using contaminated equipment. [x] True   [] False   [] Undecided  21. If I take more than 1 inhaler, I know in which order to take them. [x] True   [] False   [] Undecided  22. Family communication promotes closeness and decreases fear for everyone. [x] True   [] False   [] Undecided  23. Nutrition labels on food packages contain.   [] A. Calories   [] B. Fat   [] C. Protein   [] D. Carbohydrates   [x] E. All of the above  Comments:      Physical Findings   BP: (!) 142/88   Pulse: 86   Resp: 21   SpO2: 97 %    Goals  · Increased stamina/strength to 30-50min total exercise by increasing 1-2 level/wk and/or 1-2min/wk to maintain SaO2 > 90%, achieve THR and RPE 11-13. · Introduce weights/ therabands 1-2# for 5-10 reps. · Less SOB on exertion by utilizing pursed-lip breathing. · Proper use of inhalers, O2 therapy and meds.   · Increased knowledge of COPD and

## 2019-08-01 NOTE — PROGRESS NOTES
Pulmonary Rehab Individualized Treatment Plan-Initial     Patient Name: Regla Otto  Date of Initial Assessment: 8/1/2019  ACCOUNT #: [de-identified]  Diagnosis: COPD   Date of last  Visit 7/17/19  Onset Date: 5/16/19  Referring Physician: Liza Lentz  Risk Stratification: low  Session Number: 1   EXERCISE    Stages of Change:   [] pre-contemplation   [x] Action   [] Contemplate   [] Maintainence   [] Prep   [] Relapse      [x] 6-MWT       Walked ft: 739.2  Max HR: 114  O2: none  RPE: 13  SPO2: 91%-95%  MET Level: 2.13             Exercise Prescription:  Mode: [x] TM [x] B [x] STP [] EL [x] R  Frequency: 3 times weekly  Duration: 31-60 minutes  Intensity: 1.5 METS  Progression: increasing 1-2 level/wk and/or 1-2min/wk to maintain SaO2 > 90%, achieve THR and RPE 11-13.   SPO2: >90%  [] O2 - Liters:   [] Resistance Training Weights (lbs):   Reps:      Hypertension:  [x] Yes  [] No  Resting BP: 134/72  Peak Exercise BP: 144/88  BP Meds: Coreg    Intervention:  Home Exercise:  Type: walking  Duration: 30 minutes  Frequency: daily  O2 (L/min):    [] Resistance Training    Education:   [] Equipment [place]  [x] PLB     [] Ex Safety   [x] S/S to report    [] Warm up/ Cool down  [x] O2 Therapy    [] RPE Scale   [] Energy conservation   [] Dyspnea scale  [] Exercise specialist class-Home Exercise         Target Goal:   - SPO2 > 90 during exercise  - Individual exercise Rx      Nutrition    Stages of Change:   [] pre-contemplation   [x] Action   [] Contemplate   [] Maintainence   [] Prep   [] Relapse     Diabetes:  [] Yes  [x] No  BS:   HbA1c:  Diabetes Medication:  [] Monitor BS at home     Weight Management:  Last Weight: 184.0#  Height: 5'6\"  BMI: 29.1  Wt Goal:   Alcohol:  [] daily   [] weekly  [x] special  [] none   Type:  beer Amount:    Diet Assessment Tool: Food Diary  Dietary Goal:     [] Diet Class           [] Referred to Diabetes Education     Intervention:   [x] Dietitian Consult       [] Nurse/Patient [] Lor Garcia  [] Benitez Germain [] Wheel Chair  [] Gait belt    Patient/Program goal:   · Increased stamina/strength to 30-50min total exercise by increasing 1-2 level/wk and/or 1-2min/wk to maintain SaO2 > 90%, achieve THR and RPE 11-13. Will increase time & workload as pt tolerates. · Introduce weights/ therabands 1-2# for 5-10 reps. Staff will introduce at future session. · Less SOB on exertion by utilizing pursed-lip breathing. PLB demonstrated to pt with pt returning demonstration. · Proper use of inhalers, O2 therapy and meds. Medications reviewed with pt. All questions answered. · Increased knowledge of COPD and optimal management. Education continues. · [place] with chronic health changes. Education continues. · Manage stress by utilizing relaxation techniques. Deep breathing & PLB   · Increased knowledge of health eating. Rate your plate & food diary sent to Dietician for review & for suggestions. · Improve/Maintain quality of life. Maintained at current. · Manage/Control blood pressure. Will continue to monitor BP.     Physician Changes/Comments:      Pulmonary Rehab Staff

## 2019-08-05 ENCOUNTER — HOSPITAL ENCOUNTER (OUTPATIENT)
Dept: CARDIAC REHAB | Age: 69
Setting detail: THERAPIES SERIES
Discharge: HOME | End: 2019-08-05
Payer: MEDICARE

## 2019-08-05 PROCEDURE — G0424 PULMONARY REHAB W EXER: HCPCS

## 2019-08-07 ENCOUNTER — HOSPITAL ENCOUNTER (OUTPATIENT)
Dept: CARDIAC REHAB | Age: 69
Setting detail: THERAPIES SERIES
Discharge: HOME | End: 2019-08-07
Payer: MEDICARE

## 2019-08-07 PROCEDURE — G0424 PULMONARY REHAB W EXER: HCPCS

## 2019-08-07 NOTE — PROGRESS NOTES
loss.  Ideally, 30 minutes each day to aid with wellness. Activity for weight loss needs to reach up to 60 and 90 minutes daily. Recommendation snapshot: Increase whole grains (choose instead of refined wheat products)      Increase whole fruit and vegetable use (at least 5 a day combined)      Incorporate daily activity (>30 minutes)      Read food labels for Saturated (<14 grams daily) and Trans fats (zero daily)      Read food labels for Sodium content (goal is <2000 mg daily)      Measure food portions and use MyPlate as guide for meal variety      Drink water throughout the day for hydration       Review nutrition information from restaurants      Work your way down to 1% or skim milk       Review highlighted food label handout for areas to focus on. Please contact a dietitian at [phone] or [phone] with any questions.

## 2019-08-08 ENCOUNTER — HOSPITAL ENCOUNTER (OUTPATIENT)
Dept: CARDIAC REHAB | Age: 69
Setting detail: THERAPIES SERIES
Discharge: HOME | End: 2019-08-08
Payer: MEDICARE

## 2019-08-08 PROCEDURE — G0424 PULMONARY REHAB W EXER: HCPCS

## 2019-08-12 ENCOUNTER — HOSPITAL ENCOUNTER (OUTPATIENT)
Dept: CARDIAC REHAB | Age: 69
Setting detail: THERAPIES SERIES
Discharge: HOME | End: 2019-08-12
Payer: MEDICARE

## 2019-08-12 PROCEDURE — G0424 PULMONARY REHAB W EXER: HCPCS

## 2019-08-14 ENCOUNTER — HOSPITAL ENCOUNTER (OUTPATIENT)
Dept: CARDIAC REHAB | Age: 69
Setting detail: THERAPIES SERIES
Discharge: HOME | End: 2019-08-14
Payer: MEDICARE

## 2019-08-14 PROCEDURE — G0424 PULMONARY REHAB W EXER: HCPCS

## 2019-08-15 ENCOUNTER — HOSPITAL ENCOUNTER (OUTPATIENT)
Dept: CARDIAC REHAB | Age: 69
Setting detail: THERAPIES SERIES
Discharge: HOME | End: 2019-08-15
Payer: MEDICARE

## 2019-08-15 ENCOUNTER — OFFICE VISIT (OUTPATIENT)
Dept: PULMONOLOGY | Age: 69
End: 2019-08-15
Payer: MEDICARE

## 2019-08-15 VITALS
BODY MASS INDEX: 29.57 KG/M2 | RESPIRATION RATE: 24 BRPM | HEIGHT: 66 IN | TEMPERATURE: 97.4 F | HEART RATE: 69 BPM | WEIGHT: 184 LBS | DIASTOLIC BLOOD PRESSURE: 97 MMHG | SYSTOLIC BLOOD PRESSURE: 169 MMHG | OXYGEN SATURATION: 96 %

## 2019-08-15 DIAGNOSIS — J47.9 BRONCHIECTASIS WITHOUT COMPLICATION (HCC): ICD-10-CM

## 2019-08-15 DIAGNOSIS — R06.02 SHORTNESS OF BREATH: ICD-10-CM

## 2019-08-15 DIAGNOSIS — J44.9 COPD, SEVERE (HCC): Primary | ICD-10-CM

## 2019-08-15 PROCEDURE — 99214 OFFICE O/P EST MOD 30 MIN: CPT | Performed by: INTERNAL MEDICINE

## 2019-08-15 PROCEDURE — G0424 PULMONARY REHAB W EXER: HCPCS

## 2019-08-15 NOTE — PROGRESS NOTES
responded to steroids, he is on Symbicort and his Spiriva but without much relief until he takes prednisone, the cough is mostly dry and he feels like to cling in his throat. He also have postnasal dripping nasal congestion  He does have shortness of breath on activity and exertion, shortness of breath has been getting worse since December. When he was in the hospital in December he had a CT scan of the chest done which was negative for pulmonary embolism, which shows a hazy tree-in-bud infiltrate in right upper lobe otherwise no evidence of chronic interstitial lung disease or fibrosis. He had a chest x-ray done in March just 4 weeks ago which did not show any acute changes. He denies gastroesophageal reflux symptoms and heartburn. He also complain of difficulty sleeping since he was started on shots for prostate cancer. He is complaining of facial swelling, increasing weight gain, pedal edema which she claimed that this started after he is on steroids. His last labs in December 2018 showed a creatinine of 1.57, a CBC shows a hemoglobin of 9.9 and platelet of 718 at that time  He had an echocardiogram in December 2018 which shows normal LV function, mild diastolic dysfunction and no mention of pulmonary hypertension. He has history of smoking for 30 years 1 pack per day and he quit about 10 years ago. He use to work as  in [employer]  He denies any other exposure, he denies any pets at home, denies any recent travel.       Past Medical History:        Diagnosis Date    CAD (coronary artery disease)     Stent placement 2019    COPD (chronic obstructive pulmonary disease) ([address]     Essential hypertension     Hyperlipidemia     Pro[facility]     Wears glasses        Past Surgical History:        Procedure Laterality Date    ANKLE SURGERY      CAROTID STENT PLACEMENT Left 2019    COLONOSCOPY  2015    Normal    HERNIA REPAIR Right 1975       Allergies:    No Known Allergies Date Value Ref Range Status   05/21/2019 272 138 - 453 k/uL Final   05/20/2019 259 138 - 453 k/uL Final   05/19/2019 259 138 - 453 k/uL Final     BMP:   Sodium   Date Value Ref Range Status   06/17/2019 138 135 - 144 mmol/L Final   05/21/2019 139 135 - 144 mmol/L Final   05/20/2019 136 135 - 144 mmol/L Final     Potassium   Date Value Ref Range Status   06/17/2019 4.3 3.7 - 5.3 mmol/L Final   05/21/2019 4.2 3.7 - 5.3 mmol/L Final   05/20/2019 3.4 (L) 3.7 - 5.3 mmol/L Final     Chloride   Date Value Ref Range Status   06/17/2019 98 98 - 107 mmol/L Final   05/21/2019 92 (L) 98 - 107 mmol/L Final   05/20/2019 88 (L) 98 - 107 mmol/L Final     CO2   Date Value Ref Range Status   06/17/2019 29 20 - 31 mmol/L Final   05/21/2019 34 (H) 20 - 31 mmol/L Final   05/20/2019 32 (H) 20 - 31 mmol/L Final     BUN   Date Value Ref Range Status   06/17/2019 44 (H) 8 - 23 mg/dL Final   05/21/2019 52 (H) 8 - 23 mg/dL Final   05/20/2019 53 (H) 8 - 23 mg/dL Final     CREATININE   Date Value Ref Range Status   06/17/2019 1.86 (H) 0.70 - 1.20 mg/dL Final   05/21/2019 1.60 (H) 0.70 - 1.20 mg/dL Final   05/20/2019 1.78 (H) 0.70 - 1.20 mg/dL Final     Glucose   Date Value Ref Range Status   06/17/2019 109 (H) 70 - 99 mg/dL Final   05/21/2019 142 (H) 70 - 99 mg/dL Final   05/20/2019 134 (H) 70 - 99 mg/dL Final     Hepatic:   AST   Date Value Ref Range Status   05/21/2019 12 <40 U/L Final   05/20/2019 17 <40 U/L Final   05/20/2019 13 <40 U/L Final     ALT   Date Value Ref Range Status   05/21/2019 13 5 - 41 U/L Final   05/20/2019 15 5 - 41 U/L Final   05/20/2019 11 5 - 41 U/L Final     Total Bilirubin   Date Value Ref Range Status   05/21/2019 <0.10 (L) 0.3 - 1.2 mg/dL Final   05/20/2019 <0.10 (L) 0.3 - 1.2 mg/dL Final   05/20/2019 0.16 (L) 0.3 - 1.2 mg/dL Final     Alkaline Phosphatase   Date Value Ref Range Status   05/21/2019 80 40 - 129 U/L Final   05/20/2019 86 40 - 129 U/L Final   05/20/2019 76 40 - 129 U/L Final     Amylase: No results off prednisone per Dr. Ajay Ramos with Spiriva once daily. Continue Singulair. Vaccinations recommended   Up to date with vaccinations from pulm perspective   Maintain an active lifestyle     CPAP at least 4 hrs qhs  Wt loss is recommended and discussed  Follow good sleep hygeine instructions  Use humidifier  Questions answered pertaining to diagnosis and management explained importance of compliance with therapy     Questions answered to pt's/family's satisfaction  It was my pleasure to evaluate Sol Dryer today. Please call with questions. Follow-up in the office in 3 months. Please note that this chart was generated using voice recognition Dragon dictation software. Although every effort was made to ensure the accuracy of this automated transcription, some errors in transcription may have occurred.     Jian Dasilva MD             8/15/2019, 11:51 AM

## 2019-08-15 NOTE — PATIENT INSTRUCTIONS
SURVEY:    You may be receiving a survey from [employer] regarding your visit today. Please complete the survey to enable us to provide the highest quality of care to you and your family. If you cannot score us a very good on any question, please call the office to discuss how we could have made your experience a very good one. Thank you. Please recheck your blood pressure when you go home and make sure you take your prescribed medication if applicable . Please follow up with your PCP or ER if needed.

## 2019-08-19 ENCOUNTER — HOSPITAL ENCOUNTER (OUTPATIENT)
Dept: CARDIAC REHAB | Age: 69
Setting detail: THERAPIES SERIES
Discharge: HOME | End: 2019-08-19
Payer: MEDICARE

## 2019-08-19 PROCEDURE — G0424 PULMONARY REHAB W EXER: HCPCS

## 2019-08-21 ENCOUNTER — HOSPITAL ENCOUNTER (OUTPATIENT)
Dept: CARDIAC REHAB | Age: 69
Setting detail: THERAPIES SERIES
Discharge: HOME | End: 2019-08-21
Payer: MEDICARE

## 2019-08-21 PROCEDURE — G0424 PULMONARY REHAB W EXER: HCPCS

## 2019-08-22 ENCOUNTER — HOSPITAL ENCOUNTER (OUTPATIENT)
Dept: CARDIAC REHAB | Age: 69
Setting detail: THERAPIES SERIES
Discharge: HOME | End: 2019-08-22
Payer: MEDICARE

## 2019-08-22 ENCOUNTER — HOSPITAL ENCOUNTER (OUTPATIENT)
Age: 69
Discharge: HOME | End: 2019-08-22
Payer: MEDICARE

## 2019-08-22 DIAGNOSIS — I10 ESSENTIAL HYPERTENSION: ICD-10-CM

## 2019-08-22 LAB
ANION GAP SERPL CALCULATED.3IONS-SCNC: 13 MMOL/L (ref 9–17)
BUN BLDV-MCNC: 44 MG/DL (ref 8–23)
BUN/CREAT BLD: 33 (ref 9–20)
CALCIUM SERPL-MCNC: 10 MG/DL (ref 8.6–10.4)
CHLORIDE BLD-SCNC: 96 MMOL/L (ref 98–107)
CO2: 32 MMOL/L (ref 20–31)
CREAT SERPL-MCNC: 1.33 MG/DL (ref 0.7–1.2)
GFR AFRICAN AMERICAN: >60 ML/MIN
GFR NON-AFRICAN AMERICAN: 53 ML/MIN
GFR SERPL CREATININE-BSD FRML MDRD: ABNORMAL ML/MIN/{1.73_M2}
GFR SERPL CREATININE-BSD FRML MDRD: ABNORMAL ML/MIN/{1.73_M2}
GLUCOSE FASTING: 127 MG/DL (ref 70–99)
POTASSIUM SERPL-SCNC: 4.4 MMOL/L (ref 3.7–5.3)
SODIUM BLD-SCNC: 141 MMOL/L (ref 135–144)

## 2019-08-22 PROCEDURE — 80048 BASIC METABOLIC PNL TOTAL CA: CPT

## 2019-08-22 PROCEDURE — 93798 PHYS/QHP OP CAR RHAB W/ECG: CPT

## 2019-08-22 PROCEDURE — 36415 COLL VENOUS BLD VENIPUNCTURE: CPT

## 2019-08-26 ENCOUNTER — HOSPITAL ENCOUNTER (OUTPATIENT)
Dept: CARDIAC REHAB | Age: 69
Setting detail: THERAPIES SERIES
Discharge: HOME | End: 2019-08-26
Payer: MEDICARE

## 2019-08-26 PROCEDURE — G0424 PULMONARY REHAB W EXER: HCPCS

## 2019-08-28 ENCOUNTER — HOSPITAL ENCOUNTER (OUTPATIENT)
Dept: CARDIAC REHAB | Age: 69
Setting detail: THERAPIES SERIES
Discharge: HOME | End: 2019-08-28
Payer: MEDICARE

## 2019-08-28 PROCEDURE — G0424 PULMONARY REHAB W EXER: HCPCS

## 2019-08-28 NOTE — PROGRESS NOTES
pulmonary disease  [] Healthy/balanced eating habits    Target Goal:  -HbA1c < 7%  -BMI < 25   Education    Stages of Change:    [] pre-contemplation   [x] Action   [] Contemplate   [] Maintainence   [] Prep   [] Relapse     Family support: [x] Yes  [] No    Tobacco use: [] Yes  [x] No  Date quit:2013  Quit date set:  # cigarettes smoked per day:   [] smokeless tobacco   Amount:    Breath sounds:    [] chest physio     [] Flutter   [] Acapella     Intervention:  [] Referred to smoking cessation counselor     [] Individual education and counseling  [] Tobacco adjunct  [x] Informed of education classes     Education:   [x] Dangers of smoking  [x] Dyspnea management  [x] Meds/MDI   [x] Pulmonary and secretion A&P; specific disease  [x] signs of infection  [x] Diaphragmatic Breathing    [x] Traveling   [x] Care of Home equipment    Target Goal:  -Complete cessation of tobacco use (if applicable)  -Self-man and prevention exacerbation  -Restore to highest level of independance      Psychosocial  Stages of Change:    [] pre-contemplation   [x] Action   [] Contemplate   [] Maintainence   [] Prep   [] Relapse    Intervention:   [] Psych Consult/   [x] Uses stress management    [] Physician Referral     [] Stress management class   [] Med change? Education:    [] Coping with Chronic illness   [x] Relaxation techniques   [x] S/S of Depression   [x] Support system    Target Goal:  -Assess presence or absence of depression using a valid screening tool. -Maximize coping skills.  -Positive support system. Patient/Program goal:   · Increased stamina/strength to 30-50min total exercise by increasing 1-2 level/wk and/or 1-2min/wk to maintain SaO2 > 90%, achieve THR and RPE 11-13. Pt has increased his speed on the Treadmill from an initial speed of 1.0 to a current speed of 1.3. Mets have increased from an initial of 1.8 to a current of 2.0.  · Introduce weights/ therabands 1-2# for 5-10 reps.  Staff will

## 2019-08-29 ENCOUNTER — HOSPITAL ENCOUNTER (OUTPATIENT)
Dept: CARDIAC REHAB | Age: 69
Setting detail: THERAPIES SERIES
Discharge: HOME | End: 2019-08-29
Payer: MEDICARE

## 2019-08-29 PROCEDURE — G0424 PULMONARY REHAB W EXER: HCPCS

## 2019-09-04 ENCOUNTER — HOSPITAL ENCOUNTER (OUTPATIENT)
Dept: CARDIAC REHAB | Age: 69
Setting detail: THERAPIES SERIES
Discharge: HOME | End: 2019-09-04
Payer: MEDICARE

## 2019-09-04 PROCEDURE — G0424 PULMONARY REHAB W EXER: HCPCS

## 2019-09-05 ENCOUNTER — HOSPITAL ENCOUNTER (OUTPATIENT)
Dept: CARDIAC REHAB | Age: 69
Setting detail: THERAPIES SERIES
Discharge: HOME | End: 2019-09-05
Payer: MEDICARE

## 2019-09-05 PROCEDURE — G0424 PULMONARY REHAB W EXER: HCPCS

## 2019-09-09 ENCOUNTER — HOSPITAL ENCOUNTER (OUTPATIENT)
Dept: VASCULAR LAB | Age: 69
Discharge: HOME | End: 2019-09-11
Payer: MEDICARE

## 2019-09-09 ENCOUNTER — HOSPITAL ENCOUNTER (OUTPATIENT)
Dept: CARDIAC REHAB | Age: 69
Setting detail: THERAPIES SERIES
Discharge: HOME | End: 2019-09-09
Payer: MEDICARE

## 2019-09-09 DIAGNOSIS — I65.22 STENOSIS OF LEFT CAROTID ARTERY: ICD-10-CM

## 2019-09-09 PROCEDURE — 93880 EXTRACRANIAL BILAT STUDY: CPT

## 2019-09-09 PROCEDURE — G0424 PULMONARY REHAB W EXER: HCPCS

## 2019-09-12 ENCOUNTER — HOSPITAL ENCOUNTER (OUTPATIENT)
Dept: CARDIAC REHAB | Age: 69
Setting detail: THERAPIES SERIES
Discharge: HOME | End: 2019-09-12
Payer: MEDICARE

## 2019-09-12 PROCEDURE — G0424 PULMONARY REHAB W EXER: HCPCS

## 2019-09-16 ENCOUNTER — HOSPITAL ENCOUNTER (OUTPATIENT)
Dept: CARDIAC REHAB | Age: 69
Setting detail: THERAPIES SERIES
Discharge: HOME | End: 2019-09-16
Payer: MEDICARE

## 2019-09-16 PROCEDURE — G0424 PULMONARY REHAB W EXER: HCPCS

## 2019-09-18 ENCOUNTER — HOSPITAL ENCOUNTER (OUTPATIENT)
Dept: CARDIAC REHAB | Age: 69
Setting detail: THERAPIES SERIES
Discharge: HOME | End: 2019-09-18
Payer: MEDICARE

## 2019-09-18 PROCEDURE — G0424 PULMONARY REHAB W EXER: HCPCS

## 2019-09-19 ENCOUNTER — HOSPITAL ENCOUNTER (OUTPATIENT)
Dept: CARDIAC REHAB | Age: 69
Setting detail: THERAPIES SERIES
Discharge: HOME | End: 2019-09-19
Payer: MEDICARE

## 2019-09-19 PROCEDURE — G0424 PULMONARY REHAB W EXER: HCPCS

## 2019-09-23 ENCOUNTER — HOSPITAL ENCOUNTER (OUTPATIENT)
Dept: CARDIAC REHAB | Age: 69
Setting detail: THERAPIES SERIES
Discharge: HOME | End: 2019-09-23
Payer: MEDICARE

## 2019-09-23 PROCEDURE — G0424 PULMONARY REHAB W EXER: HCPCS

## 2019-09-25 ENCOUNTER — HOSPITAL ENCOUNTER (OUTPATIENT)
Dept: CARDIAC REHAB | Age: 69
Setting detail: THERAPIES SERIES
Discharge: HOME | End: 2019-09-25
Payer: MEDICARE

## 2019-09-25 PROCEDURE — G0424 PULMONARY REHAB W EXER: HCPCS

## 2019-09-25 NOTE — PROGRESS NOTES
Coping with Chronic illness   [x] Relaxation techniques   [x] S/S of Depression   [x] Support system     Target Goal:  -Assess presence or absence of depression using a valid screening tool. -Maximize coping skills.  -Positive support system.     Patient/Program goal:   · Increased stamina/strength to 30-50min total exercise by increasing 1-2 level/wk and/or 1-2min/wk to maintain SaO2 > 90%, achieve THR and RPE 11-13.  Pt has increased his speed on the Treadmill from an initial speed of 1.0 to a current speed of 1.3. Mets have increased from an initial of 1.8 to a current of 2.2.  · Introduce weights/ therabands 1-2# for 5-10 reps. Pt currently utilizing weights and thera-bands at every session. · Less SOB on exertion by utilizing pursed-lip breathing. PLB demonstrated to pt with pt returning demonstration. Pt utilizing PLB when appropriate. · Proper use of inhalers, O2 therapy and meds. Medications reviewed with pt. All questions answered. No further questions noted. · Increased knowledge of COPD and optimal management.  Education continues. · Wolverine with chronic health changes.  Education continues. · Manage stress by utilizing relaxation techniques.  Deep breathing & PLB .  · Increased knowledge of health eating.  Rate your plate & food diary sent to Dietician for review & for suggestions. Recommendations given to Pt. · Improve/Maintain quality of life.  Pt states increased stamina with exercise. · Manage/Control blood pressure. Will continue to monitor BP.  BP remains within normal limits.     Physician Changes/Comments:        Pulmonary Rehab Staff

## 2019-09-26 ENCOUNTER — HOSPITAL ENCOUNTER (OUTPATIENT)
Dept: CARDIAC REHAB | Age: 69
Setting detail: THERAPIES SERIES
Discharge: HOME | End: 2019-09-26
Payer: MEDICARE

## 2019-09-26 ENCOUNTER — OFFICE VISIT (OUTPATIENT)
Dept: PULMONOLOGY | Age: 69
End: 2019-09-26
Payer: MEDICARE

## 2019-09-26 VITALS
SYSTOLIC BLOOD PRESSURE: 166 MMHG | BODY MASS INDEX: 29.57 KG/M2 | WEIGHT: 184 LBS | RESPIRATION RATE: 20 BRPM | OXYGEN SATURATION: 95 % | TEMPERATURE: 98.2 F | DIASTOLIC BLOOD PRESSURE: 107 MMHG | HEIGHT: 66 IN | HEART RATE: 62 BPM

## 2019-09-26 DIAGNOSIS — J44.9 STEROID-DEPENDENT COPD (HCC): ICD-10-CM

## 2019-09-26 DIAGNOSIS — G47.33 OSA ON CPAP: ICD-10-CM

## 2019-09-26 DIAGNOSIS — Z99.89 OSA ON CPAP: ICD-10-CM

## 2019-09-26 DIAGNOSIS — J44.9 STAGE 4 VERY SEVERE COPD BY GOLD CLASSIFICATION (HCC): Primary | ICD-10-CM

## 2019-09-26 DIAGNOSIS — Z87.891 STOPPED SMOKING WITH GREATER THAN 30 PACK YEAR HISTORY: ICD-10-CM

## 2019-09-26 PROCEDURE — G0424 PULMONARY REHAB W EXER: HCPCS

## 2019-09-26 PROCEDURE — 99213 OFFICE O/P EST LOW 20 MIN: CPT | Performed by: INTERNAL MEDICINE

## 2019-09-26 RX ORDER — PREDNISONE 10 MG/1
TABLET ORAL
Qty: 30 TABLET | Refills: 0 | Status: SHIPPED | OUTPATIENT
Start: 2019-09-26 | End: 2019-11-18

## 2019-09-26 ASSESSMENT — ENCOUNTER SYMPTOMS
WHEEZING: 1
SHORTNESS OF BREATH: 1

## 2019-09-26 NOTE — PROGRESS NOTES
Subjective:      Patient ID: Vidya Ventura is a 70 y.o. male. HPI  Follow-up visit for stage IV COPD. Nicolas Rowe function studies done 3 months ago demonstrated an FEV1 of 25% predicted postbronchodilator. Short of breath at rest and with minimal exertion. Patient states \"I have been on prednisone off and on for 100 days. \"  Took his last dose yesterday. She is to come off but states \"every time I do I have to go back on again. \"  Currently in the pulmonary rehab program.  States that it has been helping him greatly. Less shortness of breath. Less anxiety. Medications Symbicort, Spiriva, and albuterol. No cough or sputum. Not on oxygen. Quit smoking 6 years ago after 30-pack-year history. Much dust and fume exposure. High-resolution CT scan of the chest done April demonstrated scattered tree-in-bud opacities primarily in the left lower and right upper lobe. No evidence for interstitial lung disease or worrisome nodules. No family history of lung disease. Review of Systems   Respiratory: Positive for shortness of breath and wheezing. Objective:     Physical Exam   Constitutional: He is oriented to person, place, and time. He appears well-developed and well-nourished. Appears cushingoid. HENT:   Nose: Nose normal.   Mouth/Throat: Oropharynx is clear and moist. No oropharyngeal exudate. Eyes: Conjunctivae are normal. No scleral icterus. Neck: Neck supple. No JVD present. No tracheal deviation present. No thyromegaly present. Cardiovascular: Normal rate, regular rhythm and normal heart sounds. Exam reveals no gallop. No murmur heard. Pulmonary/Chest: Effort normal. No respiratory distress. He has no wheezes. He has no rales. He exhibits no tenderness. AP diameter of chest increased. Thoracic expansion and diaphragmatic excursion diminished. BS diminished and expiratory phase prolonged. No dullness to percussion or tenderness to palpation. Abdominal: Soft.  There is no tenderness. Musculoskeletal: He exhibits no edema. Lymphadenopathy:     He has no cervical adenopathy. Neurological: He is alert and oriented to person, place, and time. Skin: Skin is warm and dry. Nursing note and vitals reviewed. Wt Readings from Last 3 Encounters:   09/26/19 184 lb (83.5 kg)   08/15/19 184 lb (83.5 kg)   08/01/19 180 lb (81.6 kg)          Results for orders placed or performed during the hospital encounter of 57/46/17   Basic Metabolic Panel, Fasting   Result Value Ref Range    Glucose, Fasting 127 (H) 70 - 99 mg/dL    BUN 44 (H) 8 - 23 mg/dL    CREATININE 1.33 (H) 0.70 - 1.20 mg/dL    Bun/Cre Ratio 33 (H) 9 - 20    Calcium 10.0 8.6 - 10.4 mg/dL    Sodium 141 135 - 144 mmol/L    Potassium 4.4 3.7 - 5.3 mmol/L    Chloride 96 (L) 98 - 107 mmol/L    CO2 32 (H) 20 - 31 mmol/L    Anion Gap 13 9 - 17 mmol/L    GFR Non-African American 53 (L) >60 mL/min    GFR African American >60 >60 mL/min    GFR Comment          GFR Staging             Assessment:         1. Stage 4 very severe COPD by GOLD classification ([address]    2. RUDY on CPAP    3. Stopped smoking with greater than 30 pack year history    4. Steroid-dependent COPD ([address]          Plan:      1. Discussed issue of chronic steroid use. We discussed the side effects. Favor avoiding chronic steroids and using short bursts for exacerbations. We also discussed the options of chronic low dose daily or every other day therapy. Patient wishes to have a burst on hand for exacerbations. Also discussed the issue of adrenal insufficiency. 2. Continue bronchodilators. 3. Strongly encouraged pulmonary rehab. Improves quality of life. 4. Encouraged flu shot. 5. Keep appointment as scheduled for November.      Electronically signed by Mckenna Arreola DO on 9/26/2019at 9:47 AM

## 2019-09-30 ENCOUNTER — HOSPITAL ENCOUNTER (OUTPATIENT)
Dept: CARDIAC REHAB | Age: 69
Setting detail: THERAPIES SERIES
Discharge: HOME | End: 2019-09-30
Payer: MEDICARE

## 2019-09-30 PROCEDURE — G0424 PULMONARY REHAB W EXER: HCPCS

## 2019-10-02 ENCOUNTER — HOSPITAL ENCOUNTER (OUTPATIENT)
Dept: CARDIAC REHAB | Age: 69
Discharge: HOME | End: 2019-10-02
Payer: MEDICARE

## 2019-10-02 PROCEDURE — G0424 PULMONARY REHAB W EXER: HCPCS

## 2019-10-03 ENCOUNTER — HOSPITAL ENCOUNTER (OUTPATIENT)
Dept: CARDIAC REHAB | Age: 69
Setting detail: THERAPIES SERIES
Discharge: HOME | End: 2019-10-03
Payer: MEDICARE

## 2019-10-03 PROCEDURE — G0424 PULMONARY REHAB W EXER: HCPCS

## 2019-10-07 ENCOUNTER — HOSPITAL ENCOUNTER (OUTPATIENT)
Dept: CARDIAC REHAB | Age: 69
Setting detail: THERAPIES SERIES
Discharge: HOME | End: 2019-10-07
Payer: MEDICARE

## 2019-10-07 PROCEDURE — G0424 PULMONARY REHAB W EXER: HCPCS

## 2019-10-09 ENCOUNTER — HOSPITAL ENCOUNTER (OUTPATIENT)
Dept: CARDIAC REHAB | Age: 69
Setting detail: THERAPIES SERIES
Discharge: HOME | End: 2019-10-09
Payer: MEDICARE

## 2019-10-09 PROCEDURE — G0424 PULMONARY REHAB W EXER: HCPCS

## 2019-10-10 ENCOUNTER — HOSPITAL ENCOUNTER (OUTPATIENT)
Dept: CARDIAC REHAB | Age: 69
Setting detail: THERAPIES SERIES
Discharge: HOME | End: 2019-10-10
Payer: MEDICARE

## 2019-10-10 PROCEDURE — G0424 PULMONARY REHAB W EXER: HCPCS

## 2019-10-14 ENCOUNTER — HOSPITAL ENCOUNTER (OUTPATIENT)
Dept: CARDIAC REHAB | Age: 69
Setting detail: THERAPIES SERIES
Discharge: HOME | End: 2019-10-14
Payer: MEDICARE

## 2019-10-14 PROCEDURE — G0424 PULMONARY REHAB W EXER: HCPCS

## 2019-10-16 ENCOUNTER — HOSPITAL ENCOUNTER (OUTPATIENT)
Dept: CARDIAC REHAB | Age: 69
Setting detail: THERAPIES SERIES
Discharge: HOME | End: 2019-10-16
Payer: MEDICARE

## 2019-10-16 PROCEDURE — G0424 PULMONARY REHAB W EXER: HCPCS

## 2019-10-17 ENCOUNTER — HOSPITAL ENCOUNTER (OUTPATIENT)
Dept: CARDIAC REHAB | Age: 69
Setting detail: THERAPIES SERIES
Discharge: HOME | End: 2019-10-17
Payer: MEDICARE

## 2019-10-17 PROCEDURE — G0424 PULMONARY REHAB W EXER: HCPCS

## 2019-10-21 ENCOUNTER — HOSPITAL ENCOUNTER (OUTPATIENT)
Dept: CARDIAC REHAB | Age: 69
Setting detail: THERAPIES SERIES
Discharge: HOME | End: 2019-10-21
Payer: MEDICARE

## 2019-10-21 PROCEDURE — G0424 PULMONARY REHAB W EXER: HCPCS

## 2019-10-23 ENCOUNTER — HOSPITAL ENCOUNTER (OUTPATIENT)
Dept: CARDIAC REHAB | Age: 69
Setting detail: THERAPIES SERIES
Discharge: HOME | End: 2019-10-23
Payer: MEDICARE

## 2019-10-23 PROCEDURE — G0424 PULMONARY REHAB W EXER: HCPCS

## 2019-10-24 ENCOUNTER — HOSPITAL ENCOUNTER (OUTPATIENT)
Dept: CARDIAC REHAB | Age: 69
Setting detail: THERAPIES SERIES
Discharge: HOME | End: 2019-10-24
Payer: MEDICARE

## 2019-10-24 PROCEDURE — G0424 PULMONARY REHAB W EXER: HCPCS

## 2019-10-28 ENCOUNTER — TELEPHONE (OUTPATIENT)
Dept: PULMONOLOGY | Age: 69
End: 2019-10-28

## 2019-10-28 ENCOUNTER — HOSPITAL ENCOUNTER (OUTPATIENT)
Dept: CARDIAC REHAB | Age: 69
Setting detail: THERAPIES SERIES
Discharge: HOME | End: 2019-10-28
Payer: MEDICARE

## 2019-10-28 DIAGNOSIS — J44.9 STAGE 4 VERY SEVERE COPD BY GOLD CLASSIFICATION (HCC): Primary | ICD-10-CM

## 2019-10-28 PROCEDURE — G0424 PULMONARY REHAB W EXER: HCPCS

## 2019-10-30 ENCOUNTER — HOSPITAL ENCOUNTER (OUTPATIENT)
Dept: CARDIAC REHAB | Age: 69
Discharge: HOME | End: 2019-10-30
Payer: MEDICARE

## 2019-10-31 ENCOUNTER — HOSPITAL ENCOUNTER (OUTPATIENT)
Dept: CARDIAC REHAB | Age: 69
Discharge: HOME | End: 2019-10-31
Payer: MEDICARE

## 2019-10-31 PROCEDURE — 9900000065 HC CARDIAC REHAB PHASE 3 - 1 VISIT

## 2019-11-04 ENCOUNTER — HOSPITAL ENCOUNTER (OUTPATIENT)
Dept: CARDIAC REHAB | Age: 69
Discharge: HOME | End: 2019-11-04
Payer: MEDICARE

## 2019-11-06 ENCOUNTER — HOSPITAL ENCOUNTER (OUTPATIENT)
Dept: CARDIAC REHAB | Age: 69
Discharge: HOME | End: 2019-11-06
Payer: MEDICARE

## 2019-11-07 ENCOUNTER — HOSPITAL ENCOUNTER (OUTPATIENT)
Dept: CARDIAC REHAB | Age: 69
Discharge: HOME | End: 2019-11-07
Payer: MEDICARE

## 2019-11-11 ENCOUNTER — HOSPITAL ENCOUNTER (OUTPATIENT)
Dept: CARDIAC REHAB | Age: 69
Discharge: HOME | End: 2019-11-11
Payer: MEDICARE

## 2019-11-12 ENCOUNTER — HOSPITAL ENCOUNTER (OUTPATIENT)
Age: 69
Discharge: HOME | End: 2019-11-12
Payer: MEDICARE

## 2019-11-12 DIAGNOSIS — C61 PROSTATE CANCER (HCC): ICD-10-CM

## 2019-11-12 LAB — PROSTATE SPECIFIC ANTIGEN: <0.01 UG/L

## 2019-11-12 PROCEDURE — 36415 COLL VENOUS BLD VENIPUNCTURE: CPT

## 2019-11-12 PROCEDURE — 84153 ASSAY OF PSA TOTAL: CPT

## 2019-11-13 ENCOUNTER — OFFICE VISIT (OUTPATIENT)
Dept: UROLOGY | Age: 69
End: 2019-11-13
Payer: MEDICARE

## 2019-11-13 ENCOUNTER — HOSPITAL ENCOUNTER (OUTPATIENT)
Dept: CARDIAC REHAB | Age: 69
Discharge: HOME | End: 2019-11-13
Payer: MEDICARE

## 2019-11-13 VITALS
HEART RATE: 69 BPM | DIASTOLIC BLOOD PRESSURE: 80 MMHG | WEIGHT: 193 LBS | SYSTOLIC BLOOD PRESSURE: 149 MMHG | HEIGHT: 66 IN | BODY MASS INDEX: 31.02 KG/M2

## 2019-11-13 DIAGNOSIS — C61 PROSTATE CANCER (HCC): Primary | ICD-10-CM

## 2019-11-13 PROCEDURE — 96402 CHEMO HORMON ANTINEOPL SQ/IM: CPT | Performed by: UROLOGY

## 2019-11-13 PROCEDURE — 99213 OFFICE O/P EST LOW 20 MIN: CPT | Performed by: UROLOGY

## 2019-11-13 ASSESSMENT — ENCOUNTER SYMPTOMS
ABDOMINAL PAIN: 0
COUGH: 0
COLOR CHANGE: 0
WHEEZING: 0
BACK PAIN: 0
NAUSEA: 0
SHORTNESS OF BREATH: 0
EYE REDNESS: 0
VOMITING: 0
EYE PAIN: 0

## 2019-11-14 ENCOUNTER — HOSPITAL ENCOUNTER (OUTPATIENT)
Dept: CARDIAC REHAB | Age: 69
Discharge: HOME | End: 2019-11-14
Payer: MEDICARE

## 2019-11-18 ENCOUNTER — HOSPITAL ENCOUNTER (OUTPATIENT)
Dept: CARDIAC REHAB | Age: 69
Discharge: HOME | End: 2019-11-18
Payer: MEDICARE

## 2019-11-20 ENCOUNTER — HOSPITAL ENCOUNTER (OUTPATIENT)
Dept: CARDIAC REHAB | Age: 69
Discharge: HOME | End: 2019-11-20
Payer: MEDICARE

## 2019-11-21 ENCOUNTER — OFFICE VISIT (OUTPATIENT)
Dept: PULMONOLOGY | Age: 69
End: 2019-11-21
Payer: MEDICARE

## 2019-11-21 ENCOUNTER — HOSPITAL ENCOUNTER (OUTPATIENT)
Dept: CARDIAC REHAB | Age: 69
Discharge: HOME | End: 2019-11-21
Payer: MEDICARE

## 2019-11-21 VITALS
RESPIRATION RATE: 20 BRPM | HEIGHT: 66 IN | DIASTOLIC BLOOD PRESSURE: 96 MMHG | OXYGEN SATURATION: 97 % | HEART RATE: 66 BPM | SYSTOLIC BLOOD PRESSURE: 192 MMHG | TEMPERATURE: 98.3 F | WEIGHT: 189 LBS | BODY MASS INDEX: 30.37 KG/M2

## 2019-11-21 DIAGNOSIS — Z99.89 OSA ON CPAP: ICD-10-CM

## 2019-11-21 DIAGNOSIS — Z87.891 STOPPED SMOKING WITH GREATER THAN 30 PACK YEAR HISTORY: ICD-10-CM

## 2019-11-21 DIAGNOSIS — J44.9 STEROID-DEPENDENT COPD (HCC): ICD-10-CM

## 2019-11-21 DIAGNOSIS — C61 PROSTATE CANCER (HCC): ICD-10-CM

## 2019-11-21 DIAGNOSIS — G47.33 OSA ON CPAP: ICD-10-CM

## 2019-11-21 DIAGNOSIS — J44.9 STAGE 4 VERY SEVERE COPD BY GOLD CLASSIFICATION (HCC): Primary | ICD-10-CM

## 2019-11-21 PROCEDURE — 9900000059 HC CARDIAC REHAB PHASE 3 - 12 VISITS

## 2019-11-21 PROCEDURE — 99213 OFFICE O/P EST LOW 20 MIN: CPT | Performed by: NURSE PRACTITIONER

## 2019-11-21 ASSESSMENT — ENCOUNTER SYMPTOMS
EYES NEGATIVE: 1
ALLERGIC/IMMUNOLOGIC NEGATIVE: 1
GASTROINTESTINAL NEGATIVE: 1

## 2019-11-25 ENCOUNTER — HOSPITAL ENCOUNTER (OUTPATIENT)
Dept: CARDIAC REHAB | Age: 69
Discharge: HOME | End: 2019-11-25
Payer: MEDICARE

## 2019-11-27 ENCOUNTER — HOSPITAL ENCOUNTER (OUTPATIENT)
Dept: CARDIAC REHAB | Age: 69
Discharge: HOME | End: 2019-11-27
Payer: MEDICARE

## 2019-12-02 ENCOUNTER — HOSPITAL ENCOUNTER (OUTPATIENT)
Dept: CARDIAC REHAB | Age: 69
Discharge: HOME | End: 2019-12-02
Payer: MEDICARE

## 2019-12-04 ENCOUNTER — HOSPITAL ENCOUNTER (OUTPATIENT)
Dept: CARDIAC REHAB | Age: 69
Discharge: HOME | End: 2019-12-04
Payer: MEDICARE

## 2019-12-09 ENCOUNTER — HOSPITAL ENCOUNTER (OUTPATIENT)
Dept: CARDIAC REHAB | Age: 69
Discharge: HOME | End: 2019-12-09
Payer: MEDICARE

## 2019-12-11 ENCOUNTER — HOSPITAL ENCOUNTER (OUTPATIENT)
Dept: CARDIAC REHAB | Age: 69
Discharge: HOME | End: 2019-12-11
Payer: MEDICARE

## 2019-12-12 ENCOUNTER — HOSPITAL ENCOUNTER (OUTPATIENT)
Dept: CARDIAC REHAB | Age: 69
Discharge: HOME | End: 2019-12-12
Payer: MEDICARE

## 2019-12-16 ENCOUNTER — HOSPITAL ENCOUNTER (OUTPATIENT)
Dept: CARDIAC REHAB | Age: 69
Discharge: HOME | End: 2019-12-16
Payer: MEDICARE

## 2019-12-18 ENCOUNTER — HOSPITAL ENCOUNTER (OUTPATIENT)
Dept: CARDIAC REHAB | Age: 69
Discharge: HOME | End: 2019-12-18
Payer: MEDICARE

## 2019-12-19 ENCOUNTER — HOSPITAL ENCOUNTER (OUTPATIENT)
Dept: CARDIAC REHAB | Age: 69
Discharge: HOME | End: 2019-12-19
Payer: MEDICARE

## 2019-12-23 ENCOUNTER — HOSPITAL ENCOUNTER (OUTPATIENT)
Dept: CARDIAC REHAB | Age: 69
Discharge: HOME | End: 2019-12-23
Payer: MEDICARE

## 2019-12-26 ENCOUNTER — HOSPITAL ENCOUNTER (OUTPATIENT)
Dept: CARDIAC REHAB | Age: 69
Discharge: HOME | End: 2019-12-26
Payer: MEDICARE

## 2019-12-30 ENCOUNTER — HOSPITAL ENCOUNTER (OUTPATIENT)
Dept: CARDIAC REHAB | Age: 69
Discharge: HOME | End: 2019-12-30
Payer: MEDICARE

## 2019-12-30 PROCEDURE — 9900000059 HC CARDIAC REHAB PHASE 3 - 12 VISITS

## 2020-01-02 ENCOUNTER — HOSPITAL ENCOUNTER (OUTPATIENT)
Dept: CARDIAC REHAB | Age: 70
Discharge: HOME | End: 2020-01-02

## 2020-01-06 ENCOUNTER — HOSPITAL ENCOUNTER (OUTPATIENT)
Dept: CARDIAC REHAB | Age: 70
Discharge: HOME | End: 2020-01-06

## 2020-01-08 ENCOUNTER — HOSPITAL ENCOUNTER (OUTPATIENT)
Dept: CARDIAC REHAB | Age: 70
Discharge: HOME | End: 2020-01-08

## 2020-01-13 ENCOUNTER — HOSPITAL ENCOUNTER (OUTPATIENT)
Dept: CARDIAC REHAB | Age: 70
Discharge: HOME | End: 2020-01-13

## 2020-01-15 ENCOUNTER — HOSPITAL ENCOUNTER (OUTPATIENT)
Dept: CARDIAC REHAB | Age: 70
Discharge: HOME | End: 2020-01-15

## 2020-01-16 ENCOUNTER — HOSPITAL ENCOUNTER (OUTPATIENT)
Dept: CARDIAC REHAB | Age: 70
Discharge: HOME | End: 2020-01-16

## 2020-01-20 ENCOUNTER — HOSPITAL ENCOUNTER (OUTPATIENT)
Dept: CARDIAC REHAB | Age: 70
Discharge: HOME | End: 2020-01-20

## 2020-01-23 ENCOUNTER — HOSPITAL ENCOUNTER (OUTPATIENT)
Dept: CARDIAC REHAB | Age: 70
Discharge: HOME | End: 2020-01-23

## 2020-01-27 ENCOUNTER — HOSPITAL ENCOUNTER (OUTPATIENT)
Dept: CARDIAC REHAB | Age: 70
Discharge: HOME | End: 2020-01-27

## 2020-01-29 ENCOUNTER — HOSPITAL ENCOUNTER (OUTPATIENT)
Dept: CARDIAC REHAB | Age: 70
Discharge: HOME | End: 2020-01-29

## 2020-01-30 ENCOUNTER — HOSPITAL ENCOUNTER (OUTPATIENT)
Dept: CARDIAC REHAB | Age: 70
Discharge: HOME | End: 2020-01-30

## 2020-02-03 ENCOUNTER — HOSPITAL ENCOUNTER (OUTPATIENT)
Dept: CARDIAC REHAB | Age: 70
Discharge: HOME | End: 2020-02-03

## 2020-02-03 PROCEDURE — 9900000059 HC CARDIAC REHAB PHASE 3 - 12 VISITS

## 2020-02-05 ENCOUNTER — HOSPITAL ENCOUNTER (OUTPATIENT)
Dept: CARDIAC REHAB | Age: 70
Discharge: HOME | End: 2020-02-05

## 2020-02-06 ENCOUNTER — HOSPITAL ENCOUNTER (OUTPATIENT)
Dept: CARDIAC REHAB | Age: 70
Discharge: HOME | End: 2020-02-06

## 2020-02-10 ENCOUNTER — HOSPITAL ENCOUNTER (OUTPATIENT)
Dept: CARDIAC REHAB | Age: 70
Discharge: HOME | End: 2020-02-10

## 2020-02-19 ENCOUNTER — HOSPITAL ENCOUNTER (OUTPATIENT)
Dept: CARDIAC REHAB | Age: 70
Discharge: HOME | End: 2020-02-19

## 2020-02-20 ENCOUNTER — HOSPITAL ENCOUNTER (OUTPATIENT)
Dept: CARDIAC REHAB | Age: 70
Discharge: HOME | End: 2020-02-20

## 2020-02-24 ENCOUNTER — HOSPITAL ENCOUNTER (OUTPATIENT)
Dept: CARDIAC REHAB | Age: 70
Discharge: HOME | End: 2020-02-24

## 2020-02-26 ENCOUNTER — HOSPITAL ENCOUNTER (OUTPATIENT)
Dept: CARDIAC REHAB | Age: 70
Discharge: HOME | End: 2020-02-26

## 2020-02-27 ENCOUNTER — OFFICE VISIT (OUTPATIENT)
Dept: PULMONOLOGY | Age: 70
End: 2020-02-27
Payer: MEDICARE

## 2020-02-27 ENCOUNTER — HOSPITAL ENCOUNTER (OUTPATIENT)
Dept: CARDIAC REHAB | Age: 70
Discharge: HOME | End: 2020-02-27

## 2020-02-27 VITALS
BODY MASS INDEX: 31.02 KG/M2 | TEMPERATURE: 98.6 F | SYSTOLIC BLOOD PRESSURE: 163 MMHG | HEART RATE: 74 BPM | OXYGEN SATURATION: 96 % | RESPIRATION RATE: 16 BRPM | DIASTOLIC BLOOD PRESSURE: 99 MMHG | HEIGHT: 66 IN | WEIGHT: 193 LBS

## 2020-02-27 PROCEDURE — G0296 VISIT TO DETERM LDCT ELIG: HCPCS | Performed by: INTERNAL MEDICINE

## 2020-02-27 PROCEDURE — 99214 OFFICE O/P EST MOD 30 MIN: CPT | Performed by: INTERNAL MEDICINE

## 2020-02-27 NOTE — PROGRESS NOTES
OUTPATIENT PULMONARY PROGRESS NOTE      Patient:  Cristel Estrada  MRN: [id number]    Consulting Physician: Bakari Case MD  Reason for Consult: Dyspnea/chronic cough/COPD  Primacy Care Physician: Bakari Case MD    HISTORY OF PRESENT ILLNESS:   The patient is a 78 y.o. male   Is here for follow-up of severe COPD/chronic cough and obstructive sleep apnea    Since he was seen last time he had been continued on steroids per Dr. Rafael Leonardo, according to patient he had tried to stop prednisone he tried to wean him self off he go to the lowest dose of 5 mg a day and according to patient he was off for 1 week but then he was started with not feeling well and shortness of breath and he started himself back he is on chronic steroids and he believes that the monthly shots he is getting for prostate cancer will give him more shortness of breath especially after he gets his shot. He does have chronic dyspnea on activities and on exertion, denies any change in his chronic shortness of breath. He is still undergoing cardiopulmonary rehab and walk on treadmill for 20 minutes 3 times a week during his session. He currently denies daily cough or persistent cough or wheezing. He denies sputum production denies purulent sputum or increase in the body of the sputum. Denies nocturnal cough wheezing or chest tightness. He is using Symbicort and Spiriva. Albuterol aerosol using it once daily and inhaler every other day  He is very compliant with CPAP using CPAP every night denies any problem with CPAP. He is currently getting cardiopulmonary rehab and he claimed that he feels better with rehab. He does have chronic pedal edema and denies any change or increase in pedal edema  He does have chronic kidney disease and he is on hydrochlorothiazide . He denies gastroesophageal reflux symptoms and heartburn.     Previous work-up  On previous visit he had MANUEL and ANCA which are both negative, CRP is 11 elevated, echo showed evidence of pulmonary hypertension. 6 minute walk test was done he did not have significant hypoxia and lowest oxygen saturation was 91%. His pulmonary function test is consistent with severe with FEV1 of 0.69 L, lung volumes consistent with air trapping and moderately reduction in diffusion capacity 51%. He had a high-resolution CT chest done which did not show evidence of bronchiectasis, interstitial lung disease, honeycombing, did show emphysema and mild areas of tree/bud/bronchiolitis. Initial history/evaluation and previous workup  He is referred here for chronic cough and shortness of breath. He has history of COPD and he is on bronchodilator but he was in usual state of health until November December when he started having shortness of breath, cough cough was persistent in all the time mostly dry and sometimes productive of sputum, he had an admission in the hospital when he was treated with steroids and was discharge on taper dose of prednisone, he took medication for a few weeks and then when he stopped medication he started having the symptoms again a few weeks later and went back on steroids, he had 3 courses at least of taper dose of steroids and recurrence of symptoms and he was started on prednisone 20 mg a day for about 3-4 weeks now. According to patient above symptoms started after he was started on shots for prostate cancer, he does have history of prostrate cancer and he had radiation therapy for prostate cancer last year and then he was started on Degarelixon by urology and according to patient he ended up with the infection and he was having increasing shortness of breath and cough, the medication was changed to Leuprolide which he received in February and the next dose will be in July this year. Cough is persistent in all the time, only responded to steroids, he is on Symbicort and his Spiriva but without much relief until he takes prednisone, the cough is mostly dry.  He does have tablet Take 1 tablet by mouth daily 90 tablet 3    hydrochlorothiazide (HYDRODIURIL) 25 MG tablet Take 1 tablet by mouth daily 30 tablet 5    clopidogrel (PLAVIX) 75 MG tablet Take 1 tablet by mouth daily 30 tablet 5    Cholecalciferol (VITAMIN D3) 5000 units CAPS Take 1,000 Units by mouth daily      albuterol sulfate  (90 Base) MCG/ACT inhaler Inhale 2 puffs into the lungs every 6 hours as needed for Wheezing or Shortness of Breath 1 Inhaler 11    albuterol (PROVENTIL) (2.5 MG/3ML) 0.083% nebulizer solution Take 3 mLs by nebulization every 4 hours as needed for Wheezing 120 each 3    budesonide-formoterol (SYMBICORT) 160-4.5 MCG/ACT AERO Inhale 2 puffs into the lungs 2 times daily      tiotropium (SPIRIVA RESPIMAT) 1.25 MCG/ACT AERS inhaler Inhale 2 puffs into the lungs daily      cetirizine (ZYRTEC) 10 MG tablet Take 10 mg by mouth daily      montelukast (SINGULAIR) 10 MG tablet Take 10 mg by mouth nightly       No facility-administered encounter medications on file as of 2/27/2020. Social History:   TOBACCO:   reports that he quit smoking about 6 years ago. His smoking use included cigarettes. He has a 30.00 pack-year smoking history. He has never used smokeless tobacco.  ETOH:   reports no history of alcohol use.   OCCUPATION:  Used to work as  in [employer]    Family History:       Problem Relation Age of Onset    No Known Problems Father     Cancer Mother        Immunizations:    Immunization History   Administered Date(s) Administered    Influenza Virus Vaccine 10/03/2016    Influenza, Intradermal, Preservative free 10/28/2019    Influenza, Quadv, 6 mo and older, IM, PF (Flulaval, Fluarix) 10/31/2018    Pneumococcal Conjugate 13-valent (Annalisa Holloway) 12/28/2015    Pneumococcal Polysaccharide (Piliedmdf31) 12/05/2016    Zoster Live (Zostavax) 03/01/2016    Zoster Recombinant (Shingrix) 11/20/2019         REVIEW OF SYSTEMS:    CONSTITUTIONAL:   positive for weight 190 lb (86.2 kg)     Body mass index is 31.15 kg/m².     Physical Examination:   General appearance - oriented to person, place, and time, overweight and acyanotic, in no respiratory distress, cushingoid face  Mental status - alert, oriented to person, place, and time  Eyes - pupils equal and reactive, extraocular eye movements intact, sclera anicteric slightly pale pale  Ears - right ear normal, left ear normal  Nose - normal and patent, no erythema, discharge or polyps  Mouth - mucous membranes moist, pharynx normal without lesions  Neck - supple, no significant adenopathy, short and thick neck  Chest - no tachypnea, retractions or cyanosis, no dullness on percussion, bilaterally symmetrical chest movement he has bilateral air entry with distant bilateral breath sound, no rhonchi and wheezing and no crackles on exam.  Heart - normal rate, regular rhythm, normal S1, S2, no murmurs, rubs, clicks or gallops  Abdomen - soft, nontender, nondistended, no masses or organomegaly  Neurological - alert, oriented, normal speech, no focal findings or movement disorder noted  Extremities - pedal edema ++, intact peripheral pulses  Skin - normal coloration and turgor, no rashes, no suspicious skin lesions noted       LABS:    CBC:   WBC   Date Value Ref Range Status   05/21/2019 12.5 (H) 3.5 - 11.3 k/uL Final   05/20/2019 14.2 (H) 3.5 - 11.3 k/uL Final   05/19/2019 14.6 (H) 3.5 - 11.3 k/uL Final     Hemoglobin   Date Value Ref Range Status   05/21/2019 11.6 (L) 13.0 - 17.0 g/dL Final   05/20/2019 11.3 (L) 13.0 - 17.0 g/dL Final   05/19/2019 11.5 (L) 13.0 - 17.0 g/dL Final     Platelets   Date Value Ref Range Status   05/21/2019 272 138 - 453 k/uL Final   05/20/2019 259 138 - 453 k/uL Final   05/19/2019 259 138 - 453 k/uL Final     BMP:   Sodium   Date Value Ref Range Status   08/22/2019 141 135 - 144 mmol/L Final   06/17/2019 138 135 - 144 mmol/L Final   05/21/2019 139 135 - 144 mmol/L Final     Potassium   Date Value Ref Range Status   08/22/2019 4.4 3.7 - 5.3 mmol/L Final   06/17/2019 4.3 3.7 - 5.3 mmol/L Final   05/21/2019 4.2 3.7 - 5.3 mmol/L Final     Chloride   Date Value Ref Range Status   08/22/2019 96 (L) 98 - 107 mmol/L Final   06/17/2019 98 98 - 107 mmol/L Final   05/21/2019 92 (L) 98 - 107 mmol/L Final     CO2   Date Value Ref Range Status   08/22/2019 32 (H) 20 - 31 mmol/L Final   06/17/2019 29 20 - 31 mmol/L Final   05/21/2019 34 (H) 20 - 31 mmol/L Final     BUN   Date Value Ref Range Status   08/22/2019 44 (H) 8 - 23 mg/dL Final   06/17/2019 44 (H) 8 - 23 mg/dL Final   05/21/2019 52 (H) 8 - 23 mg/dL Final     CREATININE   Date Value Ref Range Status   08/22/2019 1.33 (H) 0.70 - 1.20 mg/dL Final   06/17/2019 1.86 (H) 0.70 - 1.20 mg/dL Final   05/21/2019 1.60 (H) 0.70 - 1.20 mg/dL Final     Glucose   Date Value Ref Range Status   06/17/2019 109 (H) 70 - 99 mg/dL Final   05/21/2019 142 (H) 70 - 99 mg/dL Final   05/20/2019 134 (H) 70 - 99 mg/dL Final     Hepatic:     Amylase: No results found for: AMYLASE  Lipase: No results found for: LIPASE  CARDIAC ENZYMES: No results found for: CKTOTAL, CKMB, CKMBINDEX, TROPONINI  BNP:   BNP   Date Value Ref Range Status   07/12/2013 28 <100 pg/mL Final     Comment:           100 pg/mL is a suggested decision/cutoff point for aid in the diagnosis of   congestive heart failure.   Gender and age differences with BNP may exist.  Performed at [facility], [place] [address] [phone]     Lipids:   Cholesterol, Total   Date Value Ref Range Status   09/28/2016 221 mg/dL Final     HDL   Date Value Ref Range Status   09/28/2016 64 35 - 70 mg/dL Final       INR: No results found for: INR  Thyroid: No results found for: TSH  Urinalysis:     Cultures:-  -----------------------------------------------------------------    ABGs: No results found for: PHART, PO2ART, GOV4XRP    Pulmonary Functions Testing Results:  DATE OF PROCEDURE:  04/23/2019     INTERPRETATION:

## 2020-02-27 NOTE — PATIENT INSTRUCTIONS
means lying down in different positions to help drain mucus from your lungs. Hold each position for 5 minutes. Do it about 30 minutes after you use your inhaler. Make sure you have an empty stomach. If you need to cough, sit up and do controlled coughing. 1. To drain the front of your lungs: Make sure your chest is lower than your hips. Put two pillows under your hips. Use a small pillow under your head. Keep your arms at your sides. Then follow these instructions for breathing:  ? Breathe in: With one hand on your belly and the other on your chest, breathe in. Push your belly out as far as possible. You should be able to feel the hand on your belly move out, while the hand on your chest should not move. ? Breathe out: When you breathe out, you should be able to feel the hand on your belly move in. This is called diaphragmatic breathing. You will use it in the other drainage positions too. 2. To drain the sides of your lungs: Place two or three pillows under your hips. Use a small pillow under your head. Make sure your chest is lower than your hips. Use diaphragmatic breathing. After 5 or 10 minutes, switch sides. 3. To drain the back of your lungs: Place two or three pillows under your hips. Use a small pillow under your head. Kneel over the pillows. Place your arms by your head. Use diaphragmatic breathing. How do you do chest percussion? Chest percussion means that you lightly tap your chest and back. The tapping loosens the mucus in your lungs. · Cup your hand, and lightly tap your chest and back. · Ask your doctor where the best spots are to tap. Avoid your spine and breastbone. · It may be easier to have someone do the tapping for you. Follow-up care is a key part of your treatment and safety. Be sure to make and go to all appointments, and call your doctor if you are having problems. It's also a good idea to know your test results and keep a list of the medicines you take.   Where can you learn more?  Go to [email or web address] and sign in to your [employer] account. Enter Z610 in the [employer] box to learn more about \"Learning About COPD and Clearing Your Lungs. \"     If you do not have an account, please click on the \"Sign Up Now\" link. Current as of: June 9, 2019  Content Version: 12.3  © 1914-5407 [employer]. Care instructions adapted under license by [facility] ([facility]). If you have questions about a medical condition or this instruction, always ask your healthcare professional. [address] any warranty or liability for your use of this information. Please recheck your blood pressure when you go home and make sure you take your prescribed medication if applicable . Please follow up with your PCP or ER if needed. What is lung cancer screening? Lung cancer screening is a way in which doctors check the lungs for early signs of cancer in people who have no symptoms of lung cancer. A low-dose CT scan uses much less radiation than a normal CT scan and shows a more detailed image of the lungs than a standard X-ray. The goal of lung cancer screening is to find cancer early, before it has a chance to grow, spread, or cause problems. One large study found that smokers who were screened with low-dose CT scans were less likely to die of lung cancer than those who were screened with standard X-ray. Below is a summary of the things you need to know regarding screening for lung cancer with low-dose computed tomography (LDCT). This is a screening program that involves routine annual screening with LDCT studies of the lung. The LDCTs are done using low-dose radiation that is not thought to increase your cancer risk. If you have other serious medical conditions (other cancers, congestive heart failure) that limit your life expectancy to less than 10 years, you should not undergo lung cancer screening with LDCT.   The chance is 20%-60% that the LDCT result will show abnormalities. This would require additional testing which could include repeat imaging or even invasive procedures. Most (about 95%) of \"abnormal\" LDCT results are false in the sense that no lung cancer is ultimately found. Additionally, some (about 10%) of the cancers found would not affect your life expectancy, even if undetected and untreated. If you are still smoking, the single most important thing that you can do to reduce your risk of dying of lung cancer is to quit. For this screening to be covered by Medicare and most other insurers, strict criteria must be met. If you do not meet these criteria, but still wish to undergo LDCT testing, you will be required to sign a waiver indicating your willingness to pay for the scan.

## 2020-03-02 ENCOUNTER — HOSPITAL ENCOUNTER (OUTPATIENT)
Dept: PULMONOLOGY | Age: 70
Discharge: HOME | End: 2020-03-02
Payer: MEDICARE

## 2020-03-02 ENCOUNTER — HOSPITAL ENCOUNTER (OUTPATIENT)
Dept: CARDIAC REHAB | Age: 70
Discharge: HOME | End: 2020-03-02

## 2020-03-02 PROCEDURE — 94010 BREATHING CAPACITY TEST: CPT

## 2020-03-03 ENCOUNTER — HOSPITAL ENCOUNTER (OUTPATIENT)
Dept: VASCULAR LAB | Age: 70
Discharge: HOME | End: 2020-03-05
Payer: MEDICARE

## 2020-03-03 PROCEDURE — 93880 EXTRACRANIAL BILAT STUDY: CPT

## 2020-03-05 ENCOUNTER — HOSPITAL ENCOUNTER (OUTPATIENT)
Dept: CARDIAC REHAB | Age: 70
Discharge: HOME | End: 2020-03-05

## 2020-03-09 ENCOUNTER — HOSPITAL ENCOUNTER (OUTPATIENT)
Age: 70
Discharge: HOME | End: 2020-03-09
Payer: MEDICARE

## 2020-03-09 ENCOUNTER — HOSPITAL ENCOUNTER (OUTPATIENT)
Dept: CARDIAC REHAB | Age: 70
Discharge: HOME | End: 2020-03-09

## 2020-03-09 LAB — PROSTATE SPECIFIC ANTIGEN: <0.01 UG/L

## 2020-03-09 PROCEDURE — 36415 COLL VENOUS BLD VENIPUNCTURE: CPT

## 2020-03-09 PROCEDURE — 84153 ASSAY OF PSA TOTAL: CPT

## 2020-03-12 ENCOUNTER — HOSPITAL ENCOUNTER (OUTPATIENT)
Dept: CARDIAC REHAB | Age: 70
Discharge: HOME | End: 2020-03-12

## 2020-03-16 ENCOUNTER — HOSPITAL ENCOUNTER (OUTPATIENT)
Dept: CARDIAC REHAB | Age: 70
Discharge: HOME | End: 2020-03-16

## 2020-03-16 ENCOUNTER — OFFICE VISIT (OUTPATIENT)
Dept: UROLOGY | Age: 70
End: 2020-03-16
Payer: MEDICARE

## 2020-03-16 VITALS — BODY MASS INDEX: 28.41 KG/M2 | DIASTOLIC BLOOD PRESSURE: 91 MMHG | WEIGHT: 176 LBS | SYSTOLIC BLOOD PRESSURE: 165 MMHG

## 2020-03-16 PROCEDURE — 96402 CHEMO HORMON ANTINEOPL SQ/IM: CPT | Performed by: NURSE PRACTITIONER

## 2020-03-16 PROCEDURE — 99213 OFFICE O/P EST LOW 20 MIN: CPT | Performed by: NURSE PRACTITIONER

## 2020-03-16 ASSESSMENT — ENCOUNTER SYMPTOMS
SHORTNESS OF BREATH: 0
EYE REDNESS: 0
VOMITING: 0
COUGH: 0
CONSTIPATION: 0
NAUSEA: 0
WHEEZING: 0
COLOR CHANGE: 0
ABDOMINAL PAIN: 0
EYE PAIN: 0
BACK PAIN: 0

## 2020-03-16 NOTE — PROGRESS NOTES
 montelukast (SINGULAIR) 10 MG tablet Take 10 mg by mouth nightly       No current facility-administered medications on file prior to visit. Patient has no known allergies. Family History   Problem Relation Age of Onset    No Known Problems Father     Cancer Mother      Social History     Tobacco Use   Smoking Status Former Smoker    Packs/day: 1.00    Years: 30.00    Pack years: 30.00    Types: Cigarettes    Last attempt to quit: 2013    Years since quittin.6   Smokeless Tobacco Never Used       Social History     Substance and Sexual Activity   Alcohol Use No    Comment: rare       Review of Systems   Constitutional: Negative for appetite change, chills and fever. Eyes: Negative for pain, redness and visual disturbance. Respiratory: Negative for cough, shortness of breath and wheezing. Cardiovascular: Negative for chest pain and leg swelling. Gastrointestinal: Negative for abdominal pain, constipation, nausea and vomiting. Genitourinary: Negative for decreased urine volume, difficulty urinating, discharge, dysuria, enuresis, flank pain, frequency, hematuria, penile pain, scrotal swelling, testicular pain and urgency. Musculoskeletal: Negative for back pain, joint swelling and myalgias. Skin: Negative for color change, rash and wound. Neurological: Negative for dizziness, tremors and numbness. Hematological: Negative for adenopathy. Does not bruise/bleed easily. BP (!) 165/91 (Site: Left Upper Arm, Position: Sitting, Cuff Size: Large Adult)   Wt 176 lb (79.8 kg)   BMI 28.41 kg/m²       PHYSICAL EXAM:  Constitutional: Patient in no acute distress; Neuro: alert and oriented to person place and time. Psych: Mood and affect normal.  Skin: Normal  Lungs: Respiratory effort normal  Cardiovascular:  Normal peripheral pulses  Abdomen: Soft, non-tender, non-distended with no CVA, flank pain, hepatosplenomegaly or hernia.   Kidneys normal.  Bladder non-tender and not distended. Lymphatics: no palpable lymphadenopathy  Penis normal  Urethral meatus normal  Scrotal exam normal  Testicles normal bilaterally  Epididymis normal bilaterally  No evidence of inguinal hernia        Lab Results   Component Value Date    BUN 44 (H) 08/22/2019     Lab Results   Component Value Date    CREATININE 1.46 09/26/2019     Lab Results   Component Value Date    PSA <0.01 03/09/2020    PSA <0.01 11/12/2019    PSA <0.01 06/17/2019       ASSESSMENT:   Diagnosis Orders   1. Prostate cancer ([facility] [address]  PSA, Diagnostic    [place] INJECTION,THERAP/PROPH/DIAGNOST, IM OR SUBCUT           PLAN:  He did receive an Eligard injection today. We will need to continue ADT for 24 months. He will complete this in 11/2020. We will see him back in 4 months with a PSA prior.

## 2020-03-16 NOTE — PROGRESS NOTES
Patient was given Eligard 30 mg in the left lower abdomen. Patient tolerated the injection well.      Lot [id number]  Exp 06/2021

## 2020-03-25 PROBLEM — N17.9 ACUTE NONTRAUMATIC KIDNEY INJURY (HCC): Status: RESOLVED | Noted: 2018-02-22 | Resolved: 2020-03-24

## 2020-04-20 PROBLEM — J96.01 ACUTE RESPIRATORY FAILURE WITH HYPOXIA (HCC): Status: RESOLVED | Noted: 2018-02-22 | Resolved: 2020-04-20

## 2020-04-20 PROBLEM — J96.01 ACUTE RESPIRATORY FAILURE WITH HYPOXEMIA (HCC): Status: RESOLVED | Noted: 2018-12-14 | Resolved: 2020-04-20

## 2020-04-20 PROBLEM — N17.9 AKI (ACUTE KIDNEY INJURY) (HCC): Status: RESOLVED | Noted: 2018-10-31 | Resolved: 2020-04-20

## 2020-04-20 PROBLEM — J44.1 COPD EXACERBATION (HCC): Status: RESOLVED | Noted: 2018-10-29 | Resolved: 2020-04-20

## 2020-04-20 PROBLEM — G45.9 TIA (TRANSIENT ISCHEMIC ATTACK): Status: RESOLVED | Noted: 2019-05-21 | Resolved: 2020-04-20

## 2020-05-07 ENCOUNTER — TELEPHONE (OUTPATIENT)
Dept: ONCOLOGY | Age: 70
End: 2020-05-07

## 2020-05-15 ENCOUNTER — HOSPITAL ENCOUNTER (OUTPATIENT)
Dept: CT IMAGING | Age: 70
Discharge: HOME | End: 2020-05-17
Payer: MEDICARE

## 2020-05-15 PROCEDURE — G0297 LDCT FOR LUNG CA SCREEN: HCPCS

## 2020-07-14 ENCOUNTER — HOSPITAL ENCOUNTER (OUTPATIENT)
Age: 70
Discharge: HOME | End: 2020-07-14
Payer: MEDICARE

## 2020-07-14 LAB — PROSTATE SPECIFIC ANTIGEN: <0.01 UG/L

## 2020-07-14 PROCEDURE — 36415 COLL VENOUS BLD VENIPUNCTURE: CPT

## 2020-07-14 PROCEDURE — 84153 ASSAY OF PSA TOTAL: CPT

## 2020-07-20 ENCOUNTER — OFFICE VISIT (OUTPATIENT)
Dept: UROLOGY | Age: 70
End: 2020-07-20
Payer: MEDICARE

## 2020-07-20 VITALS
BODY MASS INDEX: 31.34 KG/M2 | HEIGHT: 66 IN | HEART RATE: 59 BPM | SYSTOLIC BLOOD PRESSURE: 187 MMHG | WEIGHT: 195 LBS | TEMPERATURE: 97.7 F | DIASTOLIC BLOOD PRESSURE: 102 MMHG

## 2020-07-20 PROCEDURE — 99213 OFFICE O/P EST LOW 20 MIN: CPT | Performed by: PHYSICIAN ASSISTANT

## 2020-07-20 PROCEDURE — 96402 CHEMO HORMON ANTINEOPL SQ/IM: CPT | Performed by: PHYSICIAN ASSISTANT

## 2020-07-20 NOTE — PROGRESS NOTES
.  HPI:    Patient is a 78 y.o. male in no acute distress. He is alert and oriented to person, place, and time. History  5/22/2018 Prostate biopsy, two cores Aditya 3+4. PSA 14.59   PSA  7/2020 - <0.01  3/2020 - <0.01  10/2019 - <0.01  6/2019 - <0.01  2/2019 - 0.08  11/2018 - 0.36  3/2018 - 14.59     8/2018 Started Suemily Legerino in conjunction with IMRT radiation.      11/2018 Changed from firmagon to Eligard     Today:  Here today to follow-up for prostate cancer. Most recent PSA is <0.01. He denies unintentional weight loss, decreased energy or appetite, new or worsening bone/hip/back pain. He denies any lower extremity numbness and tingling. He denies new or worsening LUTS. He denies gross hematuria or dysuria. He does have hot flashes but these are tolerable for him. He states that with his COPD it does seem as though the ADT injections make his breathing worse. His PCP Dr. Monserrat Gamble does have him on oral steroids.     Past Medical History:   Diagnosis Date    CAD (coronary artery disease)     Stent placement 2019    [facility]) [facility] [address]     Essential hypertension     Hyperlipidemia     Prostate cancer ([facility] [address]     Wears glasses      Past Surgical History:   Procedure Laterality Date    ANKLE SURGERY      CAROTID STENT PLACEMENT Left 2019    COLONOSCOPY  2015    Normal    HERNIA REPAIR Right 1975     Outpatient Encounter Medications as of 7/20/2020   Medication Sig Dispense Refill    carvedilol (COREG) 3.125 MG tablet Take 1 tablet by mouth 2 times daily (with meals) 60 tablet 3    atorvastatin (LIPITOR) 80 MG tablet Take 1 tablet by mouth daily 90 tablet 3    hydrochlorothiazide (HYDRODIURIL) 25 MG tablet Take 1 tablet by mouth daily 30 tablet 5    clopidogrel (PLAVIX) 75 MG tablet Take 1 tablet by mouth daily 30 tablet 5    Cholecalciferol (VITAMIN D3) 5000 units CAPS Take 1,000 Units by mouth daily      albuterol sulfate  (90 Base) MCG/ACT inhaler Inhale 2 cetirizine (ZYRTEC) 10 MG tablet Take 10 mg by mouth daily      montelukast (SINGULAIR) 10 MG tablet Take 10 mg by mouth nightly      predniSONE (DELTASONE) 10 MG tablet Take 4 tabs QD for 3 days, then 3 tabs QD for 3 days, then 2 tabs QD for 3 days, then 1 tab QD for 3 days then Stop (Patient not taking: Reported on 2020) 30 tablet 0    LEUPROLIDE ACETATE SC Inject into the skin       No current facility-administered medications on file prior to visit. Patient has no known allergies. Family History   Problem Relation Age of Onset    No Known Problems Father     Cancer Mother      Social History     Tobacco Use   Smoking Status Former Smoker    Packs/day: 1.00    Years: 30.00    Pack years: 30.00    Types: Cigarettes    Last attempt to quit: 2013    Years since quittin.9   Smokeless Tobacco Never Used       Social History     Substance and Sexual Activity   Alcohol Use No    Comment: rare       Review of Systems    BP (!) 187/102 (Site: Left Upper Arm, Position: Sitting, Cuff Size: Medium Adult)   Pulse 59   Temp 97.7 °F (36.5 °C) (Temporal)   Ht 5' 6\" (1.676 m)   Wt 195 lb (88.5 kg)   BMI 31.47 kg/m²       PHYSICAL EXAM:  Constitutional: Patient in no acute distress; Neuro: alert and oriented to person place and time. Psych: Mood and affect normal.  Skin: Normal  Lungs: Respiratory effort normal  Cardiovascular:  Normal peripheral pulses  Abdomen: Soft, non-tender, non-distended with no CVA, flank pain, hepatosplenomegaly or hernia. Kidneys normal.  Bladder non-tender and not distended. Lymphatics: no palpable lymphadenopathy  Rectal: Deferred      Lab Results   Component Value Date    BUN 44 (H) 2019     Lab Results   Component Value Date    CREATININE 1.46 2019     Lab Results   Component Value Date    PSA <0.01 2020    PSA <0.01 2020    PSA <0.01 2019       ASSESSMENT:   Diagnosis Orders   1.  Prostate cancer [facility])         PLAN:  He did receive an Eligard injection today. He will complete this in 11/2020 (started ADT 8/2018). We will see him back in 6 months with a PSA prior.

## 2020-07-20 NOTE — PATIENT INSTRUCTIONS
SURVEY:    You may be receiving a survey from [employer] regarding your visit today. Please complete the survey to enable us to provide the highest quality of care to you and your family. If you cannot score us a very good on any question, please call the office to discuss how we could of made your experience a very good one. Thank you.

## 2020-07-20 NOTE — PROGRESS NOTES
Patient was given Eligard 30 mg in right abdomen, sub-Q during today's visit. Patient tolerated the injection well.      [id number]  Exp

## 2020-08-18 ENCOUNTER — HOSPITAL ENCOUNTER (EMERGENCY)
Age: 70
Discharge: HOME | End: 2020-08-19
Attending: EMERGENCY MEDICINE
Payer: MEDICARE

## 2020-08-18 ENCOUNTER — APPOINTMENT (OUTPATIENT)
Dept: GENERAL RADIOLOGY | Age: 70
End: 2020-08-18
Payer: MEDICARE

## 2020-08-18 LAB
ABSOLUTE EOS #: 0 K/UL (ref 0–0.44)
ABSOLUTE IMMATURE GRANULOCYTE: 0.1 K/UL (ref 0–0.3)
ABSOLUTE LYMPH #: 1.13 K/UL (ref 1.1–3.7)
ABSOLUTE MONO #: 0.72 K/UL (ref 0.1–1.2)
ALLEN TEST: ABNORMAL
ANION GAP SERPL CALCULATED.3IONS-SCNC: 17 MMOL/L (ref 9–17)
BASOPHILS # BLD: 0 % (ref 0–2)
BASOPHILS ABSOLUTE: 0 K/UL (ref 0–0.2)
BNP INTERPRETATION: ABNORMAL
BUN BLDV-MCNC: 39 MG/DL (ref 8–23)
BUN/CREAT BLD: 19 (ref 9–20)
CALCIUM SERPL-MCNC: 9.4 MG/DL (ref 8.6–10.4)
CARBOXYHEMOGLOBIN: ABNORMAL % (ref 0–5)
CHLORIDE BLD-SCNC: 97 MMOL/L (ref 98–107)
CO2: 25 MMOL/L (ref 20–31)
CREAT SERPL-MCNC: 2.08 MG/DL (ref 0.7–1.2)
D-DIMER QUANTITATIVE: 0.41 MG/L FEU (ref 0–0.59)
DIFFERENTIAL TYPE: ABNORMAL
EKG ATRIAL RATE: 96 BPM
EKG P AXIS: 96 DEGREES
EKG P-R INTERVAL: 194 MS
EKG Q-T INTERVAL: 372 MS
EKG QRS DURATION: 110 MS
EKG QTC CALCULATION (BAZETT): 469 MS
EKG R AXIS: 8 DEGREES
EKG T AXIS: 95 DEGREES
EKG VENTRICULAR RATE: 96 BPM
EOSINOPHILS RELATIVE PERCENT: 0 % (ref 1–4)
FIO2: ABNORMAL
GFR AFRICAN AMERICAN: 38 ML/MIN
GFR NON-AFRICAN AMERICAN: 32 ML/MIN
GFR SERPL CREATININE-BSD FRML MDRD: ABNORMAL ML/MIN/{1.73_M2}
GFR SERPL CREATININE-BSD FRML MDRD: ABNORMAL ML/MIN/{1.73_M2}
GLUCOSE BLD-MCNC: 179 MG/DL (ref 70–99)
HCO3 VENOUS: 27.5 MMOL/L (ref 24–30)
HCT VFR BLD CALC: 20.6 % (ref 40.7–50.3)
HEMOGLOBIN: 6 G/DL (ref 13–17)
IMMATURE GRANULOCYTES: 1 %
INR BLD: 1
LYMPHOCYTES # BLD: 11 % (ref 24–43)
MAGNESIUM: 1.9 MG/DL (ref 1.6–2.6)
MCH RBC QN AUTO: 21.9 PG (ref 25.2–33.5)
MCHC RBC AUTO-ENTMCNC: 29.1 G/DL (ref 28.4–34.8)
MCV RBC AUTO: 75.2 FL (ref 82.6–102.9)
METHEMOGLOBIN: ABNORMAL % (ref 0–1.9)
MODE: ABNORMAL
MONOCYTES # BLD: 7 % (ref 3–12)
MORPHOLOGY: ABNORMAL
MORPHOLOGY: ABNORMAL
NEGATIVE BASE EXCESS, VEN: ABNORMAL MMOL/L (ref 0–2)
NOTIFICATION TIME: ABNORMAL
NOTIFICATION: ABNORMAL
NRBC AUTOMATED: 0.2 PER 100 WBC
O2 DEVICE/FLOW/%: ABNORMAL
O2 SAT, VEN: 90.2 % (ref 60–85)
OXYHEMOGLOBIN: ABNORMAL % (ref 95–98)
PATIENT TEMP: 37
PCO2, VEN, TEMP ADJ: ABNORMAL MMHG (ref 39–55)
PCO2, VEN: 42.7 (ref 39–55)
PDW BLD-RTO: 18.2 % (ref 11.8–14.4)
PEEP/CPAP: ABNORMAL
PH VENOUS: 7.43 (ref 7.32–7.42)
PH, VEN, TEMP ADJ: ABNORMAL (ref 7.32–7.42)
PLATELET # BLD: 325 K/UL (ref 138–453)
PLATELET ESTIMATE: ABNORMAL
PMV BLD AUTO: 10.7 FL (ref 8.1–13.5)
PO2, VEN, TEMP ADJ: ABNORMAL MMHG (ref 30–50)
PO2, VEN: 56.8 (ref 30–50)
POSITIVE BASE EXCESS, VEN: 2.7 MMOL/L (ref 0–2)
POTASSIUM SERPL-SCNC: 3.3 MMOL/L (ref 3.7–5.3)
PRO-BNP: 1198 PG/ML
PROTHROMBIN TIME: 12.8 SEC (ref 11.5–14.2)
PSV: ABNORMAL
PT. POSITION: ABNORMAL
RBC # BLD: 2.74 M/UL (ref 4.21–5.77)
RBC # BLD: ABNORMAL 10*6/UL
RESPIRATORY RATE: ABNORMAL
SAMPLE SITE: ABNORMAL
SEG NEUTROPHILS: 81 % (ref 36–65)
SEGMENTED NEUTROPHILS ABSOLUTE COUNT: 8.35 K/UL (ref 1.5–8.1)
SET RATE: ABNORMAL
SODIUM BLD-SCNC: 139 MMOL/L (ref 135–144)
TEXT FOR RESPIRATORY: ABNORMAL
TOTAL HB: ABNORMAL G/DL (ref 12–16)
TOTAL RATE: ABNORMAL
TROPONIN INTERP: ABNORMAL
TROPONIN T: ABNORMAL NG/ML
TROPONIN, HIGH SENSITIVITY: 24 NG/L (ref 0–22)
VT: ABNORMAL
WBC # BLD: 10.3 K/UL (ref 3.5–11.3)
WBC # BLD: ABNORMAL 10*3/UL

## 2020-08-18 PROCEDURE — 6370000000 HC RX 637 (ALT 250 FOR IP): Performed by: EMERGENCY MEDICINE

## 2020-08-18 PROCEDURE — 36430 TRANSFUSION BLD/BLD COMPNT: CPT

## 2020-08-18 PROCEDURE — 83880 ASSAY OF NATRIURETIC PEPTIDE: CPT

## 2020-08-18 PROCEDURE — P9016 RBC LEUKOCYTES REDUCED: HCPCS

## 2020-08-18 PROCEDURE — 36415 COLL VENOUS BLD VENIPUNCTURE: CPT

## 2020-08-18 PROCEDURE — 86920 COMPATIBILITY TEST SPIN: CPT

## 2020-08-18 PROCEDURE — 94664 DEMO&/EVAL PT USE INHALER: CPT

## 2020-08-18 PROCEDURE — 94640 AIRWAY INHALATION TREATMENT: CPT

## 2020-08-18 PROCEDURE — 85379 FIBRIN DEGRADATION QUANT: CPT

## 2020-08-18 PROCEDURE — 86900 BLOOD TYPING SEROLOGIC ABO: CPT

## 2020-08-18 PROCEDURE — 86901 BLOOD TYPING SEROLOGIC RH(D): CPT

## 2020-08-18 PROCEDURE — 80048 BASIC METABOLIC PNL TOTAL CA: CPT

## 2020-08-18 PROCEDURE — 93005 ELECTROCARDIOGRAM TRACING: CPT | Performed by: EMERGENCY MEDICINE

## 2020-08-18 PROCEDURE — 93010 ELECTROCARDIOGRAM REPORT: CPT | Performed by: FAMILY MEDICINE

## 2020-08-18 PROCEDURE — 96375 TX/PRO/DX INJ NEW DRUG ADDON: CPT

## 2020-08-18 PROCEDURE — 6360000002 HC RX W HCPCS: Performed by: EMERGENCY MEDICINE

## 2020-08-18 PROCEDURE — 86850 RBC ANTIBODY SCREEN: CPT

## 2020-08-18 PROCEDURE — 71046 X-RAY EXAM CHEST 2 VIEWS: CPT

## 2020-08-18 PROCEDURE — 85025 COMPLETE CBC W/AUTO DIFF WBC: CPT

## 2020-08-18 PROCEDURE — 99285 EMERGENCY DEPT VISIT HI MDM: CPT

## 2020-08-18 PROCEDURE — 83735 ASSAY OF MAGNESIUM: CPT

## 2020-08-18 PROCEDURE — 82805 BLOOD GASES W/O2 SATURATION: CPT

## 2020-08-18 PROCEDURE — 96374 THER/PROPH/DIAG INJ IV PUSH: CPT

## 2020-08-18 PROCEDURE — 85610 PROTHROMBIN TIME: CPT

## 2020-08-18 PROCEDURE — 84484 ASSAY OF TROPONIN QUANT: CPT

## 2020-08-18 RX ORDER — IPRATROPIUM BROMIDE AND ALBUTEROL SULFATE 2.5; .5 MG/3ML; MG/3ML
1 SOLUTION RESPIRATORY (INHALATION) ONCE
Status: COMPLETED | OUTPATIENT
Start: 2020-08-18 | End: 2020-08-18

## 2020-08-18 RX ORDER — METHYLPREDNISOLONE SODIUM SUCCINATE 125 MG/2ML
125 INJECTION, POWDER, LYOPHILIZED, FOR SOLUTION INTRAMUSCULAR; INTRAVENOUS ONCE
Status: COMPLETED | OUTPATIENT
Start: 2020-08-18 | End: 2020-08-18

## 2020-08-18 RX ORDER — POTASSIUM CHLORIDE 20 MEQ/1
20 TABLET, EXTENDED RELEASE ORAL ONCE
Status: COMPLETED | OUTPATIENT
Start: 2020-08-18 | End: 2020-08-18

## 2020-08-18 RX ORDER — AMLODIPINE BESYLATE 10 MG/1
10 TABLET ORAL DAILY
COMMUNITY
End: 2021-01-19

## 2020-08-18 RX ORDER — HYDRALAZINE HYDROCHLORIDE 20 MG/ML
5 INJECTION INTRAMUSCULAR; INTRAVENOUS ONCE
Status: COMPLETED | OUTPATIENT
Start: 2020-08-19 | End: 2020-08-18

## 2020-08-18 RX ORDER — ASPIRIN 81 MG/1
324 TABLET, CHEWABLE ORAL ONCE
Status: COMPLETED | OUTPATIENT
Start: 2020-08-18 | End: 2020-08-18

## 2020-08-18 RX ORDER — ONDANSETRON 2 MG/ML
4 INJECTION INTRAMUSCULAR; INTRAVENOUS ONCE
Status: COMPLETED | OUTPATIENT
Start: 2020-08-18 | End: 2020-08-18

## 2020-08-18 RX ORDER — CALCIUM CARBONATE 200(500)MG
500 TABLET,CHEWABLE ORAL ONCE
Status: COMPLETED | OUTPATIENT
Start: 2020-08-18 | End: 2020-08-18

## 2020-08-18 RX ADMIN — ASPIRIN 81 MG 324 MG: 81 TABLET ORAL at 19:57

## 2020-08-18 RX ADMIN — HYDRALAZINE HYDROCHLORIDE 5 MG: 20 INJECTION INTRAMUSCULAR; INTRAVENOUS at 23:52

## 2020-08-18 RX ADMIN — ONDANSETRON 4 MG: 2 INJECTION INTRAMUSCULAR; INTRAVENOUS at 19:57

## 2020-08-18 RX ADMIN — ANTACID TABLETS 500 MG: 500 TABLET, CHEWABLE ORAL at 23:39

## 2020-08-18 RX ADMIN — POTASSIUM CHLORIDE 20 MEQ: 1500 TABLET, EXTENDED RELEASE ORAL at 23:39

## 2020-08-18 RX ADMIN — IPRATROPIUM BROMIDE AND ALBUTEROL SULFATE 1 AMPULE: .5; 3 SOLUTION RESPIRATORY (INHALATION) at 20:16

## 2020-08-18 RX ADMIN — METHYLPREDNISOLONE SODIUM SUCCINATE 125 MG: 125 INJECTION, POWDER, FOR SOLUTION INTRAMUSCULAR; INTRAVENOUS at 20:15

## 2020-08-18 ASSESSMENT — ENCOUNTER SYMPTOMS
COUGH: 1
ABDOMINAL PAIN: 0
COLOR CHANGE: 0
CHEST TIGHTNESS: 1
BACK PAIN: 0
RHINORRHEA: 1
DIARRHEA: 0
NAUSEA: 0
WHEEZING: 1
SHORTNESS OF BREATH: 1
VOMITING: 0

## 2020-08-18 ASSESSMENT — PAIN DESCRIPTION - DESCRIPTORS: DESCRIPTORS: DISCOMFORT

## 2020-08-18 ASSESSMENT — PAIN DESCRIPTION - PAIN TYPE: TYPE: ACUTE PAIN

## 2020-08-18 ASSESSMENT — PAIN SCALES - GENERAL: PAINLEVEL_OUTOF10: 3

## 2020-08-18 ASSESSMENT — PAIN DESCRIPTION - LOCATION: LOCATION: CHEST

## 2020-08-18 NOTE — ED PROVIDER NOTES
[facility] ED  Emergency Department Encounter  EmergencyMedicine Attending     Pt Name:Sahil Shah  MRN: [id number]  [place] 1950  Date of evaluation: 8/18/20  PCP:  Pollo Vinson MD    [address] [place] [address]       Chief Complaint   Patient presents with    Chest Pain     hx copd, difficulty breathing  and chest pain    Shortness of Breath     ongoing week, worse in last day       HISTORY OF PRESENT ILLNESS  (Location/Symptom, Timing/Onset, Context/Setting, Quality, Duration, Modifying Factors, Severity.)      Charlotte He is a 78 y.o. male who presents with 1 week of shortness of breath. History of COPD, similar to previous COPD. Complaining of cough, phlegm production, congestion and postnasal drip. That has also been ongoing for several days. Denies any chest pain but reports some Tightness sensation in the chest.  No myopathies, no asymmetrical leg swelling, no immobilization, no history of a PE or DVT. No fevers or chills, no known exposure to anyone with coronavirus. Does report a cough with some phlegm production. Has been taking steroids for his severe COPD. No abdominal, no diarrhea constipation, no asymmetrical leg swelling, no hemoptysis. PAST MEDICAL / SURGICAL / SOCIAL / FAMILY HISTORY      has a past medical history of CAD (coronary artery disease), COPD (chronic obstructive pulmonary disease) ([address] Essential hypertension, Hyperlipidemia, Prostate cancer ([address] and Wears glasses. has a past surgical history that includes hernia repair (Right, 1975); Colonoscopy (2015); Ankle surgery; and Carotid stent placement (Left, 2019).     Social History     Socioeconomic History    Marital status:      Spouse name: Not on file    Number of children: Not on file    Years of education: Not on file    Highest education level: Not on file   Occupational History    Not on file   Social Needs    Financial resource strain: Not hard at all   [place] insecurity Worry: Never true     Inability: Never true    Transportation needs     Medical: No     Non-medical: No   Tobacco Use    Smoking status: Former Smoker     Packs/day: 1.00     Years: 30.00     Pack years: 30.00     Types: Cigarettes     Last attempt to quit: 2013     Years since quittin.0    Smokeless tobacco: Never Used   Substance and Sexual Activity    Alcohol use: No     Comment: rare    Drug use: No    Sexual activity: Not on file   Lifestyle    Physical activity     Days per week: 0 days     Minutes per session: 0 min    Stress: Not at all   Relationships    Social connections     Talks on phone: Not on file     Gets together: Not on file     Attends Zoroastrianism service: Not on file     Active member of club or organization: Not on file     Attends meetings of clubs or organizations: Not on file     Relationship status: Not on file    Intimate partner violence     Fear of current or ex partner: No     Emotionally abused: No     Physically abused: No     Forced sexual activity: No   Other Topics Concern    Not on file   Social History Narrative    Not on file       Family History   Problem Relation Age of Onset    No Known Problems Father     Cancer Mother        Allergies:  Patient has no known allergies. Home Medications:  Prior to Admission medications    Medication Sig Start Date End Date Taking?  Authorizing Provider   predniSONE (DELTASONE) 10 MG tablet Take 4 tabs QD for 3 days, then 3 tabs QD for 3 days, then 2 tabs QD for 3 days, then 1 tab QD for 3 days then Stop 20  Yes Kim Barnes MD   amLODIPine (NORVASC) 10 MG tablet Take 10 mg by mouth daily   Yes Historical Provider, MD   carvedilol (COREG) 3.125 MG tablet Take 1 tablet by mouth 2 times daily (with meals) 3/9/20  Yes Kim Barnes MD   atorvastatin (LIPITOR) 80 MG tablet Take 1 tablet by mouth daily 19  Yes Kim Barnes MD   hydrochlorothiazide (HYDRODIURIL) 25 MG tablet Take 1 tablet by mouth daily 6/21/19  Yes Chandni Mathew MD   clopidogrel (PLAVIX) 75 MG tablet Take 1 tablet by mouth daily 6/21/19  Yes Chandni Mathew MD   Cholecalciferol (VITAMIN D3) 5000 units CAPS Take 1,000 Units by mouth daily   Yes Historical Provider, MD   albuterol sulfate  (90 Base) MCG/ACT inhaler Inhale 2 puffs into the lungs every 6 hours as needed for Wheezing or Shortness of Breath 12/19/18  Yes EVER Cheatham CNP   albuterol (PROVENTIL) (2.5 MG/3ML) 0.083% nebulizer solution Take 3 mLs by nebulization every 4 hours as needed for Wheezing 12/19/18  Yes EVER Cheatham CNP   budesonide-formoterol (SYMBICORT) 160-4.5 MCG/ACT AERO Inhale 2 puffs into the lungs 2 times daily   Yes Historical Provider, MD   tiotropium (SPIRIVA RESPIMAT) 1.25 MCG/ACT AERS inhaler Inhale 2 puffs into the lungs daily   Yes Historical Provider, MD   cetirizine (ZYRTEC) 10 MG tablet Take 10 mg by mouth daily   Yes Historical Provider, MD   montelukast (SINGULAIR) 10 MG tablet Take 10 mg by mouth nightly   Yes Historical Provider, MD   LEUPROLIDE ACETATE SC Inject into the skin    Historical Provider, MD       REVIEW OF SYSTEMS    (2-9 systems for level 4, 10 or more for level 5)      Review of Systems   Constitutional: Negative for chills and fever. HENT: Positive for congestion and rhinorrhea. Respiratory: Positive for cough, chest tightness, shortness of breath and wheezing. Cardiovascular: Negative for chest pain and leg swelling. Gastrointestinal: Negative for abdominal pain, diarrhea, nausea and vomiting. Genitourinary: Negative for dysuria. Musculoskeletal: Negative for back pain. Skin: Negative for color change. Neurological: Negative for weakness and numbness. Psychiatric/Behavioral: Negative for confusion.        PHYSICAL EXAM   (up to 7 for level 4, 8 or more for level 5)      INITIAL VITALS:   BP (!) 198/98   Pulse 86   Temp 97.7 °F (36.5 °C) (Oral)   Resp 21   SpO2 97% Physical Exam  Constitutional:       General: He is not in acute distress. Appearance: He is well-developed. HENT:      Head: Normocephalic and atraumatic. Eyes:      General:         Right eye: No discharge. Left eye: No discharge. Conjunctiva/sclera: Conjunctivae normal.   Cardiovascular:      Rate and Rhythm: Normal rate and regular rhythm. Heart sounds: Normal heart sounds. No murmur. No friction rub. No gallop. Pulmonary:      Effort: Pulmonary effort is normal. No respiratory distress. Breath sounds: Wheezing present. No rales. Comments: Scattered wheezing throughout with diminished breath sounds throughout the lung bases. Abdominal:      General: There is no distension. Palpations: Abdomen is soft. Tenderness: There is no abdominal tenderness. There is no guarding or rebound. Genitourinary:     Rectum: Guaiac result positive. Comments: Old bright red blood around the anus with guaiac that was positive. No melena. No significant active bleeding at this time. Musculoskeletal:         General: No tenderness. Skin:     General: Skin is warm and dry. Findings: No erythema. Neurological:      General: No focal deficit present. Mental Status: He is oriented to person, place, and time.    Psychiatric:         Mood and Affect: Mood normal.         DIFFERENTIAL  DIAGNOSIS     PLAN (LABS / IMAGING / EKG):  Orders Placed This Encounter   Procedures    XR CHEST (2 VW)    CBC Auto Differential    Basic Metabolic Panel w/ Reflex to MG    Troponin    Brain Natriuretic Peptide    Protime-INR    D-Dimer, Quantitative    Blood gas, venous    Hemoglobin and Hematocrit, Blood, Post Transfusion    Magnesium    VITAL SIGNS PER TRANSFUSION PROTOCOL    Verify informed consent    TRANSFUSION REACTION MANAGEMENT    EKG 12 Lead    TYPE AND SCREEN    PREPARE RBC (CROSSMATCH), 1 Units       MEDICATIONS ORDERED:  Orders Placed This Encounter Medications    ondansetron (ZOFRAN) injection 4 mg    aspirin chewable tablet 324 mg    methylPREDNISolone sodium (SOLU-MEDROL) injection 125 mg    ipratropium-albuterol (DUONEB) nebulizer solution 1 ampule    potassium chloride (KLOR-CON M) extended release tablet 20 mEq       DDX: COPD exacerbation versus GI bleeding versus acute anemia versus CHF exacerbation versus atypical ACS vs PE    DIAGNOSTIC RESULTS / EMERGENCY DEPARTMENT COURSE / MDM   :  Results for orders placed or performed during the hospital encounter of 08/18/20   CBC Auto Differential   Result Value Ref Range    WBC 10.3 3.5 - 11.3 k/uL    RBC 2.74 (L) 4.21 - 5.77 m/uL    Hemoglobin 6.0 (LL) 13.0 - 17.0 g/dL    Hematocrit 20.6 (LL) 40.7 - 50.3 %    MCV 75.2 (L) 82.6 - 102.9 fL    MCH 21.9 (L) 25.2 - 33.5 pg    MCHC 29.1 28.4 - 34.8 g/dL    RDW 18.2 (H) 11.8 - 14.4 %    Platelets 119 469 - 718 k/uL    MPV 10.7 8.1 - 13.5 fL    NRBC Automated 0.2 (H) 0.0 per 100 WBC    Differential Type NOT REPORTED     WBC Morphology NOT REPORTED     RBC Morphology NOT REPORTED     Platelet Estimate NOT REPORTED     Seg Neutrophils 81 (H) 36 - 65 %    Lymphocytes 11 (L) 24 - 43 %    Monocytes 7 3 - 12 %    Eosinophils % 0 (L) 1 - 4 %    Immature Granulocytes 1 (H) 0 %    Basophils 0 0 - 2 %    Segs Absolute 8.35 (H) 1.50 - 8.10 k/uL    Absolute Lymph # 1.13 1.10 - 3.70 k/uL    Absolute Mono # 0.72 0.10 - 1.20 k/uL    Absolute Eos # 0.00 0.00 - 0.44 k/uL    Absolute Immature Granulocyte 0.10 0.00 - 0.30 k/uL    Basophils Absolute 0.00 0.0 - 0.2 k/uL    Morphology POLYCHROMASIA     Morphology PRESENCE NOTED ANISOCYTOSIS PRESENT    Basic Metabolic Panel w/ Reflex to MG   Result Value Ref Range    Glucose 179 (H) 70 - 99 mg/dL    BUN 39 (H) 8 - 23 mg/dL    CREATININE 2.08 (H) 0.70 - 1.20 mg/dL    Bun/Cre Ratio 19 9 - 20    Calcium 9.4 8.6 - 10.4 mg/dL    Sodium 139 135 - 144 mmol/L    Potassium 3.3 (L) 3.7 - 5.3 mmol/L    Chloride 97 (L) 98 - 107 mmol/L    CO2 25 EKG    EKG Interpretation    Interpreted by me    Rhythm: normal sinus   Rate: normal 96  Axis: normal  Ectopy: none  Conduction: normal  ST Segments: non specific depression V6  T Waves: no acute change  Q Waves: none    Clinical Impression: non specific EKG    All EKG's are interpreted by the Emergency Department Physician who either signs or Co-signs this chart in the absence of a cardiologist.    EMERGENCY DEPARTMENT COURSE:    We will transfer to [facility] due to patient preference. Discussed with Dr. Jerson Bran at [facility], patient accepted for transfer. PROCEDURES:  None    CONSULTS:  None    CRITICAL CARE:  None    FINAL IMPRESSION      1. Acute blood loss anemia    2. Gastrointestinal hemorrhage, unspecified gastrointestinal hemorrhage type    3. COPD exacerbation ([facility] [address]          DISPOSITION / PLAN     DISPOSITION  Transfer      PATIENT REFERRED TO:  No follow-up provider specified.     DISCHARGE MEDICATIONS:  New Prescriptions    No medications on file       Elizabeth Morel MD  Emergency Medicine Attending    (Please note that portions of thisnote were completed with a voice recognition program.  Efforts were made to edit the dictations but occasionally words are mis-transcribed.)        Elizabeth Morel MD  08/18/20 [id number]

## 2020-08-19 VITALS
DIASTOLIC BLOOD PRESSURE: 104 MMHG | OXYGEN SATURATION: 98 % | HEART RATE: 84 BPM | RESPIRATION RATE: 24 BRPM | TEMPERATURE: 97 F | SYSTOLIC BLOOD PRESSURE: 225 MMHG

## 2020-08-19 NOTE — ED NOTES
Contacted [facility] for transfer to [facility], they will contact hospitalist. Waiting for call back.      Jenaro Pagan  08/18/20 [id number]

## 2020-08-19 NOTE — ED NOTES
Bed assigned at [facility], [facility] [address] [id number] [address]  08/18/20 [id number]

## 2020-08-19 NOTE — ED NOTES
Chyna Lin from [facility] called for pt facesheet, faxed over.       Lee Ann Mosley  08/18/20 [id number]

## 2020-08-19 NOTE — ED NOTES
Hemoccult of stool positive for blood per rectal exam completed by Dr. Jack Jeong.      Hawk Bishop RN  08/18/20 [id number]

## 2020-08-19 NOTE — ED NOTES
Pt has been accepted to [facility], waiting on bed.      July Berman  08/18/20 [id number]

## 2020-08-20 LAB
ABO/RH: NORMAL
ANTIBODY SCREEN: NEGATIVE
ARM BAND NUMBER: NORMAL
BLD PROD TYP BPU: NORMAL
CROSSMATCH RESULT: NORMAL
DISPENSE STATUS BLOOD BANK: NORMAL
EXPIRATION DATE: NORMAL
TRANSFUSION STATUS: NORMAL
UNIT DIVISION: 0
UNIT NUMBER: NORMAL

## 2020-08-24 ENCOUNTER — OFFICE VISIT (OUTPATIENT)
Dept: PULMONOLOGY | Age: 70
End: 2020-08-24
Payer: MEDICARE

## 2020-08-24 VITALS
DIASTOLIC BLOOD PRESSURE: 82 MMHG | RESPIRATION RATE: 18 BRPM | WEIGHT: 192.9 LBS | SYSTOLIC BLOOD PRESSURE: 170 MMHG | HEIGHT: 66 IN | BODY MASS INDEX: 31 KG/M2 | OXYGEN SATURATION: 97 % | HEART RATE: 66 BPM | TEMPERATURE: 98 F

## 2020-08-24 PROCEDURE — 99214 OFFICE O/P EST MOD 30 MIN: CPT | Performed by: INTERNAL MEDICINE

## 2020-08-24 RX ORDER — CARVEDILOL 25 MG/1
25 TABLET ORAL 2 TIMES DAILY WITH MEALS
COMMUNITY
End: 2021-09-08 | Stop reason: SDUPTHER

## 2020-08-24 NOTE — PATIENT INSTRUCTIONS
SURVEY:    You may be receiving a survey from [facility] regarding your visit today. Please complete the survey to enable us to provide the highest quality of care to you and your family. If you cannot score us a very good on any question, please call the office to discuss how we could have made your experience a very good one. Thank you. Patient Education        Learning About Coronavirus [phone])  Coronavirus [phone]): Overview  What is coronavirus (HQBHA-14)? The coronavirus disease (COVID-19) is caused by a virus. It is an illness that was first found in [place], [place], in December 2019. It has since spread worldwide. The virus can cause fever, cough, and trouble breathing. In severe cases, it can cause pneumonia and make it hard to breathe without help. It can cause death. Coronaviruses are a large group of viruses. They cause the common cold. They also cause more serious illnesses like Middle East respiratory syndrome (MERS) and severe acute respiratory syndrome (SARS). COVID-19 is caused by a novel coronavirus. That means it's a new type that has not been seen in people before. This virus spreads person-to-person through droplets from coughing and sneezing. It can also spread when you are close to someone who is infected. And it can spread when you touch something that has the virus on it, such as a doorknob or a tabletop. What can you do to protect yourself from coronavirus (COVID-19)? The best way to protect yourself from getting sick is to:  · Avoid areas where there is an outbreak. · Avoid contact with people who may be infected. · Wash your hands often with soap or alcohol-based hand sanitizers. · Avoid crowds and try to stay at least 6 feet away from other people. · Wash your hands often, especially after you cough or sneeze. Use soap and water, and scrub for at least 20 seconds. If soap and water aren't available, use an alcohol-based hand .   · Avoid touching your mouth, nose, and eyes. What can you do to avoid spreading the virus to others? To help avoid spreading the virus to others:  · Cover your mouth with a tissue when you cough or sneeze. Then throw the tissue in the trash. · Use a disinfectant to clean things that you touch often. · Wear a cloth face cover if you have to go to public areas. · Stay home if you are sick or have been exposed to the virus. Don't go to school, work, or public areas. And don't use public transportation, ride-shares, or taxis unless you have no choice. · If you are sick:  ? Leave your home only if you need to get medical care. But call the doctor's office first so they know you're coming. And wear a face cover. ? Wear the face cover whenever you're around other people. It can help stop the spread of the virus when you cough or sneeze. ? Clean and disinfect your home every day. Use household  and disinfectant wipes or sprays. Take special care to clean things that you grab with your hands. These include doorknobs, remote controls, phones, and handles on your refrigerator and microwave. And don't forget countertops, tabletops, bathrooms, and computer keyboards. When to call for help  [id number] anytime you think you may need emergency care. For example, call if:  · You have severe trouble breathing. (You can't talk at all.)  · You have constant chest pain or pressure. · You are severely dizzy or lightheaded. · You are confused or can't think clearly. · Your face and lips have a blue color. · You pass out (lose consciousness) or are very hard to wake up. Call your doctor now if you develop symptoms such as:  · Shortness of breath. · Fever. · Cough. If you need to get care, call ahead to the doctor's office for instructions before you go. Make sure you wear a face cover to prevent exposing other people to the virus. Where can you get the latest information?   The following health organizations are tracking and studying this virus. Their websites contain the most up-to-date information. Nitesh  also learn what to do if you think you may have been exposed to the virus. · U.S. Centers for Disease Control and Prevention (CDC): The CDC provides updated news about the disease and travel advice. The website also tells you how to prevent the spread of infection. www.cdc.gov  · World Health Organization [facility]): WHO offers information about the virus outbreaks. WHO also has travel advice. www.who.int  Current as of: May 8, 2020               Content Version: 12.5  © 9129-1899 Healthwise, Incorporated. Care instructions adapted under license by [facility] ([facility]). If you have questions about a medical condition or this instruction, always ask your healthcare professional. [address] any warranty or liability for your use of this information.

## 2020-08-24 NOTE — PROGRESS NOTES
PULMONARY MEDICINE OUTPATIENT  FOLLOW UP NOTE                                                                       PATIENT:  Klever Croft  YOB: 1950  MRN: [id number]     REFERRED BY: Arlet Bowser MD   CHIEF COMPLIANT: COPD and Other (recently hospitalized for blood loss-had a unit of blood)       HISTORY     HISTORY OF PRESENT ILLNESS:   Klever Croft is a 78y.o. year old male here for follow-up for COPD. He has been previously seen by Dr. Haskins.    Patient nonetheless severe COPD for several years. He is currently on Symbicort, Spiriva and as needed albuterol along with chronic prednisone therapy. He is currently taking 10 mg daily and reports that when he gets home steroid he gets a COPD flareup. He  reports that he quit smoking about 7 years ago. His smoking use included cigarettes. He has a 30.00 pack-year smoking history. He has never used smokeless tobacco.  His last pulmonary function test showed severe obstructive ventilatory impairment with FEV1 0.68 L, air trapping and moderately reduced diffusion capacity. Lung cancer screening CT in May 2020 showed interval resolution of prior scattered tree-in-bud opacities along with emphysematous changes. He also has obstructive sleep apnea and reports that he uses CPAP every night. However, I could not see sleep study order compliance data in his chart.        PAST MEDICAL HISTORY:      Diagnosis Date    CAD (coronary artery disease)     Stent placement 2019    COPD (chronic obstructive pulmonary disease) (HCC)     Essential hypertension     Hyperlipidemia     Prostate [facility] [address]     Wears glasses      PAST SURGICAL HISTORY:      Procedure Laterality Date    ANKLE SURGERY      CAROTID STENT PLACEMENT Left 2019    COLONOSCOPY  2015    Normal    HERNIA REPAIR Right 1975     FAMILY HISTORY:      Problem Relation Age of Onset    No Known Problems Father     Cancer Mother      SOCIAL HISTORY:  TOBACCO:   reports that he quit smoking about 7 years ago. His smoking use included cigarettes. He has a 30.00 pack-year smoking history. He has never used smokeless tobacco.  ETOH:   reports no history of alcohol use. DRUGS: reports no history of drug use.      AVOCATION/OCCUPATIONAL EXPOSURE:  None    IMMUNIZATION HISTORY:  Immunization History   Administered Date(s) Administered    Influenza Virus Vaccine 10/03/2016    Influenza, Intradermal, Preservative free 10/28/2019    Influenza, Quadv, 6 mo and older, IM, PF (Flulaval, Fluarix) 10/31/2018    Pneumococcal Conjugate 13-valent ([id number]) 12/28/2015    Pneumococcal Polysaccharide ([id number]) 12/05/2016    Zoster Live (Zostavax) 03/01/2016    Zoster Recombinant (Shingrix) 11/20/2019        ALLERGIES:  No Known Allergies   MEDICATIONS:  Outpatient Medications Prior to Visit   Medication Sig Dispense Refill    carvedilol (COREG) 25 MG tablet Take 25 mg by mouth 2 times daily (with meals)      predniSONE (DELTASONE) 10 MG tablet Take 4 tabs QD for 3 days, then 3 tabs QD for 3 days, then 2 tabs QD for 3 days, then 1 tab QD for 3 days then Stop (Patient taking differently: Take 10 mg by mouth every other day Take 4 tabs QD for 3 days, then 3 tabs QD for 3 days, then 2 tabs QD for 3 days, then 1 tab QD for 3 days then Stop) 30 tablet 0    amLODIPine (NORVASC) 10 MG tablet Take 10 mg by mouth daily      LEUPROLIDE ACETATE SC Inject into the skin      atorvastatin (LIPITOR) 80 MG tablet Take 1 tablet by mouth daily 90 tablet 3    hydrochlorothiazide (HYDRODIURIL) 25 MG tablet Take 1 tablet by mouth daily 30 tablet 5    clopidogrel (PLAVIX) 75 MG tablet Take 1 tablet by mouth daily 30 tablet 5    Cholecalciferol (VITAMIN D3) 5000 units CAPS Take 1,000 Units by mouth daily      albuterol sulfate  (90 Base) MCG/ACT inhaler Inhale 2 puffs into the lungs every 6 hours as needed for Wheezing or Shortness of Breath 1 Inhaler 11    albuterol (PROVENTIL) (2.5 MG/3ML) 0.083% nebulizer solution Take 3 mLs by nebulization every 4 hours as needed for Wheezing 120 each 3    budesonide-formoterol (SYMBICORT) 160-4.5 MCG/ACT AERO Inhale 2 puffs into the lungs 2 times daily      tiotropium (SPIRIVA RESPIMAT) 1.25 MCG/ACT AERS inhaler Inhale 2 puffs into the lungs daily      cetirizine (ZYRTEC) 10 MG tablet Take 10 mg by mouth daily      montelukast (SINGULAIR) 10 MG tablet Take 10 mg by mouth nightly      carvedilol (COREG) 3.125 MG tablet Take 1 tablet by mouth 2 times daily (with meals) 60 tablet 3     No facility-administered medications prior to visit.          REVIEW OF SYSTEMS:   General: negative for - chills, fever, or sleep disturbance   Psychological: negative for - anxiety or depression   Ophthalmic: negative for - dry or itchy eyes, or loss of vision   ENT: negative   Allergy and Immunology: negative   Hematological and Lymphatic: negative for - bleeding problems, jaundice, or swollen lymph nodes   Endocrine: negative for - polydipsia/polyuria or temperature intolerance   Respiratory: positive for - cough and shortness of breath   Cardiovascular: negative for - chest pain or palpitations   Gastrointestinal: negative for - change in bowel habits, nausea/vomiting, or swallowing difficulty/pain   Genito-Urinary: negative for - dysuria or urinary frequency/urgency   Musculoskeletal: negative for - joint pain or joint swelling   Neurological: negative for - numbness/tingling or weakness   Dermatological: negative for - rash or skin lesion changes      PHYSICAL EXAMINATION      VITAL SIGNS:   BP (!) 170/82 (Site: Left Upper Arm, Position: Sitting, Cuff Size: Medium Adult)   Pulse 66   Temp 98 °F (36.7 °C) (Tympanic)   Resp 18   Ht 5' 6\" (1.676 m)   Wt 192 lb 14.4 oz (87.5 kg)   SpO2 97%   BMI 31.13 kg/m²   Wt Readings from Last 3 Encounters:   08/24/20 192 lb 14.4 oz (87.5 kg)   07/20/20 195 lb (88.5 kg)   03/16/20 176 lb (79.8 kg)     SYSTEMIC EXAMINATION:   General appearance - alert, well appearing, and in no distress and overweight   Eyes - sclera anicteric, no conjunctival injection injection   ENT - hearing grossly normal bilaterally,  Nose normal and patent, no erythema, discharge or polyps, Oral mucous membranes moist, pharynx normal without lesions, Mallampati Airway Score - II (soft palate, uvula, fauces visible)   Neck/Lymphatics - supple, no significant adenopathy, carotids upstroke normal bilaterally, no bruits   Chest- decreased air entry noted bilaterally with prolonged expiratory phase   Heart - normal rate, regular rhythm, normal S1, S2, no murmurs, rubs, clicks or gallops   Abdomen - soft, nontender, non distended   Neurological/Psych- motor and sensory grossly normal bilaterally, alert, oriented to person, place, and time   Musculoskeletal/Extremities- no joint tenderness or swelling, peripheral pulses normal, no pedal edema, no clubbing or cyanosis   Skin - normal coloration and turgor, no rashes, no suspicious skin lesions noted     LABORATORY DATA      LABS:  ABG:   No results found for: PH, PHART, PCO2, HXO6IEA, PO2, PO2ART, HCO3, OTJ4SBY, BE, BEART, THGB, T1RNTWDV  VBG:  Lab Results   Component Value Date    PHVEN 7.426 (H) 08/18/2020    WWD5UAT 42.7 08/18/2020    Q3SPYAGL 90.2 (H) 08/18/2020     CBC:   Lab Results   Component Value Date    WBC 10.3 08/18/2020    RBC 2.74 (L) 08/18/2020    HGB 6.0 (LL) 08/18/2020    HCT 20.6 (LL) 08/18/2020     08/18/2020    MCV 75.2 (L) 08/18/2020    MCH 21.9 (L) 08/18/2020    MCHC 29.1 08/18/2020    RDW 18.2 (H) 08/18/2020    LYMPHOPCT 11 (L) 08/18/2020    MONOPCT 7 08/18/2020    BASOPCT 0 08/18/2020    MONOSABS 0.72 08/18/2020    LYMPHSABS 1.13 08/18/2020    EOSABS 0.00 08/18/2020    BASOSABS 0.00 08/18/2020    DIFFTYPE NOT REPORTED 08/18/2020     Eosinophils/IgE:   Lab Results   Component Value Date    EOSABS 0.00 08/18/2020     Alpha 1 antitrypsin: No results found for: A1A  CMP:   Lab Results   Component Value Date     08/18/2020    K 3.3 (L) 08/18/2020    CL 97 (L) 08/18/2020    CO2 25 08/18/2020    BUN 39 (H) 08/18/2020    CREATININE 2.08 (H) 08/18/2020    GLUCOSE 179 (H) 08/18/2020    MG 1.9 08/18/2020    CALCIUM 9.4 08/18/2020    PROT 6.9 05/21/2019    LABALBU 3.7 05/21/2019    BILITOT <0.10 (L) 05/21/2019    ALT 13 05/21/2019    AST 12 05/21/2019    ALKPHOS 80 05/21/2019     Coagulation Profile:   Lab Results   Component Value Date    INR 1.0 08/18/2020    PROTIME 12.8 08/18/2020     BNP:   Lab Results   Component Value Date    BNP 28 07/12/2013    PROBNP 1,198 (H) 08/18/2020     D-Dimer/Fibrinogen:  Lab Results   Component Value Date    DDIMER 0.41 08/18/2020     Others labs:  No results found for: TSH, R0OONCU, I1PVNNF, THYROIDAB, FT3, T4FREE  Lab Results   Component Value Date    MANUEL NEGATIVE 04/11/2019    MPO 11 04/11/2019    PR3 17 04/11/2019    CRP 11.1 (H) 04/11/2019     No results found for: IRON, TIBC, FERRITIN, FOLATE, LDH  Lab Results   Component Value Date    PSA <0.01 07/14/2020     No results found for: RPR, HIV    RADIOLOGY:  Xr Chest (2 Vw)  Result Date: 8/18/2020  EXAMINATION: TWO XRAY VIEWS OF THE CHEST 8/18/2020 8:04 pm COMPARISON: 05/21/2019 HISTORY: ORDERING SYSTEM PROVIDED HISTORY: Chest pain TECHNOLOGIST PROVIDED HISTORY: Chest pain FINDINGS: Cardiomediastinal silhouette is unchanged in size. Aortic atherosclerosis. Calcified hilar lymph nodes, consistent with prior granulomatous disease. No pulmonary consolidation, pleural effusion, or pneumothorax. Osteopenia without acute osseous abnormality identified. No acute cardiopulmonary abnormality.      Chest CT: 5/15/2020  Impression    1.  Interval resolution of prior scattered tree-in-bud findings consistent    with resolved inflammatory or infectious process.         2.  Emphysematous changes.         3.  No solid noncalcified nodules.  Cast like calcifications in the right    lower lobe are non worrisome.         LUNG RADS:    Category 2.  Benign findings.  Less than 1% probability of malignancy. Advise annual low-dose CT screening of the chest.      PULMONARY FUNCTION TEST:    03/02/2020  NOCTURNAL RECORDING OXIMETRY     INTERPRETATION:  The patient had a total recording time of 7 hours and 8  minutes of which about 2 minutes were excluded. The patient had a pulse  rate of 99 to 35 with a mean pulse of 58 and the oxygen saturation  varied from 100% to 90% with a mean saturation of 96.2%. The patient  had oxygen saturation always more than 90%.    The nocturnal recording oximetry does not show any pattern suggesting  sleep apnea.     IMPRESSION:  No desaturation that would warrant oxygen therapy. If  sleep apnea is a concern, please do a sleep study. 04/23/2019  INTERPRETATION:  Spirometry shows an obstructive ventilatory pattern,  which is stage IV obstruction, and it does not improve with  bronchodilator therapy. The FEV1 is 0.68 liter. Lung volumes show air  trapping. Diffusion capacity is decreased at 51% predicted. Airway  resistance is increased and specific conductance is decreased. Flow  volume loop shows obstructive ventilatory pattern.     IMPRESSION:  Stage IV COPD with an FEV1 of 0.68 liter. Even though  there is lack of response to bronchodilator in the test, a clinical  improvement with bronchodilator therapy cannot be excluded. ECHOCARDIOGRAM:   Results for orders placed during the hospital encounter of 12/14/18   ECHO Complete 2D W Doppler W Color    Narrative   Summary  Poor image quality, likely due to patient body habitus and/or lung disease. Because of poor image quality, Definity echo contrast was used to better  assess left ventricular wall motion and thickness. Global left ventricular function is difficult to assess but appears normal  with an estimated EF of >55%. Mild left ventricular hypertrophy and with normal left ventricular cavity  size.   No definite specific wall motion abnormalities were identified. No significant valvular abnormalities. Evidence of mild diastolic dysfunction is seen. No prior study available for comparison. CARDIAC CATHETERIZATION:  No results found for this or any previous visit. ASSESSMENT AND PLAN     ASSESSMENT:    ICD-10-CM    1. COPD, severe ([address]  J44.9 Roflumilast (DALIRESP) 500 MCG tablet   2. Current chronic use of systemic steroids  Z79.52    3. History of smoking 30 or more pack years  Z87.891    4. RUDY on CPAP  G47.33     Z99.89      PLAN/RECOMMENDATIONS:     Pulmonary function tests reviewed   Recent chest x-ray and CT scan reviewed   Patient is currently on Symbicort, Spiriva and as needed albuterol nebulizer/HFA   He is also on chronic therapy with steroids, currently using prednisone 10 mg daily   Discussed with him at length regarding long-term side effects of systemic steroids   Recommended initiation of Daliresp   Reviewed use of rescue vs controlling agents, oral and inhaled meds and potential side effects   Reviewed use, techniques, schedule and side effects of all inhaled medications   Refills were provided    Barriers to medication compliance addressed.  Patient was recommended to have prednisone and an antibiotic available for use during an exacerbation   Patient received counseling on the following healthy behaviors: nutrition, exercise and medication adherence   Maintain an active lifestyle   Recommend him to continue smoking cessation  Lung Cancer Screening: After reviewing the patient's smoking history, age and other clinical criteria, the LOW DOSE CT is : NOT INDICATED; Recent CT within 11 months   Recommend influenza vaccination in the fall annually; Up-to-date   Recommendations were given regarding pneumococcal vaccination;  Up-to-date     Patient denies any excessive daytime sleepiness and reports refreshing and restorative sleep   Patient is using PAP as recommended and is benefiting compliant with the therapy   Sleep study/compliance data unavailable for review   Recommended him continue to use CPAP for at least 4 hours every night   Use heated humidification, if needed   Weight loss was recommended and discussed   Recommended good sleep hygiene and instructions provided   Patient instructed not to drive or operate heavy machinery, if sleepy    All the questions that the patient had were answered to his satisfaction  We'll see the patient back in three months  Thank you for having us involved in the care of your patient. Please call us if you have any questions or concerns. Peña Zimmerman MD    Pulmonary and Critical Care Medicine            Please note that this chart was generated using voice recognition Dragon dictation software. Although every effort was made to ensure the accuracy of this automated transcription, some errors in transcription may have occurred.

## 2020-09-02 ENCOUNTER — HOSPITAL ENCOUNTER (OUTPATIENT)
Dept: VASCULAR LAB | Age: 70
Discharge: HOME | End: 2020-09-04
Payer: MEDICARE

## 2020-09-02 PROCEDURE — 93880 EXTRACRANIAL BILAT STUDY: CPT

## 2020-09-13 ENCOUNTER — HOSPITAL ENCOUNTER (EMERGENCY)
Age: 70
Discharge: HOME | End: 2020-09-13
Payer: MEDICARE

## 2020-09-13 ENCOUNTER — APPOINTMENT (OUTPATIENT)
Dept: GENERAL RADIOLOGY | Age: 70
End: 2020-09-13
Payer: MEDICARE

## 2020-09-13 VITALS
RESPIRATION RATE: 18 BRPM | BODY MASS INDEX: 30.18 KG/M2 | SYSTOLIC BLOOD PRESSURE: 150 MMHG | OXYGEN SATURATION: 98 % | TEMPERATURE: 98.9 F | WEIGHT: 187 LBS | HEART RATE: 85 BPM | DIASTOLIC BLOOD PRESSURE: 100 MMHG

## 2020-09-13 LAB
ABSOLUTE EOS #: 0 K/UL (ref 0–0.44)
ABSOLUTE IMMATURE GRANULOCYTE: 0 K/UL (ref 0–0.3)
ABSOLUTE LYMPH #: 0.52 K/UL (ref 1.1–3.7)
ABSOLUTE MONO #: 0.31 K/UL (ref 0.1–1.2)
ALBUMIN SERPL-MCNC: 3.9 G/DL (ref 3.5–5.2)
ALBUMIN/GLOBULIN RATIO: 1.4 (ref 1–2.5)
ALP BLD-CCNC: 114 U/L (ref 40–129)
ALT SERPL-CCNC: 24 U/L (ref 5–41)
ANION GAP SERPL CALCULATED.3IONS-SCNC: 13 MMOL/L (ref 9–17)
AST SERPL-CCNC: 19 U/L
BASOPHILS # BLD: 0 % (ref 0–2)
BASOPHILS ABSOLUTE: 0 K/UL (ref 0–0.2)
BILIRUB SERPL-MCNC: 0.32 MG/DL (ref 0.3–1.2)
BNP INTERPRETATION: ABNORMAL
BUN BLDV-MCNC: 31 MG/DL (ref 8–23)
BUN/CREAT BLD: 22 (ref 9–20)
CALCIUM SERPL-MCNC: 9.8 MG/DL (ref 8.6–10.4)
CHLORIDE BLD-SCNC: 96 MMOL/L (ref 98–107)
CO2: 26 MMOL/L (ref 20–31)
CREAT SERPL-MCNC: 1.39 MG/DL (ref 0.7–1.2)
DIFFERENTIAL TYPE: ABNORMAL
EKG ATRIAL RATE: 86 BPM
EKG P AXIS: 46 DEGREES
EKG P-R INTERVAL: 172 MS
EKG Q-T INTERVAL: 384 MS
EKG QRS DURATION: 104 MS
EKG QTC CALCULATION (BAZETT): 459 MS
EKG R AXIS: -28 DEGREES
EKG T AXIS: 109 DEGREES
EKG VENTRICULAR RATE: 86 BPM
EOSINOPHILS RELATIVE PERCENT: 0 % (ref 1–4)
GFR AFRICAN AMERICAN: >60 ML/MIN
GFR NON-AFRICAN AMERICAN: 51 ML/MIN
GFR SERPL CREATININE-BSD FRML MDRD: ABNORMAL ML/MIN/{1.73_M2}
GFR SERPL CREATININE-BSD FRML MDRD: ABNORMAL ML/MIN/{1.73_M2}
GLUCOSE BLD-MCNC: 150 MG/DL (ref 70–99)
HCT VFR BLD CALC: 28.6 % (ref 40.7–50.3)
HEMOGLOBIN: 8.4 G/DL (ref 13–17)
IMMATURE GRANULOCYTES: 0 %
INR BLD: 1
LYMPHOCYTES # BLD: 5 % (ref 24–43)
MAGNESIUM: 2 MG/DL (ref 1.6–2.6)
MCH RBC QN AUTO: 21.8 PG (ref 25.2–33.5)
MCHC RBC AUTO-ENTMCNC: 29.4 G/DL (ref 28.4–34.8)
MCV RBC AUTO: 74.3 FL (ref 82.6–102.9)
MONOCYTES # BLD: 3 % (ref 3–12)
MORPHOLOGY: NORMAL
NRBC AUTOMATED: 0 PER 100 WBC
PARTIAL THROMBOPLASTIN TIME: 27.5 SEC (ref 23.9–33.8)
PDW BLD-RTO: 19.2 % (ref 11.8–14.4)
PLATELET # BLD: 391 K/UL (ref 138–453)
PLATELET ESTIMATE: ABNORMAL
PMV BLD AUTO: 10.1 FL (ref 8.1–13.5)
POTASSIUM SERPL-SCNC: 3.1 MMOL/L (ref 3.7–5.3)
PRO-BNP: 822 PG/ML
PROTHROMBIN TIME: 13.1 SEC (ref 11.5–14.2)
RBC # BLD: 3.85 M/UL (ref 4.21–5.77)
RBC # BLD: ABNORMAL 10*6/UL
SEDIMENTATION RATE, ERYTHROCYTE: 46 MM (ref 0–20)
SEG NEUTROPHILS: 92 % (ref 36–65)
SEGMENTED NEUTROPHILS ABSOLUTE COUNT: 9.47 K/UL (ref 1.5–8.1)
SODIUM BLD-SCNC: 135 MMOL/L (ref 135–144)
TOTAL PROTEIN: 6.7 G/DL (ref 6.4–8.3)
TROPONIN INTERP: ABNORMAL
TROPONIN INTERP: ABNORMAL
TROPONIN T: ABNORMAL NG/ML
TROPONIN T: ABNORMAL NG/ML
TROPONIN, HIGH SENSITIVITY: 27 NG/L (ref 0–22)
TROPONIN, HIGH SENSITIVITY: 31 NG/L (ref 0–22)
WBC # BLD: 10.3 K/UL (ref 3.5–11.3)
WBC # BLD: ABNORMAL 10*3/UL

## 2020-09-13 PROCEDURE — 83735 ASSAY OF MAGNESIUM: CPT

## 2020-09-13 PROCEDURE — 80053 COMPREHEN METABOLIC PANEL: CPT

## 2020-09-13 PROCEDURE — 2580000003 HC RX 258: Performed by: NURSE PRACTITIONER

## 2020-09-13 PROCEDURE — 85730 THROMBOPLASTIN TIME PARTIAL: CPT

## 2020-09-13 PROCEDURE — 36415 COLL VENOUS BLD VENIPUNCTURE: CPT

## 2020-09-13 PROCEDURE — 6370000000 HC RX 637 (ALT 250 FOR IP): Performed by: NURSE PRACTITIONER

## 2020-09-13 PROCEDURE — 85610 PROTHROMBIN TIME: CPT

## 2020-09-13 PROCEDURE — 71045 X-RAY EXAM CHEST 1 VIEW: CPT

## 2020-09-13 PROCEDURE — 84484 ASSAY OF TROPONIN QUANT: CPT

## 2020-09-13 PROCEDURE — 99285 EMERGENCY DEPT VISIT HI MDM: CPT

## 2020-09-13 PROCEDURE — 83880 ASSAY OF NATRIURETIC PEPTIDE: CPT

## 2020-09-13 PROCEDURE — 96360 HYDRATION IV INFUSION INIT: CPT

## 2020-09-13 PROCEDURE — 93010 ELECTROCARDIOGRAM REPORT: CPT | Performed by: INTERNAL MEDICINE

## 2020-09-13 PROCEDURE — 85025 COMPLETE CBC W/AUTO DIFF WBC: CPT

## 2020-09-13 PROCEDURE — 93005 ELECTROCARDIOGRAM TRACING: CPT | Performed by: EMERGENCY MEDICINE

## 2020-09-13 PROCEDURE — 85651 RBC SED RATE NONAUTOMATED: CPT

## 2020-09-13 RX ORDER — POTASSIUM CHLORIDE 20 MEQ/1
20 TABLET, EXTENDED RELEASE ORAL DAILY
Qty: 5 TABLET | Refills: 0 | Status: SHIPPED | OUTPATIENT
Start: 2020-09-13 | End: 2020-11-23 | Stop reason: ALTCHOICE

## 2020-09-13 RX ORDER — 0.9 % SODIUM CHLORIDE 0.9 %
1000 INTRAVENOUS SOLUTION INTRAVENOUS ONCE
Status: COMPLETED | OUTPATIENT
Start: 2020-09-13 | End: 2020-09-13

## 2020-09-13 RX ORDER — POTASSIUM CHLORIDE 20 MEQ/1
40 TABLET, EXTENDED RELEASE ORAL ONCE
Status: COMPLETED | OUTPATIENT
Start: 2020-09-13 | End: 2020-09-13

## 2020-09-13 RX ADMIN — SODIUM CHLORIDE 1000 ML: 9 INJECTION, SOLUTION INTRAVENOUS at 11:46

## 2020-09-13 RX ADMIN — POTASSIUM CHLORIDE 40 MEQ: 1500 TABLET, EXTENDED RELEASE ORAL at 13:14

## 2020-09-13 NOTE — ED NOTES
Discharge education reviewed with patient and spouse. They verbalized understanding of need to follow-up with PCP and understanding of prescription medication. He denies further questions/concerns at this time.       Medhat Pisano RN  09/13/20 [id number]

## 2020-09-13 NOTE — ED PROVIDER NOTES
Stent placement 2019    COPD (chronic obstructive pulmonary disease) (HCC)     Essential hypertension     Hyperlipidemia     Prostate cancer ([address]     Wears glasses          SURGICAL HISTORY       Past Surgical History:   Procedure Laterality Date    ANKLE SURGERY      CAROTID STENT PLACEMENT Left 2019    COLONOSCOPY  2015    Normal    HERNIA REPAIR Right 1975         CURRENT MEDICATIONS       Previous Medications    ALBUTEROL (PROVENTIL) (2.5 MG/3ML) 0.083% NEBULIZER SOLUTION    Take 3 mLs by nebulization every 4 hours as needed for Wheezing    ALBUTEROL SULFATE  (90 BASE) MCG/ACT INHALER    Inhale 2 puffs into the lungs every 6 hours as needed for Wheezing or Shortness of Breath    AMLODIPINE (NORVASC) 10 MG TABLET    Take 10 mg by mouth daily    ATORVASTATIN (LIPITOR) 80 MG TABLET    Take 1 tablet by mouth daily    BUDESONIDE-FORMOTEROL (SYMBICORT) 160-4.5 MCG/ACT AERO    Inhale 2 puffs into the lungs 2 times daily    CARVEDILOL (COREG) 25 MG TABLET    Take 25 mg by mouth 2 times daily (with meals)    CETIRIZINE (ZYRTEC) 10 MG TABLET    Take 10 mg by mouth daily    CHOLECALCIFEROL (VITAMIN D3) 5000 UNITS CAPS    Take 1,000 Units by mouth daily    CLOPIDOGREL (PLAVIX) 75 MG TABLET    Take 1 tablet by mouth daily    HYDROCHLOROTHIAZIDE (HYDRODIURIL) 25 MG TABLET    Take 1 tablet by mouth daily    LEUPROLIDE ACETATE SC    Inject into the skin    MONTELUKAST (SINGULAIR) 10 MG TABLET    Take 10 mg by mouth nightly    PREDNISONE (DELTASONE) 10 MG TABLET    Take 4 tabs QD for 3 days, then 3 tabs QD for 3 days, then 2 tabs QD for 3 days, then 1 tab QD for 3 days then Stop    ROFLUMILAST (DALIRESP) 500 MCG TABLET    Take 1 tablet by mouth daily    TIOTROPIUM (SPIRIVA RESPIMAT) 1.25 MCG/ACT AERS INHALER    Inhale 2 puffs into the lungs daily       ALLERGIES     Patient has no known allergies.     FAMILY HISTORY       Family History   Problem Relation Age of Onset    No Known Problems Father     Cancer Mother           SOCIAL HISTORY       Social History     Socioeconomic History    Marital status:      Spouse name: None    Number of children: None    Years of education: None    Highest education level: None   Occupational History    None   Social Needs    Financial resource strain: Not hard at all   [place] insecurity     Worry: Never true     Inability: Never true    Transportation needs     Medical: No     Non-medical: No   Tobacco Use    Smoking status: Former Smoker     Packs/day: 1.00     Years: 30.00     Pack years: 30.00     Types: Cigarettes     Last attempt to quit: 2013     Years since quittin.1    Smokeless tobacco: Never Used   Substance and Sexual Activity    Alcohol use: No     Comment: rare    Drug use: No    Sexual activity: None   Lifestyle    Physical activity     Days per week: 0 days     Minutes per session: 0 min    Stress: Not at all   Relationships    Social connections     Talks on phone: None     Gets together: None     Attends [employer] service: None     Active member of club or organization: None     Attends meetings of clubs or organizations: None     Relationship status: None    Intimate partner violence     Fear of current or ex partner: No     Emotionally abused: No     Physically abused: No     Forced sexual activity: No   Other Topics Concern    None   Social History Narrative    None     PHYSICAL EXAM    (up to 7 for level 4, 8 or more for level 5)     ED Triage Vitals [20 1112]   BP Temp Temp Source Pulse Resp SpO2 Height Weight   (!) 184/93 98.9 °F (37.2 °C) Tympanic 84 22 97 % -- 187 lb (84.8 kg)     Constitutional: Oriented and well-developed, well-nourished, and in no distress. Non-toxic appearance. Head: Normocephalic and atraumatic. Eyes: Extra ocular muscles intact. Pupils are equal, round, and reactive to light. No discharge. No scleral icterus.    Neck: Normal range of motion and phonation normal.   Cardiovascular: Regular rate and rhythm, normal heart sounds and intact distal pulses. No murmur heard. Pulmonary/Chest: Effort normal and breath sounds normal. No accessory muscle usage or stridor. Not tachypneic. No respiratory distress. No tenderness and no retraction. Abdominal: Soft and non-distended. Bowel sounds are normal. No masses or organomegaly. No significant tenderness. No rebound, no guarding and no CVA tenderness. No appreciable hernia. Musculoskeletal: Normal range of motion. No edema and no tenderness. Neurological: Alert and oriented. Skin: Skin is warm and dry. No rash noted. No cyanosis or erythema. No pallor. Nails show no clubbing. DIAGNOSTIC RESULTS     EKG: All EKG's are interpreted by the Emergency Department Physician who either signs or Co-signs this chart in the absence of a cardiologist.      RADIOLOGY:   Non-plain film images such as CT, Ultrasound and MRI are read by the radiologist. Plain radiographic images are visualized and preliminarily interpreted by the emergency physician with the below findings:    Interpretation per the Radiologist below, if available at the time of this note:    XR CHEST PORTABLE   Final Result   No acute process.                ED BEDSIDE ULTRASOUND:   Performed by ED Physician - none    LABS:  Results for orders placed or performed during the hospital encounter of 09/13/20   CBC Auto Differential   Result Value Ref Range    WBC 10.3 3.5 - 11.3 k/uL    RBC 3.85 (L) 4.21 - 5.77 m/uL    Hemoglobin 8.4 (L) 13.0 - 17.0 g/dL    Hematocrit 28.6 (L) 40.7 - 50.3 %    MCV 74.3 (L) 82.6 - 102.9 fL    MCH 21.8 (L) 25.2 - 33.5 pg    MCHC 29.4 28.4 - 34.8 g/dL    RDW 19.2 (H) 11.8 - 14.4 %    Platelets 167 506 - 365 k/uL    MPV 10.1 8.1 - 13.5 fL    NRBC Automated 0.0 0.0 per 100 WBC    Differential Type NOT REPORTED     WBC Morphology NOT REPORTED     RBC Morphology NOT REPORTED     Platelet Estimate NOT REPORTED     Seg Neutrophils 92 (H) 36 - 65 %    Lymphocytes 5 (L) 24 - 43 % Monocytes 3 3 - 12 %    Eosinophils % 0 (L) 1 - 4 %    Immature Granulocytes 0 0 %    Basophils 0 0 - 2 %    Segs Absolute 9.47 (H) 1.50 - 8.10 k/uL    Absolute Lymph # 0.52 (L) 1.10 - 3.70 k/uL    Absolute Mono # 0.31 0.10 - 1.20 k/uL    Absolute Eos # 0.00 0.00 - 0.44 k/uL    Absolute Immature Granulocyte 0.00 0.00 - 0.30 k/uL    Basophils Absolute 0.00 0.0 - 0.2 k/uL    Morphology Normal    Comprehensive Metabolic Panel w/ Reflex to MG   Result Value Ref Range    Glucose 150 (H) 70 - 99 mg/dL    BUN 31 (H) 8 - 23 mg/dL    CREATININE 1.39 (H) 0.70 - 1.20 mg/dL    Bun/Cre Ratio 22 (H) 9 - 20    Calcium 9.8 8.6 - 10.4 mg/dL    Sodium 135 135 - 144 mmol/L    Potassium 3.1 (L) 3.7 - 5.3 mmol/L    Chloride 96 (L) 98 - 107 mmol/L    CO2 26 20 - 31 mmol/L    Anion Gap 13 9 - 17 mmol/L    Alkaline Phosphatase 114 40 - 129 U/L    ALT 24 5 - 41 U/L    AST 19 <40 U/L    Total Bilirubin 0.32 0.3 - 1.2 mg/dL    Total Protein 6.7 6.4 - 8.3 g/dL    Alb 3.9 3.5 - 5.2 g/dL    Albumin/Globulin Ratio 1.4 1.0 - 2.5    GFR Non- 51 (L) >60 mL/min    GFR African American >60 >60 mL/min    GFR Comment          GFR Staging         Troponin   Result Value Ref Range    Troponin, High Sensitivity 31 (H) 0 - 22 ng/L    Troponin T NOT REPORTED <0.03 ng/mL    Troponin Interp NOT REPORTED    Sedimentation Rate   Result Value Ref Range    Sed Rate 46 (H) 0 - 20 mm   Protime-INR   Result Value Ref Range    Protime 13.1 11.5 - 14.2 sec    INR 1.0    APTT   Result Value Ref Range    PTT 27.5 23.9 - 33.8 sec   Brain Natriuretic Peptide   Result Value Ref Range    Pro- (H) <300 pg/mL    BNP Interpretation Pro-BNP Reference Range:    Magnesium   Result Value Ref Range    Magnesium 2.0 1.6 - 2.6 mg/dL   Troponin   Result Value Ref Range    Troponin, High Sensitivity 27 (H) 0 - 22 ng/L    Troponin T NOT REPORTED <0.03 ng/mL    Troponin Interp NOT REPORTED    EKG 12 Lead   Result Value Ref Range    Ventricular Rate 86 BPM Atrial Rate 86 BPM    P-R Interval 172 ms    QRS Duration 104 ms    Q-T Interval 384 ms    QTc Calculation (Bazett) 459 ms    P Axis 46 degrees    R Axis -28 degrees    T Axis 109 degrees     Orders Placed This Encounter   Procedures    XR CHEST PORTABLE    CBC Auto Differential    Comprehensive Metabolic Panel w/ Reflex to MG    Troponin    Sedimentation Rate    Protime-INR    APTT    Brain Natriuretic Peptide    Magnesium    Troponin    EKG 12 Lead    Saline lock IV       All other labs were within normal range or not returned as of this dictation. EMERGENCY DEPARTMENT COURSE and DIFFERENTIAL DIAGNOSIS/MDM:   Vitals:    Vitals:    09/13/20 1316 09/13/20 1320 09/13/20 1327 09/13/20 1333   BP: (!) 222/112 (!) 232/205 (!) 150/100    Pulse: 81 83  85   Resp:       Temp:       TempSrc:       SpO2: 98% 97%  98%   Weight:           MEDICAL DECISION MAKING:  Patient was discharged in stable condition. His troponin decreased. He has had elevated troponins in the past blood work. He was directed to follow-up with his primary care provider tomorrow, Dr. Sandeep Burkett. The patient was evaluated during the global COVID-19 pandemic, and that diagnosis was considered upon their initial presentation. Their evaluation, treatment and testing was consistent with current guidelines for patients who present with complaints or symptoms that may be related to COVID-19. Full PPE including gloves, gown, N95 or P100 respirator, and eye protection was used during every encounter with this patient. REASSESSMENT     ED Course as of Sep 13 1347   Sun Sep 13, 2020   1245 Updated patient on laboratory values and that we would be repeating his troponin level. [JG]      ED Course User Index  [JG] Storm Bonilla APRN - CNP         FINAL IMPRESSION      1.  Hypokalemia Stable         DISPOSITION/PLAN   DISPOSITION Decision To Discharge 09/13/2020 01:45:02 PM      PATIENT REFERRED TO:  Benjamin Ritter MD  [place] [address], [address]  [facility] [address]  [phone]    Schedule an appointment as soon as possible for a visit in 1 day        DISCHARGE MEDICATIONS:  New Prescriptions    POTASSIUM CHLORIDE (KLOR-CON M) 20 MEQ EXTENDED RELEASE TABLET    Take 1 tablet by mouth daily for 5 days     Controlled Substances Monitoring:     No flowsheet data found.     (Please note that portions of this note were completed with a voice recognition program.  Efforts were made to edit the dictations but occasionally words are mis-transcribed.)    Electronically signed by EVER Robertson CNP on 9/13/2020 at 1:47 PM               EVER Robertson CNP  09/13/20 5051

## 2020-09-13 NOTE — ED NOTES
Page to Dr. Julita Shah. Patient not to be discharged until he returns call.       Allison Camacho RN  09/13/20 1400

## 2020-09-14 ENCOUNTER — CARE COORDINATION (OUTPATIENT)
Dept: CARE COORDINATION | Age: 70
End: 2020-09-14

## 2020-09-14 NOTE — CARE COORDINATION
Patient contacted regarding recent discharge and COVID-19 risk. Discussed COVID-19 related testing which was not done at this time. Test results were not done. Patient informed of results, if available? N/A     Care Transition Nurse/ Ambulatory Care Manager contacted the patient by telephone to perform post discharge assessment. Verified name and  with patient as identifiers. Patient has following risk factors of: COPD. CTN/ACM reviewed discharge instructions, medical action plan and red flags related to discharge diagnosis. Reviewed and educated them on any new and changed medications related to discharge diagnosis. Advised obtaining a 90-day supply of all daily and as-needed medications. Education provided regarding infection prevention, and signs and symptoms of COVID-19 and when to seek medical attention with patient who verbalized understanding. Discussed exposure protocols and quarantine from [address] you at higher risk for severe illness  and given an opportunity for questions and concerns. The patient agrees to contact the COVID-19 hotline [phone] or PCP office for questions related to their healthcare. CTN/ACM provided contact information for future reference. From CDC: Are you at higher risk for severe illness?  Wash your hands often.  Avoid close contact (6 feet, which is about two arm lengths) with people who are sick.  Put distance between yourself and other people if COVID-19 is spreading in your community.  Clean and disinfect frequently touched surfaces.  Avoid all cruise travel and non-essential air travel.  Call your healthcare professional if you have concerns about COVID-19 and your underlying condition or if you are sick. For more information on steps you can take to protect yourself, see CDC's How to Protect Yourself    No plan to follow up based on severity of symptoms and risk factors. Patient feeling better today.  Patient did  klor

## 2020-09-21 PROBLEM — K52.0 COLITIS DUE TO RADIATION: Chronic | Status: ACTIVE | Noted: 2020-09-21

## 2020-11-10 ENCOUNTER — HOSPITAL ENCOUNTER (OUTPATIENT)
Dept: CARDIAC REHAB | Age: 70
Discharge: HOME | End: 2020-11-10

## 2020-11-13 ENCOUNTER — HOSPITAL ENCOUNTER (OUTPATIENT)
Dept: CARDIAC REHAB | Age: 70
Discharge: HOME | End: 2020-11-13

## 2020-11-17 ENCOUNTER — HOSPITAL ENCOUNTER (OUTPATIENT)
Dept: CARDIAC REHAB | Age: 70
Setting detail: THERAPIES SERIES
Discharge: HOME | End: 2020-11-17

## 2020-11-20 ENCOUNTER — HOSPITAL ENCOUNTER (OUTPATIENT)
Dept: CARDIAC REHAB | Age: 70
Setting detail: THERAPIES SERIES
Discharge: HOME | End: 2020-11-20

## 2020-11-23 ENCOUNTER — OFFICE VISIT (OUTPATIENT)
Dept: PULMONOLOGY | Age: 70
End: 2020-11-23
Payer: MEDICARE

## 2020-11-23 VITALS
OXYGEN SATURATION: 98 % | TEMPERATURE: 97.6 F | BODY MASS INDEX: 30.37 KG/M2 | WEIGHT: 189 LBS | HEART RATE: 62 BPM | SYSTOLIC BLOOD PRESSURE: 206 MMHG | HEIGHT: 66 IN | RESPIRATION RATE: 18 BRPM | DIASTOLIC BLOOD PRESSURE: 98 MMHG

## 2020-11-23 PROCEDURE — G0296 VISIT TO DETERM LDCT ELIG: HCPCS | Performed by: INTERNAL MEDICINE

## 2020-11-23 PROCEDURE — 99214 OFFICE O/P EST MOD 30 MIN: CPT | Performed by: INTERNAL MEDICINE

## 2020-11-23 NOTE — PATIENT INSTRUCTIONS
other people. · Wash your hands often, especially after you cough or sneeze. Use soap and water, and scrub for at least 20 seconds. If soap and water aren't available, use an alcohol-based hand . · Avoid touching your mouth, nose, and eyes. What can you do to avoid spreading the virus to others? To help avoid spreading the virus to others:  · Wash your hands often with soap or alcohol-based hand sanitizers. · Cover your mouth with a tissue when you cough or sneeze. Then throw the tissue in the trash. · Use a disinfectant to clean things that you touch often. These include doorknobs, remote controls, phones, and handles on your refrigerator and microwave. And don't forget countertops, tabletops, bathrooms, and computer keyboards. · Wear a cloth face cover if you have to go to public areas. If you know or suspect that you have COVID-19:  · Stay home. Don't go to school, work, or public areas. And don't use public transportation, ride-shares, or taxis unless you have no choice. · Leave your home only if you need to get medical care or testing. But call the doctor's office first so they know you're coming. And wear a face cover. · Limit contact with people in your home. If possible, stay in a separate bedroom and use a separate bathroom. · Wear a face cover whenever you're around other people. It can help stop the spread of the virus when you cough or sneeze. · Clean and disinfect your home every day. Use household  and disinfectant wipes or sprays. Take special care to clean things that you grab with your hands. · Self-isolate until it's safe to be around others again. ? If you have symptoms, it's safe when you haven't had a fever for 3 days and your symptoms have improved and it's been at least 10 days since your symptoms started. ? If you were exposed to the virus but don't have symptoms, it's safe to be around others 14 days after exposure.   ? Talk to your doctor about whether you also need testing, especially if you have a weakened immune system. When to call for help  Call 911 anytime you think you may need emergency care. For example, call if:  · You have severe trouble breathing. (You can't talk at all.)  · You have constant chest pain or pressure. · You are severely dizzy or lightheaded. · You are confused or can't think clearly. · Your face and lips have a blue color. · You passed out (lost consciousness) or are very hard to wake up. Call your doctor now if you develop symptoms such as:  · Shortness of breath. · Fever. · Cough. If you need to get care, call ahead to the doctor's office for instructions before you go. Make sure you wear a face cover to prevent exposing other people to the virus. Where can you get the latest information? The following health organizations are tracking and studying this virus. Their websites contain the most up-to-date information. Nikolai Rosado also learn what to do if you think you may have been exposed to the virus. · U.[place]. Centers for Disease Control and Prevention (CDC): The CDC provides updated news about the disease and travel advice. The website also tells you how to prevent the spread of infection. www.cdc.gov  · World Health Organization [facility]): WHO offers information about the virus outbreaks. WHO also has travel advice. www.who.int  Current as of: July 10, 2020               Content Version: 12.6  © 2073-6217 1[employer]. Care instructions adapted under license by [facility] ([facility]). If you have questions about a medical condition or this instruction, always ask your healthcare professional. [address] any warranty or liability for your use of this information. What is lung cancer screening? Lung cancer screening is a way in which doctors check the lungs for early signs of cancer in people who have no symptoms of lung cancer.   A low-dose CT scan uses much less radiation than a normal CT scan and

## 2020-11-23 NOTE — PROGRESS NOTES
PULMONARY MEDICINE OUTPATIENT  FOLLOW UP NOTE                                                                       PATIENT:  Nisa Miranda  YOB: 1950  MRN: [id number]     REFERRED BY: Adria Auguste MD   CHIEF COMPLIANT: COPD, Other (August-needed blood transfusions), and Other (stopped taking Daliresp due to making him become very angry/mean)       HISTORY     HISTORY OF PRESENT ILLNESS:   Nisa Miranda is a 69y.o. year old male here for follow-up. Chronic obstructive pulmonary disease:  Patient is a former smoker and  reports that he quit smoking about 7 years ago. His smoking use included cigarettes. He has a 30.00 pack-year smoking history. He has never used smokeless tobacco.  He has been diagnosed to have severe COPD for several years. He reports that he was recently hospitalized with GI bleed. He is currently on Symbicort, Spiriva and as needed albuterol along with chronic prednisone therapy at 5 mg daily. He was started on Daliresp at the last clinic visit, however patient stopped using it as he started \"acting mean\". His last pulmonary function test showed severe obstructive ventilatory impairment with FEV1 0.68 L, air trapping and moderately reduced diffusion capacity. Lung cancer screening CT in May 2020 showed interval resolution of prior scattered tree-in-bud opacities along with emphysematous changes. He also has obstructive sleep apnea and reports that he uses CPAP every night. He is being followed up for sleep apnea at the [facility] and reports good compliance to the machine.        PAST MEDICAL HISTORY:      Diagnosis Date    CAD (coronary artery disease)     Stent placement 2019    COPD (chronic obstructive pulmonary disease) (HCC)     Essential hypertension     Hyperlipidemia     Prostate cancer ([address]     Wears glasses      PAST SURGICAL HISTORY:      Procedure Laterality Date    ANKLE SURGERY      CAROTID STENT PLACEMENT Left 2019    COLONOSCOPY 2015    Normal    HERNIA REPAIR Right 1975    OTHER SURGICAL HISTORY      [facility]ization [facility]     FAMILY HISTORY:      Problem Relation Age of Onset    No Known Problems Father     Cancer Mother      SOCIAL HISTORY:  TOBACCO:   reports that he quit smoking about 7 years ago. His smoking use included cigarettes. He has a 30.00 pack-year smoking history. He has never used smokeless tobacco.  ETOH:   reports current alcohol use. DRUGS: reports no history of drug use.      AVOCATION/OCCUPATIONAL EXPOSURE:  None    IMMUNIZATION HISTORY:  Immunization History   Administered Date(s) Administered    Influenza Virus Vaccine 10/03/2016    Influenza, High-dose, Quadv, 65 yrs +, IM (Fluzone) 10/23/2020    Influenza, Intradermal, Preservative free 10/28/2019    Influenza, Quadv, 6 mo and older, IM, PF (Flulaval, Fluarix) 10/31/2018    Pneumococcal Conjugate 13-valent ([id number]) 12/28/2015    Pneumococcal Polysaccharide ([id number]) 12/05/2016    Zoster Live (Zostavax) 03/01/2016    Zoster Recombinant (Shingrix) 11/20/2019        ALLERGIES:  No Known Allergies   MEDICATIONS:  Outpatient Medications Prior to Visit   Medication Sig Dispense Refill    carvedilol (COREG) 25 MG tablet Take 25 mg by mouth 2 times daily (with meals)      amLODIPine (NORVASC) 10 MG tablet Take 10 mg by mouth daily      predniSONE (DELTASONE) 10 MG tablet Take 4 tabs QD for 3 days, then 3 tabs QD for 3 days, then 2 tabs QD for 3 days, then 1 tab QD for 3 days then Stop (Patient taking differently: Take 5 mg by mouth every other day Take 4 tabs QD for 3 days, then 3 tabs QD for 3 days, then 2 tabs QD for 3 days, then 1 tab QD for 3 days then Stop) 30 tablet 0    LEUPROLIDE ACETATE SC Inject into the skin      atorvastatin (LIPITOR) 80 MG tablet Take 1 tablet by mouth daily 90 tablet 3    hydrochlorothiazide (HYDRODIURIL) 25 MG tablet Take 1 tablet by mouth daily 30 tablet 5    Cholecalciferol (VITAMIN D3) 5000 units CAPS Take 1,000 Units by mouth daily      albuterol sulfate  (90 Base) MCG/ACT inhaler Inhale 2 puffs into the lungs every 6 hours as needed for Wheezing or Shortness of Breath 1 Inhaler 11    albuterol (PROVENTIL) (2.5 MG/3ML) 0.083% nebulizer solution Take 3 mLs by nebulization every 4 hours as needed for Wheezing 120 each 3    budesonide-formoterol (SYMBICORT) 160-4.5 MCG/ACT AERO Inhale 2 puffs into the lungs 2 times daily      tiotropium (SPIRIVA RESPIMAT) 1.25 MCG/ACT AERS inhaler Inhale 2 puffs into the lungs daily      cetirizine (ZYRTEC) 10 MG tablet Take 10 mg by mouth daily      montelukast (SINGULAIR) 10 MG tablet Take 10 mg by mouth nightly      sucralfate (CARAFATE) 1 GM/10ML suspension USE 10 ML RECTAL TWICE DAILY FOR 10 DAYS      potassium chloride (KLOR-CON M) 20 MEQ extended release tablet Take 1 tablet by mouth daily for 5 days 5 tablet 0    Roflumilast (DALIRESP) 500 MCG tablet Take 1 tablet by mouth daily 30 tablet 11    clopidogrel (PLAVIX) 75 MG tablet Take 1 tablet by mouth daily (Patient not taking: Reported on 11/23/2020) 30 tablet 5     No facility-administered medications prior to visit.          REVIEW OF SYSTEMS:   General: negative for - chills, fever, or sleep disturbance   Psychological: negative for - anxiety or depression   Ophthalmic: negative for - dry or itchy eyes, or loss of vision   ENT: negative   Allergy and Immunology: negative   Hematological and Lymphatic: negative for - bleeding problems, jaundice, or swollen lymph nodes   Endocrine: negative for - polydipsia/polyuria or temperature intolerance   Respiratory: positive for - cough and shortness of breath   Cardiovascular: negative for - chest pain or palpitations   Gastrointestinal: negative for - change in bowel habits, nausea/vomiting, or swallowing difficulty/pain   Genito-Urinary: negative for - dysuria or urinary frequency/urgency   Musculoskeletal: negative for - joint pain or joint swelling   Neurological: negative for - numbness/tingling or weakness   Dermatological: negative for - rash or skin lesion changes      PHYSICAL EXAMINATION      VITAL SIGNS:   BP (!) 206/98 (Site: Left Upper Arm, Position: Sitting, Cuff Size: Medium Adult)   Pulse 62   Temp 97.6 °F (36.4 °C) (Temporal)   Resp 18   Ht 5' 6\" (1.676 m)   Wt 189 lb (85.7 kg)   SpO2 98%   BMI 30.51 kg/m²   Wt Readings from Last 3 Encounters:   11/23/20 189 lb (85.7 kg)   09/21/20 183 lb (83 kg)   09/13/20 187 lb (84.8 kg)     SYSTEMIC EXAMINATION:   General appearance - alert, well appearing, and in no distress and overweight   Eyes - sclera anicteric, no conjunctival injection injection   ENT - hearing grossly normal bilaterally,  Nose normal and patent, no erythema, discharge or polyps, Oral mucous membranes moist, pharynx normal without lesions, Mallampati Airway Score - II (soft palate, uvula, fauces visible)   Neck/Lymphatics - supple, no significant adenopathy, carotids upstroke normal bilaterally, no bruits   Chest- decreased air entry noted bilaterally with prolonged expiratory phase   Heart - normal rate, regular rhythm, normal S1, S2, no murmurs, rubs, clicks or gallops   Abdomen - soft, nontender, non distended   Neurological/Psych- motor and sensory grossly normal bilaterally, alert, oriented to person, place, and time   Musculoskeletal/Extremities- no joint tenderness or swelling, peripheral pulses normal, no pedal edema, no clubbing or cyanosis   Skin - normal coloration and turgor, no rashes, no suspicious skin lesions noted     LABORATORY DATA      LABS:  ABG:   No results found for: PH, PHART, PCO2, WMQ7CZZ, PO2, PO2ART, HCO3, GIF2OIL, BE, BEART, THGB, C1TKXZPW  VBG:  Lab Results   Component Value Date    PHVEN 7.426 (H) 08/18/2020    SFG0LPX 42.7 08/18/2020    E7SOEJCU 90.2 (H) 08/18/2020     CBC:   Lab Results   Component Value Date    WBC 10.3 09/13/2020    RBC 3.85 (L) 09/13/2020    HGB 8.4 (L) 09/13/2020    HCT 28.6 (L) 09/13/2020     09/13/2020    MCV 74.3 (L) 09/13/2020    MCH 21.8 (L) 09/13/2020    MCHC 29.4 09/13/2020    RDW 19.2 (H) 09/13/2020    LYMPHOPCT 5 (L) 09/13/2020    MONOPCT 3 09/13/2020    BASOPCT 0 09/13/2020    MONOSABS 0.31 09/13/2020    LYMPHSABS 0.52 (L) 09/13/2020    EOSABS 0.00 09/13/2020    BASOSABS 0.00 09/13/2020    DIFFTYPE NOT REPORTED 09/13/2020     Eosinophils/IgE:   Lab Results   Component Value Date    EOSABS 0.00 09/13/2020     Alpha 1 antitrypsin: No results found for: A1A  CMP:   Lab Results   Component Value Date     09/13/2020    K 3.1 (L) 09/13/2020    CL 96 (L) 09/13/2020    CO2 26 09/13/2020    BUN 31 (H) 09/13/2020    CREATININE 1.39 (H) 09/13/2020    GLUCOSE 150 (H) 09/13/2020    MG 2.0 09/13/2020    CALCIUM 9.8 09/13/2020    PROT 6.7 09/13/2020    LABALBU 3.9 09/13/2020    BILITOT 0.32 09/13/2020    ALT 24 09/13/2020    AST 19 09/13/2020    ALKPHOS 114 09/13/2020     Coagulation Profile:   Lab Results   Component Value Date    INR 1.0 09/13/2020    PROTIME 13.1 09/13/2020    APTT 27.5 09/13/2020     BNP:   Lab Results   Component Value Date    BNP 28 07/12/2013    PROBNP 822 (H) 09/13/2020     D-Dimer/Fibrinogen:  Lab Results   Component Value Date    DDIMER 0.41 08/18/2020     Others labs:  No results found for: TSH, H9GCVRI, Y2VCEKD, THYROIDAB, FT3, T4FREE  Lab Results   Component Value Date    MANUEL NEGATIVE 04/11/2019    MPO 11 04/11/2019    PR3 17 04/11/2019    SEDRATE 46 (H) 09/13/2020    CRP 11.1 (H) 04/11/2019     No results found for: IRON, TIBC, FERRITIN, FOLATE, LDH  Lab Results   Component Value Date    PSA <0.01 07/14/2020     No results found for: RPR, HIV    RADIOLOGY:  Xr Chest (2 Vw)  Result Date: 8/18/2020  EXAMINATION: TWO XRAY VIEWS OF THE CHEST 8/18/2020 8:04 pm COMPARISON: 05/21/2019 HISTORY: ORDERING SYSTEM PROVIDED HISTORY: Chest pain TECHNOLOGIST PROVIDED HISTORY: Chest pain FINDINGS: Cardiomediastinal silhouette is unchanged in size.  Aortic atherosclerosis. Calcified hilar lymph nodes, consistent with prior granulomatous disease. No pulmonary consolidation, pleural effusion, or pneumothorax. Osteopenia without acute osseous abnormality identified. No acute cardiopulmonary abnormality. Chest CT: 5/15/2020  Impression    1.  Interval resolution of prior scattered tree-in-bud findings consistent    with resolved inflammatory or infectious process.         2.  Emphysematous changes.         3.  No solid noncalcified nodules.  Cast like calcifications in the right    lower lobe are non worrisome.         LUNG RADS:    Category 2.  Benign findings.  Less than 1% probability of malignancy. Advise annual low-dose CT screening of the chest.      PULMONARY FUNCTION TEST:    03/02/2020  NOCTURNAL RECORDING OXIMETRY     INTERPRETATION:  The patient had a total recording time of 7 hours and 8  minutes of which about 2 minutes were excluded. The patient had a pulse  rate of 99 to 35 with a mean pulse of 58 and the oxygen saturation  varied from 100% to 90% with a mean saturation of 96.2%. The patient  had oxygen saturation always more than 90%.    The nocturnal recording oximetry does not show any pattern suggesting  sleep apnea.     IMPRESSION:  No desaturation that would warrant oxygen therapy. If  sleep apnea is a concern, please do a sleep study. 04/23/2019  INTERPRETATION:  Spirometry shows an obstructive ventilatory pattern,  which is stage IV obstruction, and it does not improve with  bronchodilator therapy. The FEV1 is 0.68 liter. Lung volumes show air  trapping. Diffusion capacity is decreased at 51% predicted. Airway  resistance is increased and specific conductance is decreased. Flow  volume loop shows obstructive ventilatory pattern.     IMPRESSION:  Stage IV COPD with an FEV1 of 0.68 liter.   Even though  there is lack of response to bronchodilator in the test, a clinical  improvement with bronchodilator therapy cannot be excluded. ECHOCARDIOGRAM:   Results for orders placed during the hospital encounter of 12/14/18   ECHO Complete 2D W Doppler W Color    Narrative   Summary  Poor image quality, likely due to patient body habitus and/or lung disease. Because of poor image quality, Definity echo contrast was used to better  assess left ventricular wall motion and thickness. Global left ventricular function is difficult to assess but appears normal  with an estimated EF of >55%. Mild left ventricular hypertrophy and with normal left ventricular cavity  size. No definite specific wall motion abnormalities were identified. No significant valvular abnormalities. Evidence of mild diastolic dysfunction is seen. No prior study available for comparison. CARDIAC CATHETERIZATION:  No results found for this or any previous visit. ASSESSMENT AND PLAN     ASSESSMENT:    ICD-10-CM    1. Personal history of tobacco use  Z87.891 [place] VISIT TO DISCUSS LUNG CA SCREEN W LDCT     CT Lung Screen (Annual)     PLAN/RECOMMENDATIONS:     Pulmonary function tests reviewed   Recent chest x-ray and CT scan reviewed   Patient is currently on Symbicort, Spiriva and as needed albuterol nebulizer/HFA   He is also on chronic therapy with steroids, currently using prednisone 5 mg daily   Discussed with him at length regarding long-term side effects of systemic steroids   He was recommended Daliresp at at last clinic visit, however patient discontinued using it due to \"started acting mean\"   Reviewed use of rescue vs controlling agents, oral and inhaled meds and potential side effects   Reviewed use, techniques, schedule and side effects of all inhaled medications   Refills were provided    Barriers to medication compliance addressed.    Patient was recommended to have prednisone and an antibiotic available for use during an exacerbation   Patient received counseling on the following healthy behaviors: nutrition, exercise and sleep   Patient is using PAP as recommended and is benefiting compliant with the therapy   Sleep study/compliance data unavailable for review as patient follows up at [facility] him continue to use CPAP for at least 4 hours every night   Use heated humidification, if needed   Weight loss was recommended and discussed   Recommended good sleep hygiene and instructions provided   Patient instructed not to drive or operate heavy machinery, if sleepy    All the questions that the patient had were answered to his satisfaction  We'll see the patient back in 6 months  Thank you for having us involved in the care of your patient. Please call us if you have any questions or concerns. Em Kovacs MD    Pulmonary and Critical Care Medicine            Please note that this chart was generated using voice recognition Dragon dictation software. Although every effort was made to ensure the accuracy of this automated transcription, some errors in transcription may have occurred.

## 2020-11-24 ENCOUNTER — HOSPITAL ENCOUNTER (OUTPATIENT)
Dept: CARDIAC REHAB | Age: 70
Setting detail: THERAPIES SERIES
Discharge: HOME | End: 2020-11-24

## 2020-12-01 ENCOUNTER — HOSPITAL ENCOUNTER (OUTPATIENT)
Dept: CARDIAC REHAB | Age: 70
Discharge: HOME | End: 2020-12-01

## 2020-12-11 ENCOUNTER — HOSPITAL ENCOUNTER (OUTPATIENT)
Dept: CARDIAC REHAB | Age: 70
Setting detail: THERAPIES SERIES
Discharge: HOME | End: 2020-12-11

## 2021-01-05 ENCOUNTER — HOSPITAL ENCOUNTER (OUTPATIENT)
Dept: CARDIAC REHAB | Age: 71
Discharge: HOME | End: 2021-01-05

## 2021-01-08 ENCOUNTER — HOSPITAL ENCOUNTER (OUTPATIENT)
Dept: CARDIAC REHAB | Age: 71
Discharge: HOME | End: 2021-01-08

## 2021-01-12 ENCOUNTER — HOSPITAL ENCOUNTER (OUTPATIENT)
Age: 71
Discharge: HOME | End: 2021-01-12
Payer: MEDICARE

## 2021-01-12 ENCOUNTER — HOSPITAL ENCOUNTER (OUTPATIENT)
Dept: CARDIAC REHAB | Age: 71
Discharge: HOME | End: 2021-01-12

## 2021-01-12 DIAGNOSIS — C61 PROSTATE CANCER (HCC): ICD-10-CM

## 2021-01-12 LAB — PROSTATE SPECIFIC ANTIGEN: <0.01 UG/L

## 2021-01-12 PROCEDURE — 84153 ASSAY OF PSA TOTAL: CPT

## 2021-01-12 PROCEDURE — 36415 COLL VENOUS BLD VENIPUNCTURE: CPT

## 2021-01-19 ENCOUNTER — HOSPITAL ENCOUNTER (OUTPATIENT)
Dept: CARDIAC REHAB | Age: 71
Discharge: HOME | End: 2021-01-19

## 2021-01-19 ENCOUNTER — OFFICE VISIT (OUTPATIENT)
Dept: UROLOGY | Age: 71
End: 2021-01-19
Payer: MEDICARE

## 2021-01-19 VITALS
SYSTOLIC BLOOD PRESSURE: 150 MMHG | DIASTOLIC BLOOD PRESSURE: 82 MMHG | TEMPERATURE: 97.1 F | WEIGHT: 199 LBS | BODY MASS INDEX: 32.12 KG/M2

## 2021-01-19 DIAGNOSIS — C61 PROSTATE CANCER (HCC): Primary | ICD-10-CM

## 2021-01-19 PROCEDURE — 9900000065 HC CARDIAC REHAB PHASE 3 - 1 VISIT

## 2021-01-19 PROCEDURE — 99213 OFFICE O/P EST LOW 20 MIN: CPT | Performed by: NURSE PRACTITIONER

## 2021-01-19 ASSESSMENT — ENCOUNTER SYMPTOMS
SHORTNESS OF BREATH: 0
CONSTIPATION: 0
NAUSEA: 0
COLOR CHANGE: 0
VOMITING: 0
EYE REDNESS: 0
ABDOMINAL PAIN: 0
COUGH: 0
BACK PAIN: 0
WHEEZING: 0

## 2021-01-19 NOTE — PROGRESS NOTES
HPI:          Patient is a 69 y.o. male in no acute distress. He is alert and oriented to person, place, and time. History  5/2018 Prostate biopsy, two cores Aditya 3+4. PSA 14.59   PSA  1/2021 - <0.01  3/2020 - <0.01  10/2019 - <0.01  6/2019 - <0.01  2/2019 - 0.08  11/2018 - 0.36  3/2018 - 14.59     8/2018 Started Lurene Noris in conjunction with IMRT radiation.      11/2018 [facility]anged from [facility] to [facility]     11/2020 Stopped ADT after ADT for 24 months     Today  Here today for a 6-month follow-up for prostate cancer. Most recent PSA is <0.01. He denies unintentional weight loss, decreased energy or appetite, new or worsening bone/hip/back pain. He denies any lower extremity numbness and tingling. He denies new or worsening LUTS. He denies gross hematuria or dysuria.      Past Medical History:   Diagnosis Date    CAD (coronary artery disease)     Stent placement 2019    [facility]) ([facility]     Essential hypertension     Hyperlipidemia     Prostate cancer ([facility]     Wears glasses      Past Surgical History:   Procedure Laterality Date    ANKLE SURGERY      CAROTID STENT PLACEMENT Left 2019    COLONOSCOPY  2015    Normal    HERNIA REPAIR Right 1975    OTHER SURGICAL HISTORY      cauterization [facility]     Outpatient Encounter Medications as of 1/19/2021   Medication Sig Dispense Refill    predniSONE 10 MG (21) TBPK Take 4 tabs QD x 3 days, then 3 tabs QD x3 days, then 2 tabs QD x3 days, then 1 tab QD x3 days, then stop (Patient taking differently: every other day Take 4 tabs QD x 3 days, then 3 tabs QD x3 days, then 2 tabs QD x3 days, then 1 tab QD x3 days, then stop) 21 each 0    carvedilol (COREG) 25 MG tablet Take 25 mg by mouth 2 times daily (with meals)      atorvastatin (LIPITOR) 80 MG tablet Take 1 tablet by mouth daily 90 tablet 3    hydrochlorothiazide (HYDRODIURIL) 25 MG tablet Take 1 tablet by mouth daily 30 tablet 5    Cholecalciferol (VITAMIN D3) 5000 units CAPS Take 1,000 Units by mouth daily      albuterol sulfate  (90 Base) MCG/ACT inhaler Inhale 2 puffs into the lungs every 6 hours as needed for Wheezing or Shortness of Breath 1 Inhaler 11    albuterol (PROVENTIL) (2.5 MG/3ML) 0.083% nebulizer solution Take 3 mLs by nebulization every 4 hours as needed for Wheezing 120 each 3    budesonide-formoterol (SYMBICORT) 160-4.5 MCG/ACT AERO Inhale 2 puffs into the lungs 2 times daily      tiotropium (SPIRIVA RESPIMAT) 1.25 MCG/ACT AERS inhaler Inhale 2 puffs into the lungs daily      cetirizine (ZYRTEC) 10 MG tablet Take 10 mg by mouth daily      montelukast (SINGULAIR) 10 MG tablet Take 10 mg by mouth nightly      amLODIPine (NORVASC) 10 MG tablet Take 10 mg by mouth daily      LEUPROLIDE ACETATE SC Inject into the skin      clopidogrel (PLAVIX) 75 MG tablet Take 1 tablet by mouth daily (Patient not taking: Reported on 11/23/2020) 30 tablet 5     No facility-administered encounter medications on file as of 1/19/2021.        Current Outpatient Medications on File Prior to Visit   Medication Sig Dispense Refill    predniSONE 10 MG (21) TBPK Take 4 tabs QD x 3 days, then 3 tabs QD x3 days, then 2 tabs QD x3 days, then 1 tab QD x3 days, then stop (Patient taking differently: every other day Take 4 tabs QD x 3 days, then 3 tabs QD x3 days, then 2 tabs QD x3 days, then 1 tab QD x3 days, then stop) 21 each 0    carvedilol (COREG) 25 MG tablet Take 25 mg by mouth 2 times daily (with meals)      atorvastatin (LIPITOR) 80 MG tablet Take 1 tablet by mouth daily 90 tablet 3    hydrochlorothiazide (HYDRODIURIL) 25 MG tablet Take 1 tablet by mouth daily 30 tablet 5    Cholecalciferol (VITAMIN D3) 5000 units CAPS Take 1,000 Units by mouth daily      albuterol sulfate  (90 Base) MCG/ACT inhaler Inhale 2 puffs into the lungs every 6 hours as needed for Wheezing or Shortness of Breath 1 Inhaler 11    albuterol (PROVENTIL) (2.5 MG/3ML) 0.083% nebulizer solution Take 3 mLs by nebulization every 4 hours as needed for Wheezing 120 each 3    budesonide-formoterol (SYMBICORT) 160-4.5 MCG/ACT AERO Inhale 2 puffs into the lungs 2 times daily      tiotropium (SPIRIVA RESPIMAT) 1.25 MCG/ACT AERS inhaler Inhale 2 puffs into the lungs daily      cetirizine (ZYRTEC) 10 MG tablet Take 10 mg by mouth daily      montelukast (SINGULAIR) 10 MG tablet Take 10 mg by mouth nightly      amLODIPine (NORVASC) 10 MG tablet Take 10 mg by mouth daily      LEUPROLIDE ACETATE SC Inject into the skin      clopidogrel (PLAVIX) 75 MG tablet Take 1 tablet by mouth daily (Patient not taking: Reported on 2020) 30 tablet 5     No current facility-administered medications on file prior to visit. Patient has no known allergies. Family History   Problem Relation Age of Onset    No Known Problems Father     Cancer Mother      Social History     Tobacco Use   Smoking Status Former Smoker    Packs/day: 1.00    Years: 30.00    Pack years: 30.00    Types: Cigarettes    Quit date: 2013    Years since quittin.4   Smokeless Tobacco Never Used       Social History     Substance and Sexual Activity   Alcohol Use Yes    Frequency: Monthly or less    Drinks per session: 1 or 2    Binge frequency: Never    Comment: rare       Review of Systems   Constitutional: Negative for appetite change, chills and fever. Eyes: Negative for redness and visual disturbance. Respiratory: Negative for cough, shortness of breath and wheezing. Cardiovascular: Negative for chest pain and leg swelling. Gastrointestinal: Negative for abdominal pain, constipation, nausea and vomiting. Genitourinary: Negative for decreased urine volume, difficulty urinating, discharge, dysuria, enuresis, flank pain, frequency, hematuria, penile pain, scrotal swelling, testicular pain and urgency. Musculoskeletal: Negative for back pain, joint swelling and myalgias.    Skin: Negative for color change, rash and wound. Neurological: Negative for dizziness, tremors and numbness. Hematological: Negative for adenopathy. Does not bruise/bleed easily. BP (!) 167/81 (Site: Right Upper Arm, Position: Sitting, Cuff Size: Medium Adult)   Temp 97.1 °F (36.2 °C)   Wt 199 lb (90.3 kg)   BMI 32.12 kg/m²       PHYSICAL EXAM:  Constitutional: Patient in no acute distress; Neuro: alert and oriented to person place and time. Psych: Mood and affect normal.  Skin: Normal  Lungs: Respiratory effort normal  Cardiovascular:  Normal peripheral pulses  Abdomen: Soft, non-tender, non-distended with no CVA, flank pain  Bladder non-tender and not distended. Lab Results   Component Value Date    BUN 31 (H) 09/13/2020     Lab Results   Component Value Date    CREATININE 1.39 (H) 09/13/2020     Lab Results   Component Value Date    PSA <0.01 01/12/2021    PSA <0.01 07/14/2020    PSA <0.01 03/09/2020       ASSESSMENT:   Diagnosis Orders   1. Prostate cancer ([facility] [address]  PSA, Diagnostic         PLAN:  He will remain off of androgen deprivation therapy    We will see him back in 6 months with a PSA prior or sooner if needed for new or worsening symptoms.

## 2021-01-22 ENCOUNTER — HOSPITAL ENCOUNTER (OUTPATIENT)
Dept: CARDIAC REHAB | Age: 71
Discharge: HOME | End: 2021-01-22

## 2021-01-26 ENCOUNTER — HOSPITAL ENCOUNTER (OUTPATIENT)
Dept: CARDIAC REHAB | Age: 71
Discharge: HOME | End: 2021-01-26

## 2021-02-02 ENCOUNTER — HOSPITAL ENCOUNTER (OUTPATIENT)
Dept: CARDIAC REHAB | Age: 71
Discharge: HOME | End: 2021-02-02

## 2021-02-09 ENCOUNTER — HOSPITAL ENCOUNTER (OUTPATIENT)
Dept: CARDIAC REHAB | Age: 71
Discharge: HOME | End: 2021-02-09

## 2021-02-12 ENCOUNTER — HOSPITAL ENCOUNTER (OUTPATIENT)
Dept: CARDIAC REHAB | Age: 71
Discharge: HOME | End: 2021-02-12

## 2021-02-19 ENCOUNTER — HOSPITAL ENCOUNTER (OUTPATIENT)
Dept: CARDIAC REHAB | Age: 71
Discharge: HOME | End: 2021-02-19

## 2021-02-23 ENCOUNTER — HOSPITAL ENCOUNTER (OUTPATIENT)
Dept: CARDIAC REHAB | Age: 71
Discharge: HOME | End: 2021-02-23

## 2021-02-26 ENCOUNTER — HOSPITAL ENCOUNTER (OUTPATIENT)
Dept: CARDIAC REHAB | Age: 71
Discharge: HOME | End: 2021-02-26

## 2021-03-02 ENCOUNTER — HOSPITAL ENCOUNTER (OUTPATIENT)
Dept: CARDIAC REHAB | Age: 71
Discharge: HOME | End: 2021-03-02

## 2021-03-05 ENCOUNTER — HOSPITAL ENCOUNTER (OUTPATIENT)
Dept: CARDIAC REHAB | Age: 71
Discharge: HOME | End: 2021-03-05

## 2021-03-05 PROCEDURE — 9900000065 HC CARDIAC REHAB PHASE 3 - 1 VISIT

## 2021-03-09 ENCOUNTER — HOSPITAL ENCOUNTER (OUTPATIENT)
Dept: CARDIAC REHAB | Age: 71
Discharge: HOME | End: 2021-03-09

## 2021-03-19 ENCOUNTER — HOSPITAL ENCOUNTER (OUTPATIENT)
Age: 71
Setting detail: OBSERVATION
Discharge: HOME | End: 2021-03-20
Attending: FAMILY MEDICINE | Admitting: INTERNAL MEDICINE
Payer: MEDICARE

## 2021-03-19 ENCOUNTER — APPOINTMENT (OUTPATIENT)
Dept: GENERAL RADIOLOGY | Age: 71
End: 2021-03-19
Payer: MEDICARE

## 2021-03-19 ENCOUNTER — APPOINTMENT (OUTPATIENT)
Dept: CT IMAGING | Age: 71
End: 2021-03-19
Payer: MEDICARE

## 2021-03-19 DIAGNOSIS — D50.0 CHRONIC BLOOD LOSS ANEMIA: ICD-10-CM

## 2021-03-19 DIAGNOSIS — R42 DIZZINESS: Primary | ICD-10-CM

## 2021-03-19 DIAGNOSIS — K52.0 RADIATION COLITIS: ICD-10-CM

## 2021-03-19 DIAGNOSIS — R42 LIGHTHEADEDNESS: ICD-10-CM

## 2021-03-19 DIAGNOSIS — I95.1 ORTHOSTATIC HYPOTENSION: ICD-10-CM

## 2021-03-19 DIAGNOSIS — K92.1 MELENA: ICD-10-CM

## 2021-03-19 LAB
ABSOLUTE EOS #: 0.23 K/UL (ref 0–0.44)
ABSOLUTE IMMATURE GRANULOCYTE: 0 K/UL (ref 0–0.3)
ABSOLUTE LYMPH #: 0.76 K/UL (ref 1.1–3.7)
ABSOLUTE MONO #: 0.76 K/UL (ref 0.1–1.2)
ANION GAP SERPL CALCULATED.3IONS-SCNC: 7 MMOL/L (ref 9–17)
BASOPHILS # BLD: 0 % (ref 0–2)
BASOPHILS ABSOLUTE: 0 K/UL (ref 0–0.2)
BNP INTERPRETATION: ABNORMAL
BUN BLDV-MCNC: 31 MG/DL (ref 8–23)
BUN/CREAT BLD: 19 (ref 9–20)
CALCIUM SERPL-MCNC: 9.8 MG/DL (ref 8.6–10.4)
CHLORIDE BLD-SCNC: 95 MMOL/L (ref 98–107)
CO2: 33 MMOL/L (ref 20–31)
CREAT SERPL-MCNC: 1.67 MG/DL (ref 0.7–1.2)
DIFFERENTIAL TYPE: ABNORMAL
EKG ATRIAL RATE: 60 BPM
EKG Q-T INTERVAL: 464 MS
EKG QRS DURATION: 102 MS
EKG QTC CALCULATION (BAZETT): 464 MS
EKG R AXIS: 89 DEGREES
EKG T AXIS: -128 DEGREES
EKG VENTRICULAR RATE: 60 BPM
EOSINOPHILS RELATIVE PERCENT: 3 % (ref 1–4)
GFR AFRICAN AMERICAN: 49 ML/MIN
GFR NON-AFRICAN AMERICAN: 41 ML/MIN
GFR SERPL CREATININE-BSD FRML MDRD: ABNORMAL ML/MIN/{1.73_M2}
GFR SERPL CREATININE-BSD FRML MDRD: ABNORMAL ML/MIN/{1.73_M2}
GLUCOSE BLD-MCNC: 129 MG/DL (ref 70–99)
HCT VFR BLD CALC: 29.3 % (ref 40.7–50.3)
HEMOGLOBIN: 8.6 G/DL (ref 13–17)
IMMATURE GRANULOCYTES: 0 %
INR BLD: 1
LYMPHOCYTES # BLD: 10 % (ref 24–43)
MCH RBC QN AUTO: 22.5 PG (ref 25.2–33.5)
MCHC RBC AUTO-ENTMCNC: 29.4 G/DL (ref 28.4–34.8)
MCV RBC AUTO: 76.7 FL (ref 82.6–102.9)
MONOCYTES # BLD: 10 % (ref 3–12)
MORPHOLOGY: ABNORMAL
NRBC AUTOMATED: 0 PER 100 WBC
PDW BLD-RTO: 19.8 % (ref 11.8–14.4)
PLATELET # BLD: 263 K/UL (ref 138–453)
PLATELET ESTIMATE: ABNORMAL
PMV BLD AUTO: 10.3 FL (ref 8.1–13.5)
POTASSIUM SERPL-SCNC: 3.2 MMOL/L (ref 3.7–5.3)
PRO-BNP: 1308 PG/ML
PROTHROMBIN TIME: 12.9 SEC (ref 11.5–14.2)
RBC # BLD: 3.82 M/UL (ref 4.21–5.77)
RBC # BLD: ABNORMAL 10*6/UL
SEG NEUTROPHILS: 77 % (ref 36–65)
SEGMENTED NEUTROPHILS ABSOLUTE COUNT: 5.85 K/UL (ref 1.5–8.1)
SODIUM BLD-SCNC: 135 MMOL/L (ref 135–144)
TROPONIN INTERP: ABNORMAL
TROPONIN INTERP: ABNORMAL
TROPONIN T: ABNORMAL NG/ML
TROPONIN T: ABNORMAL NG/ML
TROPONIN, HIGH SENSITIVITY: 37 NG/L (ref 0–22)
TROPONIN, HIGH SENSITIVITY: 48 NG/L (ref 0–22)
WBC # BLD: 7.6 K/UL (ref 3.5–11.3)
WBC # BLD: ABNORMAL 10*3/UL

## 2021-03-19 PROCEDURE — 6370000000 HC RX 637 (ALT 250 FOR IP): Performed by: INTERNAL MEDICINE

## 2021-03-19 PROCEDURE — G0378 HOSPITAL OBSERVATION PER HR: HCPCS

## 2021-03-19 PROCEDURE — 94664 DEMO&/EVAL PT USE INHALER: CPT

## 2021-03-19 PROCEDURE — 6360000002 HC RX W HCPCS: Performed by: INTERNAL MEDICINE

## 2021-03-19 PROCEDURE — 96374 THER/PROPH/DIAG INJ IV PUSH: CPT

## 2021-03-19 PROCEDURE — 94640 AIRWAY INHALATION TREATMENT: CPT

## 2021-03-19 PROCEDURE — 94761 N-INVAS EAR/PLS OXIMETRY MLT: CPT

## 2021-03-19 PROCEDURE — 99284 EMERGENCY DEPT VISIT MOD MDM: CPT

## 2021-03-19 PROCEDURE — 80048 BASIC METABOLIC PNL TOTAL CA: CPT

## 2021-03-19 PROCEDURE — 71045 X-RAY EXAM CHEST 1 VIEW: CPT

## 2021-03-19 PROCEDURE — 36415 COLL VENOUS BLD VENIPUNCTURE: CPT

## 2021-03-19 PROCEDURE — 84484 ASSAY OF TROPONIN QUANT: CPT

## 2021-03-19 PROCEDURE — 85610 PROTHROMBIN TIME: CPT

## 2021-03-19 PROCEDURE — 70450 CT HEAD/BRAIN W/O DYE: CPT

## 2021-03-19 PROCEDURE — 6360000002 HC RX W HCPCS: Performed by: FAMILY MEDICINE

## 2021-03-19 PROCEDURE — 83880 ASSAY OF NATRIURETIC PEPTIDE: CPT

## 2021-03-19 PROCEDURE — 93010 ELECTROCARDIOGRAM REPORT: CPT | Performed by: INTERNAL MEDICINE

## 2021-03-19 PROCEDURE — 93005 ELECTROCARDIOGRAM TRACING: CPT | Performed by: FAMILY MEDICINE

## 2021-03-19 PROCEDURE — 85025 COMPLETE CBC W/AUTO DIFF WBC: CPT

## 2021-03-19 RX ORDER — HYDROCHLOROTHIAZIDE 25 MG/1
25 TABLET ORAL DAILY
Status: DISCONTINUED | OUTPATIENT
Start: 2021-03-20 | End: 2021-03-20 | Stop reason: HOSPADM

## 2021-03-19 RX ORDER — HYDRALAZINE HYDROCHLORIDE 20 MG/ML
5 INJECTION INTRAMUSCULAR; INTRAVENOUS EVERY 6 HOURS PRN
Status: DISCONTINUED | OUTPATIENT
Start: 2021-03-19 | End: 2021-03-20 | Stop reason: HOSPADM

## 2021-03-19 RX ORDER — CETIRIZINE HYDROCHLORIDE 10 MG/1
10 TABLET ORAL DAILY
Status: DISCONTINUED | OUTPATIENT
Start: 2021-03-20 | End: 2021-03-20 | Stop reason: HOSPADM

## 2021-03-19 RX ORDER — SODIUM CHLORIDE 0.9 % (FLUSH) 0.9 %
10 SYRINGE (ML) INJECTION PRN
Status: DISCONTINUED | OUTPATIENT
Start: 2021-03-19 | End: 2021-03-20 | Stop reason: HOSPADM

## 2021-03-19 RX ORDER — SODIUM CHLORIDE 9 MG/ML
INJECTION, SOLUTION INTRAVENOUS CONTINUOUS
Status: DISCONTINUED | OUTPATIENT
Start: 2021-03-19 | End: 2021-03-19

## 2021-03-19 RX ORDER — POLYETHYLENE GLYCOL 3350 17 G/17G
17 POWDER, FOR SOLUTION ORAL DAILY PRN
Status: DISCONTINUED | OUTPATIENT
Start: 2021-03-19 | End: 2021-03-20 | Stop reason: HOSPADM

## 2021-03-19 RX ORDER — CARVEDILOL 25 MG/1
25 TABLET ORAL 2 TIMES DAILY WITH MEALS
Status: DISCONTINUED | OUTPATIENT
Start: 2021-03-19 | End: 2021-03-20 | Stop reason: HOSPADM

## 2021-03-19 RX ORDER — PROMETHAZINE HYDROCHLORIDE 25 MG/1
12.5 TABLET ORAL EVERY 6 HOURS PRN
Status: DISCONTINUED | OUTPATIENT
Start: 2021-03-19 | End: 2021-03-20 | Stop reason: HOSPADM

## 2021-03-19 RX ORDER — MONTELUKAST SODIUM 10 MG/1
10 TABLET ORAL DAILY
Status: DISCONTINUED | OUTPATIENT
Start: 2021-03-20 | End: 2021-03-20 | Stop reason: HOSPADM

## 2021-03-19 RX ORDER — SODIUM CHLORIDE 0.9 % (FLUSH) 0.9 %
10 SYRINGE (ML) INJECTION EVERY 12 HOURS SCHEDULED
Status: DISCONTINUED | OUTPATIENT
Start: 2021-03-19 | End: 2021-03-20 | Stop reason: HOSPADM

## 2021-03-19 RX ORDER — DOCUSATE SODIUM 100 MG/1
100 CAPSULE, LIQUID FILLED ORAL 2 TIMES DAILY
Status: DISCONTINUED | OUTPATIENT
Start: 2021-03-19 | End: 2021-03-20 | Stop reason: HOSPADM

## 2021-03-19 RX ORDER — ALBUTEROL SULFATE 2.5 MG/3ML
2.5 SOLUTION RESPIRATORY (INHALATION) EVERY 4 HOURS PRN
Status: DISCONTINUED | OUTPATIENT
Start: 2021-03-19 | End: 2021-03-20 | Stop reason: HOSPADM

## 2021-03-19 RX ORDER — FERROUS SULFATE 325(65) MG
325 TABLET ORAL EVERY OTHER DAY
Status: DISCONTINUED | OUTPATIENT
Start: 2021-03-19 | End: 2021-03-20 | Stop reason: HOSPADM

## 2021-03-19 RX ORDER — BUDESONIDE AND FORMOTEROL FUMARATE DIHYDRATE 160; 4.5 UG/1; UG/1
2 AEROSOL RESPIRATORY (INHALATION) 2 TIMES DAILY
Status: DISCONTINUED | OUTPATIENT
Start: 2021-03-19 | End: 2021-03-20 | Stop reason: HOSPADM

## 2021-03-19 RX ORDER — ACETAMINOPHEN 325 MG/1
650 TABLET ORAL EVERY 6 HOURS PRN
Status: DISCONTINUED | OUTPATIENT
Start: 2021-03-19 | End: 2021-03-20 | Stop reason: HOSPADM

## 2021-03-19 RX ORDER — NIFEDIPINE 30 MG/1
30 TABLET, FILM COATED, EXTENDED RELEASE ORAL DAILY
Status: DISCONTINUED | OUTPATIENT
Start: 2021-03-19 | End: 2021-03-20 | Stop reason: HOSPADM

## 2021-03-19 RX ORDER — DOCUSATE SODIUM 100 MG/1
100 CAPSULE, LIQUID FILLED ORAL DAILY
Status: DISCONTINUED | OUTPATIENT
Start: 2021-03-19 | End: 2021-03-19

## 2021-03-19 RX ORDER — PREDNISONE 10 MG/1
10 TABLET ORAL EVERY OTHER DAY
Status: DISCONTINUED | OUTPATIENT
Start: 2021-03-20 | End: 2021-03-20 | Stop reason: HOSPADM

## 2021-03-19 RX ORDER — ONDANSETRON 2 MG/ML
4 INJECTION INTRAMUSCULAR; INTRAVENOUS EVERY 6 HOURS PRN
Status: DISCONTINUED | OUTPATIENT
Start: 2021-03-19 | End: 2021-03-20 | Stop reason: HOSPADM

## 2021-03-19 RX ORDER — SODIUM CHLORIDE AND POTASSIUM CHLORIDE .9; .15 G/100ML; G/100ML
SOLUTION INTRAVENOUS CONTINUOUS
Status: DISCONTINUED | OUTPATIENT
Start: 2021-03-19 | End: 2021-03-20 | Stop reason: HOSPADM

## 2021-03-19 RX ORDER — ATORVASTATIN CALCIUM 40 MG/1
80 TABLET, FILM COATED ORAL DAILY
Status: DISCONTINUED | OUTPATIENT
Start: 2021-03-20 | End: 2021-03-20 | Stop reason: HOSPADM

## 2021-03-19 RX ORDER — ACETAMINOPHEN 650 MG/1
650 SUPPOSITORY RECTAL EVERY 6 HOURS PRN
Status: DISCONTINUED | OUTPATIENT
Start: 2021-03-19 | End: 2021-03-20 | Stop reason: HOSPADM

## 2021-03-19 RX ADMIN — DOCUSATE SODIUM 100 MG: 100 CAPSULE, LIQUID FILLED ORAL at 22:57

## 2021-03-19 RX ADMIN — BUDESONIDE AND FORMOTEROL FUMARATE DIHYDRATE 2 PUFF: 160; 4.5 AEROSOL RESPIRATORY (INHALATION) at 20:52

## 2021-03-19 RX ADMIN — CARVEDILOL 25 MG: 25 TABLET, FILM COATED ORAL at 18:01

## 2021-03-19 RX ADMIN — NIFEDIPINE 30 MG: 30 TABLET, EXTENDED RELEASE ORAL at 18:01

## 2021-03-19 RX ADMIN — POTASSIUM CHLORIDE AND SODIUM CHLORIDE: 900; 150 INJECTION, SOLUTION INTRAVENOUS at 18:01

## 2021-03-19 RX ADMIN — FERROUS SULFATE TAB 325 MG (65 MG ELEMENTAL FE) 325 MG: 325 (65 FE) TAB at 18:01

## 2021-03-19 RX ADMIN — ALBUTEROL SULFATE 2.5 MG: 2.5 SOLUTION RESPIRATORY (INHALATION) at 20:42

## 2021-03-19 RX ADMIN — HYDRALAZINE HYDROCHLORIDE 5 MG: 20 INJECTION INTRAMUSCULAR; INTRAVENOUS at 15:43

## 2021-03-19 ASSESSMENT — ENCOUNTER SYMPTOMS
STRIDOR: 0
CHEST TIGHTNESS: 0
ALLERGIC/IMMUNOLOGIC NEGATIVE: 1
SHORTNESS OF BREATH: 1
COUGH: 1
CHOKING: 0
APNEA: 0
WHEEZING: 1
EYES NEGATIVE: 1
GASTROINTESTINAL NEGATIVE: 1

## 2021-03-19 NOTE — ED NOTES
Per patient he has had both covid vaccines, 2nd one was yesterdat 3/18/21       Gio Gupta, RN  03/19/21 [id number]

## 2021-03-19 NOTE — ED NOTES
Bed: 03  Expected date:   Expected time:   Means of arrival:   Comments:  Hold for pt from rehab     Sherry Baum RN  03/19/21 [id number]

## 2021-03-19 NOTE — H&P
Patient:  Wisam Charles  MRN: [id number]    Chief Complaint:    Chief Complaint   Patient presents with    Shortness of Breath     after 1 minute into workout during phase 3 of cardiac rehab---denies any chest pain    Rectal Bleeding     ongoing for a few weeks    Dizziness     intermittent for last few days       History Obtained From:  patient, electronic medical record    PCP: Akosua Callejas MD    History of Present Illness: The patient is a 69 y.o. male who presents with ER complaining of dizziness for the last couple days. He apparently went to cardiac rehab this morning and he was unable to walk all the way to start his routine exercises. He reports some fatigue and some slight dry cough which is not new. Apparently also had some slight rectal bleeding. Denies any nausea, vomiting, diarrhea, constipation, abdominal pain. He is also complaining of having rectal bleeding on and off for several months. He apparently has had radiation therapy for prostate cancer and he believes that this was causing him to have irregular bleeding. He states he gets colonoscopies regularly and that last time he had one he had some areas in the colon cauterized.              Nursing Notes werereviewed.     REVIEW OF SYSTEMS    (2-9 systems for level 4, 10 or more for level 5)      Review of Systems   Constitutional: Positive for fatigue. Negative for activity change, appetite change, chills, diaphoresis, fever and unexpected weight change. HENT: Negative. Eyes: Negative. Respiratory: Positive for cough, shortness of breath and wheezing. Negative for apnea, choking, chest tightness and stridor. Cardiovascular: Positive for leg swelling. Gastrointestinal: Negative. Endocrine: Negative. Genitourinary: Negative. Musculoskeletal: Negative. Allergic/Immunologic: Negative. Neurological: Negative.         Past Medical History:        Diagnosis Date    CAD (coronary artery disease)     Stent placement 2019    COPD (chronic obstructive pulmonary disease) (HCC)     Essential hypertension     Hyperlipidemia     Prostate cancer ([address]     Wears glasses        Past Surgical History:        Procedure Laterality Date    ANKLE SURGERY      CAROTID STENT PLACEMENT Left 2019    COLONOSCOPY  2015    Normal    HERNIA REPAIR Right 1975    OTHER SURGICAL HISTORY      cauterization intestines-[facility]       Family History:       Problem Relation Age of Onset    No Known Problems Father     Cancer Mother        Social History:   TOBACCO:   reports that he quit smoking about 7 years ago. His smoking use included cigarettes. He has a 30.00 pack-year smoking history. He has never used smokeless tobacco.  ETOH:   reports current alcohol use. Allergies:  Patient has no known allergies. Medications Prior to Admission:    Prior to Admission medications    Medication Sig Start Date End Date Taking?  Authorizing Provider   predniSONE 10 MG (21) TBPK Take 4 tabs QD x 3 days, then 3 tabs QD x3 days, then 2 tabs QD x3 days, then 1 tab QD x3 days, then stop  Patient taking differently: every other day Take 4 tabs QD x 3 days, then 3 tabs QD x3 days, then 2 tabs QD x3 days, then 1 tab QD x3 days, then stop 12/4/20  Yes Mj Schroeder MD   carvedilol (COREG) 25 MG tablet Take 25 mg by mouth 2 times daily (with meals)   Yes Historical Provider, MD   atorvastatin (LIPITOR) 80 MG tablet Take 1 tablet by mouth daily 7/1/19  Yes Mj Schroeder MD   hydrochlorothiazide (HYDRODIURIL) 25 MG tablet Take 1 tablet by mouth daily 6/21/19  Yes Mj Schroeder MD   Cholecalciferol (VITAMIN D3) 5000 units CAPS Take 1,000 Units by mouth daily   Yes Historical Provider, MD   albuterol sulfate  (90 Base) MCG/ACT inhaler Inhale 2 puffs into the lungs every 6 hours as needed for Wheezing or Shortness of Breath 12/19/18  Yes Domenica Lyon APRN - CNP   albuterol (PROVENTIL) (2.5 MG/3ML) 0.083% nebulizer solution Take 3 mLs by nebulization every 4 hours as needed for Wheezing 12/19/18  Yes EVER Pablo - CNP   budesonide-formoterol (SYMBICORT) 160-4.5 MCG/ACT AERO Inhale 2 puffs into the lungs 2 times daily   Yes Historical Provider, MD   tiotropium (SPIRIVA RESPIMAT) 1.25 MCG/ACT AERS inhaler Inhale 2 puffs into the lungs daily   Yes Historical Provider, MD   cetirizine (ZYRTEC) 10 MG tablet Take 10 mg by mouth daily   Yes Historical Provider, MD   montelukast (SINGULAIR) 10 MG tablet Take 10 mg by mouth nightly   Yes Historical Provider, MD   LEUPROLIDE ACETATE SC Inject into the skin    Historical Provider, MD           Physical Exam:    Vitals:   Temp: 96.7 °F (35.9 °C)  BP: (!) 171/90  Resp: 20  Pulse: 74  SpO2: 97 %  24HR INTAKE/OUTPUT:  No intake or output data in the 24 hours ending 03/19/21 1705    Exam:  GEN:  alert and oriented to person, place and time  EYES: No gross abnormalities.  and Sclera nonicteric  NECK: supple, no lymphadenopathy, cushinoid,  no carotid bruits  PULM: decreased breath sounds noted- b  COR: regular rate & rhythm  ABD:  soft, non-tender and non-distended  EXT:   Trace edema  NEURO: follows commands, JUAREZ, no deficits  SKIN:  no rashes or significant lesions  -----------------------------------------------------------------  Diagnostic Data:   Lab Results   Component Value Date    WBC 7.6 03/19/2021    HGB 8.6 (L) 03/19/2021     03/19/2021       Lab Results   Component Value Date    BUN 31 (H) 03/19/2021    CREATININE 1.67 (H) 03/19/2021     03/19/2021    K 3.2 (L) 03/19/2021    CALCIUM 9.8 03/19/2021    CL 95 (L) 03/19/2021    CO2 33 (H) 03/19/2021    LABGLOM 41 (L) 03/19/2021       Lab Results   Component Value Date    WBCUA None 06/17/2019    RBCUA None 06/17/2019    EPITHUA 0 TO 2 06/17/2019    LEUKOCYTESUR NEGATIVE 06/17/2019    SPECGRAV 1.020 06/17/2019    GLUCOSEU NEGATIVE 06/17/2019    KETUA NEGATIVE 06/17/2019    PROTEINU TRACE (A) 06/17/2019    HGBUR NEGATIVE 06/17/2019 CRYSTUA NOT REPORTED 06/17/2019    BACTERIA TRACE (A) 06/17/2019    YEAST NOT REPORTED 06/17/2019       Lab Results   Component Value Date    TROPONINT NOT REPORTED 03/19/2021    PROBNP 1,308 (H) 03/19/2021       Ct Head Wo Contrast    Result Date: 3/19/2021  EXAMINATION: CT OF THE HEAD WITHOUT CONTRAST  3/19/2021 9:07 am TECHNIQUE: CT of the head was performed without the administration of intravenous contrast. Dose modulation, iterative reconstruction, and/or weight based adjustment of the mA/kV was utilized to reduce the radiation dose to as low as reasonably achievable. COMPARISON: CT brain performed 05/20/2019. HISTORY: ORDERING SYSTEM PROVIDED HISTORY: dizziness TECHNOLOGIST PROVIDED HISTORY: dizziness Decision Support Exception->Emergency Medical Condition (MA) FINDINGS: BRAIN/VENTRICLES: There is no acute intracranial hemorrhage, mass effect, or midline shift. There is satisfactory overall gray-white matter differentiation. There is chronic microvascular disease. The ventricular structures are symmetric and unremarkable. The infratentorial structures are unremarkable. ORBITS: The visualized portion of the orbits demonstrate no acute abnormality. SINUSES: The visualized paranasal sinuses and mastoid air cells demonstrate no acute abnormality. SOFT TISSUES/SKULL:  No acute abnormality of the visualized skull or soft tissues. Chronic microvascular disease without acute intracranial abnormality. Xr Chest Portable    Result Date: 3/19/2021  EXAMINATION: ONE XRAY VIEW OF THE CHEST 3/19/2021 8:31 am COMPARISON: Chest radiograph performed 09/13/2020. HISTORY: ORDERING SYSTEM PROVIDED HISTORY: dyspnea TECHNOLOGIST PROVIDED HISTORY: dyspnea FINDINGS: There is no acute consolidation or effusion. There is no pneumothorax. The mediastinal structures are unremarkable. The upper abdomen is unremarkable. The extrathoracic soft tissues are unremarkable. There is no acute osseous abnormality.      No acute cardiopulmonary process. Assessment:    Active Problems:    Orthostatic dizziness  Resolved Problems:    * No resolved hospital problems. 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 /  2020 09/21/2020    Proteinuria 05/25/2019    Stenosis of left carotid artery 05/22/2019    PAF (paroxysmal atrial fibrillation) ([facility] 05/21/2019    Left carotid stenosis  with amaurosis fugax 05/21/2019    Essential hypertension, benign 05/18/2019    Stage 3 chronic kidney disease 12/14/2018    Prostate cancer [facility]) / Biopsy 2018 /  radiation 2019 06/01/2018    Chronic obstructive pulmonary disease ([facility] 02/18/2018       Plan:     · This patient requires overnight observation because of orthostatic   · Factors affecting the medical complexity of this patient include Active Problems:  ·   Orthostatic dizziness  · Resolved Problems:  ·   * No resolved hospital problems. *  ·   · Estimated length of stay is 1 days  · Fluids  · BP control  · Home tomorrow if good  · High risk medication monitoring: zero    CORE MEASURES  Core measures including DVT prophylaxis, Code Status, Nutrition, Therapy Options, chart reviewed and advance directives reviewed at this visit.     Leticia Galarza MD  3/19/2021, 5:05 PM

## 2021-03-19 NOTE — ED NOTES
Telephone call to lab to question troponin results. Specimen was in lab but not running. Lab to run trop now.       Ron Sharma, JARETH  03/19/21 [id number] JARETH Sharma  03/19/21 0037

## 2021-03-19 NOTE — ED NOTES
Dr. Cristel Giang made aware of pt's elevated BP in the 180's.  He goes to room to talk to pt about being admited     Bhavana Read RN  03/19/21 5149

## 2021-03-19 NOTE — ED NOTES
Pt ambulated to the restroom without any c/o dizziness or shortness of breath.       Eve Arguelles RN  03/19/21 3310

## 2021-03-19 NOTE — ED PROVIDER NOTES
[facility]      Pt Name: Glenna Almanzar  MRN: [id number]  [place] 1950  Date of evaluation: 3/19/2021  Provider: Ian Shah MD    CHIEF COMPLAINT     Chief Complaint   Patient presents with    Shortness of Breath     after 1 minute into workout during phase 3 of cardiac rehab---denies any chest pain    Rectal Bleeding     ongoing for a few weeks    Dizziness     intermittent for last few days         HISTORY OF PRESENT ILLNESS   (Location/Symptom, Timing/Onset, Context/Setting,Quality, Duration, Modifying Factors, Severity)  Note limiting factors. Glenna Almanzar is a77 y.o. male who presents to the emergency department      This is a 69years old male history of COPD presented to the ER complaining of dizziness for the last couple days. He apparently went to cardiac rehab this morning and he was unable to walk all the way to start his routine exercises. He reports some fatigue and some slight dry cough which is not new. Apparently also had some slight rectal bleeding. Denies any nausea, vomiting, diarrhea, constipation, abdominal pain. He is also complaining of having rectal bleeding on and off for several months. He apparently has had radiation therapy for prostate cancer and he believes that this was causing him to have irregular bleeding. He states he gets colonoscopies regularly and that last time he had one he had some areas in the colon cauterized. Nursing Notes werereviewed. REVIEW OF SYSTEMS    (2-9 systems for level 4, 10 or more for level 5)     Review of Systems   Constitutional: Positive for fatigue. Negative for activity change, appetite change, chills, diaphoresis, fever and unexpected weight change. HENT: Negative. Eyes: Negative. Respiratory: Positive for cough, shortness of breath and wheezing. Negative for apnea, choking, chest tightness and stridor. Cardiovascular: Positive for leg swelling.    Gastrointestinal: tiotropium (SPIRIVA RESPIMAT) 1.25 MCG/ACT AERS inhaler Inhale 2 puffs into the lungs dailyHistorical Med      cetirizine (ZYRTEC) 10 MG tablet Take 10 mg by mouth dailyHistorical Med      montelukast (SINGULAIR) 10 MG tablet Take 10 mg by mouth nightlyHistorical Med      LEUPROLIDE ACETATE SC Inject into the skinHistorical Med                  Patient has no known allergies.     FAMILY HISTORY       Family History   Problem Relation Age of Onset    No Known Problems Father     Cancer Mother           SOCIAL HISTORY       Social History     Socioeconomic History    Marital status:      Spouse name: None    Number of children: None    Years of education: None    Highest education level: None   Occupational History    None   Social Needs    Financial resource strain: Not hard at all   [employer] insecurity     Worry: Never true     Inability: Never true    Transportation needs     Medical: No     Non-medical: No   Tobacco Use    Smoking status: Former Smoker     Packs/day: 1.00     Years: 30.00     Pack years: 30.00     Types: Cigarettes     Quit date: 2013     Years since quittin.6    Smokeless tobacco: Never Used   Substance and Sexual Activity    Alcohol use: Yes     Frequency: Monthly or less     Drinks per session: 1 or 2     Binge frequency: Never     Comment: rare    Drug use: No    Sexual activity: None   Lifestyle    Physical activity     Days per week: 0 days     Minutes per session: 0 min    Stress: Not at all   Relationships    Social connections     Talks on phone: Once a week     Gets together: Once a week     Attends Muslim service: More than 4 times per year     Active member of club or organization: Yes     Attends meetings of clubs or organizations: More than 4 times per year     Relationship status:     Intimate partner violence     Fear of current or ex partner: No     Emotionally abused: No     Physically abused: No     Forced sexual activity: No   Other Topics Concern    None   Social History Narrative    None       SCREENINGS      [place] Coma Scale  Eye Opening: Spontaneous  Best Verbal Response: Oriented  Best Motor Response: Obeys commands  Becky Coma Scale Score: 15             PHYSICAL EXAM    (up to 7 for level 4, 8 or more for level 5)     ED Triage Vitals [03/19/21 0822]   BP Temp Temp Source Pulse Resp SpO2 Height Weight   (!) 144/67 96.6 °F (35.9 °C) Temporal 64 24 99 % 5' 6\" (1.676 m) 197 lb (89.4 kg)       Physical Exam  Vitals signs and nursing note reviewed. Constitutional:       Appearance: He is obese. HENT:      Head: Normocephalic and atraumatic. Eyes:      Extraocular Movements: Extraocular movements intact. Pupils: Pupils are equal, round, and reactive to light. Cardiovascular:      Rate and Rhythm: Normal rate and regular rhythm. Pulmonary:      Effort: Pulmonary effort is normal.      Breath sounds: Examination of the right-lower field reveals decreased breath sounds. Examination of the left-lower field reveals decreased breath sounds. Decreased breath sounds present. No wheezing, rhonchi or rales. Chest:      Chest wall: No mass, deformity, tenderness, crepitus or edema. There is no dullness to percussion. Musculoskeletal:      Right lower leg: Edema present. Left lower leg: Edema present. Skin:     General: Skin is warm. Capillary Refill: Capillary refill takes less than 2 seconds. Coloration: Skin is not cyanotic or pale. Findings: No ecchymosis, erythema or rash. Nails: There is no clubbing. Neurological:      General: No focal deficit present. Mental Status: He is alert. He is disoriented.          DIAGNOSTIC RESULTS     EKG: All EKG's are interpreted by the Emergency Department Physician who either signs orCo-signs this chart in the absence of a cardiologist.        RADIOLOGY:   plain film images such as CT, Ultrasound and MRI are read by the radiologist. Plain radiographic images are visualized and preliminarily interpreted by the emergency physician with the below findings:        Interpretation per the Radiologist below, ifavailable at the time of this note:    CT Head WO Contrast   Final Result   Chronic microvascular disease without acute intracranial abnormality. XR CHEST PORTABLE   Final Result   No acute cardiopulmonary process.                ED BEDSIDE ULTRASOUND:   Performed by ED Physician - none    LABS:  Labs Reviewed   BASIC METABOLIC PANEL - Abnormal; Notable for the following components:       Result Value    Glucose 129 (*)     BUN 31 (*)     CREATININE 1.67 (*)     Potassium 3.2 (*)     Chloride 95 (*)     CO2 33 (*)     Anion Gap 7 (*)     GFR Non- 41 (*)     GFR  49 (*)     All other components within normal limits   BRAIN NATRIURETIC PEPTIDE - Abnormal; Notable for the following components:    Pro-BNP 1,308 (*)     All other components within normal limits   CBC WITH AUTO DIFFERENTIAL - Abnormal; Notable for the following components:    RBC 3.82 (*)     Hemoglobin 8.6 (*)     Hematocrit 29.3 (*)     MCV 76.7 (*)     MCH 22.5 (*)     RDW 19.8 (*)     Seg Neutrophils 77 (*)     Lymphocytes 10 (*)     Absolute Lymph # 0.76 (*)     All other components within normal limits   TROPONIN - Abnormal; Notable for the following components:    Troponin, High Sensitivity 48 (*)     All other components within normal limits   TROPONIN - Abnormal; Notable for the following components:    Troponin, High Sensitivity 37 (*)     All other components within normal limits   CBC - Abnormal; Notable for the following components:    RBC 3.88 (*)     Hemoglobin 8.6 (*)     Hematocrit 29.3 (*)     MCV 75.5 (*)     MCH 22.2 (*)     RDW 19.9 (*)     All other components within normal limits   BASIC METABOLIC PANEL W/ REFLEX TO MG FOR LOW K - Abnormal; Notable for the following components:    Glucose 128 (*)     BUN 31 (*)     CREATININE 1.54 (*) Sodium 133 (*)     Potassium 3.1 (*)     Chloride 93 (*)     GFR Non- 45 (*)     GFR  54 (*)     All other components within normal limits   PROTIME-INR   MAGNESIUM       All other labs were within normal range ornot returned as of this dictation. EMERGENCY DEPARTMENT COURSE and DIFFERENTIAL DIAGNOSIS/MDM:   Vitals:    Vitals:    03/20/21 0137 03/20/21 0215 03/20/21 0515 03/20/21 0714   BP:  (!) 159/72  (!) 159/71   Pulse:  79  80   Resp:  20  18   Temp:  99.1 °F (37.3 °C)  97.5 °F (36.4 °C)   TempSrc:  Oral  Temporal   SpO2: 96% 94%  93%   Weight:   202 lb 1.6 oz (91.7 kg)    Height:               MDM  Number of Diagnoses or Management Options  Chronic blood loss anemia  Dizziness  Lightheadedness  Melena  Orthostatic hypotension  Radiation colitis  Diagnosis management comments: This is a 69years old patient presented to the ER complaining of dizziness. Is found to be anemic-has some melena-orthostatic hypotension as well. 45s recently ended up being admitted to the hospital for further evaluation and treatment. At the time of the admission patient's condition was stable. CRITICAL CARE TIME   Total CriticalCare time was  minutes, excluding separately reportable procedures. There was a high probability of clinically significant/life threatening deterioration in the patient's condition which required my urgent intervention. CONSULTS:  IP CONSULT TO CASE MANAGEMENT    PROCEDURES:  Unlessotherwise noted below, none     Procedures    FINAL IMPRESSION      1. Dizziness    2. Lightheadedness    3. Orthostatic hypotension    4. Radiation colitis    5. Melena    6.  Chronic blood loss anemia          DISPOSITION/PLAN   DISPOSITION        PATIENT REFERRED TO:  Treasure Lugo MD  [facility], [address]  [phone]    Schedule an appointment as soon as possible for a visit in 1 week  hospital follow up      DISCHARGE MEDICATIONS:  Discharge Medication List as of 3/20/2021 10:18 AM      START taking these medications    Details   NIFEdipine (ADALAT CC) 30 MG extended release tablet Take 1 tablet by mouth daily, Disp-30 tablet, R-3Normal      ferrous sulfate (IRON 325) 325 (65 Fe) MG tablet Take 1 tablet by mouth every other day, Disp-30 tablet, R-3Normal      docusate sodium (COLACE, DULCOLAX) 100 MG CAPS Take 100 mg by mouth 2 times daily, Disp-30 capsule, R-0Normal      potassium chloride (KLOR-CON M) 20 MEQ extended release tablet Take 1 tablet by mouth daily, Disp-30 tablet, R-0Normal                    (Please note that portions of this note were completed with a voice recognition program.  Efforts were made to edit the dictations but occasionally words are mis-transcribed.)      Jose Whitley MD (electronically signed)  Attending Emergency Physician            Jose Whitley MD  03/20/21 3942 Winsome eJong MD  03/29/21 9948

## 2021-03-20 VITALS
TEMPERATURE: 97.5 F | BODY MASS INDEX: 32.48 KG/M2 | RESPIRATION RATE: 18 BRPM | HEART RATE: 80 BPM | OXYGEN SATURATION: 93 % | WEIGHT: 202.1 LBS | HEIGHT: 66 IN | DIASTOLIC BLOOD PRESSURE: 71 MMHG | SYSTOLIC BLOOD PRESSURE: 159 MMHG

## 2021-03-20 PROBLEM — D50.0 ANEMIA DUE TO BLOOD LOSS, CHRONIC: Status: ACTIVE | Noted: 2021-03-20

## 2021-03-20 LAB
ANION GAP SERPL CALCULATED.3IONS-SCNC: 10 MMOL/L (ref 9–17)
BUN BLDV-MCNC: 31 MG/DL (ref 8–23)
BUN/CREAT BLD: 20 (ref 9–20)
CALCIUM SERPL-MCNC: 9.4 MG/DL (ref 8.6–10.4)
CHLORIDE BLD-SCNC: 93 MMOL/L (ref 98–107)
CO2: 30 MMOL/L (ref 20–31)
CREAT SERPL-MCNC: 1.54 MG/DL (ref 0.7–1.2)
GFR AFRICAN AMERICAN: 54 ML/MIN
GFR NON-AFRICAN AMERICAN: 45 ML/MIN
GFR SERPL CREATININE-BSD FRML MDRD: ABNORMAL ML/MIN/{1.73_M2}
GFR SERPL CREATININE-BSD FRML MDRD: ABNORMAL ML/MIN/{1.73_M2}
GLUCOSE BLD-MCNC: 128 MG/DL (ref 70–99)
HCT VFR BLD CALC: 29.3 % (ref 40.7–50.3)
HEMOGLOBIN: 8.6 G/DL (ref 13–17)
MAGNESIUM: 2.2 MG/DL (ref 1.6–2.6)
MCH RBC QN AUTO: 22.2 PG (ref 25.2–33.5)
MCHC RBC AUTO-ENTMCNC: 29.4 G/DL (ref 28.4–34.8)
MCV RBC AUTO: 75.5 FL (ref 82.6–102.9)
NRBC AUTOMATED: 0 PER 100 WBC
PDW BLD-RTO: 19.9 % (ref 11.8–14.4)
PLATELET # BLD: 256 K/UL (ref 138–453)
PMV BLD AUTO: 10.8 FL (ref 8.1–13.5)
POTASSIUM SERPL-SCNC: 3.1 MMOL/L (ref 3.7–5.3)
RBC # BLD: 3.88 M/UL (ref 4.21–5.77)
SODIUM BLD-SCNC: 133 MMOL/L (ref 135–144)
WBC # BLD: 6.9 K/UL (ref 3.5–11.3)

## 2021-03-20 PROCEDURE — G0378 HOSPITAL OBSERVATION PER HR: HCPCS

## 2021-03-20 PROCEDURE — 6370000000 HC RX 637 (ALT 250 FOR IP): Performed by: INTERNAL MEDICINE

## 2021-03-20 PROCEDURE — 80048 BASIC METABOLIC PNL TOTAL CA: CPT

## 2021-03-20 PROCEDURE — 2580000003 HC RX 258: Performed by: INTERNAL MEDICINE

## 2021-03-20 PROCEDURE — 36415 COLL VENOUS BLD VENIPUNCTURE: CPT

## 2021-03-20 PROCEDURE — 85027 COMPLETE CBC AUTOMATED: CPT

## 2021-03-20 PROCEDURE — 83735 ASSAY OF MAGNESIUM: CPT

## 2021-03-20 PROCEDURE — 94761 N-INVAS EAR/PLS OXIMETRY MLT: CPT

## 2021-03-20 RX ORDER — NIFEDIPINE 30 MG/1
30 TABLET, FILM COATED, EXTENDED RELEASE ORAL DAILY
Qty: 30 TABLET | Refills: 3 | Status: SHIPPED | OUTPATIENT
Start: 2021-03-21

## 2021-03-20 RX ORDER — POTASSIUM CHLORIDE 20 MEQ/1
20 TABLET, EXTENDED RELEASE ORAL DAILY
Qty: 30 TABLET | Refills: 0 | Status: SHIPPED | OUTPATIENT
Start: 2021-03-20 | End: 2021-07-20

## 2021-03-20 RX ORDER — PSEUDOEPHEDRINE HCL 30 MG
100 TABLET ORAL 2 TIMES DAILY
Qty: 30 CAPSULE | Refills: 0 | Status: SHIPPED | OUTPATIENT
Start: 2021-03-20

## 2021-03-20 RX ORDER — FERROUS SULFATE 325(65) MG
325 TABLET ORAL EVERY OTHER DAY
Qty: 30 TABLET | Refills: 3 | Status: SHIPPED | OUTPATIENT
Start: 2021-03-21 | End: 2022-07-21

## 2021-03-20 RX ADMIN — CETIRIZINE HYDROCHLORIDE 10 MG: 10 TABLET, FILM COATED ORAL at 08:29

## 2021-03-20 RX ADMIN — DOCUSATE SODIUM 100 MG: 100 CAPSULE, LIQUID FILLED ORAL at 08:29

## 2021-03-20 RX ADMIN — NIFEDIPINE 30 MG: 30 TABLET, EXTENDED RELEASE ORAL at 08:29

## 2021-03-20 RX ADMIN — SODIUM CHLORIDE, PRESERVATIVE FREE 10 ML: 5 INJECTION INTRAVENOUS at 08:30

## 2021-03-20 RX ADMIN — PREDNISONE 10 MG: 10 TABLET ORAL at 08:29

## 2021-03-20 RX ADMIN — CARVEDILOL 25 MG: 25 TABLET, FILM COATED ORAL at 08:29

## 2021-03-20 RX ADMIN — MONTELUKAST SODIUM 10 MG: 10 TABLET, FILM COATED ORAL at 08:29

## 2021-03-20 RX ADMIN — HYDROCHLOROTHIAZIDE 25 MG: 25 TABLET ORAL at 08:29

## 2021-03-20 RX ADMIN — ATORVASTATIN CALCIUM 80 MG: 40 TABLET, FILM COATED ORAL at 08:29

## 2021-03-20 NOTE — PROGRESS NOTES
RESPIRATORY ASSESSMENT PROTOCOL                                                                                              Patient Name: Tello Dang Room#: 7400/2753-76 : 1950     Admitting diagnosis: Orthostatic dizziness [R42]       Medical History:   Past Medical History:   Diagnosis Date    CAD (coronary artery disease)     Stent placement     COPD (chronic obstructive pulmonary disease) ([facility]     Essential hypertension     Hyperlipidemia     Prostate cancer ([facility]     Wears glasses        PATIENT ASSESSMENT    LABORATORY DATA  Hematology:   Lab Results   Component Value Date    WBC 7.6 2021    RBC 3.82 2021    HGB 8.6 2021    HCT 29.3 2021     2021     Chemistry:  No results found for: PHART, ZTR4EQA, PO2ART, G0YTGSII, MCM5FUE, PBEA    Blood Culture:   Sputum Culture:     VITALS  Pulse: 82   Resp: 22  BP: (!) 174/96  SpO2: 96 % O2 Device: None (Room air)  Temp: 97.7 °F (36.5 °C)  Comment:   SKIN COLOR  [x] Normal  [] Pale  [] Dusky  [] Cyanotic      RESPIRATORY PATTERN  [x] Normal  [] Dyspnea  [] Cheyne-Jensen  [] Kussmaul  [] Biots  AMBULATORY  [x] Yes  [] No  [] With Assistance    PEAK FLOW  Predicted:     Personal Best:        VITAL CAPACITY  Predicted value:  ml  Actual Value:  ml  30% of Predicted:  ml  Patient Acuity 0 1 2 3 4 Score   Level of Concious (LOC) [x]  Alert & Oriented or Pt normal LOC []  Confused;follows directions []  Confused & uncooper-ative []  Obtunded []  Comatose 0   Respiratory Rate  (RR) [x]  Reg. rate & pattern. 12 - 20 bpm  []  Increased RR. Greater than 20 bpm   []  SOB w/ exertion or RR greater than 24 bpm []  Access- ory muscle use at rest. Abn.  resp. []  SOB at rest.   0   Bilateral Breath Sounds (BBS) []  Clear []  Diminish-ed bases  []  Diminish-ed t/o, or rales   [x]  Sporadic, scattered wheezes or rhonchi []  Persistentwheezes and, or absent BBS 3   Cough [x]  Strong, effective, & non-prod. []  Effective & prod. Less than 25 ml (2 TBSP) over past 24 hrs []  Ineffective & non-prod to less than 25 ML over past 24 hrs []  Ineffective and, or greater than 25 ml sputum prod. past 24 hrs. []  Nonspon- taneous; Requires suctioning 0   Pulmonary History  (PULM HX) []  No smoking and no chronic pulmonaryhistory []  Former smoker. Quit over 12 mos. ago []  Current smoker or quit w/ in 12 mos [x]  Pulm. History and, or 20 pk/yr smoking hx []  Admitted w/ acute pulm. dx and, or has been admitted w/ pulm. dx 2 or more times over past 12 mos 3   Surgical History this Admit  (SURG HX) [x]  No surgery []  General surgery []  Lower abdominal []  Thoracic or upper abdominal   []  Thoracic w/ pulm. disease 0   Chest X-Ray (CXR)/CT Scan [x]  Clear or not applicable []  Not available []  Atelect- asis or pleural effusions []  Localized infiltrate or pulm. edema []  Con-solidated Infiltrates, bilateral, or in more than 1 lobe 0   Slow or Forced VC, FEV1 OR PEFR (PULM FXN)  [x]  80% or greater, or not indicated []  Pt. unable to perform []  FEV1 or PEFR or VC 51-79%. []  FEV1 or PEFR or VC  30-49%   []  FEV1 or PEFR or VC less than 30%   0   TOTAL ACUITY: 6       CARE PLAN    If Acuity Level is 2, 3, or 4 in any of the following:    [x] BILATERAL BREATH SOUNDS (BBS)     [x] PULMONARY HISTORY (PULM HX)  [] PULMONARY FUNCTION (PULM FX)    Goal: Improve respiratory functions in patients with airway disease and decrease WOB    [x] AEROSOL PROTOCOL  Continue current prn orders    Total Acuity:   16-32  []  Secondary Assessment in 24 hrs Total Acuity:  9-15  []  Secondary Assessment in 24 hrs Total Acuity:  4-8  []  Secondary Assessment in 48 hrs Total Acuity:  0-3  [x]  Secondary Assessment in 72 hrs   HHN AEROSOL THERAPY with  [physician-ordered bronchodilator(s)] q 4 & Albuterol PRN q2 hrs. Breath-Actuated Neb if BBS Acuity = 4, and pt. can use MP. Notify physician if condition deteriorates.   HHN AEROSOL THERAPY with  [physician-ordered bronchodilator(s)]  QID and Albuterol PRN q4 hrs. Breath-Actuated Neb if BBS Acuity = 4, and pt. can use MP. Notify physician if condition deteriorates. MDI THERAPY with  2 actuations of [physician-ordered bronchodilator(s)] via spacer TID Albuterol and PRNq4 hrs. If unable to utilize MDI: HHN [physician-ordered bronchodilator(s)] TID and Albuterol PRN q4 hrs. Notify physician if condition deteriorates. MDI THERAPY with  [physician-ordered bronchodilator(s)] via spacer TID PRN. If unable to utilize MDI: HHN [physician-ordered bronchodilator(s)] TID PRN. Notify physician if condition deteriorates. If Acuity Level is 2, 3, or 4 in any of the following:    [] COUGH     [] SURGICAL HISTORY (SURG HX)  [] CHEST XRAY (CXR)    Goal: Improvement in sputum mobilization in patients with ineffective airway clearance. Reverse atelectasis. [] Bronchopulmonary Hygiene Protocol    Total Acuity:   16-32  []  Secondary Assessment in 24 hrs Total Acuity:  9-15  []  Secondary Assessment in 24 hrs Total Acuity:  4-8  []  Secondary Assessment in 48 hrs Total Acuity:  0-3  []  Secondary Assessment in 72 hrs   METANEB QID with [physician-ordered bronchodilator(s)] if CXR Acuity = 4; otherwise:  PD&P, PEP, or Vest QID & PRN  NT Sxn PRN for ineffective cough  METANEB QID with [physician-ordered bronchodilator(s)] if CXR Acuity = 4; otherwise:  PD&P, PEP, or Vest TID & PRN  NT Sxn PRN for ineffective cough  Instruct patient to self-perform IS q1hr WA   Directed Cough self-performed q1hr WA     If Acuity Level is 2 or above in the following:    [] PULMONARY HISTORY (PULM HX)    Goal: Assist patient in quitting smoking to slow or stop the progression of lung disease.     [] Smoking Cessation Protocol    SMOKING CESSATION EDUCATION provided according to policy [id number]: (rohit with an X)  ____Yes    ____ No     ____ NA    Smoking Cessation Booklet given:  ____Yes  ____No ____Patient Ally Pardo

## 2021-03-20 NOTE — DISCHARGE INSTR - DIET

## 2021-03-20 NOTE — PLAN OF CARE
Problem: Falls - Risk of:  Goal: Will remain free from falls  Description: Will remain free from falls  3/20/2021 0433 by Anais Badillo RN  Outcome: Ongoing  Note: Patient is a high fall risk even though the fall score is medium because of dizziness being his admission diagnosis. Patient is alert and oriented and calls out appropriately. Patient has to call to get off the bed. Bed wheels locked and kept in lowest position. Nonskid wear on, fall sign posted and fall sticker on. Bedside table and call light within reach. Needs assessed with hourly rounding and environment scanned for any safety issue.    3/20/2021 0433 by Anais Badillo RN  Outcome: Ongoing  Goal: Absence of physical injury  Description: Absence of physical injury  3/20/2021 0433 by Anais Badillo RN  Outcome: Ongoing  3/20/2021 0433 by Anais Badillo RN  Outcome: Ongoing

## 2021-03-20 NOTE — PROGRESS NOTES
Reviewed discharge instructions with patient. All questions answered at this time. Patient wheeled out to front entrance and left via private auto at discharge.

## 2021-03-20 NOTE — DISCHARGE SUMMARY
apparent distress  EYES: No gross abnormalities. NECK: normal, no lymphadenopathy,  no carotid bruits  PULM: clear to auscultation bilaterally- no wheezes, rales or rhonchi, normal air movement, no respiratory distress  COR: regular rate & rhythm and no gallops  ABD:  soft, non-tender, non-distended, normal bowel sounds, no masses or organomegaly  EXT:   no cyanosis, clubbing or edema present    NEURO: negative  SKIN:  no rashes or significant lesions  -----------------------------------------------------------------          Hospital Course: The patient was admitted for the above. He was treated with iv fluids and his hb was monitored. His k was low and required replacement and improved over the course of his hospitalization. Consultants:    · none    Procedures:    · none    Complications:   · none    Significant Diagnostic Studies:   Ct Head Wo Contrast    Result Date: 3/19/2021  EXAMINATION: CT OF THE HEAD WITHOUT CONTRAST  3/19/2021 9:07 am TECHNIQUE: CT of the head was performed without the administration of intravenous contrast. Dose modulation, iterative reconstruction, and/or weight based adjustment of the mA/kV was utilized to reduce the radiation dose to as low as reasonably achievable. COMPARISON: CT brain performed 05/20/2019. HISTORY: ORDERING SYSTEM PROVIDED HISTORY: dizziness TECHNOLOGIST PROVIDED HISTORY: dizziness Decision Support Exception->Emergency Medical Condition (MA) FINDINGS: BRAIN/VENTRICLES: There is no acute intracranial hemorrhage, mass effect, or midline shift. There is satisfactory overall gray-white matter differentiation. There is chronic microvascular disease. The ventricular structures are symmetric and unremarkable. The infratentorial structures are unremarkable. ORBITS: The visualized portion of the orbits demonstrate no acute abnormality. SINUSES: The visualized paranasal sinuses and mastoid air cells demonstrate no acute abnormality.  SOFT TISSUES/SKULL:  No acute abnormality of the visualized skull or soft tissues. Chronic microvascular disease without acute intracranial abnormality. Xr Chest Portable    Result Date: 3/19/2021  EXAMINATION: ONE XRAY VIEW OF THE CHEST 3/19/2021 8:31 am COMPARISON: Chest radiograph performed 09/13/2020. HISTORY: ORDERING SYSTEM PROVIDED HISTORY: dyspnea TECHNOLOGIST PROVIDED HISTORY: dyspnea FINDINGS: There is no acute consolidation or effusion. There is no pneumothorax. The mediastinal structures are unremarkable. The upper abdomen is unremarkable. The extrathoracic soft tissues are unremarkable. There is no acute osseous abnormality. No acute cardiopulmonary process.      Recent Results (from the past 96 hour(s))   EKG 12 Lead    Collection Time: 03/19/21  8:27 AM   Result Value Ref Range    Ventricular Rate 60 BPM    Atrial Rate 60 BPM    QRS Duration 102 ms    Q-T Interval 464 ms    QTc Calculation (Bazett) 464 ms    R Axis 89 degrees    T Axis -128 degrees   Basic Metabolic Panel    Collection Time: 03/19/21  8:51 AM   Result Value Ref Range    Glucose 129 (H) 70 - 99 mg/dL    BUN 31 (H) 8 - 23 mg/dL    CREATININE 1.67 (H) 0.70 - 1.20 mg/dL    Bun/Cre Ratio 19 9 - 20    Calcium 9.8 8.6 - 10.4 mg/dL    Sodium 135 135 - 144 mmol/L    Potassium 3.2 (L) 3.7 - 5.3 mmol/L    Chloride 95 (L) 98 - 107 mmol/L    CO2 33 (H) 20 - 31 mmol/L    Anion Gap 7 (L) 9 - 17 mmol/L    GFR Non-African American 41 (L) >60 mL/min    GFR  49 (L) >60 mL/min    GFR Comment          GFR Staging         Brain Natriuretic Peptide    Collection Time: 03/19/21  8:51 AM   Result Value Ref Range    Pro-BNP 1,308 (H) <300 pg/mL    BNP Interpretation Pro-BNP Reference Range:    CBC Auto Differential    Collection Time: 03/19/21  8:51 AM   Result Value Ref Range    WBC 7.6 3.5 - 11.3 k/uL    RBC 3.82 (L) 4.21 - 5.77 m/uL    Hemoglobin 8.6 (L) 13.0 - 17.0 g/dL    Hematocrit 29.3 (L) 40.7 - 50.3 %    MCV 76.7 (L) 82.6 - 102.9 fL    MCH 22.5 (L) 25.2 - 33.5 pg    MCHC 29.4 28.4 - 34.8 g/dL    RDW 19.8 (H) 11.8 - 14.4 %    Platelets 394 403 - 621 k/uL    MPV 10.3 8.1 - 13.5 fL    NRBC Automated 0.0 0.0 per 100 WBC    Differential Type NOT REPORTED     WBC Morphology NOT REPORTED     RBC Morphology NOT REPORTED     Platelet Estimate NOT REPORTED     Seg Neutrophils 77 (H) 36 - 65 %    Lymphocytes 10 (L) 24 - 43 %    Monocytes 10 3 - 12 %    Eosinophils % 3 1 - 4 %    Immature Granulocytes 0 0 %    Basophils 0 0 - 2 %    Segs Absolute 5.85 1.50 - 8.10 k/uL    Absolute Lymph # 0.76 (L) 1.10 - 3.70 k/uL    Absolute Mono # 0.76 0.10 - 1.20 k/uL    Absolute Eos # 0.23 0.00 - 0.44 k/uL    Absolute Immature Granulocyte 0.00 0.00 - 0.30 k/uL    Basophils Absolute 0.00 0.0 - 0.2 k/uL    Morphology Platelet scan shows Normal Platelets    Troponin    Collection Time: 03/19/21  8:51 AM   Result Value Ref Range    Troponin, High Sensitivity 48 (H) 0 - 22 ng/L    Troponin T NOT REPORTED <0.03 ng/mL    Troponin Interp NOT REPORTED    Protime-INR    Collection Time: 03/19/21  8:51 AM   Result Value Ref Range    Protime 12.9 11.5 - 14.2 sec    INR 1.0    Troponin    Collection Time: 03/19/21  1:00 PM   Result Value Ref Range    Troponin, High Sensitivity 37 (H) 0 - 22 ng/L    Troponin T NOT REPORTED <0.03 ng/mL    Troponin Interp NOT REPORTED    CBC    Collection Time: 03/20/21  5:31 AM   Result Value Ref Range    WBC 6.9 3.5 - 11.3 k/uL    RBC 3.88 (L) 4.21 - 5.77 m/uL    Hemoglobin 8.6 (L) 13.0 - 17.0 g/dL    Hematocrit 29.3 (L) 40.7 - 50.3 %    MCV 75.5 (L) 82.6 - 102.9 fL    MCH 22.2 (L) 25.2 - 33.5 pg    MCHC 29.4 28.4 - 34.8 g/dL    RDW 19.9 (H) 11.8 - 14.4 %    Platelets 620 030 - 943 k/uL    MPV 10.8 8.1 - 13.5 fL    NRBC Automated 0.0 0.0 per 100 WBC   Basic Metabolic Panel w/ Reflex to MG    Collection Time: 03/20/21  5:31 AM   Result Value Ref Range    Glucose 128 (H) 70 - 99 mg/dL    BUN 31 (H) 8 - 23 mg/dL    CREATININE 1.54 (H) 0.70 - 1.20 mg/dL    Bun/Cre Ratio 20 9 - 20    Calcium 9.4 8.6 - 10.4 mg/dL    Sodium 133 (L) 135 - 144 mmol/L    Potassium 3.1 (L) 3.7 - 5.3 mmol/L    Chloride 93 (L) 98 - 107 mmol/L    CO2 30 20 - 31 mmol/L    Anion Gap 10 9 - 17 mmol/L    GFR Non-African American 45 (L) >60 mL/min    GFR  54 (L) >60 mL/min    GFR Comment          GFR Staging         Magnesium    Collection Time: 03/20/21  5:31 AM   Result Value Ref Range    Magnesium 2.2 1.6 - 2.6 mg/dL     ·     Discharge Condition:   · stable    Disposition:   · home    Discharge Medications:     Laura Parr   Home Medication Instructions [id number]    Printed on:03/20/21 7290   Medication Information                      albuterol (PROVENTIL) (2.5 MG/3ML) 0.083% nebulizer solution  Take 3 mLs by nebulization every 4 hours as needed for Wheezing             albuterol sulfate  (90 Base) MCG/ACT inhaler  Inhale 2 puffs into the lungs every 6 hours as needed for Wheezing or Shortness of Breath             atorvastatin (LIPITOR) 80 MG tablet  Take 1 tablet by mouth daily             budesonide-formoterol (SYMBICORT) 160-4.5 MCG/ACT AERO  Inhale 2 puffs into the lungs 2 times daily             carvedilol (COREG) 25 MG tablet  Take 25 mg by mouth 2 times daily (with meals)             cetirizine (ZYRTEC) 10 MG tablet  Take 10 mg by mouth daily             Cholecalciferol (VITAMIN D3) 5000 units CAPS  Take 1,000 Units by mouth daily             docusate sodium (COLACE, DULCOLAX) 100 MG CAPS  Take 100 mg by mouth 2 times daily             ferrous sulfate (IRON 325) 325 (65 Fe) MG tablet  Take 1 tablet by mouth every other day             hydrochlorothiazide (HYDRODIURIL) 25 MG tablet  Take 1 tablet by mouth daily             LEUPROLIDE ACETATE SC  Inject into the skin             montelukast (SINGULAIR) 10 MG tablet  Take 10 mg by mouth nightly             NIFEdipine (ADALAT CC) 30 MG extended release tablet  Take 1 tablet by mouth daily             potassium chloride (KLOR-CON M) 20 MEQ extended release tablet  Take 1 tablet by mouth daily             predniSONE 10 MG (21) TBPK  Take 4 tabs QD x 3 days, then 3 tabs QD x3 days, then 2 tabs QD x3 days, then 1 tab QD x3 days, then stop             tiotropium (SPIRIVA RESPIMAT) 1.25 MCG/ACT AERS inhaler  Inhale 2 puffs into the lungs daily                 · Resume all home medications unless otherwise directed  · Add kcl,procardia  ·     Patient Instructions:   · Activity: activity as tolerated  · Diet: cardiac diet  · Wound Care: none needed  · Other:   · Follow up with Dr Rosina Arceo in 1 wk as directed      Time Spent on discharge services is 25 minutes in the examination, evaluation, counseling and review of medications and discharge plan.     Signed:  Tiana Chakraborty M.D.  3/20/2021  8:38 AM

## 2021-03-20 NOTE — PROGRESS NOTES
The patient is resting in his bed at this time. Vitals are within the normal limit except for his B/P being on the higher side. Physical assessment is also completed at this time. Patient is A&O. Patient stated his dizziness is getting better. Further needs are assessed. Patient has been instructed on using call light to get off the bed. Will continue to monitor.

## 2021-03-20 NOTE — PROGRESS NOTES
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 *      ASSESSMENT / PLAN:  Orthostatic dizziness  · improved  · D/c home  ·   · Nutrition status:  Well developed, well nourished with no malnutrition  · DVT prophylaxis: Lovenox   · High risk medications: none   · Disposition:  Discharge plan is home    Darci Conroy M.D.  3/20/2021  8:30 AM

## 2021-03-20 NOTE — DISCHARGE INSTR - ACTIVITY
No restrictions on activity. Be cautious when switching from laying to sitting or standing. Slow transitions to prevent further dizziness.

## 2021-03-20 NOTE — PROGRESS NOTES
Patient got off the bed couple times over the shift to go to the bathroom and has not complained of any dizziness during the times. Will continue to monitor.

## 2021-03-26 ENCOUNTER — HOSPITAL ENCOUNTER (OUTPATIENT)
Dept: CARDIAC REHAB | Age: 71
Discharge: HOME | End: 2021-03-26

## 2021-03-30 ENCOUNTER — HOSPITAL ENCOUNTER (OUTPATIENT)
Dept: CARDIAC REHAB | Age: 71
Discharge: HOME | End: 2021-03-30

## 2021-04-02 ENCOUNTER — HOSPITAL ENCOUNTER (OUTPATIENT)
Dept: CARDIAC REHAB | Age: 71
Discharge: HOME | End: 2021-04-02

## 2021-04-06 ENCOUNTER — HOSPITAL ENCOUNTER (OUTPATIENT)
Dept: CARDIAC REHAB | Age: 71
Discharge: HOME | End: 2021-04-06

## 2021-04-06 ENCOUNTER — HOSPITAL ENCOUNTER (OUTPATIENT)
Age: 71
Discharge: HOME | End: 2021-04-06
Payer: MEDICARE

## 2021-04-06 LAB
ABSOLUTE EOS #: 0.12 K/UL (ref 0–0.44)
ABSOLUTE IMMATURE GRANULOCYTE: 0.12 K/UL (ref 0–0.3)
ABSOLUTE LYMPH #: 0.74 K/UL (ref 1.1–3.7)
ABSOLUTE MONO #: 0.62 K/UL (ref 0.1–1.2)
ALBUMIN SERPL-MCNC: 3.9 G/DL (ref 3.5–5.2)
ALBUMIN/GLOBULIN RATIO: 1.4 (ref 1–2.5)
ALP BLD-CCNC: 85 U/L (ref 40–129)
ALT SERPL-CCNC: 20 U/L (ref 5–41)
ANION GAP SERPL CALCULATED.3IONS-SCNC: 12 MMOL/L (ref 9–17)
AST SERPL-CCNC: 16 U/L
BASOPHILS # BLD: 1 % (ref 0–2)
BASOPHILS ABSOLUTE: 0.12 K/UL (ref 0–0.2)
BILIRUB SERPL-MCNC: <0.1 MG/DL (ref 0.3–1.2)
BUN BLDV-MCNC: 36 MG/DL (ref 8–23)
BUN/CREAT BLD: 21 (ref 9–20)
CALCIUM SERPL-MCNC: 9.9 MG/DL (ref 8.6–10.4)
CHLORIDE BLD-SCNC: 99 MMOL/L (ref 98–107)
CO2: 27 MMOL/L (ref 20–31)
CREAT SERPL-MCNC: 1.75 MG/DL (ref 0.7–1.2)
DIFFERENTIAL TYPE: ABNORMAL
EOSINOPHILS RELATIVE PERCENT: 1 % (ref 1–4)
GFR AFRICAN AMERICAN: 47 ML/MIN
GFR NON-AFRICAN AMERICAN: 39 ML/MIN
GFR SERPL CREATININE-BSD FRML MDRD: ABNORMAL ML/MIN/{1.73_M2}
GFR SERPL CREATININE-BSD FRML MDRD: ABNORMAL ML/MIN/{1.73_M2}
GLUCOSE BLD-MCNC: 145 MG/DL (ref 70–99)
HCT VFR BLD CALC: 34.8 % (ref 40.7–50.3)
HEMOGLOBIN: 10.3 G/DL (ref 13–17)
IMMATURE GRANULOCYTES: 1 %
LYMPHOCYTES # BLD: 6 % (ref 24–43)
MCH RBC QN AUTO: 24.4 PG (ref 25.2–33.5)
MCHC RBC AUTO-ENTMCNC: 29.6 G/DL (ref 28.4–34.8)
MCV RBC AUTO: 82.5 FL (ref 82.6–102.9)
MONOCYTES # BLD: 5 % (ref 3–12)
MORPHOLOGY: ABNORMAL
NRBC AUTOMATED: 0 PER 100 WBC
PDW BLD-RTO: 23.8 % (ref 11.8–14.4)
PLATELET # BLD: 298 K/UL (ref 138–453)
PLATELET ESTIMATE: ABNORMAL
PMV BLD AUTO: 10.5 FL (ref 8.1–13.5)
POTASSIUM SERPL-SCNC: 4.1 MMOL/L (ref 3.7–5.3)
RBC # BLD: 4.22 M/UL (ref 4.21–5.77)
RBC # BLD: ABNORMAL 10*6/UL
SEG NEUTROPHILS: 86 % (ref 36–65)
SEGMENTED NEUTROPHILS ABSOLUTE COUNT: 10.68 K/UL (ref 1.5–8.1)
SODIUM BLD-SCNC: 138 MMOL/L (ref 135–144)
TOTAL PROTEIN: 6.7 G/DL (ref 6.4–8.3)
WBC # BLD: 12.4 K/UL (ref 3.5–11.3)
WBC # BLD: ABNORMAL 10*3/UL

## 2021-04-06 PROCEDURE — 85025 COMPLETE CBC W/AUTO DIFF WBC: CPT

## 2021-04-06 PROCEDURE — 80053 COMPREHEN METABOLIC PANEL: CPT

## 2021-04-06 PROCEDURE — 36415 COLL VENOUS BLD VENIPUNCTURE: CPT

## 2021-04-09 ENCOUNTER — HOSPITAL ENCOUNTER (OUTPATIENT)
Dept: CARDIAC REHAB | Age: 71
Discharge: HOME | End: 2021-04-09

## 2021-04-13 ENCOUNTER — HOSPITAL ENCOUNTER (OUTPATIENT)
Dept: CARDIAC REHAB | Age: 71
Discharge: HOME | End: 2021-04-13

## 2021-04-20 ENCOUNTER — HOSPITAL ENCOUNTER (OUTPATIENT)
Dept: CARDIAC REHAB | Age: 71
Discharge: HOME | End: 2021-04-20

## 2021-04-23 ENCOUNTER — HOSPITAL ENCOUNTER (OUTPATIENT)
Dept: CARDIAC REHAB | Age: 71
Discharge: HOME | End: 2021-04-23

## 2021-04-27 ENCOUNTER — HOSPITAL ENCOUNTER (OUTPATIENT)
Dept: CARDIAC REHAB | Age: 71
Discharge: HOME | End: 2021-04-27

## 2021-05-03 ENCOUNTER — HOSPITAL ENCOUNTER (OUTPATIENT)
Age: 71
Discharge: HOME | End: 2021-05-03
Payer: MEDICARE

## 2021-05-03 DIAGNOSIS — D50.0 ANEMIA DUE TO BLOOD LOSS, CHRONIC: ICD-10-CM

## 2021-05-03 LAB
ABSOLUTE EOS #: 0.1 K/UL (ref 0–0.44)
ABSOLUTE IMMATURE GRANULOCYTE: 0.1 K/UL (ref 0–0.3)
ABSOLUTE LYMPH #: 0.93 K/UL (ref 1.1–3.7)
ABSOLUTE MONO #: 0.72 K/UL (ref 0.1–1.2)
BASOPHILS # BLD: 0 % (ref 0–2)
BASOPHILS ABSOLUTE: 0 K/UL (ref 0–0.2)
DIFFERENTIAL TYPE: ABNORMAL
EOSINOPHILS RELATIVE PERCENT: 1 % (ref 1–4)
HCT VFR BLD CALC: 36.8 % (ref 40.7–50.3)
HEMOGLOBIN: 11.9 G/DL (ref 13–17)
IMMATURE GRANULOCYTES: 1 %
LYMPHOCYTES # BLD: 9 % (ref 24–43)
MCH RBC QN AUTO: 26.3 PG (ref 25.2–33.5)
MCHC RBC AUTO-ENTMCNC: 32.3 G/DL (ref 28.4–34.8)
MCV RBC AUTO: 81.2 FL (ref 82.6–102.9)
MONOCYTES # BLD: 7 % (ref 3–12)
MORPHOLOGY: ABNORMAL
NRBC AUTOMATED: 0 PER 100 WBC
PDW BLD-RTO: 22.3 % (ref 11.8–14.4)
PLATELET # BLD: 283 K/UL (ref 138–453)
PLATELET ESTIMATE: ABNORMAL
PMV BLD AUTO: 10.2 FL (ref 8.1–13.5)
RBC # BLD: 4.53 M/UL (ref 4.21–5.77)
RBC # BLD: ABNORMAL 10*6/UL
SEG NEUTROPHILS: 82 % (ref 36–65)
SEGMENTED NEUTROPHILS ABSOLUTE COUNT: 8.45 K/UL (ref 1.5–8.1)
WBC # BLD: 10.3 K/UL (ref 3.5–11.3)
WBC # BLD: ABNORMAL 10*3/UL

## 2021-05-03 PROCEDURE — 36415 COLL VENOUS BLD VENIPUNCTURE: CPT

## 2021-05-03 PROCEDURE — 85025 COMPLETE CBC W/AUTO DIFF WBC: CPT

## 2021-05-04 ENCOUNTER — HOSPITAL ENCOUNTER (OUTPATIENT)
Dept: CARDIAC REHAB | Age: 71
Discharge: HOME | End: 2021-05-04

## 2021-05-07 ENCOUNTER — HOSPITAL ENCOUNTER (OUTPATIENT)
Dept: CARDIAC REHAB | Age: 71
Discharge: HOME | End: 2021-05-07

## 2021-05-12 NOTE — PROGRESS NOTES
Subjective:      Patient ID: Stephen Gastelum is a 69 y.o. male. HPI  Patient here for follow-up for severe COPD, chronic steroid use, RUDY on CPAP. Patient was last seen in the office in November 2020 per Dr. Zohreh Estrada. No Compliance data available- Goes through the [place]. Patient states uses every night, at least 4 hours, using a full face mask. Machine is new- approx 1 year. Patient is known to have prostate cancer, with radiation treatment. 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 Patient has required 2 transfusions to support his low hemoglobin. He continues with cardiac/pulmonary rehab. He unfortunately is still on prednisone 10 mg every other day. Has difficulty with his breathing once he reduces that dose. Discussed with patient that long-term chronic steroid use is not ideal due to the side effects of this and encourage patient to work on weaning his steroid as able. Discussed strategies to help with that. He is up-to-date on his Covid vaccines. Denies needing any refills. Endorses that his shortness of breath is his usual with no increase. Endorses that he occasionally has a cough that is generally nonproductive. Denies any hemoptysis or purulent sputum. Patient states that he will try to obtain a compliance report and have it sent to our office for documentation. Medications:   Prednisone 10 mg every other day. Albuterol neb/HFA:  Symbicort 160-4.5 mcg:  Spiriva Respimat 1.25 mcg:  Singulair 10 mg:    PRIOR WORKUP:  PFT:  Nocturnal oximetry 3/2/2020: SPO2 range 90 to 100% with no desaturations less than 90%. Patient did not qualify for nocturnal oxygen. 6-minute walk test 4/23/2019: Patient's SPO2 on room air was 94% at rest and remained above 88% during ambulation. Lowest SPO2 during ambulation was 91%. He did have progressive worsening dyspnea but did not drop below 89% did not qualify for home oxygen.     PFT 4/23/2019: Spirometry shows an obstructive ventilatory pattern with stage IV obstruction which does not improve with bronchodilator. Lung volumes show air trapping. Diffusion capacity is decreased at 51% of predicted. Final impression: Stage IV COPD with no significant bronchodilator response. CT Imaging:  CT lung screening 5/15/2020: Mild emphysematous changes are present. Prior tree-in-bud pattern in the left lower lobe, posterior right upper lobe and right infrahilar areas of the right lower lobe has resolved. Cast-like calcifications are present in the anterior portion of the right lower lobe. No significant nodules or interval developing acute findings are noted. High resolution CT chest 4/23/2019: Negative for mediastinal adenopathy. Scattered tree-in-bud opacities are seen in the peripheral left lower lobe, posterior right upper lobe near the fissure, peripheral superior segment of the right lower lobe and infrahilar right lower lobe. Interval resolution of previously seen right upper lobe peribronchovascular opacities. Stable emphysematous changes. No focal consolidation or pulmonary edema. Negative for pleural effusion or pneumothorax. CT chest 12/14/2018: Negative for mediastinal lymphadenopathy. Partially calcified right hilar lymph nodes. Paraseptal and centrilobular emphysema. Ill-defined peribronchovascular opacities are identified involving the right upper lobe. This is seen to a lesser degree within the left lower lobe. No suspicious noncalcified lung nodule or mass. Calcified granulomas of the right lower lobe are noted. No focal consolidation or pulmonary edema. No evidence of pleural effusion or pneumothorax.     Sleep Study:  Not available for my review    Laboratory Evaluation:  MANUEL 4/11/2019: Negative  ANCA myeloperoxidase 4/11/2019: 11  ANCA proteinase 4/11/2019: 17  CRP 4/11/2019: 11.1      Immunizations:   Immunization History   Administered Date(s) Administered    Kathleen FLOREZ PF, 100mcg/0.5mL 2021, 2021    Influenza Virus Vaccine 10/03/2016    Influenza, High-dose, Quadv, 65 yrs +, IM (Fluzone) 10/23/2020    Influenza, Intradermal, Preservative free 10/28/2019    Influenza, Quadv, 6 mo and older, IM, PF (Flulaval, Fluarix) 10/31/2018    Pneumococcal Conjugate 13-valent ([id number]) 2015    Pneumococcal Polysaccharide ([id number]) 2016    Zoster Live (Zostavax) 2016    Zoster Recombinant (Shingrix) 2019        No flowsheet data found.     /62   Pulse 61   Temp 97.7 °F (36.5 °C)   Resp 16   Ht 5' 6\" (1.676 m)   Wt 199 lb (90.3 kg)   SpO2 97%   BMI 32.12 kg/m²     Past Medical History:   Diagnosis Date    CAD (coronary artery disease)     Stent placement     COPD (chronic obstructive pulmonary disease) (HCC)     Essential hypertension     Hyperlipidemia     Prostate cancer ([facility] [address]     Wears glasses      Past Surgical History:   Procedure Laterality Date    ANKLE SURGERY      CAROTID STENT PLACEMENT Left 2019    COLONOSCOPY  2015    Normal    HERNIA REPAIR Right     OTHER SURGICAL HISTORY      [facility]ization intestines-[facility]     Family History   Problem Relation Age of Onset    No Known Problems Father     Cancer Mother        Social History     Socioeconomic History    Marital status:      Spouse name: Not on file    Number of children: Not on file    Years of education: Not on file    Highest education level: Not on file   Occupational History    Not on file   Tobacco Use    Smoking status: Former Smoker     Packs/day: 1.00     Years: 30.00     Pack years: 30.00     Types: Cigarettes     Quit date: 2013     Years since quittin.8    Smokeless tobacco: Never Used   Vaping Use    Vaping Use: Never used   Substance and Sexual Activity    Alcohol use: Yes     Comment: rare    Drug use: No    Sexual activity: Not on file   Other Topics Concern    Not on file   Social History Narrative    Not on movements intact  Ears - not examined  Nose - normal and patent, no erythema, discharge or polyps  Mouth - mucous membranes moist, pharynx normal without lesions  Neck - supple, no significant adenopathy  Chest -increased AP diameter, decreased thoracic expansion and excursion, prolonged expiratory phase. No adventitious sounds appreciated. Lung sounds generally quiet throughout.   Heart -normal rate, regular rhythm, normal S1, S2, no murmurs, rubs, clicks or gallops  Abdomen - soft, nontender, nondistended, no masses or organomegaly  Neuro- alert, oriented, normal speech, no focal findings or movement disorder noted}  Extremities - peripheral pulses normal, no pedal edema, no clubbing or cyanosis  Skin - normal coloration and turgor, no rashes, no suspicious skin lesions noted     Wt Readings from Last 3 Encounters:   05/24/21 199 lb (90.3 kg)   03/22/21 200 lb (90.7 kg)   03/20/21 202 lb 1.6 oz (91.7 kg)       Results for orders placed or performed during the hospital encounter of 05/03/21   CBC With Auto Differential   Result Value Ref Range    WBC 10.3 3.5 - 11.3 k/uL    RBC 4.53 4.21 - 5.77 m/uL    Hemoglobin 11.9 (L) 13.0 - 17.0 g/dL    Hematocrit 36.8 (L) 40.7 - 50.3 %    MCV 81.2 (L) 82.6 - 102.9 fL    MCH 26.3 25.2 - 33.5 pg    MCHC 32.3 28.4 - 34.8 g/dL    RDW 22.3 (H) 11.8 - 14.4 %    Platelets 031 001 - 182 k/uL    MPV 10.2 8.1 - 13.5 fL    NRBC Automated 0.0 0.0 per 100 WBC    Differential Type NOT REPORTED     WBC Morphology NOT REPORTED     RBC Morphology NOT REPORTED     Platelet Estimate NOT REPORTED     Seg Neutrophils 82 (H) 36 - 65 %    Lymphocytes 9 (L) 24 - 43 %    Monocytes 7 3 - 12 %    Eosinophils % 1 1 - 4 %    Immature Granulocytes 1 (H) 0 %    Basophils 0 0 - 2 %    Segs Absolute 8.45 (H) 1.50 - 8.10 k/uL    Absolute Lymph # 0.93 (L) 1.10 - 3.70 k/uL    Absolute Mono # 0.72 0.10 - 1.20 k/uL    Absolute Eos # 0.10 0.00 - 0.44 k/uL    Absolute Immature Granulocyte 0.10 0.00 - 0.30 k/uL Basophils Absolute 0.00 0.0 - 0.2 k/uL    Morphology ANISOCYTOSIS PRESENT        No results found. Assessment:      1. Personal history of tobacco use    2. COPD, severe ([address]    3. Current chronic use of systemic steroids    4. RUDY on CPAP    5. History of smoking 30 or more pack years    6. Cigarette nicotine dependence in remission          Plan:      1. Medications reviewed, continue as ordered. 2. Educated and clarified the medication use. 3. Recommend flu vaccination in the fall annually. Due in fall  4. Patient is up-to-date with vaccinations from pulmonary perspective. Up-to-date including Covid vaccine  5. Maintain an active lifestyle. 6. Patient's questions were answered to his satisfaction. 7. Former smoker quit in 2013 remains eligible for CT lung screening. Last CT lung screening was in May 2020. 8. Pulmonary function tests were reviewed  9. CT scan of the chest was reviewed  10. Compliance data not available for my review. Per patient report they use the machine faithfully every night, and report refreshing and restorative sleep without residual daytime fatigue  11. We'll see the patient back in 6 months      Electronically signed by EVER Nielson CNP on 5/24/2021 at 9:20 AM     Low Dose CT (LDCT) Lung Screening criteria met   Age 50-69   Pack year smoking >30   Still smoking or less than 15 year since quit   No sign or symptoms of lung cancer   > 11 months since last LDCT     Risks and benefits of lung cancer screening with LDCT scans discussed:    Significance of positive screen - False-positive LDCT results often occur. 95% of all positive results do not lead to a diagnosis of cancer. Usually further imaging can resolve most false-positive results; however, some patients may require invasive procedures.     Over diagnosis risk - 10% to 12% of screen-detected lung cancer cases are over diagnosed--that is, the cancer would not have been detected in the patient's lifetime without the screening. Need for follow up screens annually to continue lung cancer screening effectiveness     Risks associated with radiation from annual LDCT- Radiation exposure is about the same as for a mammogram, which is about 1/3 of the annual background radiation exposure from everyday life. Starting screening at age 54 is not likely to increase cancer risk from radiation exposure. Patients with comorbidities resulting in life expectancy of < 10 years, or that would preclude treatment of an abnormality identified on CT, should not be screened due to lack of benefit.     To obtain maximal benefit from this screening, smoking cessation and long-term abstinence from smoking is critical

## 2021-05-14 ENCOUNTER — HOSPITAL ENCOUNTER (OUTPATIENT)
Dept: CARDIAC REHAB | Age: 71
Discharge: HOME | End: 2021-05-14

## 2021-05-18 ENCOUNTER — HOSPITAL ENCOUNTER (OUTPATIENT)
Dept: CARDIAC REHAB | Age: 71
Discharge: HOME | End: 2021-05-18

## 2021-05-21 ENCOUNTER — HOSPITAL ENCOUNTER (OUTPATIENT)
Dept: CARDIAC REHAB | Age: 71
Discharge: HOME | End: 2021-05-21

## 2021-05-24 ENCOUNTER — OFFICE VISIT (OUTPATIENT)
Dept: PULMONOLOGY | Age: 71
End: 2021-05-24
Payer: MEDICARE

## 2021-05-24 VITALS
SYSTOLIC BLOOD PRESSURE: 111 MMHG | HEART RATE: 61 BPM | WEIGHT: 199 LBS | DIASTOLIC BLOOD PRESSURE: 62 MMHG | BODY MASS INDEX: 31.98 KG/M2 | OXYGEN SATURATION: 97 % | TEMPERATURE: 97.7 F | HEIGHT: 66 IN | RESPIRATION RATE: 16 BRPM

## 2021-05-24 DIAGNOSIS — Z79.52 CURRENT CHRONIC USE OF SYSTEMIC STEROIDS: ICD-10-CM

## 2021-05-24 DIAGNOSIS — Z99.89 OSA ON CPAP: ICD-10-CM

## 2021-05-24 DIAGNOSIS — Z87.891 HISTORY OF SMOKING 30 OR MORE PACK YEARS: ICD-10-CM

## 2021-05-24 DIAGNOSIS — F17.211 CIGARETTE NICOTINE DEPENDENCE IN REMISSION: ICD-10-CM

## 2021-05-24 DIAGNOSIS — G47.33 OSA ON CPAP: ICD-10-CM

## 2021-05-24 DIAGNOSIS — J44.9 COPD, SEVERE (HCC): ICD-10-CM

## 2021-05-24 DIAGNOSIS — Z87.891 PERSONAL HISTORY OF TOBACCO USE: Primary | ICD-10-CM

## 2021-05-24 PROCEDURE — G0296 VISIT TO DETERM LDCT ELIG: HCPCS | Performed by: NURSE PRACTITIONER

## 2021-05-24 PROCEDURE — 99214 OFFICE O/P EST MOD 30 MIN: CPT | Performed by: NURSE PRACTITIONER

## 2021-05-24 ASSESSMENT — ENCOUNTER SYMPTOMS
EYES NEGATIVE: 1
ALLERGIC/IMMUNOLOGIC NEGATIVE: 1

## 2021-05-24 NOTE — PATIENT INSTRUCTIONS
SURVEY:    You may be receiving a survey from [employer] regarding your visit today. Please complete the survey to enable us to provide the highest quality of care to you and your family. If you cannot score us a very good on any question, please call the office to discuss how we could have made your experience a very good one. Thank you. What is lung cancer screening? Lung cancer screening is a way in which doctors check the lungs for early signs of cancer in people who have no symptoms of lung cancer. A low-dose CT scan uses much less radiation than a normal CT scan and shows a more detailed image of the lungs than a standard X-ray. The goal of lung cancer screening is to find cancer early, before it has a chance to grow, spread, or cause problems. One large study found that smokers who were screened with low-dose CT scans were less likely to die of lung cancer than those who were screened with standard X-ray. Below is a summary of the things you need to know regarding screening for lung cancer with low-dose computed tomography (LDCT). This is a screening program that involves routine annual screening with LDCT studies of the lung. The LDCTs are done using low-dose radiation that is not thought to increase your cancer risk. If you have other serious medical conditions (other cancers, congestive heart failure) that limit your life expectancy to less than 10 years, you should not undergo lung cancer screening with LDCT. The chance is 20%-60% that the LDCT result will show abnormalities. This would require additional testing which could include repeat imaging or even invasive procedures. Most (about 95%) of \"abnormal\" LDCT results are false in the sense that no lung cancer is ultimately found. Additionally, some (about 10%) of the cancers found would not affect your life expectancy, even if undetected and untreated.   If you are still smoking, the single most important thing that you can do to reduce your risk of dying of lung cancer is to quit. For this screening to be covered by Medicare and most other insurers, strict criteria must be met. If you do not meet these criteria, but still wish to undergo LDCT testing, you will be required to sign a waiver indicating your willingness to pay for the scan.

## 2021-05-25 ENCOUNTER — HOSPITAL ENCOUNTER (OUTPATIENT)
Dept: CARDIAC REHAB | Age: 71
Discharge: HOME | End: 2021-05-25

## 2021-05-27 ENCOUNTER — TELEPHONE (OUTPATIENT)
Dept: ONCOLOGY | Age: 71
End: 2021-05-27

## 2021-05-27 NOTE — LETTER
5/27/2021        [address]az [address] [facility]    Dear Jade Baker: Your healthcare provider has ordered a low dose CT scan of the chest for lung cancer screening. You will find enclosed, information about CT lung screening. Please review the statement of understanding, you will be asked to sign a copy of this at the time of your CT scan    If you have not already been contacted to make the appointment for your scan, please call our scheduling department at [phone]    Keep in mind that CT lung screening does not take the place of smoking cessation. If you are a current smoker, you will find enclosed smoking cessation resources. Please do not hesitate to contact me if you have any questions or concerns.     [address] [facility],      [facility] Lung Screening [facility]  [phone]

## 2021-05-28 ENCOUNTER — HOSPITAL ENCOUNTER (OUTPATIENT)
Dept: CARDIAC REHAB | Age: 71
Discharge: HOME | End: 2021-05-28

## 2021-06-01 ENCOUNTER — HOSPITAL ENCOUNTER (OUTPATIENT)
Dept: CARDIAC REHAB | Age: 71
Discharge: HOME | End: 2021-06-01

## 2021-06-03 ENCOUNTER — HOSPITAL ENCOUNTER (OUTPATIENT)
Dept: CT IMAGING | Age: 71
Discharge: HOME | End: 2021-06-05
Payer: MEDICARE

## 2021-06-03 DIAGNOSIS — F17.211 CIGARETTE NICOTINE DEPENDENCE IN REMISSION: ICD-10-CM

## 2021-06-03 DIAGNOSIS — Z87.891 HISTORY OF SMOKING 30 OR MORE PACK YEARS: ICD-10-CM

## 2021-06-03 DIAGNOSIS — Z87.891 PERSONAL HISTORY OF TOBACCO USE: ICD-10-CM

## 2021-06-03 PROCEDURE — 71271 CT THORAX LUNG CANCER SCR C-: CPT

## 2021-06-04 ENCOUNTER — HOSPITAL ENCOUNTER (OUTPATIENT)
Dept: CARDIAC REHAB | Age: 71
Discharge: HOME | End: 2021-06-04

## 2021-06-08 ENCOUNTER — HOSPITAL ENCOUNTER (OUTPATIENT)
Dept: CARDIAC REHAB | Age: 71
Discharge: HOME | End: 2021-06-08

## 2021-06-15 ENCOUNTER — HOSPITAL ENCOUNTER (OUTPATIENT)
Dept: CARDIAC REHAB | Age: 71
Discharge: HOME | End: 2021-06-15

## 2021-06-22 ENCOUNTER — HOSPITAL ENCOUNTER (OUTPATIENT)
Dept: CARDIAC REHAB | Age: 71
Discharge: HOME | End: 2021-06-22

## 2021-06-29 ENCOUNTER — HOSPITAL ENCOUNTER (OUTPATIENT)
Dept: CARDIAC REHAB | Age: 71
Discharge: HOME | End: 2021-06-29

## 2021-07-02 ENCOUNTER — HOSPITAL ENCOUNTER (OUTPATIENT)
Dept: CARDIAC REHAB | Age: 71
Discharge: HOME | End: 2021-07-02

## 2021-07-06 ENCOUNTER — HOSPITAL ENCOUNTER (OUTPATIENT)
Dept: CARDIAC REHAB | Age: 71
Discharge: HOME | End: 2021-07-06

## 2021-07-09 ENCOUNTER — HOSPITAL ENCOUNTER (OUTPATIENT)
Dept: CARDIAC REHAB | Age: 71
Discharge: HOME | End: 2021-07-09

## 2021-07-13 ENCOUNTER — HOSPITAL ENCOUNTER (OUTPATIENT)
Age: 71
Discharge: HOME | End: 2021-07-13
Payer: MEDICARE

## 2021-07-13 ENCOUNTER — HOSPITAL ENCOUNTER (OUTPATIENT)
Dept: CARDIAC REHAB | Age: 71
Discharge: HOME | End: 2021-07-13

## 2021-07-13 DIAGNOSIS — C61 PROSTATE CANCER (HCC): ICD-10-CM

## 2021-07-13 LAB — PROSTATE SPECIFIC ANTIGEN: <0.02 UG/L

## 2021-07-13 PROCEDURE — 84153 ASSAY OF PSA TOTAL: CPT

## 2021-07-13 PROCEDURE — 36415 COLL VENOUS BLD VENIPUNCTURE: CPT

## 2021-07-16 ENCOUNTER — HOSPITAL ENCOUNTER (OUTPATIENT)
Dept: CARDIAC REHAB | Age: 71
Discharge: HOME | End: 2021-07-16

## 2021-07-20 ENCOUNTER — OFFICE VISIT (OUTPATIENT)
Dept: UROLOGY | Age: 71
End: 2021-07-20
Payer: MEDICARE

## 2021-07-20 VITALS
DIASTOLIC BLOOD PRESSURE: 84 MMHG | SYSTOLIC BLOOD PRESSURE: 147 MMHG | WEIGHT: 199 LBS | BODY MASS INDEX: 31.98 KG/M2 | HEIGHT: 66 IN | TEMPERATURE: 96.6 F | HEART RATE: 64 BPM

## 2021-07-20 DIAGNOSIS — C61 PROSTATE CANCER (HCC): Primary | ICD-10-CM

## 2021-07-20 PROCEDURE — 99213 OFFICE O/P EST LOW 20 MIN: CPT | Performed by: NURSE PRACTITIONER

## 2021-07-20 ASSESSMENT — ENCOUNTER SYMPTOMS
BACK PAIN: 0
WHEEZING: 0
COUGH: 0
NAUSEA: 0
EYE REDNESS: 0
COLOR CHANGE: 0
SHORTNESS OF BREATH: 0
CONSTIPATION: 0
VOMITING: 0
ABDOMINAL PAIN: 0

## 2021-07-20 NOTE — PATIENT INSTRUCTIONS
SURVEY:    You may be receiving a survey from [employer] regarding your visit today. Please complete the survey to enable us to provide the highest quality of care to you and your family. If you cannot score us a very good on any question, please call the office to discuss how we could of made your experience a very good one. Thank you.

## 2021-07-20 NOTE — PROGRESS NOTES
HPI:          Patient is a 69 y.o. male in no acute distress. He is alert and oriented to person, place, and time. History  5/2018 Prostate biopsy, two cores Aditya 3+4. PSA 14.59   PSA  7/2021 - <0.02  1/2021 - <0.01  3/2020 - <0.01  10/2019 - <0.01  6/2019 - <0.01  2/2019 - 0.08  11/2018 - 0.36  3/2018 - 14.59     8/2018 Started [facility] in conjunction with IMRT radiation.      11/2018 Changed from [facility] to [facility]     11/2020 Stopped ADT after ADT for 24 months     Today  Here today for a 6-month follow-up for prostate cancer. Most recent PSA is <0.02. He denies unintentional weight loss, decreased energy or appetite, new or worsening bone/hip/back pain. He denies any lower extremity numbness and tingling. He denies new or worsening LUTS. He denies gross hematuria or dysuria.      Past Medical History:   Diagnosis Date    CAD (coronary artery disease)     Stent placement 2019    COPD (ch[facility] obstructive pulmonary disease) ([facility] [address]     Essential hypertension     Hyperlipidemia     Prostate cancer ([facility] [address]     Wears glasses      Past Surgical History:   Procedure Laterality Date    ANKLE SURGERY      CAROTID STENT PLACEMENT Left 2019    COLONOSCOPY  2015    Normal    HERNIA REPAIR Right 1975    OTHER SURGICAL HISTORY      [facility]     Outpatient Encounter Medications as of 7/20/2021   Medication Sig Dispense Refill    NIFEdipine (ADALAT CC) 30 MG extended release tablet Take 1 tablet by mouth daily 30 tablet 3    ferrous sulfate (IRON 325) 325 (65 Fe) MG tablet Take 1 tablet by mouth every other day 30 tablet 3    docusate sodium (COLACE, DULCOLAX) 100 MG CAPS Take 100 mg by mouth 2 times daily 30 capsule 0    potassium chloride (KLOR-CON M) 20 MEQ extended release tablet Take 1 tablet by mouth daily 30 tablet 0    predniSONE 10 MG (21) TBPK Take 4 tabs QD x 3 days, then 3 tabs QD x3 days, then 2 tabs QD x3 days, then 1 tab QD x3 days, then stop (Patient taking differently: every other day Take 4 tabs QD x 3 days, then 3 tabs QD x3 days, then 2 tabs QD x3 days, then 1 tab QD x3 days, then stop) 21 each 0    carvedilol (COREG) 25 MG tablet Take 25 mg by mouth 2 times daily (with meals)      LEUPROLIDE ACETATE SC Inject into the skin      atorvastatin (LIPITOR) 80 MG tablet Take 1 tablet by mouth daily 90 tablet 3    hydrochlorothiazide (HYDRODIURIL) 25 MG tablet Take 1 tablet by mouth daily 30 tablet 5    Cholecalciferol (VITAMIN D3) 5000 units CAPS Take 1,000 Units by mouth daily      albuterol sulfate  (90 Base) MCG/ACT inhaler Inhale 2 puffs into the lungs every 6 hours as needed for Wheezing or Shortness of Breath 1 Inhaler 11    albuterol (PROVENTIL) (2.5 MG/3ML) 0.083% nebulizer solution Take 3 mLs by nebulization every 4 hours as needed for Wheezing 120 each 3    budesonide-formoterol (SYMBICORT) 160-4.5 MCG/ACT AERO Inhale 2 puffs into the lungs 2 times daily      tiotropium (SPIRIVA RESPIMAT) 1.25 MCG/ACT AERS inhaler Inhale 2 puffs into the lungs daily      cetirizine (ZYRTEC) 10 MG tablet Take 10 mg by mouth daily      montelukast (SINGULAIR) 10 MG tablet Take 10 mg by mouth nightly       No facility-administered encounter medications on file as of 7/20/2021.       Current Outpatient Medications on File Prior to Visit   Medication Sig Dispense Refill    NIFEdipine (ADALAT CC) 30 MG extended release tablet Take 1 tablet by mouth daily 30 tablet 3    ferrous sulfate (IRON 325) 325 (65 Fe) MG tablet Take 1 tablet by mouth every other day 30 tablet 3    docusate sodium (COLACE, DULCOLAX) 100 MG CAPS Take 100 mg by mouth 2 times daily 30 capsule 0    potassium chloride (KLOR-CON M) 20 MEQ extended release tablet Take 1 tablet by mouth daily 30 tablet 0    predniSONE 10 MG (21) TBPK Take 4 tabs QD x 3 days, then 3 tabs QD x3 days, then 2 tabs QD x3 days, then 1 tab QD x3 days, then stop (Patient taking differently: every other day Take 4 tabs QD x Negative for abdominal pain, constipation, nausea and vomiting. Genitourinary: Negative for decreased urine volume, difficulty urinating, discharge, dysuria, enuresis, flank pain, frequency, hematuria, penile pain, scrotal swelling, testicular pain and urgency. Musculoskeletal: Negative for back pain, joint swelling and myalgias. Skin: Negative for color change, rash and wound. Neurological: Negative for dizziness, tremors and numbness. Hematological: Negative for adenopathy. Does not bruise/bleed easily. Temp 96.6 °F (35.9 °C) (Temporal)   Ht 5' 6\" (1.676 m)   Wt 199 lb (90.3 kg)   BMI 32.12 kg/m²       PHYSICAL EXAM:  Constitutional: Patient in no acute distress; Neuro: alert and oriented to person place and time. Psych: Mood and affect normal.  Skin: Normal  Lungs: Respiratory effort normal  Cardiovascular:  Normal peripheral pulses  Abdomen: Soft, non-tender, non-distended with no CVA, flank pain  Bladder non-tender and not distended. Lab Results   Component Value Date    BUN 36 (H) 04/06/2021     Lab Results   Component Value Date    CREATININE 1.75 (H) 04/06/2021     Lab Results   Component Value Date    PSA <0.02 07/13/2021    PSA <0.01 01/12/2021    PSA <0.01 07/14/2020       ASSESSMENT:   Diagnosis Orders   1. Prostate cancer ([facility] [address]  PSA, Diagnostic         PLAN:  PSA remains stable. We will plan to see him in 6 months with a PSA prior. He will call sooner if needed for any new or worsening symptoms.

## 2021-07-23 PROBLEM — N18.31 STAGE 3A CHRONIC KIDNEY DISEASE (HCC): Chronic | Status: ACTIVE | Noted: 2018-12-14

## 2021-07-30 ENCOUNTER — HOSPITAL ENCOUNTER (OUTPATIENT)
Dept: CARDIAC REHAB | Age: 71
Discharge: HOME | End: 2021-07-30

## 2021-08-03 ENCOUNTER — HOSPITAL ENCOUNTER (OUTPATIENT)
Dept: CARDIAC REHAB | Age: 71
Discharge: HOME | End: 2021-08-03

## 2021-08-06 ENCOUNTER — HOSPITAL ENCOUNTER (OUTPATIENT)
Dept: CARDIAC REHAB | Age: 71
Discharge: HOME | End: 2021-08-06

## 2021-08-10 ENCOUNTER — HOSPITAL ENCOUNTER (OUTPATIENT)
Dept: CARDIAC REHAB | Age: 71
Discharge: HOME | End: 2021-08-10

## 2021-08-13 ENCOUNTER — HOSPITAL ENCOUNTER (OUTPATIENT)
Dept: CARDIAC REHAB | Age: 71
Discharge: HOME | End: 2021-08-13

## 2021-08-13 PROCEDURE — 9900000065 HC CARDIAC REHAB PHASE 3 - 1 VISIT

## 2021-08-17 ENCOUNTER — HOSPITAL ENCOUNTER (OUTPATIENT)
Dept: CARDIAC REHAB | Age: 71
Discharge: HOME | End: 2021-08-17

## 2021-08-20 ENCOUNTER — HOSPITAL ENCOUNTER (OUTPATIENT)
Dept: CARDIAC REHAB | Age: 71
Discharge: HOME | End: 2021-08-20

## 2021-08-24 ENCOUNTER — HOSPITAL ENCOUNTER (OUTPATIENT)
Dept: CARDIAC REHAB | Age: 71
Discharge: HOME | End: 2021-08-24

## 2021-08-27 ENCOUNTER — HOSPITAL ENCOUNTER (OUTPATIENT)
Dept: CARDIAC REHAB | Age: 71
Discharge: HOME | End: 2021-08-27

## 2021-09-03 ENCOUNTER — HOSPITAL ENCOUNTER (OUTPATIENT)
Dept: CARDIAC REHAB | Age: 71
Discharge: HOME | End: 2021-09-03

## 2021-09-07 ENCOUNTER — HOSPITAL ENCOUNTER (OUTPATIENT)
Dept: CARDIAC REHAB | Age: 71
Discharge: HOME | End: 2021-09-07

## 2021-09-10 ENCOUNTER — HOSPITAL ENCOUNTER (OUTPATIENT)
Dept: CARDIAC REHAB | Age: 71
Discharge: HOME | End: 2021-09-10

## 2021-09-21 ENCOUNTER — HOSPITAL ENCOUNTER (OUTPATIENT)
Dept: CARDIAC REHAB | Age: 71
Discharge: HOME | End: 2021-09-21

## 2021-09-24 ENCOUNTER — HOSPITAL ENCOUNTER (OUTPATIENT)
Dept: CARDIAC REHAB | Age: 71
Discharge: HOME | End: 2021-09-24

## 2021-09-28 ENCOUNTER — HOSPITAL ENCOUNTER (OUTPATIENT)
Dept: CARDIAC REHAB | Age: 71
Discharge: HOME | End: 2021-09-28

## 2021-10-01 ENCOUNTER — HOSPITAL ENCOUNTER (OUTPATIENT)
Dept: CARDIAC REHAB | Age: 71
Discharge: HOME | End: 2021-10-01
Payer: MEDICARE

## 2021-10-05 ENCOUNTER — HOSPITAL ENCOUNTER (OUTPATIENT)
Dept: CARDIAC REHAB | Age: 71
Discharge: HOME | End: 2021-10-05
Payer: MEDICARE

## 2021-10-08 ENCOUNTER — HOSPITAL ENCOUNTER (OUTPATIENT)
Dept: CARDIAC REHAB | Age: 71
Discharge: HOME | End: 2021-10-08
Payer: MEDICARE

## 2021-10-12 ENCOUNTER — HOSPITAL ENCOUNTER (OUTPATIENT)
Dept: CARDIAC REHAB | Age: 71
Discharge: HOME | End: 2021-10-12
Payer: MEDICARE

## 2021-10-19 ENCOUNTER — HOSPITAL ENCOUNTER (OUTPATIENT)
Dept: CARDIAC REHAB | Age: 71
Discharge: HOME | End: 2021-10-19
Payer: MEDICARE

## 2021-10-22 ENCOUNTER — HOSPITAL ENCOUNTER (OUTPATIENT)
Dept: CARDIAC REHAB | Age: 71
Discharge: HOME | End: 2021-10-22
Payer: MEDICARE

## 2021-10-26 ENCOUNTER — HOSPITAL ENCOUNTER (OUTPATIENT)
Dept: CARDIAC REHAB | Age: 71
Discharge: HOME | End: 2021-10-26
Payer: MEDICARE

## 2021-11-09 ENCOUNTER — HOSPITAL ENCOUNTER (OUTPATIENT)
Dept: CARDIAC REHAB | Age: 71
Discharge: HOME | End: 2021-11-09
Payer: MEDICARE

## 2021-11-12 ENCOUNTER — HOSPITAL ENCOUNTER (OUTPATIENT)
Dept: CARDIAC REHAB | Age: 71
Discharge: HOME | End: 2021-11-12
Payer: MEDICARE

## 2021-11-16 ENCOUNTER — HOSPITAL ENCOUNTER (OUTPATIENT)
Dept: CARDIAC REHAB | Age: 71
Discharge: HOME | End: 2021-11-16
Payer: MEDICARE

## 2021-11-19 ENCOUNTER — HOSPITAL ENCOUNTER (OUTPATIENT)
Dept: CARDIAC REHAB | Age: 71
Discharge: HOME | End: 2021-11-19
Payer: MEDICARE

## 2021-11-22 ENCOUNTER — OFFICE VISIT (OUTPATIENT)
Dept: PULMONOLOGY | Age: 71
End: 2021-11-22
Payer: MEDICARE

## 2021-11-22 VITALS
DIASTOLIC BLOOD PRESSURE: 82 MMHG | SYSTOLIC BLOOD PRESSURE: 139 MMHG | OXYGEN SATURATION: 98 % | TEMPERATURE: 97.4 F | BODY MASS INDEX: 32.22 KG/M2 | HEIGHT: 66 IN | HEART RATE: 64 BPM | RESPIRATION RATE: 20 BRPM | WEIGHT: 200.5 LBS

## 2021-11-22 DIAGNOSIS — R06.09 EXERTIONAL DYSPNEA: ICD-10-CM

## 2021-11-22 DIAGNOSIS — G47.33 OSA ON CPAP: ICD-10-CM

## 2021-11-22 DIAGNOSIS — Z99.89 OSA ON CPAP: ICD-10-CM

## 2021-11-22 DIAGNOSIS — J43.9 PULMONARY EMPHYSEMA, UNSPECIFIED EMPHYSEMA TYPE (HCC): Primary | ICD-10-CM

## 2021-11-22 DIAGNOSIS — J44.9 COPD, SEVERE (HCC): ICD-10-CM

## 2021-11-22 DIAGNOSIS — Z87.891 HISTORY OF SMOKING 30 OR MORE PACK YEARS: ICD-10-CM

## 2021-11-22 PROCEDURE — 99214 OFFICE O/P EST MOD 30 MIN: CPT | Performed by: INTERNAL MEDICINE

## 2021-11-22 NOTE — PATIENT INSTRUCTIONS
SURVEY:    You may be receiving a survey from [employer] regarding your visit today. Please complete the survey to enable us to provide the highest quality of care to you and your family. If you cannot score us a very good on any question, please call the office to discuss how we could have made your experience a very good one. Thank you.

## 2021-11-22 NOTE — PROGRESS NOTES
PULMONARY MEDICINE OUTPATIENT  FOLLOW UP NOTE                                                                       PATIENT:  Stacey Merino  YOB: 1950  MRN: [id number]     REFERRED BY: Lakeisha Norris MD   CHIEF COMPLIANT: COPD (doing okay, has done research on valves wants to speak with  about that )       HISTORY     HISTORY OF PRESENT ILLNESS:   Stacey Merino is a 66y.o. year old male here for follow-up. Chronic obstructive pulmonary disease:  Patient is a former smoker and  reports that he quit smoking about 8 years ago. His smoking use included cigarettes. He has a 30.00 pack-year smoking history. He has never used smokeless tobacco.  He has been diagnosed to have severe COPD for several years. He is currently on Symbicort, Spiriva and as needed albuterol along with chronic prednisone therapy at 10 mg daily. Last PFT (4/23/2019) showed very severe obstructive ventilatory impairment with FEV1 0.68 L, air trapping and moderately reduced diffusion capacity. Lung cancer screening CT in June 2021 showed calcified right hilar lymph node and right lower lobe nodules suggestive of granulomatous lung disease. He has questions regarding bronchoscopic lung volume reduction surgery. Discussed at length about risks/benefits/alternatives. He also has obstructive sleep apnea and reports that he uses CPAP every night. He is being followed up for sleep apnea at the [facility] and reports good compliance to the machine.        PAST MEDICAL HISTORY:      Diagnosis Date    CAD (coronary artery disease)     Stent placement 2019    COPD (chronic obstructive pulmonary disease) (HCC)     Essential hypertension     Hyperlipidemia     Prostate cancer ([address]     Wears glasses      PAST SURGICAL HISTORY:      Procedure Laterality Date    ANKLE SURGERY      CAROTID STENT PLACEMENT Left 2019    COLONOSCOPY  2015    Normal    HERNIA REPAIR Right 1975    OTHER SURGICAL HISTORY cauterization [facility]     FAMILY HISTORY:      Problem Relation Age of Onset    No Known Problems Father     Cancer Mother      SOCIAL HISTORY:  TOBACCO:   reports that he quit smoking about 8 years ago. His smoking use included cigarettes. He has a 30.00 pack-year smoking history. He has never used smokeless tobacco.  ETOH:   reports current alcohol use. DRUGS: reports no history of drug use.      AVOCATION/OCCUPATIONAL EXPOSURE:  None     ALLERGIES:  No Known Allergies   MEDICATIONS:  Outpatient Medications Prior to Visit   Medication Sig Dispense Refill    predniSONE (DELTASONE) 10 MG tablet 4 tabs daily for 3 days, 3 tabs daily for 3 days, 2 tabs daily for 3 days, 1 tabs daily for 3 days 30 tablet 0    carvedilol (COREG) 25 MG tablet Take 1 tablet by mouth 2 times daily (with meals) 14 tablet 0    NIFEdipine (ADALAT CC) 30 MG extended release tablet Take 1 tablet by mouth daily 30 tablet 3    ferrous sulfate (IRON 325) 325 (65 Fe) MG tablet Take 1 tablet by mouth every other day 30 tablet 3    docusate sodium (COLACE, DULCOLAX) 100 MG CAPS Take 100 mg by mouth 2 times daily 30 capsule 0    atorvastatin (LIPITOR) 80 MG tablet Take 1 tablet by mouth daily 90 tablet 3    hydrochlorothiazide (HYDRODIURIL) 25 MG tablet Take 1 tablet by mouth daily 30 tablet 5    Cholecalciferol (VITAMIN D3) 5000 units CAPS Take 1,000 Units by mouth daily      albuterol sulfate  (90 Base) MCG/ACT inhaler Inhale 2 puffs into the lungs every 6 hours as needed for Wheezing or Shortness of Breath 1 Inhaler 11    albuterol (PROVENTIL) (2.5 MG/3ML) 0.083% nebulizer solution Take 3 mLs by nebulization every 4 hours as needed for Wheezing 120 each 3    budesonide-formoterol (SYMBICORT) 160-4.5 MCG/ACT AERO Inhale 2 puffs into the lungs 2 times daily      tiotropium (SPIRIVA RESPIMAT) 1.25 MCG/ACT AERS inhaler Inhale 2 puffs into the lungs daily      cetirizine (ZYRTEC) 10 MG tablet Take 10 mg by mouth daily      montelukast (SINGULAIR) 10 MG tablet Take 10 mg by mouth nightly       No facility-administered medications prior to visit.         PHYSICAL EXAMINATION      VITAL SIGNS:   /82 (Site: Left Upper Arm, Position: Sitting, Cuff Size: Medium Adult)   Pulse 64   Temp 97.4 °F (36.3 °C) (Temporal)   Resp 20   Ht 5' 6\" (1.676 m)   Wt 200 lb 8 oz (90.9 kg)   SpO2 98%   BMI 32.36 kg/m²   Wt Readings from Last 3 Encounters:   11/22/21 200 lb 8 oz (90.9 kg)   09/15/21 201 lb (91.2 kg)   07/23/21 199 lb (90.3 kg)     SYSTEMIC EXAMINATION:  General appearance -  [x] well appearing  [x] overweight  [] obese [] cachectic [x] comfortable  [x] in no acute distress  []chronically ill-appearing  [] in mild to moderate respiratory distress  Mental status -  [x] alert  [x] oriented to person, place, and time  [] anxious  [] depressed mood   Head-  [x] atraumatic  [x] normocephalic  Eyes -  [] pupils equal and reactive [x] extraocular eye movements intact  [x] sclera anicteric  Ears -  [x] hearing grossly normal bilaterally [] bilateral TM's and external ear canals normal  Nose -  [x] normal and patent [] no erythema  [] discharge  Mouth -  [x] mucous membranes moist  [] pharynx normal without lesions [] erythematous  [] exudate noted  Mallampati Airway Score -  [] I (soft palate, uvula, fauces, tonsillar pillars visible) [] II (soft palate, uvula, fauces visible) []  III (soft palate, base of uvula visible) [] IV (only hard palate visible)  Neck -  [x] supple  [x] no significant adenopathy  [x] carotids upstroke normal bilaterally [x] no JVD  [x] no bruit  Lymphatics -  [x] no palpable lymphadenopathy  [] no hepatosplenomegaly  Chest -   [x] normal chest excursion  [x] no chest wall tenderness  [] increased AP diameter[] pectus noted [] scoliosis noted [x] no tachypnea retraction or cyanosis [x] clear to auscultation, no wheezes, rales or rhonchi, symmetric air entry  [] wheezing noted  [] rales noted  []rhonchi noted [x] decreased air entry noted bilaterally  Heart -  [x] normal rate,  [x] regular rhythm  [] irregularly irregular rhythm [x] normal S1  [x] normal S2  [x] no murmurs, rubs, clicks or gallops  [] S3 present  [] S4 present  [] systolic murmur  [] diastolic murmur  [] midsystolic click []pericardial rub present   Abdomen -  [x] soft [x] nontender  [x] nondistended  [] no masses or organomegaly  Neurological -  [x] normal speech  [x] no focal findings or movement disorder noted  [x] no facial asymmetry []  cranial nerves II through XII intact  [] DTR's normal and symmetric  [] Babinski sign negative,  [x] motor and sensory grossly normal bilaterally  [] normal muscle tone [x] no tremors  [] strength 5/5  [] Romberg sign negative [x] normal gait and station  Musculoskeletal -  [x] no joint swelling [x] no joint tenderness [] no deformity  Extremities - [x] no clubbing or cyanosis,  [x] no pedal edema [] pedal edema  [x] intact peripheral pulses  Skin -  [x] normal coloration and turgor  [x] no rashes  [x] no suspicious skin lesions noted        LABORATORY DATA      LABS:  ABG:   No results found for: PH, PHART, PCO2, KSX0ILK, PO2, PO2ART, HCO3, ZCG6KNF, BE, BEART, THGB, R9IOWCOS  VBG:  Lab Results   Component Value Date    PHVEN 7.426 (H) 08/18/2020    JZW3YMP 42.7 08/18/2020    S0BXAHEQ 90.2 (H) 08/18/2020     CBC:   Lab Results   Component Value Date    WBC 10.3 05/03/2021    RBC 4.53 05/03/2021    HGB 11.9 (L) 05/03/2021    HCT 36.8 (L) 05/03/2021     05/03/2021    MCV 81.2 (L) 05/03/2021    MCH 26.3 05/03/2021    MCHC 32.3 05/03/2021    RDW 22.3 (H) 05/03/2021    LYMPHOPCT 9 (L) 05/03/2021    MONOPCT 7 05/03/2021    BASOPCT 0 05/03/2021    MONOSABS 0.72 05/03/2021    LYMPHSABS 0.93 (L) 05/03/2021    EOSABS 0.10 05/03/2021    BASOSABS 0.00 05/03/2021    DIFFTYPE NOT REPORTED 05/03/2021     Eosinophils/IgE:   Lab Results   Component Value Date    EOSABS 0.10 05/03/2021     Alpha 1 antitrypsin: No results found for: A1A  CMP:   Lab Results   Component Value Date     04/06/2021    K 4.1 04/06/2021    CL 99 04/06/2021    CO2 27 04/06/2021    BUN 36 (H) 04/06/2021    CREATININE 1.75 (H) 04/06/2021    GLUCOSE 134 05/03/2021    MG 2.2 03/20/2021    CALCIUM 9.9 04/06/2021    PROT 6.7 04/06/2021    LABALBU 3.9 04/06/2021    BILITOT <0.10 (L) 04/06/2021    ALT 20 04/06/2021    AST 16 04/06/2021    ALKPHOS 85 04/06/2021     Coagulation Profile:   Lab Results   Component Value Date    INR 1.0 03/19/2021    PROTIME 12.9 03/19/2021    APTT 27.5 09/13/2020     BNP:   Lab Results   Component Value Date    BNP 28 07/12/2013    PROBNP 1,308 (H) 03/19/2021     D-Dimer/Fibrinogen:  Lab Results   Component Value Date    DDIMER 0.41 08/18/2020     Others labs:  No results found for: TSH, T4PZAFA, J1KPMWF, THYROIDAB, FT3, T4FREE  Lab Results   Component Value Date    MANUEL NEGATIVE 04/11/2019    MPO 11 04/11/2019    PR3 17 04/11/2019    SEDRATE 46 (H) 09/13/2020    CRP 11.1 (H) 04/11/2019     No results found for: IRON, TIBC, FERRITIN, FOLATE, LDH  Lab Results   Component Value Date    PSA <0.02 07/13/2021     No results found for: RPR, HIV    RADIOLOGY:  Low-dose chest CT:   6/3/2021  1. Calcified right hilar and right lower lobe nodules representing   granulomatous disease. 2. No significant new nodule or mass. 3. Mild emphysema     Low-dose CT chest:  5/15/2020  Impression    1.  Interval resolution of prior scattered tree-in-bud findings consistent    with resolved inflammatory or infectious process.         2.  Emphysematous changes.         3.  No solid noncalcified nodules.  Cast like calcifications in the right    lower lobe are non worrisome.         LUNG RADS:    Category 2.  Benign findings.  Less than 1% probability of malignancy.     Advise annual low-dose CT screening of the chest.      PULMONARY FUNCTION TEST:    03/02/2020  NOCTURNAL RECORDING OXIMETRY     INTERPRETATION:  The patient had a total recording time of 7 hours and 8  minutes of which about 2 minutes were excluded. The patient had a pulse  rate of 99 to 35 with a mean pulse of 58 and the oxygen saturation  varied from 100% to 90% with a mean saturation of 96.2%. The patient  had oxygen saturation always more than 90%.    The nocturnal recording oximetry does not show any pattern suggesting  sleep apnea.     IMPRESSION:  No desaturation that would warrant oxygen therapy. If  sleep apnea is a concern, please do a sleep study. 04/23/2019  INTERPRETATION:  Spirometry shows an obstructive ventilatory pattern,  which is stage IV obstruction, and it does not improve with  bronchodilator therapy. The FEV1 is 0.68 liter. Lung volumes show air  trapping. Diffusion capacity is decreased at 51% predicted. Airway  resistance is increased and specific conductance is decreased. Flow  volume loop shows obstructive ventilatory pattern.     IMPRESSION:  Stage IV COPD with an FEV1 of 0.68 liter. Even though  there is lack of response to bronchodilator in the test, a clinical  improvement with bronchodilator therapy cannot be excluded. ECHOCARDIOGRAM:   Results for orders placed during the hospital encounter of 12/14/18   ECHO Complete 2D W Doppler W Color    Narrative   Summary  Poor image quality, likely due to patient body habitus and/or lung disease. Because of poor image quality, Definity echo contrast was used to better  assess left ventricular wall motion and thickness. Global left ventricular function is difficult to assess but appears normal  with an estimated EF of >55%. Mild left ventricular hypertrophy and with normal left ventricular cavity  size. No definite specific wall motion abnormalities were identified. No significant valvular abnormalities. Evidence of mild diastolic dysfunction is seen. No prior study available for comparison. CARDIAC CATHETERIZATION:  No results found for this or any previous visit.      ASSESSMENT AND PLAN     ASSESSMENT: ICD-10-CM    1. Pulmonary emphysema, unspecified emphysema type ([facility] [address]  J43.9 Full PFT Study With Bronchodilator     CT CHEST WO CONTRAST     Blood Gas, Arterial     6 Minute Walk Test     ECHO 2D WO Color Doppler Complete   2. COPD, severe ([address]  J44.9    3. RUDY on CPAP  G47.33     Z99.89    4. History of smoking 30 or more pack years  Z87.891    5. Exertional dyspnea  R06.00 ECHO 2D WO Color Doppler Complete     PLAN/RECOMMENDATIONS:    Pulmonary function tests reviewed  Recent chest x-ray and CT scan reviewed  Patient is currently on Symbicort, Spiriva and as needed albuterol nebulizer/HFA  He is also on chronic therapy with steroids, currently using prednisone 10 mg daily  Discussed at length about bronchoscopic lung volume reduction bronchoscopic surgery including risk/benefits/alternatives  We will order repeat PFT, 6-minute walk test, ABG, echo, and CT chest  Reviewed use of rescue vs controlling agents, oral and inhaled meds and potential side effects  Reviewed use, techniques, schedule and side effects of all inhaled medications  Refills were provided   Barriers to medication compliance addressed. Patient was recommended to have prednisone and an antibiotic available for use during an exacerbation  Patient received counseling on the following healthy behaviors: nutrition, exercise and medication adherence  Maintain an active lifestyle  Recommend him to continue smoking cessation  After reviewing the patient's smoking history, age and other clinical criteria, the LOW DOSE CT is : NOT INDICATED;  Recent CT within 11 months     Immunization History   Administered Date(s) Administered    COVID-19, Moderna, Primary or Immunocompromised, PF, 100mcg/0.5mL 02/18/2021, 03/18/2021    Influenza Virus Vaccine 10/03/2016    Influenza, High-dose, Quadv, 65 yrs +, IM (Fluzone) 10/23/2020    Influenza, Intradermal, Preservative free 10/28/2019    Influenza, Quadv, 6 mo and older, IM, PF (Flulaval, Fluarix) 10/31/2018  Pneumococcal Conjugate 13-valent ([id number]) 12/28/2015, 09/28/2016    Pneumococcal Polysaccharide ([id number]) 12/05/2016    Pneumococcal Vaccine 01/20/2015    Tdap (Boostrix, Adacel) 09/28/2016    Zoster Live (Zostavax) 03/01/2016    Zoster Recombinant (Shingrix) 11/20/2019     Recommend influenza vaccination in the fall annually; Up-to-date   Recommendations were given regarding pneumococcal vaccination; Up-to-date  Recommendations were given regarding COVID-19 vaccination; Up-to-date    Patient denies any excessive daytime sleepiness and reports refreshing and restorative sleep  Patient is using PAP as recommended and is benefiting compliant with the therapy  Sleep study/compliance data unavailable for review as patient follows up at [place]  Recommended him continue to use CPAP for at least 4 hours every night  Use heated humidification, if needed  Weight loss was recommended and discussed  Recommended good sleep hygiene and instructions provided  Patient instructed not to drive or operate heavy machinery, if sleepy    All the questions that the patient had were answered to his satisfaction  We'll see the patient back in 6 months  Thank you for having us involved in the care of your patient. Please call us if you have any questions or concerns. Arlene Holt MD  Pulmonary and Critical Care Medicine            Please note that this chart was generated using voice recognition Dragon dictation software. Although every effort was made to ensure the accuracy of this automated transcription, some errors in transcription may have occurred.

## 2021-11-23 ENCOUNTER — HOSPITAL ENCOUNTER (OUTPATIENT)
Dept: CARDIAC REHAB | Age: 71
Discharge: HOME | End: 2021-11-23
Payer: MEDICARE

## 2021-11-30 ENCOUNTER — HOSPITAL ENCOUNTER (OUTPATIENT)
Dept: CARDIAC REHAB | Age: 71
Discharge: HOME | End: 2021-11-30
Payer: MEDICARE

## 2021-12-03 ENCOUNTER — HOSPITAL ENCOUNTER (OUTPATIENT)
Dept: CARDIAC REHAB | Age: 71
Discharge: HOME | End: 2021-12-03
Payer: MEDICARE

## 2021-12-07 ENCOUNTER — HOSPITAL ENCOUNTER (OUTPATIENT)
Dept: CARDIAC REHAB | Age: 71
Discharge: HOME | End: 2021-12-07
Payer: MEDICARE

## 2021-12-10 ENCOUNTER — HOSPITAL ENCOUNTER (OUTPATIENT)
Dept: CARDIAC REHAB | Age: 71
Discharge: HOME | End: 2021-12-10
Payer: MEDICARE

## 2021-12-14 ENCOUNTER — HOSPITAL ENCOUNTER (OUTPATIENT)
Dept: CARDIAC REHAB | Age: 71
Discharge: HOME | End: 2021-12-14
Payer: MEDICARE

## 2021-12-17 ENCOUNTER — HOSPITAL ENCOUNTER (OUTPATIENT)
Dept: NON INVASIVE DIAGNOSTICS | Age: 71
Discharge: HOME | End: 2021-12-17
Payer: MEDICARE

## 2021-12-17 ENCOUNTER — HOSPITAL ENCOUNTER (OUTPATIENT)
Dept: CT IMAGING | Age: 71
Discharge: HOME | End: 2021-12-19
Payer: MEDICARE

## 2021-12-17 ENCOUNTER — HOSPITAL ENCOUNTER (OUTPATIENT)
Dept: CARDIAC REHAB | Age: 71
Discharge: HOME | End: 2021-12-17
Payer: MEDICARE

## 2021-12-17 DIAGNOSIS — R06.09 EXERTIONAL DYSPNEA: ICD-10-CM

## 2021-12-17 DIAGNOSIS — J43.9 PULMONARY EMPHYSEMA, UNSPECIFIED EMPHYSEMA TYPE (HCC): ICD-10-CM

## 2021-12-17 LAB
LV EF: 55 %
LVEF MODALITY: NORMAL

## 2021-12-17 PROCEDURE — 9900000065 HC CARDIAC REHAB PHASE 3 - 1 VISIT

## 2021-12-17 PROCEDURE — 71250 CT THORAX DX C-: CPT

## 2021-12-17 PROCEDURE — 93306 TTE W/DOPPLER COMPLETE: CPT

## 2021-12-21 ENCOUNTER — HOSPITAL ENCOUNTER (OUTPATIENT)
Dept: CARDIAC REHAB | Age: 71
Discharge: HOME | End: 2021-12-21
Payer: MEDICARE

## 2022-01-04 ENCOUNTER — HOSPITAL ENCOUNTER (OUTPATIENT)
Dept: PULMONOLOGY | Age: 72
Discharge: HOME | End: 2022-01-04
Payer: MEDICARE

## 2022-01-04 ENCOUNTER — HOSPITAL ENCOUNTER (OUTPATIENT)
Dept: PREADMISSION TESTING | Age: 72
Setting detail: SPECIMEN
Discharge: HOME | End: 2022-01-04
Payer: MEDICARE

## 2022-01-04 VITALS — OXYGEN SATURATION: 97 %

## 2022-01-04 VITALS — HEIGHT: 66 IN | BODY MASS INDEX: 31.34 KG/M2 | WEIGHT: 195 LBS

## 2022-01-04 DIAGNOSIS — J43.9 PULMONARY EMPHYSEMA, UNSPECIFIED EMPHYSEMA TYPE (HCC): ICD-10-CM

## 2022-01-04 LAB
SARS-COV-2, RAPID: NOT DETECTED
SPECIMEN DESCRIPTION: NORMAL

## 2022-01-04 PROCEDURE — C9803 HOPD COVID-19 SPEC COLLECT: HCPCS

## 2022-01-04 PROCEDURE — 94618 PULMONARY STRESS TESTING: CPT

## 2022-01-04 PROCEDURE — 6360000002 HC RX W HCPCS: Performed by: INTERNAL MEDICINE

## 2022-01-04 PROCEDURE — 94729 DIFFUSING CAPACITY: CPT

## 2022-01-04 PROCEDURE — 94060 EVALUATION OF WHEEZING: CPT

## 2022-01-04 PROCEDURE — 94726 PLETHYSMOGRAPHY LUNG VOLUMES: CPT

## 2022-01-04 PROCEDURE — 87635 SARS-COV-2 COVID-19 AMP PRB: CPT

## 2022-01-04 RX ORDER — ALBUTEROL SULFATE 2.5 MG/3ML
2.5 SOLUTION RESPIRATORY (INHALATION) ONCE
Status: COMPLETED | OUTPATIENT
Start: 2022-01-04 | End: 2022-01-04

## 2022-01-04 RX ADMIN — ALBUTEROL SULFATE 2.5 MG: 2.5 SOLUTION RESPIRATORY (INHALATION) at 13:31

## 2022-01-04 ASSESSMENT — 6 MINUTE WALK TEST (6MWT)
BLOOD PRESSURE: 148/97
BORG FATIGUE SCALE SCORE: 0.5
% PREDICTED: 111.61
BORG FATIGUE SCALE SCORE: 0.5
SYMPTOMS: SOB
OXYGEN DEVICE: ROOM AIR
TOTAL DISTANCE WALKED (M): 500
O2 SATURATION: 93
HEART RATE: 107
BORG DYSPNEA SCALE SCORE: 0.5
HEART RATE: 92
O2 SATURATION: 97
BLOOD PRESSURE: 153/99
BORG DYSPNEA SCALE SCORE: 0.5
BLOOD PRESSURE: 152/99
HEART RATE: 107
BORG FATIGUE SCALE SCORE: 0.5
ANY PROBLEMS WHILE EXERCISING?: NO
HEART RATE: 78
BORG DYSPNEA SCALE SCORE: 0.5
O2 SATURATION: 92
O2 SATURATION: 95

## 2022-01-06 PROCEDURE — 94060 EVALUATION OF WHEEZING: CPT | Performed by: INTERNAL MEDICINE

## 2022-01-06 PROCEDURE — 94729 DIFFUSING CAPACITY: CPT | Performed by: INTERNAL MEDICINE

## 2022-01-11 ENCOUNTER — HOSPITAL ENCOUNTER (OUTPATIENT)
Dept: CARDIAC REHAB | Age: 72
Discharge: HOME | End: 2022-01-11

## 2022-01-11 NOTE — PROGRESS NOTES
2022    TELEHEALTH EVALUATION -- Audio/Visual (During GEDBB-12 public health emergency)    Patient and physician are located in their individual locations. This is visit is completed via Entitle application []/ Doxy. me[] / Telephone [x]     HPI:    Niki Al (:  1950) has requested an audio/video evaluation for the following concern(s):    Patient here for follow-up for COPD. Patient was last seen in the office in 2021 per Dr. Iman Russ. For that appointment patient was asking about lung volume reduction surgery. Further testing including PFT, 6-minute walk test, ABG, echo and CT imaging of his chest were ordered. [facility] No compliance data available for my review. Patient reports that he is currently in pulmonary rehab/cardiac rehab. Recently missed a few dates of cardiac rehab due to appointments at the [place]. He attends cardiac rehab twice a week at [facility].  He also had a recent episode where he was out sick with anemia. Patient reports that he has stopped smoking. He is working on weaning down off of his prednisone. Currently on 10 mg every other day and planning to cut that in half. Discussed with patient that I will need to have Dr. Vinod Basurto review his chart as he is our partner that does [place] valve. Once Dr. Vinod Basurto has had a chance to review the chart and make a determination on whether or not patient would be a good candidate I will contact the patient and update him regarding Dr. Dawson Meade opinion. If he is a candidate he will need to see Dr. Vinod Basurto in our [facility] clinic in [facility]. ABG has not yet been completed: Patient advised that it does need to be completed and he will get that completed soon. PFT 2022: PFT 2022: FVC 67% of predicted, FEV1 36% of predicted, FEV1/FVC ratio 53% of predicted. FEF 25 7514% of predicted.   No significant improvement in FVC, FEV1 after one-time dose of bronchodilator. TLC is 117% of predicted. RV is 197% of predicted. DLCO is 48% of predicted. Final impression: Severe stage III COPD with emphysema. No significant bronchodilator response. Severe air trapping and severe decrease in DLCO. 6-minute walk test was completed 2022: Patient did not qualify for oxygen. Echocardiogram 2021:  Global left ventricular systolic function appears preserved with an  estimated ejection fraction of 55%. Moderately increased left ventricular wall thickness with a normal left  ventricular cavity size. No definite specific wall motion abnormalities were identified. No significant valvular disease was seen. Evidence of moderate diastolic dysfunction is seen. Compared to the previous study of 18, the patients LVH has increased  from mild to moderate.     CT chest 2021: Small blebs noted in the right apex. Small bullae measuring just over 2 cm noted in the anterior segment of each upper lobe. Subtle segmental focal inflammation identified in the posterior segment of the right upper lobe new from 2021. Negative for pleural effusion, bronchiectasis, significant pleural thickening. Mild central peribronchial thickening possibly due to retained secretions from pulmonary emphysema. Hyperinflation with diaphragmatic flattening and small scattered coalesced alveoli noted throughout. Medications:   Prednisone 10 mg every other day. Albuterol neb/HFA: 2-3x a day  Symbicort 160-4.5 mc puff  BID  Spiriva Respimat 1.25 mcg: Daily  Singulair 10 mg:     PRIOR WORKUP:  PFT:  PFT 2022: FVC 67% of predicted, FEV1 36% of predicted, FEV1/FVC ratio 53% of predicted. FEF 25 7514% of predicted. No significant improvement in FVC, FEV1 after one-time dose of bronchodilator. TLC is 117% of predicted. RV is 197% of predicted. DLCO is 48% of predicted. Final impression: Severe stage III COPD with emphysema.   No significant bronchodilator response. Severe air trapping and severe decrease in DLCO. Nocturnal oximetry 3/2/2020: SPO2 range 90 to 100% with no desaturations less than 90%. Patient did not qualify for nocturnal oxygen.     6-minute walk test 4/23/2019: Patient's SPO2 on room air was 94% at rest and remained above 88% during ambulation. Lowest SPO2 during ambulation was 91%. He did have progressive worsening dyspnea but did not drop below 89% did not qualify for home oxygen.     PFT 4/23/2019: Spirometry shows an obstructive ventilatory pattern with stage IV obstruction which does not improve with bronchodilator. Lung volumes show air trapping. Diffusion capacity is decreased at 51% of predicted. Final impression: Stage IV COPD with no significant bronchodilator response.     CT Imaging:  CT chest 12/17/2021: Small blebs noted in the right apex. Small bullae measuring just over 2 cm noted in the anterior segment of each upper lobe. Subtle segmental focal inflammation identified in the posterior segment of the right upper lobe new from June 2021. Negative for pleural effusion, bronchiectasis, significant pleural thickening. Mild central peribronchial thickening possibly due to retained secretions from pulmonary emphysema. Hyperinflation with diaphragmatic flattening and small scattered coalesced alveoli noted throughout. Echocardiogram 12/17/2021: LVEF 55%, evidence of moderate diastolic dysfunction is noted. No significant valvular disease noted. CT lung screening 6/3/2021: No significant mediastinal, hilar or axillary lymphadenopathy. Mild centrilobular emphysema. Few scattered calcified tiny nodules in the right lower lobe. Minor scarring along the left major fissure. No significant nodules or masses. No focal consolidation, pleural effusion or pneumothorax. CT lung screening 5/15/2020: Mild emphysematous changes are present.   Prior tree-in-bud pattern in the left lower lobe, posterior right upper lobe and right infrahilar areas of the right lower lobe has resolved. Cast-like calcifications are present in the anterior portion of the right lower lobe. No significant nodules or interval developing acute findings are noted.     High resolution CT chest 4/23/2019: Negative for mediastinal adenopathy. Scattered tree-in-bud opacities are seen in the peripheral left lower lobe, posterior right upper lobe near the fissure, peripheral superior segment of the right lower lobe and infrahilar right lower lobe. Interval resolution of previously seen right upper lobe peribronchovascular opacities. Stable emphysematous changes. No focal consolidation or pulmonary edema. Negative for pleural effusion or pneumothorax.     CT chest 12/14/2018: Negative for mediastinal lymphadenopathy. Partially calcified right hilar lymph nodes. Paraseptal and centrilobular emphysema. Ill-defined peribronchovascular opacities are identified involving the right upper lobe. This is seen to a lesser degree within the left lower lobe. No suspicious noncalcified lung nodule or mass. Calcified granulomas of the right lower lobe are noted. No focal consolidation or pulmonary edema. No evidence of pleural effusion or pneumothorax.     Sleep Study:  Not available for my review     Laboratory Evaluation:  MANUEL 4/11/2019: Negative  ANCA myeloperoxidase 4/11/2019: 11  ANCA proteinase 4/11/2019: 17  CRP 4/11/2019: 11.1        Review of Systems   Constitutional:        Decreased activity tolerance secondary to exertional dyspnea. HENT: Negative. Eyes: Negative. Respiratory:        Exertional dyspnea, no significant cough or sputum production. Cardiovascular: Negative. Gastrointestinal: Negative. Endocrine: Negative. Genitourinary: Negative. Musculoskeletal: Negative. Skin: Negative. Allergic/Immunologic: Negative. Neurological: Negative. Hematological: Negative. Psychiatric/Behavioral: Negative.         Prior to Visit Medications    Medication Sig Taking? Authorizing Provider   predniSONE (DELTASONE) 10 MG tablet 4 tabs daily for 3 days, 3 tabs daily for 3 days, 2 tabs daily for 3 days, 1 tabs daily for 3 days  Patient taking differently:  At present 10mg every other day Yes Lannette Babinski, APRN - CNP   carvedilol (COREG) 25 MG tablet Take 1 tablet by mouth 2 times daily (with meals) Yes Kassidy Renae MD   NIFEdipine (ADALAT CC) 30 MG extended release tablet Take 1 tablet by mouth daily Yes Get Shell MD   ferrous sulfate (IRON 325) 325 (65 Fe) MG tablet Take 1 tablet by mouth every other day Yes Get Shell MD   docusate sodium (COLACE, DULCOLAX) 100 MG CAPS Take 100 mg by mouth 2 times daily Yes Get Shell MD   atorvastatin (LIPITOR) 80 MG tablet Take 1 tablet by mouth daily Yes Kassidy Renae MD   hydrochlorothiazide (HYDRODIURIL) 25 MG tablet Take 1 tablet by mouth daily Yes Kassidy Renae MD   Cholecalciferol (VITAMIN D3) 5000 units CAPS Take 1,000 Units by mouth daily Yes Historical Provider, MD   albuterol sulfate  (90 Base) MCG/ACT inhaler Inhale 2 puffs into the lungs every 6 hours as needed for Wheezing or Shortness of Breath Yes EVER Gillespie CNP   albuterol (PROVENTIL) (2.5 MG/3ML) 0.083% nebulizer solution Take 3 mLs by nebulization every 4 hours as needed for Wheezing Yes EVER Gillespie CNP   budesonide-formoterol (SYMBICORT) 160-4.5 MCG/ACT AERO Inhale 2 puffs into the lungs 2 times daily Yes Historical Provider, MD   tiotropium (SPIRIVA RESPIMAT) 1.25 MCG/ACT AERS inhaler Inhale 2 puffs into the lungs daily Yes Historical Provider, MD   cetirizine (ZYRTEC) 10 MG tablet Take 10 mg by mouth daily Yes Historical Provider, MD   montelukast (SINGULAIR) 10 MG tablet Take 10 mg by mouth nightly Yes Historical Provider, MD       Social History     Tobacco Use    Smoking status: Former Smoker     Packs/day: 1.00     Years: 30.00     Pack years: 30.00     Types: Cigarettes     Quit date: 2013     Years since quittin.4    Smokeless tobacco: Never Used   Vaping Use    Vaping Use: Never used   Substance Use Topics    Alcohol use: Yes     Comment: rare    Drug use: No              RECORD REVIEW: Previous medical records were reviewed at today's visit. Wt Readings from Last 3 Encounters:   22 195 lb (88.5 kg)   21 200 lb 8 oz (90.9 kg)   09/15/21 201 lb (91.2 kg)       Results for orders placed or performed during the hospital encounter of 22   COVID-19, Rapid    Specimen: Nasopharyngeal Swab   Result Value Ref Range    Specimen Description . NASOPHARYNGEAL SWAB     SARS-CoV-2, Rapid Not Detected Not Detected       ECHO-2D-M-MODE COMPLETE    Result Date: 2021  [address] [place] [address] Transthoracic Echocardiography Report (TTE)  Patient Name Rosario Polo        Date of Study               2021               Eboni Belt   Date of      1950  Gender                      Male  Birth   Age          66 year(s)  Race                           Room Number              Height:                     66 inch, 167.64 cm   Corporate ID [id number]    Weight:                     200 pounds, 90.7 kg  #   Patient Acct [de-identified]   BSA:          2 m^2         BMI:      32.28  #                                                              kg/m^2   MR #         [id number]      Sonographer                 YoonJanice   Accession #  [id number]  Interpreting Physician      Luis Ellsworth   Fellow                   Referring Nurse                           Practitioner   Interpreting             Referring Physician         Russell Fisher MD  Fellow  Type of Study   TTE procedure:2D Echocardiogram, M-Mode, Doppler, Color Doppler. Procedure Date Date: 2021 Start: 08:37 AM Study Location: [facility] Indications:Dyspnea/SOB. History / Tech.  Comments: Dx: dyspnea Hx: CAD, COPD, hyperlipidemia, HTN Patient Status: Outpatient Height: 66 inches Weight: 200 pounds BSA: 2 m^2 BMI: 32.28 kg/m^2 BP: 139/82 mmHg CONCLUSIONS Summary Global left ventricular systolic function appears preserved with an estimated ejection fraction of 55%. Moderately increased left ventricular wall thickness with a normal left ventricular cavity size. No definite specific wall motion abnormalities were identified. No significant valvular disease was seen. Evidence of moderate diastolic dysfunction is seen. Compared to the previous study of 12/14/18, the patients LVH has increased from mild to moderate. Signature ----------------------------------------------------------------------------  Electronically signed by Janice Nettles(Sonographer) on 12/17/2021 09:08 AM ---------------------------------------------------------------------------- ----------------------------------------------------------------------------  Electronically signed by Nelson Henderson(Interpreting physician) on  12/17/2021 10:53 PM ---------------------------------------------------------------------------- FINDINGS Left Atrium Left atrium is normal in size. Left Ventricle Global left ventricular systolic function appears preserved with an estimated ejection fraction of 55%. Moderately increased left ventricular wall thickness with a normal left ventricular cavity size. No definite specific wall motion abnormalities were identified. Right Atrium Right atrium is normal in size. Right Ventricle Normal right ventricular size and function. Mitral Valve Normal mitral valve structure and function. Aortic Valve Normal aortic valve structure and function without stenosis or regurgitation. Tricuspid Valve Normal tricuspid valve structure and function. Pulmonic Valve The pulmonic valve is normal in structure. Pericardial Effusion No significant pericardial effusion is seen. Miscellaneous Evidence of moderate diastolic dysfunction is seen. Normal aortic root dimension.  M-mode / 2D Measurements & Calculations: LVIDd:4.12 cm(3.7 - 5.6 cm)      Diastolic RVDLWL:56.54 ml  LVIDs:2.69 cm(2.2 - 4.0 cm)      Systolic KCQLBQ:32.77 ml  IVSd:1.41 cm(0.6 - 1.1 cm)       Aortic Root:3.63 cm(2.0 - 3.7 cm)  LVPWd:1.26 cm(0.6 - 1.1 cm)      LA Dimension: 3.88 cm(1.9 - 4.0 cm)  Fractional Shortenin.71 %    LA volume/Index: 45.7 ml /23m^2  Calculated LVEF (%): 61.71 %     AV Cusp Separation: 1.73 cm   Mitral:                                 Aortic   Valve Area (P1/2-Time): 3.43 cm^2       Peak Velocity: 1.36 m/s  Peak E-Wave: 0.79 m/s                   Mean Velocity: 0.93 m/s  Peak A-Wave: 0.85 m/s                   Peak Gradient: 7.36 mmHg  E/A Ratio: 0.93                         Mean Gradient: 3.92 mmHg  Peak Gradient: 2.48 mmHg                                          Acceleration Time: 66.3 msec  P1/2t: 64.09 msec                                          AV VTI: 23.19 cm  Diastology / Tissue Doppler Lateral Wall E' velocity:0.07 m/s Lateral Wall E/E':10.82    CT CHEST WO CONTRAST    Result Date: 2021  EXAMINATION: CT OF THE CHEST WITHOUT CONTRAST 2021 8:13 am TECHNIQUE: CT of the chest was performed without the administration of intravenous contrast. Multiplanar reformatted images are provided for review. Dose modulation, iterative reconstruction, and/or weight based adjustment of the mA/kV was utilized to reduce the radiation dose to as low as reasonably achievable. COMPARISON: 2021. HISTORY: ORDERING SYSTEM PROVIDED HISTORY: Pulmonary emphysema, unspecified emphysema type ([address] TECHNOLOGIST PROVIDED HISTORY: Please is being evaluated for possible bronchoscopic lung volume reduction surgery ([place] valve). Please do CT chest with navigation protocol Empysema. FINDINGS: Mediastinum: Ectasia of the ascending aorta was noted measuring 3.6 cm. Coronary artery calcifications were noted. Small calcified right hilar lymph nodes were noted. Lungs/pleura: Small blebs were noted in the right apex.   Small bulla measuring just over 2 cm was noted in the anterior segment of each upper lobe. Subtle subsegmental focal inflammation was identified in the posterior segment of the right upper lobe, new from 06/03/2021. No pleural effusion, bronchiectasis, significant pleural thickening were noted. Mild central peribronchial thickening was noted probably due to retained secretions from pulmonary emphysema. Hyperinflation was noted with diaphragmatic flattening. Scattered small coalesced alveoli were noted throughout. Upper Abdomen: A small fixed hiatal hernia was noted. Multiple calcified splenic granulomas were seen. Soft Tissues/Bones: No osseous erosion was noted. No hilar, mediastinal or axillary lymphadenopathy. No hilar, mediastinal or lung parenchymal mass. Changes of pulmonary emphysema with hyperinflation, diaphragmatic flattening, and scattered small coalesced alveoli throughout. Subtle subsegmental focal inflammation was identified in the posterior segment of the right upper lobe, new from 06/03/2021. It measured 2 centimetres. RECOMMENDATIONS: Unavailable       PHYSICAL EXAMINATION:  Due to this being a TeleHealth encounter, evaluation of the following organ systems is limited: Vitals/Constitutional/EENT/Resp/CV/GI//MS/Neuro/Skin/Heme-Lymph-Imm. Physical exam not completed secondary to telephone encounter. ASSESSMENT:  1. Current chronic use of systemic steroids    2. RUDY on CPAP    3. Pulmonary emphysema, unspecified emphysema type ([address]    4. COPD, severe ([address]    5. History of smoking 30 or more pack years          Plan:   1. Medications reviewed, continue as ordered. 2. Educated and clarified the medication use. 3. Maintain an active lifestyle. 4. Patient's questions were answered to his satisfaction. 5. Former smoker quit smoking in 2013, ongoing smoking cessation advised. 6. Pulmonary function tests were reviewed   7. CT scan of the chest was reviewed  8.  We will asked Dr. Vinod Basurto to review chart to determine if patient meets criteria. 9. We'll see the patient back in 6 months or sooner pending Raquel's opinion         An  electronic signature was used to authenticate this note. --Olya Gutierrez, APRN - CNP on 1/13/2022 at 2:57 PM    9}    Pursuant to the emergency declaration under the [address] [place] [address] authority and the [employer] Act, this Virtual  Visit was conducted, with patient's consent, to reduce the patient's risk of exposure to COVID-19 and provide continuity of care for an established patient. Services were provided through a video synchronous discussion virtually to substitute for in-person clinic visit.     _______________________________________________________________________________________________________________________________________________  FOR TELEPHONE VISITS PLEASE COMPLETE THE FOLLOWING    Consent:  He and/or health care decision maker is aware that that he may receive a bill for this telephone service, depending on his insurance coverage, and has provided verbal consent to proceed: Yes      I affirm this is a Patient Initiated Episode with an Established Patient who has not had a related appointment within my department in the past 7 days or scheduled within the next 24 hours.     Total Time: minutes: 5-10 minutes    Note: not billable if this call serves to triage the patient into an appointment for the relevant concern

## 2022-01-13 ENCOUNTER — VIRTUAL VISIT (OUTPATIENT)
Dept: PULMONOLOGY | Age: 72
End: 2022-01-13
Payer: MEDICARE

## 2022-01-13 DIAGNOSIS — Z87.891 HISTORY OF SMOKING 30 OR MORE PACK YEARS: ICD-10-CM

## 2022-01-13 DIAGNOSIS — Z79.52 CURRENT CHRONIC USE OF SYSTEMIC STEROIDS: Primary | ICD-10-CM

## 2022-01-13 DIAGNOSIS — G47.33 OSA ON CPAP: ICD-10-CM

## 2022-01-13 DIAGNOSIS — J44.9 COPD, SEVERE (HCC): ICD-10-CM

## 2022-01-13 DIAGNOSIS — J43.9 PULMONARY EMPHYSEMA, UNSPECIFIED EMPHYSEMA TYPE (HCC): ICD-10-CM

## 2022-01-13 DIAGNOSIS — Z99.89 OSA ON CPAP: ICD-10-CM

## 2022-01-13 PROCEDURE — G2012 BRIEF CHECK IN BY MD/QHP: HCPCS | Performed by: NURSE PRACTITIONER

## 2022-01-13 ASSESSMENT — ENCOUNTER SYMPTOMS
GASTROINTESTINAL NEGATIVE: 1
ALLERGIC/IMMUNOLOGIC NEGATIVE: 1
EYES NEGATIVE: 1

## 2022-01-14 ENCOUNTER — HOSPITAL ENCOUNTER (OUTPATIENT)
Age: 72
Discharge: HOME | End: 2022-01-14
Payer: MEDICARE

## 2022-01-14 ENCOUNTER — HOSPITAL ENCOUNTER (OUTPATIENT)
Dept: CARDIAC REHAB | Age: 72
Discharge: HOME | End: 2022-01-14

## 2022-01-14 DIAGNOSIS — C61 PROSTATE CANCER (HCC): ICD-10-CM

## 2022-01-14 LAB — PROSTATE SPECIFIC ANTIGEN: <0.02 UG/L

## 2022-01-14 PROCEDURE — 84153 ASSAY OF PSA TOTAL: CPT

## 2022-01-14 PROCEDURE — 36415 COLL VENOUS BLD VENIPUNCTURE: CPT

## 2022-01-18 ENCOUNTER — HOSPITAL ENCOUNTER (OUTPATIENT)
Dept: CARDIAC REHAB | Age: 72
Discharge: HOME | End: 2022-01-18

## 2022-01-18 NOTE — PROGRESS NOTES
Dr. Lindsey Menendez reviewed- he thinks patient would be a candidate and advised to have Renetta send info to patient regarding [place] valve and to have patient arrange to see Dr. Lindsey Menendez in the [address] [place] [address] office once clinics resume.

## 2022-01-19 ENCOUNTER — HOSPITAL ENCOUNTER (OUTPATIENT)
Age: 72
Discharge: HOME | End: 2022-01-19
Payer: MEDICARE

## 2022-01-19 DIAGNOSIS — J43.9 PULMONARY EMPHYSEMA, UNSPECIFIED EMPHYSEMA TYPE (HCC): ICD-10-CM

## 2022-01-19 LAB
ALLEN TEST: ABNORMAL
CARBOXYHEMOGLOBIN: ABNORMAL % (ref 0–5)
FIO2: ABNORMAL
HCO3 ARTERIAL: 31.3 MMOL/L (ref 22–26)
METHEMOGLOBIN: ABNORMAL % (ref 0–1.9)
MODE: ABNORMAL
NEGATIVE BASE EXCESS, ART: ABNORMAL MMOL/L (ref 0–2)
NOTIFICATION TIME: ABNORMAL
NOTIFICATION: ABNORMAL
O2 DEVICE/FLOW/%: ABNORMAL
O2 SAT, ARTERIAL: 96.9 % (ref 95–98)
OXYHEMOGLOBIN: ABNORMAL % (ref 95–98)
PATIENT TEMP: 37
PCO2 ARTERIAL: 41.5 MMHG (ref 35–45)
PCO2, ART, TEMP ADJ: ABNORMAL
PEEP/CPAP: ABNORMAL
PH ARTERIAL: 7.5 (ref 7.35–7.45)
PH, ART, TEMP ADJ: ABNORMAL (ref 7.35–7.45)
PO2 ARTERIAL: 83.2 MMHG (ref 80–100)
PO2, ART, TEMP ADJ: ABNORMAL MMHG (ref 80–100)
POSITIVE BASE EXCESS, ART: 7.4 MMOL/L (ref 0–2)
PSV: ABNORMAL
PT. POSITION: ABNORMAL
RESPIRATORY RATE: ABNORMAL
SAMPLE SITE: ABNORMAL
SET RATE: ABNORMAL
TEXT FOR RESPIRATORY: ABNORMAL
TOTAL HB: ABNORMAL G/DL (ref 12–16)
TOTAL RATE: ABNORMAL
VT: ABNORMAL

## 2022-01-19 PROCEDURE — 82805 BLOOD GASES W/O2 SATURATION: CPT

## 2022-01-19 PROCEDURE — 36600 WITHDRAWAL OF ARTERIAL BLOOD: CPT

## 2022-01-20 ENCOUNTER — VIRTUAL VISIT (OUTPATIENT)
Dept: UROLOGY | Age: 72
End: 2022-01-20
Payer: MEDICARE

## 2022-01-20 DIAGNOSIS — C61 PROSTATE CANCER (HCC): Primary | ICD-10-CM

## 2022-01-20 PROCEDURE — 99212 OFFICE O/P EST SF 10 MIN: CPT | Performed by: NURSE PRACTITIONER

## 2022-01-20 ASSESSMENT — ENCOUNTER SYMPTOMS
NAUSEA: 0
BLOOD IN STOOL: 1
ABDOMINAL PAIN: 0
BACK PAIN: 0
VOMITING: 0
EYE REDNESS: 0
CONSTIPATION: 0
SHORTNESS OF BREATH: 0
COLOR CHANGE: 0
WHEEZING: 0
COUGH: 0

## 2022-01-20 NOTE — PROGRESS NOTES
2022    TELEHEALTH EVALUATION -- Audio/Visual (During OHACW-18 public health emergency)    HPI:    Paula Ontiveros (:  1950) has requested an audio/video evaluation for the following concern(s):    History  2018 Prostate biopsy, two cores Aditya 3+4. PSA 14.59   PSA  2022 - <0.02  2021 - <0.02  2021 - <0.01  3/2020 - <0.01  10/2019 - <0.01  2019 - <0.01  2019 - 0.08  2018 - 0.36  3/2018 - 14.59     2018 Started Christianne Fent in conjunction with IMRT radiation.      2018 Changed from [facility] to [facility]     2020 Stopped ADT after ADT for 24 months     Today  Here today for a 6-month follow-up for prostate cancer. Most recent PSA is <0.02. He denies unintentional weight loss, decreased energy or appetite, new or worsening bone/hip/back pain. He denies any lower extremity numbness and tingling. He denies new or worsening LUTS. He denies gross hematuria or dysuria. Since his last office visit he was hospitalized for rectal bleeding and has been through several cauterization surgeries to stop the bleeding. The surgeon did attribute this to a side effect of radiation. He denies ever seeing blood in his urine. Review of Systems   Constitutional: Negative for appetite change, chills and fever. Eyes: Negative for redness and visual disturbance. Respiratory: Negative for cough, shortness of breath and wheezing. Cardiovascular: Negative for chest pain and leg swelling. Gastrointestinal: Positive for blood in stool. Negative for abdominal pain, constipation, nausea and vomiting. Genitourinary: Negative for decreased urine volume, difficulty urinating, dysuria, enuresis, flank pain, frequency, hematuria, penile discharge, penile pain, scrotal swelling, testicular pain and urgency. Musculoskeletal: Negative for back pain, joint swelling and myalgias. Skin: Negative for color change, rash and wound. Neurological: Negative for dizziness, tremors and numbness.    Hematological: Negative for adenopathy. Does not bruise/bleed easily. No Known Allergies    Prior to Visit Medications    Medication Sig Taking? Authorizing Provider   predniSONE (DELTASONE) 10 MG tablet 4 tabs daily for 3 days, 3 tabs daily for 3 days, 2 tabs daily for 3 days, 1 tabs daily for 3 days  Patient taking differently:  At present 10mg every other day  Prudence EVER Mayorga CNP   carvedilol (COREG) 25 MG tablet Take 1 tablet by mouth 2 times daily (with meals)  Huy Morales MD   NIFEdipine (ADALAT CC) 30 MG extended release tablet Take 1 tablet by mouth daily  Ayala Vicente MD   ferrous sulfate (IRON 325) 325 (65 Fe) MG tablet Take 1 tablet by mouth every other day  Ayala Vicente MD   docusate sodium (COLACE, DULCOLAX) 100 MG CAPS Take 100 mg by mouth 2 times daily  Ayala Vicente MD   atorvastatin (LIPITOR) 80 MG tablet Take 1 tablet by mouth daily  Huy Morales MD   hydrochlorothiazide (HYDRODIURIL) 25 MG tablet Take 1 tablet by mouth daily  Huy Morales MD   Cholecalciferol (VITAMIN D3) 5000 units CAPS Take 1,000 Units by mouth daily  Historical Provider, MD   albuterol sulfate  (90 Base) MCG/ACT inhaler Inhale 2 puffs into the lungs every 6 hours as needed for Wheezing or Shortness of Breath  EVER Olmedo CNP   albuterol (PROVENTIL) (2.5 MG/3ML) 0.083% nebulizer solution Take 3 mLs by nebulization every 4 hours as needed for Wheezing  EVER Olmedo CNP   budesonide-formoterol (SYMBICORT) 160-4.5 MCG/ACT AERO Inhale 2 puffs into the lungs 2 times daily  Historical Provider, MD   tiotropium (SPIRIVA RESPIMAT) 1.25 MCG/ACT AERS inhaler Inhale 2 puffs into the lungs daily  Historical Provider, MD   cetirizine (ZYRTEC) 10 MG tablet Take 10 mg by mouth daily  Historical Provider, MD   montelukast (SINGULAIR) 10 MG tablet Take 10 mg by mouth nightly  Historical Provider, MD       Social History     Tobacco Use    Smoking status: Former Smoker Packs/day: 1.00     Years: 30.00     Pack years: 30.00     Types: Cigarettes     Quit date: 2013     Years since quittin.4    Smokeless tobacco: Never Used   Vaping Use    Vaping Use: Never used   Substance Use Topics    Alcohol use: Yes     Comment: rare    Drug use: No        Past Medical History:   Diagnosis Date    CAD (coronary artery disease)     Stent placement     [facility] [address]     Essential hypertension     Hyperlipidemia     Prostate cancer ([facility] [address] [facility]     Wears glasses        Past Surgical History:   Procedure Laterality Date    ANKLE SURGERY      CAROTID STENT PLACEMENT Left 2019    COLONOSCOPY      Normal    HERNIA REPAIR Right     OTHER SURGICAL HISTORY      cauterization intestines-[facility]       Family History   Problem Relation Age of Onset    No Known Problems Father     Cancer Mother        PHYSICAL EXAMINATION:  Vital Signs: (As obtained by patient/caregiver or practitioner observation)    Blood pressure- 150/70 Heart rate- 60   Pulse oximetry- 98%    Constitutional: [x] Appears well-developed and well-nourished [x] No apparent distress      [] Abnormal-   Mental status  [x] Alert and awake  [x] Oriented to person/place/time [x]Able to follow commands      Eyes:  EOM    [x]  Normal  [] Abnormal-  Sclera  [x]  Normal  [] Abnormal -         Discharge [x]  None visible  [] Abnormal -    HENT:   [x] Normocephalic, atraumatic.   [] Abnormal   [x] Mouth/Throat: Mucous membranes are moist.     External Ears [x] Normal  [] Abnormal-     Neck: [x] No visualized mass     Pulmonary/Chest: [x] Respiratory effort normal.  [x] No visualized signs of difficulty breathing or respiratory distress        [] Abnormal-      Musculoskeletal:   [x] Normal gait with no signs of ataxia         [x] Normal range of motion of neck        [] Abnormal-       Neurological:        [x] No Facial Asymmetry (Cranial nerve 7 motor function) (limited exam to video visit)          [x] No gaze palsy        [] Abnormal-         Skin:        [x] No significant exanthematous lesions or discoloration noted on facial skin         [] Abnormal-            Psychiatric:       [x] Normal Affect [x] No Hallucinations        [] Abnormal-         ASSESSMENT/PLAN:   Diagnosis Orders   1. Prostate cancer [facility])  PSA, Diagnostic       We will see patient in 6 months with a PSA prior. He will call sooner if needed for any new or worsening symptoms. Charity Nichols is a 66 y.o. male being evaluated by a Virtual Visit (video visit) encounter to address concerns as mentioned above. A caregiver was present when appropriate. Due to this being a TeleHealth encounter (During KUL-60 public health emergency), evaluation of the following organ systems was limited: Vitals/Constitutional/EENT/Resp/CV/GI//MS/Neuro/Skin/Heme-Lymph-Imm. Pursuant to the emergency declaration under the [address] [place] [address] and the [employer] Act, this Virtual Visit was conducted with patient's (and/or legal guardian's) consent, to reduce the patient's risk of exposure to COVID-19 and provide necessary medical care. The patient (and/or legal guardian) has also been advised to contact this office for worsening conditions or problems, and seek emergency medical treatment and/or call 911 if deemed necessary. Services were provided through a video synchronous discussion virtually to substitute for in-person clinic visit. The patient was located in their home. The provider was located in the office. --EVER Mendoza - CNP on 1/20/2022 at 10:05 AM    An electronic signature was used to authenticate this note.

## 2022-01-21 ENCOUNTER — HOSPITAL ENCOUNTER (OUTPATIENT)
Dept: CARDIAC REHAB | Age: 72
Discharge: HOME | End: 2022-01-21

## 2022-01-25 ENCOUNTER — HOSPITAL ENCOUNTER (OUTPATIENT)
Dept: CARDIAC REHAB | Age: 72
Discharge: HOME | End: 2022-01-25

## 2022-02-01 ENCOUNTER — HOSPITAL ENCOUNTER (OUTPATIENT)
Dept: CARDIAC REHAB | Age: 72
Discharge: HOME | End: 2022-02-01

## 2022-02-08 ENCOUNTER — HOSPITAL ENCOUNTER (OUTPATIENT)
Dept: CARDIAC REHAB | Age: 72
Discharge: HOME | End: 2022-02-08

## 2022-02-11 ENCOUNTER — HOSPITAL ENCOUNTER (OUTPATIENT)
Dept: CARDIAC REHAB | Age: 72
Discharge: HOME | End: 2022-02-11

## 2022-02-15 ENCOUNTER — HOSPITAL ENCOUNTER (OUTPATIENT)
Dept: CARDIAC REHAB | Age: 72
Discharge: HOME | End: 2022-02-15

## 2022-02-18 ENCOUNTER — HOSPITAL ENCOUNTER (OUTPATIENT)
Dept: CARDIAC REHAB | Age: 72
Discharge: HOME | End: 2022-02-18

## 2022-02-22 ENCOUNTER — HOSPITAL ENCOUNTER (OUTPATIENT)
Dept: CARDIAC REHAB | Age: 72
Discharge: HOME | End: 2022-02-22

## 2022-02-25 ENCOUNTER — HOSPITAL ENCOUNTER (OUTPATIENT)
Dept: CARDIAC REHAB | Age: 72
Discharge: HOME | End: 2022-02-25

## 2022-03-01 ENCOUNTER — HOSPITAL ENCOUNTER (OUTPATIENT)
Dept: CARDIAC REHAB | Age: 72
Discharge: HOME | End: 2022-03-01

## 2022-03-04 ENCOUNTER — HOSPITAL ENCOUNTER (OUTPATIENT)
Dept: CARDIAC REHAB | Age: 72
Discharge: HOME | End: 2022-03-04

## 2022-03-08 ENCOUNTER — HOSPITAL ENCOUNTER (OUTPATIENT)
Dept: CARDIAC REHAB | Age: 72
Discharge: HOME | End: 2022-03-08

## 2022-03-11 ENCOUNTER — HOSPITAL ENCOUNTER (OUTPATIENT)
Dept: CARDIAC REHAB | Age: 72
Discharge: HOME | End: 2022-03-11

## 2022-03-15 ENCOUNTER — HOSPITAL ENCOUNTER (OUTPATIENT)
Dept: CARDIAC REHAB | Age: 72
Discharge: HOME | End: 2022-03-15

## 2022-03-18 ENCOUNTER — HOSPITAL ENCOUNTER (OUTPATIENT)
Dept: CARDIAC REHAB | Age: 72
Discharge: HOME | End: 2022-03-18

## 2022-03-22 ENCOUNTER — HOSPITAL ENCOUNTER (OUTPATIENT)
Dept: CARDIAC REHAB | Age: 72
Discharge: HOME | End: 2022-03-22

## 2022-03-22 PROCEDURE — 9900000065 HC CARDIAC REHAB PHASE 3 - 1 VISIT

## 2022-04-01 ENCOUNTER — HOSPITAL ENCOUNTER (OUTPATIENT)
Dept: CARDIAC REHAB | Age: 72
Discharge: HOME | End: 2022-04-01

## 2022-04-05 ENCOUNTER — HOSPITAL ENCOUNTER (OUTPATIENT)
Dept: CARDIAC REHAB | Age: 72
Discharge: HOME | End: 2022-04-05

## 2022-04-08 ENCOUNTER — HOSPITAL ENCOUNTER (OUTPATIENT)
Dept: CARDIAC REHAB | Age: 72
Discharge: HOME | End: 2022-04-08

## 2022-04-12 ENCOUNTER — HOSPITAL ENCOUNTER (OUTPATIENT)
Dept: CARDIAC REHAB | Age: 72
Discharge: HOME | End: 2022-04-12

## 2022-04-15 ENCOUNTER — HOSPITAL ENCOUNTER (OUTPATIENT)
Dept: CARDIAC REHAB | Age: 72
Discharge: HOME | End: 2022-04-15

## 2022-04-19 ENCOUNTER — HOSPITAL ENCOUNTER (OUTPATIENT)
Dept: CARDIAC REHAB | Age: 72
Discharge: HOME | End: 2022-04-19

## 2022-04-22 ENCOUNTER — HOSPITAL ENCOUNTER (OUTPATIENT)
Dept: CARDIAC REHAB | Age: 72
Discharge: HOME | End: 2022-04-22

## 2022-04-26 ENCOUNTER — HOSPITAL ENCOUNTER (OUTPATIENT)
Dept: CARDIAC REHAB | Age: 72
Discharge: HOME | End: 2022-04-26

## 2022-04-29 ENCOUNTER — HOSPITAL ENCOUNTER (OUTPATIENT)
Dept: CARDIAC REHAB | Age: 72
Discharge: HOME | End: 2022-04-29

## 2022-05-03 ENCOUNTER — HOSPITAL ENCOUNTER (OUTPATIENT)
Dept: CARDIAC REHAB | Age: 72
Discharge: HOME | End: 2022-05-03

## 2022-05-06 ENCOUNTER — HOSPITAL ENCOUNTER (OUTPATIENT)
Dept: CARDIAC REHAB | Age: 72
Discharge: HOME | End: 2022-05-06

## 2022-05-10 ENCOUNTER — HOSPITAL ENCOUNTER (OUTPATIENT)
Dept: CARDIAC REHAB | Age: 72
Discharge: HOME | End: 2022-05-10

## 2022-05-13 ENCOUNTER — HOSPITAL ENCOUNTER (OUTPATIENT)
Dept: CARDIAC REHAB | Age: 72
Discharge: HOME | End: 2022-05-13

## 2022-05-17 ENCOUNTER — HOSPITAL ENCOUNTER (OUTPATIENT)
Dept: CARDIAC REHAB | Age: 72
Discharge: HOME | End: 2022-05-17

## 2022-05-20 ENCOUNTER — HOSPITAL ENCOUNTER (OUTPATIENT)
Dept: CARDIAC REHAB | Age: 72
Discharge: HOME | End: 2022-05-20

## 2022-05-27 ENCOUNTER — HOSPITAL ENCOUNTER (INPATIENT)
Age: 72
LOS: 2 days | Discharge: HOME | DRG: 177 | End: 2022-05-29
Attending: EMERGENCY MEDICINE | Admitting: INTERNAL MEDICINE
Payer: MEDICARE

## 2022-05-27 ENCOUNTER — APPOINTMENT (OUTPATIENT)
Dept: CT IMAGING | Age: 72
DRG: 177 | End: 2022-05-27
Payer: MEDICARE

## 2022-05-27 ENCOUNTER — APPOINTMENT (OUTPATIENT)
Dept: GENERAL RADIOLOGY | Age: 72
DRG: 177 | End: 2022-05-27
Payer: MEDICARE

## 2022-05-27 DIAGNOSIS — I48.91 ATRIAL FIBRILLATION, UNSPECIFIED TYPE (HCC): ICD-10-CM

## 2022-05-27 DIAGNOSIS — R09.02 HYPOXIA: ICD-10-CM

## 2022-05-27 DIAGNOSIS — J44.1 COPD EXACERBATION (HCC): ICD-10-CM

## 2022-05-27 DIAGNOSIS — U07.1 PNEUMONIA DUE TO COVID-19 VIRUS: Primary | ICD-10-CM

## 2022-05-27 DIAGNOSIS — J12.82 PNEUMONIA DUE TO COVID-19 VIRUS: Primary | ICD-10-CM

## 2022-05-27 LAB
ABSOLUTE EOS #: 0.04 K/UL (ref 0–0.44)
ABSOLUTE IMMATURE GRANULOCYTE: 0.11 K/UL (ref 0–0.3)
ABSOLUTE LYMPH #: 0.74 K/UL (ref 1.1–3.7)
ABSOLUTE MONO #: 1.19 K/UL (ref 0.1–1.2)
ALBUMIN SERPL-MCNC: 3.8 G/DL (ref 3.5–5.2)
ALBUMIN/GLOBULIN RATIO: 1 (ref 1–2.5)
ALP BLD-CCNC: 93 U/L (ref 40–129)
ALT SERPL-CCNC: 17 U/L (ref 5–41)
ANION GAP SERPL CALCULATED.3IONS-SCNC: 15 MMOL/L (ref 9–17)
AST SERPL-CCNC: 16 U/L
BASOPHILS # BLD: 0 % (ref 0–2)
BASOPHILS ABSOLUTE: 0.03 K/UL (ref 0–0.2)
BILIRUB SERPL-MCNC: 0.21 MG/DL (ref 0.3–1.2)
BILIRUBIN DIRECT: <0.08 MG/DL
BILIRUBIN, INDIRECT: ABNORMAL MG/DL (ref 0–1)
BUN BLDV-MCNC: 33 MG/DL (ref 8–23)
BUN/CREAT BLD: 19 (ref 9–20)
C-REACTIVE PROTEIN: 254.7 MG/L (ref 0–5)
CALCIUM SERPL-MCNC: 9.7 MG/DL (ref 8.6–10.4)
CHLORIDE BLD-SCNC: 92 MMOL/L (ref 98–107)
CO2: 31 MMOL/L (ref 20–31)
CREAT SERPL-MCNC: 1.7 MG/DL (ref 0.7–1.2)
D-DIMER QUANTITATIVE: 0.66 MG/L FEU (ref 0–0.59)
EKG ATRIAL RATE: 65 BPM
EKG Q-T INTERVAL: 382 MS
EKG QRS DURATION: 102 MS
EKG QTC CALCULATION (BAZETT): 454 MS
EKG R AXIS: -59 DEGREES
EKG T AXIS: 97 DEGREES
EKG VENTRICULAR RATE: 85 BPM
EOSINOPHILS RELATIVE PERCENT: 0 % (ref 1–4)
FERRITIN: 222 NG/ML (ref 30–400)
FIBRINOGEN: 1192 MG/DL (ref 179–518)
FLU A ANTIGEN: NEGATIVE
FLU B ANTIGEN: NEGATIVE
GFR AFRICAN AMERICAN: 48 ML/MIN
GFR NON-AFRICAN AMERICAN: 40 ML/MIN
GFR SERPL CREATININE-BSD FRML MDRD: ABNORMAL ML/MIN/{1.73_M2}
GFR SERPL CREATININE-BSD FRML MDRD: ABNORMAL ML/MIN/{1.73_M2}
GLUCOSE BLD-MCNC: 149 MG/DL (ref 70–99)
HCO3 VENOUS: 29.7 MMOL/L (ref 24–30)
HCT VFR BLD CALC: 37.4 % (ref 40.7–50.3)
HEMOGLOBIN: 12.6 G/DL (ref 13–17)
IMMATURE GRANULOCYTES: 1 %
INR BLD: 1
LACTATE DEHYDROGENASE: 223 U/L (ref 135–225)
LYMPHOCYTES # BLD: 6 % (ref 24–43)
MCH RBC QN AUTO: 31.3 PG (ref 25.2–33.5)
MCHC RBC AUTO-ENTMCNC: 33.7 G/DL (ref 28.4–34.8)
MCV RBC AUTO: 92.8 FL (ref 82.6–102.9)
MONOCYTES # BLD: 9 % (ref 3–12)
NRBC AUTOMATED: 0 PER 100 WBC
O2 DEVICE/FLOW/%: ABNORMAL
O2 SAT, VEN: 52.3 % (ref 60–85)
PATIENT TEMP: 37
PCO2, VEN: 49.1 (ref 39–55)
PDW BLD-RTO: 13.6 % (ref 11.8–14.4)
PH VENOUS: 7.4 (ref 7.32–7.42)
PLATELET # BLD: 249 K/UL (ref 138–453)
PMV BLD AUTO: 10.7 FL (ref 8.1–13.5)
PO2, VEN: 28.2 (ref 30–50)
POSITIVE BASE EXCESS, VEN: 3.8 MMOL/L (ref 0–2)
POTASSIUM SERPL-SCNC: 3.1 MMOL/L (ref 3.7–5.3)
PRO-BNP: 1670 PG/ML
PROTHROMBIN TIME: 13.2 SEC (ref 11.5–14.2)
PT. POSITION: ABNORMAL
RBC # BLD: 4.03 M/UL (ref 4.21–5.77)
RESPIRATORY RATE: 24
SARS-COV-2, RAPID: DETECTED
SEG NEUTROPHILS: 84 % (ref 36–65)
SEGMENTED NEUTROPHILS ABSOLUTE COUNT: 10.96 K/UL (ref 1.5–8.1)
SODIUM BLD-SCNC: 138 MMOL/L (ref 135–144)
SPECIMEN DESCRIPTION: ABNORMAL
TOTAL PROTEIN: 7.8 G/DL (ref 6.4–8.3)
TROPONIN, HIGH SENSITIVITY: 35 NG/L (ref 0–22)
WBC # BLD: 13.1 K/UL (ref 3.5–11.3)

## 2022-05-27 PROCEDURE — 94669 MECHANICAL CHEST WALL OSCILL: CPT

## 2022-05-27 PROCEDURE — 94664 DEMO&/EVAL PT USE INHALER: CPT

## 2022-05-27 PROCEDURE — 87040 BLOOD CULTURE FOR BACTERIA: CPT

## 2022-05-27 PROCEDURE — 36415 COLL VENOUS BLD VENIPUNCTURE: CPT

## 2022-05-27 PROCEDURE — 6360000002 HC RX W HCPCS: Performed by: INTERNAL MEDICINE

## 2022-05-27 PROCEDURE — 94640 AIRWAY INHALATION TREATMENT: CPT

## 2022-05-27 PROCEDURE — 6370000000 HC RX 637 (ALT 250 FOR IP): Performed by: NURSE PRACTITIONER

## 2022-05-27 PROCEDURE — 87804 INFLUENZA ASSAY W/OPTIC: CPT

## 2022-05-27 PROCEDURE — 71045 X-RAY EXAM CHEST 1 VIEW: CPT

## 2022-05-27 PROCEDURE — G0378 HOSPITAL OBSERVATION PER HR: HCPCS

## 2022-05-27 PROCEDURE — 1200000000 HC SEMI PRIVATE

## 2022-05-27 PROCEDURE — 82805 BLOOD GASES W/O2 SATURATION: CPT

## 2022-05-27 PROCEDURE — 6360000002 HC RX W HCPCS: Performed by: NURSE PRACTITIONER

## 2022-05-27 PROCEDURE — 80048 BASIC METABOLIC PNL TOTAL CA: CPT

## 2022-05-27 PROCEDURE — 85610 PROTHROMBIN TIME: CPT

## 2022-05-27 PROCEDURE — 80076 HEPATIC FUNCTION PANEL: CPT

## 2022-05-27 PROCEDURE — 6360000002 HC RX W HCPCS: Performed by: EMERGENCY MEDICINE

## 2022-05-27 PROCEDURE — 85384 FIBRINOGEN ACTIVITY: CPT

## 2022-05-27 PROCEDURE — 82728 ASSAY OF FERRITIN: CPT

## 2022-05-27 PROCEDURE — 94761 N-INVAS EAR/PLS OXIMETRY MLT: CPT

## 2022-05-27 PROCEDURE — 87635 SARS-COV-2 COVID-19 AMP PRB: CPT

## 2022-05-27 PROCEDURE — 96372 THER/PROPH/DIAG INJ SC/IM: CPT

## 2022-05-27 PROCEDURE — 96374 THER/PROPH/DIAG INJ IV PUSH: CPT

## 2022-05-27 PROCEDURE — 86140 C-REACTIVE PROTEIN: CPT

## 2022-05-27 PROCEDURE — 99222 1ST HOSP IP/OBS MODERATE 55: CPT | Performed by: INTERNAL MEDICINE

## 2022-05-27 PROCEDURE — 85379 FIBRIN DEGRADATION QUANT: CPT

## 2022-05-27 PROCEDURE — 99285 EMERGENCY DEPT VISIT HI MDM: CPT

## 2022-05-27 PROCEDURE — 83615 LACTATE (LD) (LDH) ENZYME: CPT

## 2022-05-27 PROCEDURE — 6370000000 HC RX 637 (ALT 250 FOR IP): Performed by: EMERGENCY MEDICINE

## 2022-05-27 PROCEDURE — 93010 ELECTROCARDIOGRAM REPORT: CPT | Performed by: INTERNAL MEDICINE

## 2022-05-27 PROCEDURE — 84484 ASSAY OF TROPONIN QUANT: CPT

## 2022-05-27 PROCEDURE — 71260 CT THORAX DX C+: CPT

## 2022-05-27 PROCEDURE — 85025 COMPLETE CBC W/AUTO DIFF WBC: CPT

## 2022-05-27 PROCEDURE — 93005 ELECTROCARDIOGRAM TRACING: CPT | Performed by: EMERGENCY MEDICINE

## 2022-05-27 PROCEDURE — 6360000004 HC RX CONTRAST MEDICATION: Performed by: EMERGENCY MEDICINE

## 2022-05-27 PROCEDURE — 2700000000 HC OXYGEN THERAPY PER DAY

## 2022-05-27 PROCEDURE — APPSS30 APP SPLIT SHARED TIME 16-30 MINUTES: Performed by: NURSE PRACTITIONER

## 2022-05-27 PROCEDURE — 83880 ASSAY OF NATRIURETIC PEPTIDE: CPT

## 2022-05-27 RX ORDER — AZITHROMYCIN 250 MG/1
250 TABLET, FILM COATED ORAL DAILY
Status: DISCONTINUED | OUTPATIENT
Start: 2022-05-28 | End: 2022-05-29 | Stop reason: HOSPADM

## 2022-05-27 RX ORDER — ENOXAPARIN SODIUM 100 MG/ML
30 INJECTION SUBCUTANEOUS 2 TIMES DAILY
Status: DISCONTINUED | OUTPATIENT
Start: 2022-05-27 | End: 2022-05-29 | Stop reason: HOSPADM

## 2022-05-27 RX ORDER — ACETAMINOPHEN 650 MG/1
650 SUPPOSITORY RECTAL EVERY 6 HOURS PRN
Status: DISCONTINUED | OUTPATIENT
Start: 2022-05-27 | End: 2022-05-29 | Stop reason: HOSPADM

## 2022-05-27 RX ORDER — ATORVASTATIN CALCIUM 40 MG/1
80 TABLET, FILM COATED ORAL DAILY
Status: DISCONTINUED | OUTPATIENT
Start: 2022-05-28 | End: 2022-05-29 | Stop reason: HOSPADM

## 2022-05-27 RX ORDER — CARVEDILOL 25 MG/1
25 TABLET ORAL 2 TIMES DAILY WITH MEALS
Status: DISCONTINUED | OUTPATIENT
Start: 2022-05-27 | End: 2022-05-29 | Stop reason: HOSPADM

## 2022-05-27 RX ORDER — IPRATROPIUM BROMIDE AND ALBUTEROL SULFATE 2.5; .5 MG/3ML; MG/3ML
1 SOLUTION RESPIRATORY (INHALATION) ONCE
Status: COMPLETED | OUTPATIENT
Start: 2022-05-27 | End: 2022-05-27

## 2022-05-27 RX ORDER — CETIRIZINE HYDROCHLORIDE 10 MG/1
10 TABLET ORAL DAILY
Status: DISCONTINUED | OUTPATIENT
Start: 2022-05-28 | End: 2022-05-29 | Stop reason: HOSPADM

## 2022-05-27 RX ORDER — DOCUSATE SODIUM 100 MG/1
100 CAPSULE, LIQUID FILLED ORAL 2 TIMES DAILY
Status: DISCONTINUED | OUTPATIENT
Start: 2022-05-27 | End: 2022-05-29 | Stop reason: HOSPADM

## 2022-05-27 RX ORDER — ERGOCALCIFEROL 1.25 MG/1
50000 CAPSULE ORAL WEEKLY
Status: DISCONTINUED | OUTPATIENT
Start: 2022-05-27 | End: 2022-05-29 | Stop reason: HOSPADM

## 2022-05-27 RX ORDER — ALBUTEROL SULFATE 2.5 MG/3ML
2.5 SOLUTION RESPIRATORY (INHALATION) EVERY 4 HOURS PRN
Status: DISCONTINUED | OUTPATIENT
Start: 2022-05-27 | End: 2022-05-27

## 2022-05-27 RX ORDER — ALBUTEROL SULFATE 2.5 MG/3ML
2.5 SOLUTION RESPIRATORY (INHALATION) 4 TIMES DAILY
Status: DISCONTINUED | OUTPATIENT
Start: 2022-05-27 | End: 2022-05-29 | Stop reason: HOSPADM

## 2022-05-27 RX ORDER — MONTELUKAST SODIUM 10 MG/1
10 TABLET ORAL NIGHTLY
Status: DISCONTINUED | OUTPATIENT
Start: 2022-05-28 | End: 2022-05-29 | Stop reason: HOSPADM

## 2022-05-27 RX ORDER — ALBUTEROL SULFATE 2.5 MG/3ML
2.5 SOLUTION RESPIRATORY (INHALATION) EVERY 4 HOURS PRN
Status: DISCONTINUED | OUTPATIENT
Start: 2022-05-27 | End: 2022-05-29 | Stop reason: HOSPADM

## 2022-05-27 RX ORDER — ZINC SULFATE 50(220)MG
100 CAPSULE ORAL DAILY
Status: DISCONTINUED | OUTPATIENT
Start: 2022-05-27 | End: 2022-05-29 | Stop reason: HOSPADM

## 2022-05-27 RX ORDER — DEXAMETHASONE 4 MG/1
6 TABLET ORAL DAILY
Status: DISCONTINUED | OUTPATIENT
Start: 2022-05-27 | End: 2022-05-27

## 2022-05-27 RX ORDER — IVERMECTIN 3 MG/1
400 TABLET ORAL DAILY
Status: DISCONTINUED | OUTPATIENT
Start: 2022-05-27 | End: 2022-05-27

## 2022-05-27 RX ORDER — DEXAMETHASONE SODIUM PHOSPHATE 10 MG/ML
10 INJECTION INTRAMUSCULAR; INTRAVENOUS ONCE
Status: COMPLETED | OUTPATIENT
Start: 2022-05-27 | End: 2022-05-27

## 2022-05-27 RX ORDER — ASCORBIC ACID 500 MG
1000 TABLET ORAL 4 TIMES DAILY
Status: DISCONTINUED | OUTPATIENT
Start: 2022-05-27 | End: 2022-05-29 | Stop reason: HOSPADM

## 2022-05-27 RX ORDER — ACETAMINOPHEN 325 MG/1
650 TABLET ORAL EVERY 6 HOURS PRN
Status: DISCONTINUED | OUTPATIENT
Start: 2022-05-27 | End: 2022-05-29 | Stop reason: HOSPADM

## 2022-05-27 RX ORDER — AZITHROMYCIN 250 MG/1
500 TABLET, FILM COATED ORAL DAILY
Status: COMPLETED | OUTPATIENT
Start: 2022-05-27 | End: 2022-05-27

## 2022-05-27 RX ORDER — FERROUS SULFATE 325(65) MG
325 TABLET ORAL EVERY OTHER DAY
Status: DISCONTINUED | OUTPATIENT
Start: 2022-05-27 | End: 2022-05-27

## 2022-05-27 RX ORDER — FAMOTIDINE 20 MG/1
40 TABLET, FILM COATED ORAL 2 TIMES DAILY
Status: DISCONTINUED | OUTPATIENT
Start: 2022-05-27 | End: 2022-05-27 | Stop reason: DRUGHIGH

## 2022-05-27 RX ORDER — NIFEDIPINE 30 MG/1
30 TABLET, FILM COATED, EXTENDED RELEASE ORAL DAILY
Status: DISCONTINUED | OUTPATIENT
Start: 2022-05-28 | End: 2022-05-29 | Stop reason: HOSPADM

## 2022-05-27 RX ORDER — HYDROCHLOROTHIAZIDE 25 MG/1
25 TABLET ORAL DAILY
Status: DISCONTINUED | OUTPATIENT
Start: 2022-05-28 | End: 2022-05-29 | Stop reason: HOSPADM

## 2022-05-27 RX ORDER — DEXAMETHASONE 4 MG/1
6 TABLET ORAL DAILY
Status: DISCONTINUED | OUTPATIENT
Start: 2022-05-28 | End: 2022-05-29 | Stop reason: HOSPADM

## 2022-05-27 RX ORDER — GUAIFENESIN/DEXTROMETHORPHAN 100-10MG/5
5 SYRUP ORAL EVERY 4 HOURS PRN
Status: DISCONTINUED | OUTPATIENT
Start: 2022-05-27 | End: 2022-05-29 | Stop reason: HOSPADM

## 2022-05-27 RX ORDER — FAMOTIDINE 20 MG/1
20 TABLET, FILM COATED ORAL DAILY
Status: DISCONTINUED | OUTPATIENT
Start: 2022-05-27 | End: 2022-05-29 | Stop reason: HOSPADM

## 2022-05-27 RX ORDER — ALBUTEROL SULFATE 90 UG/1
2 AEROSOL, METERED RESPIRATORY (INHALATION) EVERY 6 HOURS PRN
Status: DISCONTINUED | OUTPATIENT
Start: 2022-05-27 | End: 2022-05-29 | Stop reason: HOSPADM

## 2022-05-27 RX ORDER — FINASTERIDE 5 MG/1
10 TABLET, FILM COATED ORAL DAILY
Status: DISCONTINUED | OUTPATIENT
Start: 2022-05-27 | End: 2022-05-29 | Stop reason: HOSPADM

## 2022-05-27 RX ADMIN — FAMOTIDINE 20 MG: 20 TABLET ORAL at 12:24

## 2022-05-27 RX ADMIN — ENOXAPARIN SODIUM 30 MG: 100 INJECTION SUBCUTANEOUS at 21:21

## 2022-05-27 RX ADMIN — IOPAMIDOL 75 ML: 755 INJECTION, SOLUTION INTRAVENOUS at 08:37

## 2022-05-27 RX ADMIN — OXYCODONE HYDROCHLORIDE AND ACETAMINOPHEN 1000 MG: 500 TABLET ORAL at 12:24

## 2022-05-27 RX ADMIN — ALBUTEROL SULFATE 2.5 MG: 2.5 SOLUTION RESPIRATORY (INHALATION) at 20:12

## 2022-05-27 RX ADMIN — ALBUTEROL SULFATE 2.5 MG: 2.5 SOLUTION RESPIRATORY (INHALATION) at 15:41

## 2022-05-27 RX ADMIN — MOMETASONE FUROATE AND FORMOTEROL FUMARATE DIHYDRATE 2 PUFF: 200; 5 AEROSOL RESPIRATORY (INHALATION) at 20:12

## 2022-05-27 RX ADMIN — DOCUSATE SODIUM 100 MG: 100 CAPSULE, LIQUID FILLED ORAL at 21:21

## 2022-05-27 RX ADMIN — AZITHROMYCIN MONOHYDRATE 500 MG: 250 TABLET ORAL at 12:24

## 2022-05-27 RX ADMIN — OXYCODONE HYDROCHLORIDE AND ACETAMINOPHEN 1000 MG: 500 TABLET ORAL at 16:04

## 2022-05-27 RX ADMIN — IPRATROPIUM BROMIDE AND ALBUTEROL SULFATE 1 AMPULE: 2.5; .5 SOLUTION RESPIRATORY (INHALATION) at 07:36

## 2022-05-27 RX ADMIN — CARVEDILOL 25 MG: 25 TABLET, FILM COATED ORAL at 16:04

## 2022-05-27 RX ADMIN — IVERMECTIN 30 MG: 3 TABLET ORAL at 12:26

## 2022-05-27 RX ADMIN — OXYCODONE HYDROCHLORIDE AND ACETAMINOPHEN 1000 MG: 500 TABLET ORAL at 21:20

## 2022-05-27 RX ADMIN — FINASTERIDE 10 MG: 5 TABLET, FILM COATED ORAL at 12:24

## 2022-05-27 RX ADMIN — DEXAMETHASONE SODIUM PHOSPHATE 10 MG: 10 INJECTION INTRAMUSCULAR; INTRAVENOUS at 07:35

## 2022-05-27 RX ADMIN — ZINC SULFATE 220 MG (50 MG) CAPSULE 100 MG: CAPSULE at 12:27

## 2022-05-27 RX ADMIN — ENOXAPARIN SODIUM 30 MG: 100 INJECTION SUBCUTANEOUS at 12:25

## 2022-05-27 ASSESSMENT — LIFESTYLE VARIABLES: HOW OFTEN DO YOU HAVE A DRINK CONTAINING ALCOHOL: NEVER

## 2022-05-27 ASSESSMENT — PAIN - FUNCTIONAL ASSESSMENT: PAIN_FUNCTIONAL_ASSESSMENT: NONE - DENIES PAIN

## 2022-05-27 NOTE — FLOWSHEET NOTE
66year old male admitted to room 326 per stretcher from ER. To bed per self. Oriented to room and call light system. Encouraged use of accapella q 1 hour and proning. Voices understanding.  Wife at bedside

## 2022-05-27 NOTE — PROGRESS NOTES
Comprehensive Nutrition Assessment    Type and Reason for Visit:  Initial    Nutrition Recommendations/Plan:   1. Continue current diet. 2. Attach low sodium diet information to d/c instructions. Malnutrition Assessment:  Malnutrition Status:  Insufficient data (05/27/22 0345)    Context:  Acute Illness     Findings of the 6 clinical characteristics of malnutrition:  Energy Intake:  Unable to assess  Weight Loss:  No significant weight loss     Body Fat Loss:  Unable to assess     Muscle Mass Loss:  Unable to assess    Fluid Accumulation:  No significant fluid accumulation     Strength:  Not Performed    Nutrition Assessment:    Predicted suboptimal oral intakes r/t impaired respiratory function aeb Admitted with COVID-19, pneumonia. Pt noted to be resting. Pt is on vitamins C & D, and zinc.PO is % of meals. Glucose 149, dexamethasone to start tomorrow. Nutrition Related Findings:    unable to assess  Wound Type: None       Current Nutrition Intake & Therapies:    Average Meal Intake: %  Average Supplements Intake: None Ordered  ADULT DIET; Regular; Low Sodium (2 gm)    Anthropometric Measures:  Height: 5' 6\" (167.6 cm)  Ideal Body Weight (IBW): 142 lbs (65 kg)    Admission Body Weight: 192 lb 8 oz (87.3 kg)  Current Body Weight: 192 lb 8 oz (87.3 kg), 135.6 % IBW. Weight Source: Bed Scale  Current BMI (kg/m2): 31.1  Usual Body Weight: 200 lb (90.7 kg)  % Weight Change (Calculated): -3.8  Weight Adjustment For: No Adjustment                 BMI Categories: Obese Class 1 (BMI 30.0-34. 9)    Estimated Daily Nutrient Needs:  Energy Requirements Based On: Kcal/kg  Weight Used for Energy Requirements: Current  Energy (kcal/day): 8360-1155 (18-21/kg)  Weight Used for Protein Requirements: Ideal  Protein (g/day): 84-103g (1.3-1.5g/kg)  Method Used for Fluid Requirements: ml/Kg  Fluid (ml/day): 1740 ml (20/kg)    Nutrition Diagnosis:   · Predicted inadequate energy intake related to impaired respiratory function as evidenced by other (comment) (COVID-19 Pneumonia)      Nutrition Interventions:   Food and/or Nutrient Delivery: Continue Current Diet  Nutrition Education/Counseling: No recommendation at this time  Coordination of Nutrition Care: Continue to monitor while inpatient       Goals:     Goals: PO intake 75% or greater     Recent Labs     05/27/22  0725      K 3.1*   CL 92*   CO2 31   BUN 33*   CREATININE 1.70*   GLUCOSE 149*   ALT 17   ALKPHOS 93   GFR                 Lab Results   Component Value Date    LABALBU 3.8 05/27/2022      Nutrition Monitoring and Evaluation:   Behavioral-Environmental Outcomes: None Identified  Food/Nutrient Intake Outcomes: Food and Nutrient Intake  Physical Signs/Symptoms Outcomes: Biochemical Data,Weight    Discharge Planning:    Continue current diet     Dahlia Smith RD, LD  Contact: [phone]

## 2022-05-27 NOTE — PLAN OF CARE
Problem: Discharge Planning  Goal: Discharge to home or other facility with appropriate resources  Outcome: Progressing     Problem: Skin/Tissue Integrity  Goal: Absence of new skin breakdown  Outcome: Progressing     Problem: Pain  Goal: Verbalizes/displays adequate comfort level or baseline comfort level  Outcome: Progressing

## 2022-05-27 NOTE — PROGRESS NOTES
Pharmacy Note - Renal dose adjustment made per P/T protocol    Original order:  Famotidine (Pepcid) 40 mg PO BID    Estimated Creatinine Clearance: 41 mL/min (A) (based on SCr of 1.7 mg/dL (H)). Recent Labs     05/27/22  0725   BUN 33*   CREATININE 1.70*      INR 1.0       Renally adjusted order:  Famotidine (Pepcid) 20 mg PO daily    Please call pharmacy with any questions.     Thank you,  Aleksandra Davis [id number], [facility]  5/27/2022 11:05 AM

## 2022-05-27 NOTE — CONSULTS
PALLIATIVE CARE NURSING ASSESSMENT    Patient: Janna Penny  Room: 2577/1521-62    Reason For Consult   Goals of care evaluation  Distress management  Guidance and support  Facilitate communications  Assistance in coordinating care    Code Status: Full Code      Impression: Janna Penny is a 66y.o. year old male  has a past medical history of CAD (coronary artery disease), COPD (chronic obstructive pulmonary disease) ([facility] [address] Essential hypertension, Hyperlipidemia, Prostate cancer ([facility] [address] and Wears glasses. .  Currently hospitalized for the management of COVID. The Palliative Care Team is following to assist with ACP. Vital Signs  Blood pressure (!) 145/76, pulse 78, temperature 97.7 °F (36.5 °C), temperature source Temporal, resp. rate 20, height 5' 6\" (1.676 m), weight 192 lb 8 oz (87.3 kg), SpO2 94 %. Patient Active Problem List   Diagnosis    Stage 3 severe COPD by GOLD classification ([facility]    Acute respiratory failure with hypoxia (HCC)    Prostate cancer ([facility] [address] / Biopsy 2018 /  radiation 2019    COPD exacerbation (HCC)    Stage 3a chronic kidney disease ([facility] [address]    Essential hypertension, benign    PAF (paroxysmal atrial fibrillation) ([facility] [address]    Left carotid stenosis  with amaurosis fugax    Proteinuria    Stenosis of left carotid artery /  2019    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 States that he has a living will and [facility] at home. Copy requested as soon as he is able to provide it. States that his wife is his only decision maker. ACP activator note complete. States that he does not want to have CPR. Discused code status options. [facility] [facility] feels that a DNR-CCA is in line with his wishes. Document prepared for provider review. Denies further needs at this time.      Goals/Plan of care  Education/support to patient  Caregiver support/education  Continue with current plan of care  Code status clarified: [facility]    Electronically signed by   Carie Lucas RN  Palliative Care Team  on 5/27/2022 at 1:56 PM    Palliative care office: [phone]

## 2022-05-27 NOTE — ACP (ADVANCE CARE PLANNING)
Advance Care Planning     Advance Care Planning Activator (Inpatient)  Conversation Note      Date of ACP Conversation: 5/27/2022     Conversation Conducted with: Patient with Decision Making Capacity    ACP Activator: Amelia Lam RN    Health Care Decision Maker:     Current Designated Health Care Decision Maker:     Primary Decision Maker: Julianna Church - [phone]    Today we documented Decision Maker(s). The patient will provide ACP documents. Care Preferences    Ventilation: \"If you were in your present state of health and suddenly became very ill and were unable to breathe on your own, what would your preference be about the use of a ventilator (breathing machine) if it were available to you? \"      Would the patient desire the use of ventilator (breathing machine)?: yes    \"If your health worsens and it becomes clear that your chance of recovery is unlikely, what would your preference be about the use of a ventilator (breathing machine) if it were available to you? \"     Would the patient desire the use of ventilator (breathing machine)?: No      Resuscitation  \"CPR works best to restart the heart when there is a sudden event, like a heart attack, in someone who is otherwise healthy. Unfortunately, CPR does not typically restart the heart for people who have serious health conditions or who are very sick. \"    \"In the event your heart stopped as a result of an underlying serious health condition, would you want attempts to be made to restart your heart (answer \"yes\" for attempt to resuscitate) or would you prefer a natural death (answer \"no\" for do not attempt to resuscitate)? \" no       [x] Yes   [] No   Educated Patient / [place] [employer] regarding differences between Advance Directives and portable DNR orders.     Length of ACP Conversation in minutes:  10    Conversation Outcomes:  [x] ACP discussion completed  [] Existing advance directive reviewed with patient; no changes to patient's previously recorded wishes  [] New Advance Directive completed  [x] Portable Do Not Rescitate prepared for Provider review and signature  [] POLST/POST/MOLST/MOST prepared for Provider review and signature      Follow-up plan:    [] Schedule follow-up conversation to continue planning  [] Referred individual to Provider for additional questions/concerns   [] Advised patient/agent/surrogate to review completed ACP document and update if needed with changes in condition, patient preferences or care setting    [] This note routed to one or more involved healthcare providers

## 2022-05-27 NOTE — ED PROVIDER NOTES
[facility] [place] ED  EMERGENCY DEPARTMENT ENCOUNTER      Pt Name: Luz Pena  MRN: [id number]  [place] 1950  Date of evaluation: 5/27/2022  Provider: Marcus Kyle MD    CHIEF COMPLAINT       Chief Complaint   Patient presents with    Shortness of Breath     SOB x couple days, home covid test was positive         HISTORY OF PRESENT ILLNESS   (Location/Symptom, Timing/Onset, Context/Setting, Quality, Duration, Modifying Factors, Severity)  Note limiting factors. Luz Pena is a 66 y.o. male who presents to the emergency department      69-year-old gentleman with history of COPD presented to the emergency department for evaluation of shortness of breath. Patient developed a fever at home on Monday. He has not had a fever since Monday. However he has had progressive worsening dyspnea. Wife reports that she did a home test for COVID-19 Monday which was positive. Patient has had a total of 4 vaccinations for COVID-19. Denies any chest pain. No nausea or vomiting. Does not use home O2. Does not smoke. Albuterol at home was helping until this morning. States he has had 3-4 aerosol treatments at home prior to coming to the ED. Nursing Notes were reviewed. REVIEW OF SYSTEMS    (2-9 systems for level 4, 10 or more for level 5)     Review of Systems   All other systems reviewed and are negative. Except as noted above the remainder of the review of systems was reviewed and negative.        PAST MEDICAL HISTORY     Past Medical History:   Diagnosis Date    CAD (coronary artery disease)     Stent placement 2019    COPD (chronic obstructive pulmonary disease) ([address]     Essential hypertension     Hyperlipidemia     Prostate cancer ([address]     Wears glasses          SURGICAL HISTORY       Past Surgical History:   Procedure Laterality Date    ANKLE SURGERY      CAROTID STENT PLACEMENT Left 2019    COLONOSCOPY  2015    Normal    HERNIA REPAIR Right 1975    OTHER SURGICAL HISTORY cauterization [facility]         CURRENT MEDICATIONS       Previous Medications    ALBUTEROL (PROVENTIL) (2.5 MG/3ML) 0.083% NEBULIZER SOLUTION    Take 3 mLs by nebulization every 4 hours as needed for Wheezing    ALBUTEROL SULFATE  (90 BASE) MCG/ACT INHALER    Inhale 2 puffs into the lungs every 6 hours as needed for Wheezing or Shortness of Breath    ATORVASTATIN (LIPITOR) 80 MG TABLET    Take 1 tablet by mouth daily    BUDESONIDE-FORMOTEROL (SYMBICORT) 160-4.5 MCG/ACT AERO    Inhale 2 puffs into the lungs 2 times daily    CARVEDILOL (COREG) 25 MG TABLET    Take 1 tablet by mouth 2 times daily (with meals)    CETIRIZINE (ZYRTEC) 10 MG TABLET    Take 10 mg by mouth daily    CHOLECALCIFEROL (VITAMIN D3) 5000 UNITS CAPS    Take 1,000 Units by mouth daily    DOCUSATE SODIUM (COLACE, DULCOLAX) 100 MG CAPS    Take 100 mg by mouth 2 times daily    FERROUS SULFATE (IRON 325) 325 (65 FE) MG TABLET    Take 1 tablet by mouth every other day    HYDROCHLOROTHIAZIDE (HYDRODIURIL) 25 MG TABLET    Take 1 tablet by mouth daily    MONTELUKAST (SINGULAIR) 10 MG TABLET    Take 10 mg by mouth nightly    NIFEDIPINE (ADALAT CC) 30 MG EXTENDED RELEASE TABLET    Take 1 tablet by mouth daily    PREDNISONE (DELTASONE) 10 MG TABLET    4 tabs daily for 3 days, 3 tabs daily for 3 days, 2 tabs daily for 3 days, 1 tabs daily for 3 days    TIOTROPIUM (SPIRIVA RESPIMAT) 1.25 MCG/ACT AERS INHALER    Inhale 2 puffs into the lungs daily       ALLERGIES     Patient has no known allergies.     FAMILY HISTORY       Family History   Problem Relation Age of Onset    No Known Problems Father     Cancer Mother           SOCIAL HISTORY       Social History     Socioeconomic History    Marital status:      Spouse name: None    Number of children: None    Years of education: None    Highest education level: None   Occupational History    None   Tobacco Use    Smoking status: Former Smoker     Packs/day: 1.00     Years: 30.00 Pack years: 30.00     Types: Cigarettes     Quit date: 2013     Years since quittin.8    Smokeless tobacco: Never Used   Vaping Use    Vaping Use: Never used   Substance and Sexual Activity    Alcohol use: Yes     Comment: rare    Drug use: No    Sexual activity: None   Other Topics Concern    None   Social History Narrative    None     Social Determinants of Health     Financial Resource Strain: Low Risk     Difficulty of Paying Living Expenses: Not hard at all   Food Insecurity: No Food Insecurity    Worried About Running Out of Food in the Last Year: Never true    Mendy of Food in the Last Year: Never true   Transportation Needs: No Transportation Needs    Lack of Transportation (Medical): No    Lack of Transportation (Non-Medical): No   Physical Activity: Insufficiently Active    Days of Exercise per Week: 3 days    Minutes of Exercise per Session: 30 min   Stress: No Stress Concern Present    Feeling of Stress : Not at all   Social Connections: Moderately Integrated    Frequency of Communication with Friends and Family: Once a week    Frequency of Social Gatherings with Friends and Family: Once a week    Attends Jain Services: More than 4 times per year    Active Member of [address] [place] [address] [employer] or Organizations:  Yes    Attends Club or Organization Meetings: 1 to 4 times per year    Marital Status:    Intimate Partner Violence: Not At Risk    Fear of Current or Ex-Partner: No    Emotionally Abused: No    Physically Abused: No    Sexually Abused: No   Housing Stability: Low Risk     Unable to Pay for Housing in the Last Year: No    Number of [place] in the Last Year: 1    Unstable Housing in the Last Year: No       SCREENINGS        Becky Coma Scale  Eye Opening: Spontaneous  Best Verbal Response: Oriented  Best Motor Response: Obeys commands  Clio Coma Scale Score: 15               PHYSICAL EXAM    (up to 7 for level 4, 8 or more for level 5)     ED Triage Vitals   BP Temp Temp src Pulse Resp SpO2 Height Weight   -- -- -- -- -- -- -- --       Physical Exam  Vitals and nursing note reviewed. Constitutional:       Comments: Tachypnea, hypoxia, patient is in mild acute respiratory distress   HENT:      Head: Normocephalic and atraumatic. Cardiovascular:      Rate and Rhythm: Normal rate. Rhythm irregular. Pulmonary:      Comments: Diminished breath sounds with diffuse wheezing. Bilateral crackles noted. Tachypnea. Mild intercostal retractions  Abdominal:      Palpations: Abdomen is soft. Tenderness: There is no abdominal tenderness. Musculoskeletal:      Right lower leg: No tenderness. No edema. Left lower leg: No tenderness. No edema. Neurological:      General: No focal deficit present. Mental Status: He is alert. DIAGNOSTIC RESULTS     EKG: All EKG's are interpreted by the Emergency Department Physician who either signs or Co-signs this chart in the absence of a cardiologist.        RADIOLOGY:   Non-plain film images such as CT, Ultrasound and MRI are read by the radiologist. Plain radiographic images are visualized and preliminarily interpreted by the emergency physician with the below findings:        Interpretation per the Radiologist below, if available at the time of this note:    CT CHEST PULMONARY EMBOLISM W CONTRAST   Preliminary Result   1. No evidence of acute pulmonary embolism or acute aortic disease. 2. Pulmonary infiltrates which have an interstitial appearance accompanied by   bronchiectasis and inflammatory changes. This likely represents atypical   pneumonia. No evidence of consolidation. No evidence of active pleural   disease. 3. Mild right hilar lymphadenopathy.          XR CHEST PORTABLE   Final Result   No acute cardiopulmonary findings               ED BEDSIDE ULTRASOUND:   Performed by ED Physician - none    LABS:  Labs Reviewed   COVID-19, RAPID - Abnormal; Notable for the following components:       Result Value SARS-CoV-2, Rapid DETECTED (*)     All other components within normal limits   BASIC METABOLIC PANEL - Abnormal; Notable for the following components:    Glucose 149 (*)     BUN 33 (*)     CREATININE 1.70 (*)     Potassium 3.1 (*)     Chloride 92 (*)     GFR Non- 40 (*)     GFR  48 (*)     All other components within normal limits   BRAIN NATRIURETIC PEPTIDE - Abnormal; Notable for the following components:    Pro-BNP 1,670 (*)     All other components within normal limits   CBC WITH AUTO DIFFERENTIAL - Abnormal; Notable for the following components:    WBC 13.1 (*)     RBC 4.03 (*)     Hemoglobin 12.6 (*)     Hematocrit 37.4 (*)     Seg Neutrophils 84 (*)     Lymphocytes 6 (*)     Eosinophils % 0 (*)     Immature Granulocytes 1 (*)     Segs Absolute 10.96 (*)     Absolute Lymph # 0.74 (*)     All other components within normal limits   TROPONIN - Abnormal; Notable for the following components:    Troponin, High Sensitivity 35 (*)     All other components within normal limits   BLOOD GAS, VENOUS - Abnormal; Notable for the following components:    pO2, Freddy 28.2 (*)     Positive Base Excess, Freddy 3.8 (*)     O2 Sat, Freddy 52.3 (*)     All other components within normal limits   HEPATIC FUNCTION PANEL - Abnormal; Notable for the following components: Total Bilirubin 0.21 (*)     All other components within normal limits   RAPID INFLUENZA A/B ANTIGENS   CULTURE, BLOOD 1   CULTURE, BLOOD 2   PROTIME-INR       All other labs were within normal range or not returned as of this dictation.     EMERGENCY DEPARTMENT COURSE and DIFFERENTIAL DIAGNOSIS/MDM:   Vitals:    Vitals:    05/27/22 0736 05/27/22 0753 05/27/22 0800 05/27/22 0815   BP:  114/74 125/77 (!) 124/45   Pulse:  74 83 70   Resp: 24 20 24 24   SpO2:  95% 94% 96%   Weight:       Height:             MDM  Number of Diagnoses or Management Options  Atrial fibrillation, unspecified type ([facility]  Hypoxia  Pneumonia due to COVID-19 virus  Diagnosis management comments: 69-year-old male fully vaccinated for COVID-19 presented with positive COVID-19 infection dyspnea and hypoxia. Patient does not use oxygen at home. Initial oxygen saturation approximately 86% on room air. Decadron and DuoNeb given in the ED. Patient is resting comfortably and does feel better. Continues on supplemental oxygen. CT negative for pulmonary embolism. Findings consistent with atypical COVID-19 pneumonia. Patient is in atrial fibrillation. Patient could not recall if he has had arrhythmias in the past.  He has had difficult problems with GI bleeds and has been taken off all NSAIDs and anticoagulations in the past.  Discussed with Dr. Shena Win and patient was accepted for continued management      MIPS       REASSESSMENT          CRITICAL CARE TIME   Total Critical Care time was 35  minutes, excluding separately reportable procedures. There was a high probability of clinically significant/life threatening deterioration in the patient's condition which required my urgent intervention. CONSULTS:  IP CONSULT TO INFECTIOUS DISEASES    PROCEDURES:  Unless otherwise noted below, none     Procedures        FINAL IMPRESSION      1. Pneumonia due to COVID-19 virus    2. Hypoxia    3. Atrial fibrillation, unspecified type [facility])          DISPOSITION/PLAN   DISPOSITION Admitted 05/27/2022 10:02:50 AM      PATIENT REFERRED TO:  No follow-up provider specified. DISCHARGE MEDICATIONS:  New Prescriptions    No medications on file     Controlled Substances Monitoring:     No flowsheet data found.     (Please note that portions of this note were completed with a voice recognition program.  Efforts were made to edit the dictations but occasionally words are mis-transcribed.)    Traci Muro MD (electronically signed)  Attending Emergency Physician             Traci Muro MD  05/27/22 Tam Cleary MD  05/27/22 [id number]

## 2022-05-27 NOTE — CONSULTS
[facility] - Initial Consult Note COVID 19 Patient  Today's Date and Time: 5/27/2022, 3:42 PM    Impression :     COVID 19 Confirmed Infection  Immunized patient  Covid tests:  5/27/22: Positive  Elevated CRP  Hx severe COPD  CAD  Essential HTN  Hx prostate Ca    Recommendations:   Antibiotic treatment:  Monitor off antibiotics  Covid Rx:    Remdesivir: not indicated  Decadron: 6 mg po daily x 10 days  Actemra: Not indicated  Monoclonal antibodies: Not indicated      Medical Decision Making/Summary/Discussion:5/27/2022     Patient admitted with COVID 19 infection    Infection Control Recommendations   [place] Precautions  Airborne isolation  Droplet Isolation    Antimicrobial Stewardship Recommendations     Simplification of therapy  Targeted therapy    Coordination of Outpatient Care:   Estimated Length of IV antimicrobials:TBD  Patient will need Midline Catheter Insertion: TBD  Patient will need PICC line Insertion: No  Patient will need: Home IV , [place],  SNF,  LTAC:TBD  Patient will need outpatient wound care: No    Chief complaint/reason for consultation:   Concern for COVID infection      History of Present Illness:   Marsha Aguirre is a 66y.o.-year-old male who was initially admitted on 5/27/2022. Patient seen at the request of Dr. Jaime Hahn:    Patient presented through ER with complaints of SOB. Patient indicated onset of fevers on 5-23-22 followed by progressive development of SOB and CHOW . He tested for Covid at home and found that the home antigen test was positive, despite having received 4 prior vaccine doses. Patient has pre-existing Hx of COPD. He does not use home 02 but uses albuterol. Evaluation in the ER showed:  Positive Covid test  ProBNP 1670  BUN 33 , Cr 1.70  WBC 13.1  Room air 02 sat: 86%  CT: 5-27-22: No PE. Pulmonary infiltrates suggestive of atypical pneumonia.     Patient admitted because of concerns with COVID Year: Never true    Ran Out of Food in the Last Year: Never true   Transportation Needs: No Transportation Needs    Lack of Transportation (Medical): No    Lack of Transportation (Non-Medical): No   Physical Activity: Insufficiently Active    Days of Exercise per Week: 3 days    Minutes of Exercise per Session: 30 min   Stress: No Stress Concern Present    Feeling of Stress : Not at all   Social Connections: Moderately Integrated    Frequency of Communication with Friends and Family: Once a week    Frequency of Social Gatherings with Friends and Family: Once a week    Attends Scientologist Services: More than 4 times per year    Active Member of [address] [place] [address] [employer] or Organizations: Yes    Attends Club or Organization Meetings: 1 to 4 times per year    Marital Status:    Intimate Partner Violence: Not At Risk    Fear of Current or Ex-Partner: No    Emotionally Abused: No    Physically Abused: No    Sexually Abused: No   Housing Stability: Low Risk     Unable to Pay for Housing in the Last Year: No    Number of Places Lived in the Last Year: 1    Unstable Housing in the Last Year: No       Family History:     Family History   Problem Relation Age of Onset    No Known Problems Father     Cancer Mother         Allergies:   Patient has no known allergies. Review of Systems:       Constitutional: No fevers or chills. No systemic complaints  Head: No headaches  Eyes: No double vision or blurry vision. No conjunctival inflammation. ENT: No sore throat or runny nose. . No hearing loss, tinnitus or vertigo. Cardiovascular: No chest pain or palpitations. Shortness of breath. CHOW  Lung: Shortness of breath, cough. No sputum production  Abdomen: No nausea, vomiting, diarrhea, or abdominal pain. Angel Amend No cramps. Genitourinary: No increased urinary frequency, or dysuria. No hematuria. No suprapubic or CVA pain  Musculoskeletal: No muscle aches or pains.  No joint effusions, swelling or deformities  Hematologic: No bleeding or bruising. Neurologic: No headache, weakness, numbness, or tingling. Integument: No rash, no ulcers. Psychiatric: No depression. Endocrine: No polyuria, no polydipsia, no polyphagia. Physical Examination :     Patient Vitals for the past 8 hrs:   BP Temp Temp src Pulse Resp SpO2 Height Weight   05/27/22 1509 -- -- -- -- -- -- 5' 6\" (1.676 m) --   05/27/22 1412 -- -- -- -- -- 94 % -- --   05/27/22 1409 (!) 148/66 97.7 °F (36.5 °C) Temporal 79 20 96 % -- --   05/27/22 1041 (!) 145/76 97.7 °F (36.5 °C) Temporal 78 20 94 % 5' 6\" (1.676 m) 192 lb 8 oz (87.3 kg)   05/27/22 0815 (!) 124/45 -- -- 70 24 96 % -- --   05/27/22 0800 125/77 -- -- 83 24 94 % -- --   05/27/22 0753 114/74 -- -- 74 20 95 % -- --     General Appearance: Awake, alert, and in no apparent distress  Head:  Normocephalic, no trauma  Eyes: Pupils equal, round, reactive to light; sclera anicteric; conjunctivae pink. No embolic phenomena. ENT: Oropharynx clear, without erythema, exudate, or thrush. No tenderness of sinuses. Mouth/throat: mucosa pink and moist. No lesions. Dentition in good repair. Neck:Supple, without lymphadenopathy. Thyroid normal, No bruits. Pulmonary/Chest: Clear to auscultation, without wheezes, rales, or rhonchi. No dullness to percussion. Cardiovascular: Regular rate and rhythm without murmurs, rubs, or gallops. Abdomen: Soft, non tender. Bowel sounds normal. No organomegaly  All four Extremities: No cyanosis, clubbing, edema, or effusions. Neurologic: No gross sensory or motor deficits. Skin: Warm and dry with good turgor. No signs of peripheral arterial or venous insufficiency. No ulcerations. No open wounds.     Medical Decision Making -Laboratory:   I have independently reviewed/ordered the following labs:    CBC with Differential:   Recent Labs     05/27/22  0725   WBC 13.1*   HGB 12.6*   HCT 37.4*      LYMPHOPCT 6*   MONOPCT 9     BMP:   Recent Labs     05/27/22  0725      K 3.1*   CL 92* CO2 31   BUN 33*   CREATININE 1.70*     Hepatic Function Panel:   Recent Labs     05/27/22  0725   PROT 7.8   LABALBU 3.8   BILIDIR <0.08   IBILI Can not be calculated   BILITOT 0.21*   ALKPHOS 93   ALT 17   AST 16     No results for input(s): RPR in the last 72 hours. No results for input(s): HIV in the last 72 hours. No results for input(s): BC in the last 72 hours. Lab Results   Component Value Date    MUCUS TRACE 06/17/2019    RBC 4.03 05/27/2022    TRICHOMONAS NOT REPORTED 06/17/2019    WBC 13.1 05/27/2022    YEAST NOT REPORTED 06/17/2019    TURBIDITY CLEAR 06/17/2019     Lab Results   Component Value Date    CREATININE 1.70 05/27/2022    GLUCOSE 149 05/27/2022       Medical Decision Making-Imaging:     Narrative   EXAMINATION:   CTA OF THE CHEST 5/27/2022 8:36 am       TECHNIQUE:   CTA of the chest was performed after the administration of intravenous   contrast.  Multiplanar reformatted images are provided for review.  MIP   images are provided for review.  Automated exposure control, iterative   reconstruction, and/or weight based adjustment of the mA/kV was utilized to   reduce the radiation dose to as low as reasonably achievable.       COMPARISON:   12/17/2020       HISTORY:   ORDERING SYSTEM PROVIDED HISTORY: hypoxia, SOB   TECHNOLOGIST PROVIDED HISTORY:   hypoxia, SOB   Decision Support Exception - unselect if not a suspected or confirmed   emergency medical condition->Emergency Medical Condition (MA)       FINDINGS:   Pulmonary Arteries: Pulmonary arteries are adequately opacified for   evaluation.  No evidence of intraluminal filling defect to suggest pulmonary   embolism.  Main pulmonary artery is normal in caliber.       Mediastinum: Mild right hilar lymphadenopathy with nodes measuring up to 1.1   cm.  The heart and pericardium demonstrate no acute abnormality.  There is no   acute abnormality of the thoracic aorta.       Scratch       Lungs/pleura: Interstitial pulmonary infiltrates seen in the upper and lower   lobes with mild bronchiectatic changes as well.  These are significantly   worse compared to the previous evaluation.  No evidence of active pleural   disease.  No evidence of dominant nodule or mass.       Upper Abdomen: Limited images of the upper abdomen are unremarkable.       Soft Tissues/Bones: No acute bone or soft tissue abnormality.           Impression   1. No evidence of acute pulmonary embolism or acute aortic disease. 2. Pulmonary infiltrates which have an interstitial appearance accompanied by   bronchiectasis and inflammatory changes.  This likely represents atypical   pneumonia.  No evidence of consolidation.  No evidence of active pleural   disease. 3. Mild right hilar lymphadenopathy.         Narrative   EXAMINATION:   ONE XRAY VIEW OF THE CHEST       5/27/2022 7:34 am       COMPARISON:   March 19, 2021 chest exam       HISTORY:   ORDERING SYSTEM PROVIDED HISTORY: dyspnea   TECHNOLOGIST PROVIDED HISTORY:   dyspnea       FINDINGS:   Stable normal cardiac silhouette       There are no significant pleural, parenchymal or mediastinal findings           Impression   No acute cardiopulmonary findings             Medical Decision [id number]:       Medical Decision Making-Other:     Note:  Labs, medications, radiologic studies were reviewed with personal review of films  Large amounts of data were reviewed  Discussed with nursing Staff, Discharge planner  Infection Control and Prevention measures reviewed  All prior entries were reviewed  Administer medications as ordered  Prognosis: Guarded  Discharge planning reviewed      Thank you for allowing us to participate in the care of this patient. Please call with questions. EVER Tucker - CNP     ATTESTATION:    I have discussed the case, including pertinent history and exam findings with the APRN.  I have evaluated the  History, physical findings and pictures of the patient and the key elements of the encounter have been performed by me. I have reviewed the laboratory data, other diagnostic studies and discussed them with the APRN. I have updated the medical record where necessary. I agree with the assessment, plan and orders as documented by the APRN.     Ian Thao MD.      Pager: [phone] - Office: [phone]

## 2022-05-27 NOTE — PROGRESS NOTES
RESPIRATORY ASSESSMENT PROTOCOL                                                                                              Patient Name: Nancie Nix Room#: 6980/9012-75 : 1950     Admitting diagnosis: Hypoxia [R09.02]  Atrial fibrillation, unspecified type ([place] [address] [I48.91]  Pneumonia due to COVID-19 virus [U07.1, J12.82]       Medical History:   Past Medical History:   Diagnosis Date    CAD (coronary artery disease)     Stent placement     COPD (chronic obstructive pulmonary disease) ([place] [address]     Essential hypertension     Hyperlipidemia     Prostate cancer ([place] [address]     Wears glasses        PATIENT ASSESSMENT    LABORATORY DATA  Hematology:   Lab Results   Component Value Date    WBC 13.1 2022    RBC 4.03 2022    HGB 12.6 2022    HCT 37.4 2022     2022     Chemistry:    Lab Results   Component Value Date    PHART 7.496 2022    JAX0JWG 41.5 2022    PO2ART 83.2 2022    A1YRPUJX 96.9 2022    GKS2KPF 31.3 2022    PBEA 7.4 2022       VITALS  Heart Rate: 78   Resp: 20  BP: (!) 145/76  SpO2: 94 % O2 Device: Nasal cannula  Temp: 97.7 °F (36.5 °C)    SKIN COLOR  [x] Normal  [] Pale  [] Dusky  [] Cyanotic    RESPIRATORY PATTERN  [x] Normal  [] Dyspnea  [] Cheyne-Jensen  [] Kussmaul  [] Biots    AMBULATORY  [] Yes  [] No  [x] With Assistance    PEAK FLOW  Predicted:     Personal Best:        VITAL CAPACITY  Predicted value:  ml  Actual Value:  ml  30% of Predicted:  ml  Patient Acuity 0 1 2 3 4 Score   Level of Concious (LOC) [x]  Alert & Oriented or Pt normal LOC []  Confused;follows directions []  Confused & uncooper-ative []  Obtunded []  Comatose 0   Respiratory Rate  (RR) []  Reg. rate & pattern. 12 - 20 bpm  []  Increased RR.  Greater than 20 bpm   [x]  SOB w/ exertion or RR greater than 24 bpm []  Access- ory muscle use at rest. Abn.  resp. []  SOB at rest.   2   Bilateral Breath Sounds (BBS) []  Clear []  Diminish-ed bases Tiara Kaminski 18 0833:
Subjective
Follow-up For:
Acute systolic heart failure leading to volume
Subjective:
Afebrile overnight. Patient is seen and examined in bed sitting up this morning.
Patient states he felt good last night and had about 7 hours of sleep with help 
from Trazodone for sleep maintainence. Patient otherwise denies any chest pain, 
palpitations, or shortness of breath but has a lingering cough. Patient notes 
recurrent abdominal pain, rated as 6 out of 10 this morning. Patient otherwise 
has no other complaints.
 
Review of Systems
Constitutional:
Reports: see HPI. 
 
Objective
Last 24 Hrs of Vital Signs/I&O
 Vital Signs
 
 
Date Time Temp Pulse Resp B/P B/P Pulse O2 O2 Flow FiO2
 
     Mean Ox Delivery Rate 
 
 0809  85  120/82     
 
 0808  85  120/82     
 
 0647 98.1 85 18 110/58  96 Room Air  
 
 2357       Room Air  
 
 2147 97.8 82 12 120/74  96 Room Air  
 
 2108  90  132/70     
 
 1402 97.9 91 18 118/70  97   
 
 
 Intake & Output
 
 
  1600  0800  0000
 
Intake Total  480 350
 
Output Total   2350
 
Balance  480 -2000
 
    
 
Intake, Oral  480 350
 
Number  0 
 
Bowel   
 
Movements   
 
Output, Urine   2350
 
 
 
 
Physical Exam
General Appearance: Alert, Oriented X3, Cooperative
Skin: No Rashes, No Breakdown
HEENT: Atraumatic
Neck: Supple, No JVD
Cardiovascular: Regular Rate, Normal S1, Normal S2
Lungs: Clear to Auscultation, slightly decreased breath sounds in RLL
Abdomen: Soft, Abd. tenderness noted, mostly right upper side to mid epigastric 
area
Neurological: Normal Speech
Extremities: 2+ edema in b/l lower extremities
 
Assessment/Plan
Assessment:
CXR  -
1. The right pleural effusion is further decreased in size, with trace
effusion noted on the current study. There is no pneumothorax.
2. There is stable cardiomegaly. The central pulmonary vasculature is less
prominent.
 
53-year-old male with PMH CAD, HF ReF, right-sided pleural effusion, type II DM,
HLD, HTN, presented with chief complaints of shortness of breath and abdominal 
pain since last 1 week.
 
Problems
Right-sided pleural effusion post thoracentesis on Friday
Bilateral pedal edema
HFR EF 25%
 
#Right-sided pleural effusion, s/p thoracentesis, repeated 
-Repeat chest x-ray: small right pleural effusion has decreased after recent 
thoracentesis. No
evidence of pneumothorax.
-Increased Lasix to 80 mg IV q8 to improve diuresis
-Thoracentesis performed  (1.5 L) ->  (1.6 L) produced with ultrasound
-guided thoracentesis
-Manage electrolytes as needed; Magnesium followed with normal values noted
-Follow-up BEP tomorrow
-If abdominal pain persists, consult GI. LFTs are normal
-Daily weight measurement
-Pt. had repeat thoracentesis with IR 
-Pt. counseled about low sodium diet as well as fluid restriction
-Repeat CXR tmrw 
-Strict I/O charting
 
FC
Heart healthy diet with sodium restriction
DVT prophylaxis
 
Problem List:
 1. HFrEF (heart failure with reduced ejection fraction)
 
 2. Pleural effusion
 
Pain Ratin
Pain Location:
abd pain, right-sided
Pain Goal: Pain 7 or less
Pain Plan:
prn pain meds
Tomorrow's Labs & Rationales:
routine
 
 
Sancho Wallace 18 1400:
Attending MD Review Statement
 
Attending Statement
Attending MD Statement: examined this patient, discuss w/resident/PA/NP, agreed 
w/resident/PA/NP, reviewed EMR data (avail), discussed with nursing, discussed 
with case mgmt
Attending Assessment/Plan:
Acute on chronic systolic chf with EF of 20%. d/w cardiology and pt the care 
plan. cardiology recommending increasing the lasix dose to 80mg iv tid on . 
Pt cousnelled about low sodium diet as well as fluid restriction.  dietary 
consult placed. Pt cousnelled about low sodium diet . 
 
Rt side pleural effusion s/p thoracentesis- 1.5 L fluid removed earlier this 
admission on . Repeat cxr showing small rt side pleural effusion and pt had 
repeat thoracentesis by IR on  and had 1.6 L removed. Repeat CXR done does 
not show any significan reaccumulation. 
 
 
ABdominal pain - Resolved now. pt says it happens after episodes of retching and
vomiting. If persists will get GI consult. His LFTs are normal except bili being
slightly high but is trending down. COuld have some liver disease secondary to 
chf. []  Diminish-ed t/o, or rales   [x]  Sporadic, scattered wheezes or rhonchi []  Persistentwheezes and, or absent BBS 3   Cough [x]  Strong, effective, & non-prod. []  Effective & prod. Less than 25 ml (2 TBSP) over past 24 hrs []  Ineffective & non-prod to less than 25 ML over past 24 hrs []  Ineffective and, or greater than 25 ml sputum prod. past 24 hrs. []  Nonspon- taneous; Requires suctioning 0   Pulmonary History  (PULM HX) []  No smoking and no chronic pulmonaryhistory []  Former smoker. Quit over 12 mos. ago []  Current smoker or quit w/ in 12 mos []  Pulm. History and, or 20 pk/yr smoking hx [x]  Admitted w/ acute pulm. dx and, or has been admitted w/ pulm. dx 2 or more times over past 12 mos 4   Surgical History this Admit  (SURG HX) [x]  No surgery []  General surgery []  Lower abdominal []  Thoracic or upper abdominal   []  Thoracic w/ pulm. disease 0   Chest X-Ray (CXR)/CT Scan []  Clear or not applicable []  Not available [x]  Atelect- asis or pleural effusions []  Localized infiltrate or pulm. edema []  Con-solidated Infiltrates, bilateral, or in more than 1 lobe 2   Slow or Forced VC, FEV1 OR PEFR (PULM FXN)  [x]  80% or greater, or not indicated []  Pt. unable to perform []  FEV1 or PEFR or VC 51-79%. []  FEV1 or PEFR or VC  30-49%   []  FEV1 or PEFR or VC less than 30%   0   TOTAL ACUITY: 11       CARE PLAN    If Acuity Level is 2, 3, or 4 in any of the following:    [] BILATERAL BREATH SOUNDS (BBS)     [x] PULMONARY HISTORY (PULM HX)  [] PULMONARY FUNCTION (PULM FX)    Goal: Improve respiratory functions in patients with airway disease and decrease WOB    [x] AEROSOL PROTOCOL    Total Acuity:   16-32  []  Secondary Assessment in 24 hrs Total Acuity:  9-15  [x]  Secondary Assessment in 24 hrs Total Acuity:  4-8  []  Secondary Assessment in 48 hrs Total Acuity:  0-3  []  Secondary Assessment in 72 hrs   HHN AEROSOL THERAPY with  [physician-ordered bronchodilator(s)] q 4 & Albuterol PRN q2 hrs. Breath-Actuated Neb if BBS Acuity = 4, and pt. can use MP. Notify physician if condition deteriorates. HHN AEROSOL THERAPY with  [physician-ordered bronchodilator(s)]  QID and Albuterol PRN q4 hrs. Breath-Actuated Neb if BBS Acuity = 4, and pt. can use MP. Notify physician if condition deteriorates. MDI THERAPY with  2 actuations of [physician-ordered bronchodilator(s)] via spacer TID Albuterol and PRNq4 hrs. If unable to utilize MDI: HHN [physician-ordered bronchodilator(s)] TID and Albuterol PRN q4 hrs. Notify physician if condition deteriorates. MDI THERAPY with  [physician-ordered bronchodilator(s)] via spacer TID PRN. If unable to utilize MDI: HHN [physician-ordered bronchodilator(s)] TID PRN. Notify physician if condition deteriorates. If Acuity Level is 2, 3, or 4 in any of the following:    [] COUGH     [] SURGICAL HISTORY (SURG HX)  [x] CHEST XRAY (CXR)    Goal: Improvement in sputum mobilization in patients with ineffective airway clearance. Reverse atelectasis. [x] Bronchopulmonary Hygiene Protocol    Total Acuity:   16-32  []  Secondary Assessment in 24 hrs Total Acuity:  9-15  [x]  Secondary Assessment in 24 hrs Total Acuity:  4-8  []  Secondary Assessment in 48 hrs Total Acuity:  0-3  []  Secondary Assessment in 72 hrs   METANEB QID with [physician-ordered bronchodilator(s)] if CXR Acuity = 4; otherwise:  PD&P, PEP, or Vest QID & PRN  NT Sxn PRN for ineffective cough  METANEB QID with [physician-ordered bronchodilator(s)] if CXR Acuity = 4; otherwise:  PD&P, PEP, or Vest TID & PRN  NT Sxn PRN for ineffective cough  Instruct patient to self-perform IS q1hr WA   Directed Cough self-performed q1hr WA     If Acuity Level is 2 or above in the following:    [] PULMONARY HISTORY (PULM HX)    Goal: Assist patient in quitting smoking to slow or stop the progression of lung disease.     [] Smoking Cessation Protocol    SMOKING CESSATION EDUCATION provided according to policy [id number]: laney with an X)  ____Yes    ____ No     ____ NA    Smoking Cessation Booklet given:  ____Yes  ____No ____Patient Nestor Moore

## 2022-05-27 NOTE — H&P
EVER Venegas-CNP  Covid-History and Physical     Patient: Kayli Charles  Date of Admission: 5/27/2022  7:13 AM  Date of Evaluation: 5/27/2022      Subjective:      Chief Complaint:    Chief Complaint   Patient presents with    Shortness of Breath     SOB x couple days, home covid test was positive       History Obtained From:  patient, electronic medical record  PCP: Samuel Oscar MD    History of Present Illness:   Kayli Charles is a 66 y.o. male who presented to the emergency room with complaints of shortness of breath. Patient stated he developed a fever on Monday, May 23. Patient stated this has been ongoing since that time. He stated his shortness of breath has been progressing with worsening dyspnea with exertion. Patient does have history of COPD but is not on any oxygen at home. Wife stated they did a home COVID test on Monday which was positive. Patient has had a total of 4 COVID vaccines. He denied chest pain or palpitations. He denied nausea vomiting or diarrhea. He does not smoke. He does use albuterol at home however has not been helping. CT chest was done and showed pneumonia. Rapid COVID was positive. WBC count was slightly elevated at 13. 1. proBNP 1670. BUN 33 and creatinine 1.70. Venous blood gases were done PO2 was 28.2. Patient's SPO2 on room air was 86% he was placed on nasal cannula oxygen given Decadron and duo nebs while in ER. Review of Systems:  Constitutional:positive  for fevers, and negative for chills.   Respiratory: positive for shortness of breath, negative for cough, and negative for wheezing  Cardiovascular: negative for chest pain, negative for palpitations, and negative for syncope  Gastrointestinal: negative for abdominal pain, negative for nausea,negative for vomiting, negative for diarrhea, negative for constipation, and negative for hematochezia or melena  Genitourinary: negative for dysuria, negative for urinary urgency, negative for urinary frequency, and negative for hematuria  Neurological: negative for unilateral weakness, numbness or tingling. All other systems were reviewed with the patient and are negative except as stated above      History:      Past Medical History:        Diagnosis Date    CAD (coronary artery disease)     Stent placement 2019    COPD (chronic obstructive pulmonary disease) ([facility] [place] [address]     Essential hypertension     Hyperlipidemia     Prostate cancer ([facility] [place] [address]     Wears glasses        Past Surgical History:        Procedure Laterality Date    ANKLE SURGERY      CAROTID STENT PLACEMENT Left 2019    COLONOSCOPY  2015    Normal    HERNIA REPAIR Right 1975    OTHER SURGICAL HISTORY      cauterization intestines-[facility]       Medications Prior to Admission:    Prior to Admission medications    Medication Sig Start Date End Date Taking? Authorizing Provider   predniSONE (DELTASONE) 10 MG tablet 4 tabs daily for 3 days, 3 tabs daily for 3 days, 2 tabs daily for 3 days, 1 tabs daily for 3 days  Patient taking differently:  At present 10mg every other day 9/15/21   EVER Brito - CNP   carvedilol (COREG) 25 MG tablet Take 1 tablet by mouth 2 times daily (with meals) 9/8/21   Sherman Hammans, MD   NIFEdipine (ADALAT CC) 30 MG extended release tablet Take 1 tablet by mouth daily 3/21/21   Maximiliano Mancilla MD   ferrous sulfate (IRON 325) 325 (65 Fe) MG tablet Take 1 tablet by mouth every other day  Patient not taking: Reported on 5/27/2022 3/21/21   Maximiliano Mancilla MD   docusate sodium (COLACE, DULCOLAX) 100 MG CAPS Take 100 mg by mouth 2 times daily 3/20/21   Maximiliano Mancilla MD   atorvastatin (LIPITOR) 80 MG tablet Take 1 tablet by mouth daily 7/1/19   Sherman Hammans, MD   hydrochlorothiazide (HYDRODIURIL) 25 MG tablet Take 1 tablet by mouth daily 6/21/19   Sherman Hammans, MD   Cholecalciferol (VITAMIN D3) 5000 units CAPS Take 1,000 Units by mouth daily    Historical Provider, MD   albuterol sulfate  (90 Base) MCG/ACT inhaler Inhale 2 puffs into the lungs every 6 hours as needed for Wheezing or Shortness of Breath 18   EVER Palma CNP   albuterol (PROVENTIL) (2.5 MG/3ML) 0.083% nebulizer solution Take 3 mLs by nebulization every 4 hours as needed for Wheezing 18   EVER Palma CNP   budesonide-formoterol (SYMBICORT) 160-4.5 MCG/ACT AERO Inhale 2 puffs into the lungs 2 times daily    Historical Provider, MD   tiotropium (SPIRIVA RESPIMAT) 1.25 MCG/ACT AERS inhaler Inhale 2 puffs into the lungs daily    Historical Provider, MD   cetirizine (ZYRTEC) 10 MG tablet Take 10 mg by mouth daily    Historical Provider, MD   montelukast (SINGULAIR) 10 MG tablet Take 10 mg by mouth nightly    Historical Provider, MD       Allergies:  Patient has no known allergies. Social History:   TOBACCO:   reports that he quit smoking about 8 years ago. His smoking use included cigarettes. He has a 30.00 pack-year smoking history. He has never used smokeless tobacco.  ETOH:   reports current alcohol use of about 1.0 standard drink of alcohol per week. Family History:       Problem Relation Age of Onset    No Known Problems Father     Cancer Mother            Objective:    VITALS:  Temp: 97.7 °F (36.5 °C)  BP: (!) 145/76  Resp: 20  Heart Rate: 78  SpO2: 94 % on supplemental O2  SpO2 range:   SpO2  Av.4 %  Min: 93 %  Max: 96 %  -----------------------------------------------------------------  EXAM:  GEN:    Awake, alert and oriented x3. EYES:  EOMI, pupils equal   NECK: Supple. No lymphadenopathy. No carotid bruit  CVS:    regular rate and rhythm, no audible murmur  PULM:  diminished with crackles located bibasilar, mild acute respiratory distress  ABD:    Bowels sounds normal.  Abdomen is soft. No distention. no tenderness to palpation. EXT:   no edema bilaterally . No calf tenderness. NEURO: Moves all extremities. Motor and sensory are grossly intact  SKIN:  No rashes. No skin lesions.    -----------------------------------------------------------------    DATA:  CBC:   Lab Results   Component Value Date    WBC 13.1 (H) 05/27/2022    RBC 4.03 (L) 05/27/2022    HGB 12.6 (L) 05/27/2022    HCT 37.4 (L) 05/27/2022    MCV 92.8 05/27/2022     05/27/2022      CMP:   Lab Results   Component Value Date    GLUCOSE 149 (H) 05/27/2022    BUN 33 (H) 05/27/2022    CREATININE 1.70 (H) 05/27/2022     05/27/2022    K 3.1 (L) 05/27/2022    CALCIUM 9.7 05/27/2022    CL 92 (L) 05/27/2022    CO2 31 05/27/2022    PROT 7.8 05/27/2022    LABALBU 3.8 05/27/2022    BILITOT 0.21 (L) 05/27/2022    ALKPHOS 93 05/27/2022    ALT 17 05/27/2022    AST 16 05/27/2022     UA:   Lab Results   Component Value Date    COLORU YELLOW 06/17/2019    SPECGRAV 1.020 06/17/2019    WBCUA None 06/17/2019    RBCUA None 06/17/2019    EPITHUA 0 TO 2 06/17/2019    LEUKOCYTESUR NEGATIVE 06/17/2019    GLUCOSEU NEGATIVE 06/17/2019    KETUA NEGATIVE 06/17/2019    PROTEINU TRACE (A) 06/17/2019    HGBUR NEGATIVE 06/17/2019    CRYSTUA NOT REPORTED 06/17/2019    BACTERIA TRACE (A) 06/17/2019    YEAST NOT REPORTED 06/17/2019     Lactic Acid:   Lab Results   Component Value Date    LACTA 1.1 10/29/2018     CRP:   No results for input(s): CRP in the last 72 hours. D-Dimer:  Lab Results   Component Value Date    DDIMER 0.41 08/18/2020     PT/INR:  Lab Results   Component Value Date    PROTIME 13.2 05/27/2022    INR 1.0 05/27/2022     High Sensitivity Troponin:  Recent Labs     05/27/22  0725   TROPHS 35*     ABGs:   Lab Results   Component Value Date    PHART 7.496 01/19/2022    OBF0XXY 41.5 01/19/2022    PO2ART 83.2 01/19/2022    DQK3CPM 31.3 01/19/2022    W7QNWLBB 96.9 01/19/2022    FIO2 NOT REPORTED 01/19/2022         EKG reviewed        Imaging Data:  CT CHEST PULMONARY EMBOLISM W CONTRAST   Final Result   1. No evidence of acute pulmonary embolism or acute aortic disease.    2. Pulmonary infiltrates which have an interstitial appearance accompanied by   bronchiectasis and inflammatory changes. This likely represents atypical   pneumonia. No evidence of consolidation. No evidence of active pleural   disease. 3. Mild right hilar lymphadenopathy.          XR CHEST PORTABLE   Final Result   No acute cardiopulmonary findings               Assessment / Plan:     · This patient requires inpatient admission because of Acute respiratory failure with hypoxia due to Covid-19 pneumonia  · Estimated length of stay is 5 days  · Reviewed patient's symptoms and data results including labs and imaging studies per the ER MD notes at time of admission  · ID consult regarding initiation of Remdesivir-likely contraindicated due to CKD  · Start Dexamethasone 6 mg po QD x 10 days   · ID consult regarding Actemra administration   · Continue Mucinex-DM  · Supplemental O2 to keep SpO2 > 92%  · Encouraged continued use of Acapella  · Encouraged intermittent prone positioning as tolerated  · Infectious Disease consult initiated   · Continue respiratory therapy protocol   · Continue airborne precautions  · Vitamin C, D and zinc  · Start Proscar  · Trend labs  · COPD exacerbation  · Start Zithromax  · Continue nebs  · Continue Proventil, Spiriva, Dulera  · Continue Zyrtec  · Stage III CKD  · Trend labs  · Essential hypertension  · Continue Coreg, Adalat CC, HCTZ  · DVT prophylaxis: Lovenox  · Peptic ulcer prophylaxis: Pepcid  · High risk medications: none      Core Measures:     · Decubitus ulcer present on admission: No  · CODE STATUS: FULL CODE  · Nutrition Status: good   · Physical therapy: yes  · Old Charts reviewed: yes  · EKG Reviewed: yes  · Advance Directive Addressed: Yes    Thresa Barn, APRN - CNP , APRN-CNP  5/27/2022  11:09 AM

## 2022-05-28 LAB
ABSOLUTE EOS #: <0.03 K/UL (ref 0–0.44)
ABSOLUTE IMMATURE GRANULOCYTE: 0.21 K/UL (ref 0–0.3)
ABSOLUTE LYMPH #: 0.71 K/UL (ref 1.1–3.7)
ABSOLUTE MONO #: 0.89 K/UL (ref 0.1–1.2)
ALBUMIN SERPL-MCNC: 3.7 G/DL (ref 3.5–5.2)
ALBUMIN/GLOBULIN RATIO: 0.9 (ref 1–2.5)
ALP BLD-CCNC: 96 U/L (ref 40–129)
ALT SERPL-CCNC: 15 U/L (ref 5–41)
ANION GAP SERPL CALCULATED.3IONS-SCNC: 11 MMOL/L (ref 9–17)
AST SERPL-CCNC: 14 U/L
BASOPHILS # BLD: 0 % (ref 0–2)
BASOPHILS ABSOLUTE: 0.04 K/UL (ref 0–0.2)
BILIRUB SERPL-MCNC: <0.1 MG/DL (ref 0.3–1.2)
BUN BLDV-MCNC: 40 MG/DL (ref 8–23)
BUN/CREAT BLD: 28 (ref 9–20)
C-REACTIVE PROTEIN: 260.6 MG/L (ref 0–5)
CALCIUM SERPL-MCNC: 9.4 MG/DL (ref 8.6–10.4)
CHLORIDE BLD-SCNC: 95 MMOL/L (ref 98–107)
CO2: 31 MMOL/L (ref 20–31)
CREAT SERPL-MCNC: 1.43 MG/DL (ref 0.7–1.2)
D-DIMER QUANTITATIVE: 0.51 MG/L FEU (ref 0–0.59)
EOSINOPHILS RELATIVE PERCENT: 0 % (ref 1–4)
FERRITIN: 252 NG/ML (ref 30–400)
FIBRINOGEN: 1109 MG/DL (ref 179–518)
GFR AFRICAN AMERICAN: 59 ML/MIN
GFR NON-AFRICAN AMERICAN: 49 ML/MIN
GFR SERPL CREATININE-BSD FRML MDRD: ABNORMAL ML/MIN/{1.73_M2}
GFR SERPL CREATININE-BSD FRML MDRD: ABNORMAL ML/MIN/{1.73_M2}
GLUCOSE BLD-MCNC: 155 MG/DL (ref 70–99)
HCT VFR BLD CALC: 35.5 % (ref 40.7–50.3)
HEMOGLOBIN: 12.1 G/DL (ref 13–17)
IMMATURE GRANULOCYTES: 2 %
LYMPHOCYTES # BLD: 6 % (ref 24–43)
MAGNESIUM: 2.5 MG/DL (ref 1.6–2.6)
MCH RBC QN AUTO: 31.2 PG (ref 25.2–33.5)
MCHC RBC AUTO-ENTMCNC: 34.1 G/DL (ref 28.4–34.8)
MCV RBC AUTO: 91.5 FL (ref 82.6–102.9)
MONOCYTES # BLD: 8 % (ref 3–12)
NRBC AUTOMATED: 0 PER 100 WBC
PARTIAL THROMBOPLASTIN TIME: 31.3 SEC (ref 26.8–34.8)
PDW BLD-RTO: 13.4 % (ref 11.8–14.4)
PLATELET # BLD: 234 K/UL (ref 138–453)
PMV BLD AUTO: 10.8 FL (ref 8.1–13.5)
POTASSIUM SERPL-SCNC: 3.3 MMOL/L (ref 3.7–5.3)
RBC # BLD: 3.88 M/UL (ref 4.21–5.77)
SEG NEUTROPHILS: 84 % (ref 36–65)
SEGMENTED NEUTROPHILS ABSOLUTE COUNT: 9.93 K/UL (ref 1.5–8.1)
SODIUM BLD-SCNC: 137 MMOL/L (ref 135–144)
TOTAL PROTEIN: 7.6 G/DL (ref 6.4–8.3)
WBC # BLD: 11.8 K/UL (ref 3.5–11.3)

## 2022-05-28 PROCEDURE — 85025 COMPLETE CBC W/AUTO DIFF WBC: CPT

## 2022-05-28 PROCEDURE — 94761 N-INVAS EAR/PLS OXIMETRY MLT: CPT

## 2022-05-28 PROCEDURE — 1200000000 HC SEMI PRIVATE

## 2022-05-28 PROCEDURE — 85379 FIBRIN DEGRADATION QUANT: CPT

## 2022-05-28 PROCEDURE — 94669 MECHANICAL CHEST WALL OSCILL: CPT

## 2022-05-28 PROCEDURE — G0378 HOSPITAL OBSERVATION PER HR: HCPCS

## 2022-05-28 PROCEDURE — 6370000000 HC RX 637 (ALT 250 FOR IP): Performed by: NURSE PRACTITIONER

## 2022-05-28 PROCEDURE — 94664 DEMO&/EVAL PT USE INHALER: CPT

## 2022-05-28 PROCEDURE — 2700000000 HC OXYGEN THERAPY PER DAY

## 2022-05-28 PROCEDURE — 6360000002 HC RX W HCPCS: Performed by: INTERNAL MEDICINE

## 2022-05-28 PROCEDURE — 99233 SBSQ HOSP IP/OBS HIGH 50: CPT | Performed by: INTERNAL MEDICINE

## 2022-05-28 PROCEDURE — 83735 ASSAY OF MAGNESIUM: CPT

## 2022-05-28 PROCEDURE — 82728 ASSAY OF FERRITIN: CPT

## 2022-05-28 PROCEDURE — 94640 AIRWAY INHALATION TREATMENT: CPT

## 2022-05-28 PROCEDURE — 36415 COLL VENOUS BLD VENIPUNCTURE: CPT

## 2022-05-28 PROCEDURE — 6360000002 HC RX W HCPCS: Performed by: NURSE PRACTITIONER

## 2022-05-28 PROCEDURE — 80053 COMPREHEN METABOLIC PANEL: CPT

## 2022-05-28 PROCEDURE — 86140 C-REACTIVE PROTEIN: CPT

## 2022-05-28 PROCEDURE — 96372 THER/PROPH/DIAG INJ SC/IM: CPT

## 2022-05-28 PROCEDURE — 85384 FIBRINOGEN ACTIVITY: CPT

## 2022-05-28 PROCEDURE — 85730 THROMBOPLASTIN TIME PARTIAL: CPT

## 2022-05-28 RX ADMIN — AZITHROMYCIN MONOHYDRATE 250 MG: 250 TABLET ORAL at 08:02

## 2022-05-28 RX ADMIN — ALBUTEROL SULFATE 2.5 MG: 2.5 SOLUTION RESPIRATORY (INHALATION) at 05:12

## 2022-05-28 RX ADMIN — DOCUSATE SODIUM 100 MG: 100 CAPSULE, LIQUID FILLED ORAL at 21:25

## 2022-05-28 RX ADMIN — ALBUTEROL SULFATE 2.5 MG: 2.5 SOLUTION RESPIRATORY (INHALATION) at 10:37

## 2022-05-28 RX ADMIN — NIFEDIPINE 30 MG: 30 TABLET, EXTENDED RELEASE ORAL at 08:02

## 2022-05-28 RX ADMIN — DEXAMETHASONE 6 MG: 4 TABLET ORAL at 08:01

## 2022-05-28 RX ADMIN — MONTELUKAST 10 MG: 10 TABLET, FILM COATED ORAL at 21:25

## 2022-05-28 RX ADMIN — FINASTERIDE 10 MG: 5 TABLET, FILM COATED ORAL at 08:02

## 2022-05-28 RX ADMIN — ZINC SULFATE 220 MG (50 MG) CAPSULE 100 MG: CAPSULE at 08:01

## 2022-05-28 RX ADMIN — DOCUSATE SODIUM 100 MG: 100 CAPSULE, LIQUID FILLED ORAL at 08:02

## 2022-05-28 RX ADMIN — CARVEDILOL 25 MG: 25 TABLET, FILM COATED ORAL at 08:02

## 2022-05-28 RX ADMIN — OXYCODONE HYDROCHLORIDE AND ACETAMINOPHEN 1000 MG: 500 TABLET ORAL at 13:04

## 2022-05-28 RX ADMIN — OXYCODONE HYDROCHLORIDE AND ACETAMINOPHEN 1000 MG: 500 TABLET ORAL at 21:24

## 2022-05-28 RX ADMIN — ALBUTEROL SULFATE 2.5 MG: 2.5 SOLUTION RESPIRATORY (INHALATION) at 16:50

## 2022-05-28 RX ADMIN — CETIRIZINE HYDROCHLORIDE 10 MG: 10 TABLET, FILM COATED ORAL at 08:02

## 2022-05-28 RX ADMIN — ENOXAPARIN SODIUM 30 MG: 100 INJECTION SUBCUTANEOUS at 21:25

## 2022-05-28 RX ADMIN — MOMETASONE FUROATE AND FORMOTEROL FUMARATE DIHYDRATE 2 PUFF: 200; 5 AEROSOL RESPIRATORY (INHALATION) at 10:38

## 2022-05-28 RX ADMIN — OXYCODONE HYDROCHLORIDE AND ACETAMINOPHEN 1000 MG: 500 TABLET ORAL at 16:52

## 2022-05-28 RX ADMIN — TIOTROPIUM BROMIDE INHALATION SPRAY 1 PUFF: 3.12 SPRAY, METERED RESPIRATORY (INHALATION) at 10:38

## 2022-05-28 RX ADMIN — ATORVASTATIN CALCIUM 80 MG: 40 TABLET, FILM COATED ORAL at 08:02

## 2022-05-28 RX ADMIN — HYDROCHLOROTHIAZIDE 25 MG: 25 TABLET ORAL at 08:02

## 2022-05-28 RX ADMIN — CARVEDILOL 25 MG: 25 TABLET, FILM COATED ORAL at 16:53

## 2022-05-28 RX ADMIN — OXYCODONE HYDROCHLORIDE AND ACETAMINOPHEN 1000 MG: 500 TABLET ORAL at 08:01

## 2022-05-28 RX ADMIN — ENOXAPARIN SODIUM 30 MG: 100 INJECTION SUBCUTANEOUS at 08:02

## 2022-05-28 RX ADMIN — FAMOTIDINE 20 MG: 20 TABLET ORAL at 08:02

## 2022-05-28 NOTE — PROGRESS NOTES
[facility] - Telemedicine Progress Note   COVID 19 Patient  Today's Date and Time: 5/28/2022, 9:31 AM    Impression :     · COVID 19 Confirmed Infection  · Immunized patient  · Covid tests:  · 5/27/22: Positive  · Elevated CRP  · Hx severe COPD  · CAD  · Essential HTN  · Hx prostate Ca      Patient evaluated by Telemedicine. [facility]  Provider Institution: [address] [place] [address],[address], [address] [facility]: Ms Nithya Ramsey, APRN- IM      Recommendations:   · Antibiotic treatment:  · Monitor off antibiotics  · Covid Rx:    · Remdesivir: not indicated  · Decadron: 6 mg po daily x 10 days  · Actemra: Not indicated  · Monoclonal antibodies: Not indicated      Medical Decision Making/Summary/Discussion:5/28/2022     · Patient admitted with COVID 19 infection    Infection Control Recommendations   · Universal Precautions  · Airborne isolation  · Droplet Isolation    Antimicrobial Stewardship Recommendations     · Simplification of therapy  · Targeted therapy    Coordination of Outpatient Care:   · Estimated Length of IV antimicrobials:TBD  · Patient will need Midline Catheter Insertion: TBD  · Patient will need PICC line Insertion: No  · Patient will need: Home IV , [place],  SNF,  LTAC:TBD  · Patient will need outpatient wound care: No    Chief complaint/reason for consultation:   · Concern for COVID infection      History of Present Illness:   Chano Thomas is a 66y.o.-year-old male who was initially admitted on 5/27/2022. Patient seen at the request of Dr. Elaine Reaves:    Patient presented through ER with complaints of SOB. Patient indicated onset of fevers on 5-23-22 followed by progressive development of SOB and CHOW . He tested for Covid at home and found that the home antigen test was positive, despite having received 4 prior vaccine doses. Patient has pre-existing Hx of COPD.  He does not use home 02 but uses albuterol. Evaluation in the ER showed:  · Positive Covid test  · ProBNP 1670  · BUN 33 , Cr 1.70  · WBC 13.1  · Room air 02 sat: 86%  · CT: 5-27-22: No PE. Pulmonary infiltrates suggestive of atypical pneumonia. Patient admitted because of concerns with COVID 19.    CURRENT EVALUATION : 5/28/2022    Afebrile  VS stable, HTN    Patient feels better  No complaints  No new issues per RN    Patient exhibiting respiratory distress. Yes  Respiratory secretions: No    Patient receiving supplemental oxygen. Nasal 02 @2-->1 L/min  RR 22-->20  02 sat 94-->95      NEWS Score: 0-4 Low risk group; 5-6: Medium risk group; 7 or above: High risk group  Parameters 3 2 1 0 1 2 3   Age    < 65   ? 65   RR ? 8  9-11 12-20  21-24 ? 25   O2 Sats ? 91 92-93 94-95 ? 96      Suppl O2  Yes  No      SBP ? 90  101-110 111-219   ? 220   HR ? 40  41-50 51-90  111-130 ? 131   Consciousness    Alert   Drowsiness, lethargy, or confusion   Temperature ? 35.0 C (95.0 F)  35.1-36.0 C 95.1-96.9 F 36.1-38.0 C 97.0-100.4 F 38.1-39.0 C 100.5-102.3 F ? 39.1 C ? 102.4 F      NEWS Score:   5/27/22: 8 High risk    Overall Daily Picture:    Worsened    Presence of secondary bacterial Infection:  No   Additional antibiotics: No    Labs, X rays reviewed: 5/28/2022    BUN:33-->40  Cr:1.70-->1.43    WBC:13.1-->11.8  Hb:12.6-->12.1  Plat: 234    Absolute Neutrophils:11  Absolute Lymphocytes:0.74  Neutrophil/Lymphocyte Ratio: 14.8    CRP:254.7-->260  Ferritin:  LDH: 223    Pro Calcitonin:  Pro BNP: 1670    Cultures:  Urine:  ·   Blood:  · 5/27/22: No growth  ·   Sputum :  ·   Wound:       CXR:   5/27/22        CAT:  5/27/22      Discussed with RNGIA. I have personally reviewed the past medical history, past surgical history, medications, social history, and family history, and I have updated the database accordingly.   Past Medical History:     Past Medical History:   Diagnosis Date    CAD (coronary artery disease)     Stent placement 2019    COPD (chronic obstructive pulmonary disease) (HCC)     Essential hypertension     Hyperlipidemia     Prostate cancer ([facility]     Wears glasses        Past Surgical  History:     Past Surgical History:   Procedure Laterality Date    ANKLE SURGERY      CAROTID STENT PLACEMENT Left 2019    COLONOSCOPY  2015    Normal    HERNIA REPAIR Right     OTHER SURGICAL HISTORY      cauterization intestines-[facility]       Medications:      atorvastatin  80 mg Oral Daily    mometasone-formoterol  2 puff Inhalation BID    carvedilol  25 mg Oral BID WC    cetirizine  10 mg Oral Daily    docusate sodium  100 mg Oral BID    hydroCHLOROthiazide  25 mg Oral Daily    montelukast  10 mg Oral Nightly    NIFEdipine  30 mg Oral Daily    enoxaparin  30 mg SubCUTAneous BID    ascorbic acid  1,000 mg Oral 4x Daily    finasteride  10 mg Oral Daily    zinc sulfate  100 mg Oral Daily    vitamin D  50,000 Units Oral Weekly    famotidine  20 mg Oral Daily    tiotropium  1 puff Inhalation Daily    azithromycin  250 mg Oral Daily    albuterol  2.5 mg Nebulization 4x daily    dexamethasone  6 mg Oral Daily       Social History:     Social History     Socioeconomic History    Marital status:      Spouse name: Not on file    Number of children: Not on file    Years of education: Not on file    Highest education level: Not on file   Occupational History    Not on file   Tobacco Use    Smoking status: Former Smoker     Packs/day: 1.00     Years: 30.00     Pack years: 30.00     Types: Cigarettes     Quit date: 2013     Years since quittin.8    Smokeless tobacco: Never Used   Vaping Use    Vaping Use: Never used   Substance and Sexual Activity    Alcohol use:  Yes     Alcohol/week: 1.0 standard drink     Types: 1 Cans of beer per week     Comment: rare    Drug use: No    Sexual activity: Not on file   Other Topics Concern    Not on file   Social History Narrative    Not on file     Social Determinants of Health     Financial Resource Strain: Low Risk     Difficulty of Paying Living Expenses: Not hard at all   Food Insecurity: No Food Insecurity    Worried About Running Out of Food in the Last Year: Never true    Mendy of Food in the Last Year: Never true   Transportation Needs: No Transportation Needs    Lack of Transportation (Medical): No    Lack of Transportation (Non-Medical): No   Physical Activity: Insufficiently Active    Days of Exercise per Week: 3 days    Minutes of Exercise per Session: 30 min   Stress: No Stress Concern Present    Feeling of Stress : Not at all   Social Connections: Moderately Integrated    Frequency of Communication with Friends and Family: Once a week    Frequency of Social Gatherings with Friends and Family: Once a week    Attends Faith Services: More than 4 times per year    Active Member of [address] [place] [address] [employer] or Organizations: Yes    Attends Club or Organization Meetings: 1 to 4 times per year    Marital Status:    Intimate Partner Violence: Not At Risk    Fear of Current or Ex-Partner: No    Emotionally Abused: No    Physically Abused: No    Sexually Abused: No   Housing Stability: Low Risk     Unable to Pay for Housing in the Last Year: No    Number of Places Lived in the Last Year: 1    Unstable Housing in the Last Year: No       Family History:     Family History   Problem Relation Age of Onset    No Known Problems Father     Cancer Mother         Allergies:   Patient has no known allergies. Review of Systems:       Constitutional: No fevers or chills. No systemic complaints  Head: No headaches  Eyes: No double vision or blurry vision. No conjunctival inflammation. ENT: No sore throat or runny nose. . No hearing loss, tinnitus or vertigo. Cardiovascular: No chest pain or palpitations. Shortness of breath. CHOW  Lung: Shortness of breath, cough. No sputum production  Abdomen: No nausea, vomiting, diarrhea, or abdominal pain. Kylah Raddle  No cramps. Genitourinary: No increased urinary frequency, or dysuria. No hematuria. No suprapubic or CVA pain  Musculoskeletal: No muscle aches or pains. No joint effusions, swelling or deformities  Hematologic: No bleeding or bruising. Neurologic: No headache, weakness, numbness, or tingling. Integument: No rash, no ulcers. Psychiatric: No depression. Endocrine: No polyuria, no polydipsia, no polyphagia. Physical Examination :     Patient Vitals for the past 8 hrs:   BP Temp Temp src Pulse Resp SpO2   05/28/22 0810 -- -- -- -- -- 95 %   05/28/22 0754 (!) 161/88 96.9 °F (36.1 °C) Temporal 66 20 97 %   05/28/22 0510 -- -- -- 62 20 96 %   05/28/22 0400 (!) 163/87 97 °F (36.1 °C) Temporal 86 20 --     General Appearance: Awake, alert, and in no apparent distress  Head:  Normocephalic, no trauma  Eyes: Pupils equal, round, reactive to light; sclera anicteric; conjunctivae pink. No embolic phenomena. ENT: Oropharynx clear, without erythema, exudate, or thrush. No tenderness of sinuses. Mouth/throat: mucosa pink and moist. No lesions. Dentition in good repair. Neck:Supple, without lymphadenopathy. Thyroid normal, No bruits. Pulmonary/Chest: Clear to auscultation, without wheezes, rales, or rhonchi. No dullness to percussion. Cardiovascular: Regular rate and rhythm without murmurs, rubs, or gallops. Abdomen: Soft, non tender. Bowel sounds normal. No organomegaly  All four Extremities: No cyanosis, clubbing, edema, or effusions. Neurologic: No gross sensory or motor deficits. Skin: Warm and dry with good turgor. No signs of peripheral arterial or venous insufficiency. No ulcerations. No open wounds.     Medical Decision Making -Laboratory:   I have independently reviewed/ordered the following labs:    CBC with Differential:   Recent Labs     05/27/22  0725 05/28/22  0637   WBC 13.1* 11.8*   HGB 12.6* 12.1*   HCT 37.4* 35.5*    234   LYMPHOPCT 6* 6*   MONOPCT 9 8     BMP:   Recent Labs     05/27/22  0725 05/28/22  0637    137   K 3.1* 3.3*   CL 92* 95*   CO2 31 31   BUN 33* 40*   CREATININE 1.70* 1.43*   MG  --  2.5     Hepatic Function Panel:   Recent Labs     05/27/22  0725 05/28/22  0637   PROT 7.8 7.6   LABALBU 3.8 3.7   BILIDIR <0.08  --    IBILI Can not be calculated  --    BILITOT 0.21* <0.10*   ALKPHOS 93 96   ALT 17 15   AST 16 14     No results for input(s): RPR in the last 72 hours. No results for input(s): HIV in the last 72 hours. No results for input(s): BC in the last 72 hours. Lab Results   Component Value Date    MUCUS TRACE 06/17/2019    RBC 3.88 05/28/2022    TRICHOMONAS NOT REPORTED 06/17/2019    WBC 11.8 05/28/2022    YEAST NOT REPORTED 06/17/2019    TURBIDITY CLEAR 06/17/2019     Lab Results   Component Value Date    CREATININE 1.43 05/28/2022    GLUCOSE 155 05/28/2022       Medical Decision Making-Imaging:     Narrative   EXAMINATION:   CTA OF THE CHEST 5/27/2022 8:36 am       TECHNIQUE:   CTA of the chest was performed after the administration of intravenous   contrast.  Multiplanar reformatted images are provided for review.  MIP   images are provided for review. Automated exposure control, iterative   reconstruction, and/or weight based adjustment of the mA/kV was utilized to   reduce the radiation dose to as low as reasonably achievable.       COMPARISON:   12/17/2020       HISTORY:   ORDERING SYSTEM PROVIDED HISTORY: hypoxia, SOB   TECHNOLOGIST PROVIDED HISTORY:   hypoxia, SOB   Decision Support Exception - unselect if not a suspected or confirmed   emergency medical condition->Emergency Medical Condition (MA)       FINDINGS:   Pulmonary Arteries: Pulmonary arteries are adequately opacified for   evaluation.  No evidence of intraluminal filling defect to suggest pulmonary   embolism.  Main pulmonary artery is normal in caliber.       Mediastinum: Mild right hilar lymphadenopathy with nodes measuring up to 1.1   cm.  The heart and pericardium demonstrate no acute abnormality.  There is no   acute abnormality of the thoracic aorta.       Scratch       Lungs/pleura: Interstitial pulmonary infiltrates seen in the upper and lower   lobes with mild bronchiectatic changes as well.  These are significantly   worse compared to the previous evaluation.  No evidence of active pleural   disease.  No evidence of dominant nodule or mass.       Upper Abdomen: Limited images of the upper abdomen are unremarkable.       Soft Tissues/Bones: No acute bone or soft tissue abnormality.           Impression   1. No evidence of acute pulmonary embolism or acute aortic disease. 2. Pulmonary infiltrates which have an interstitial appearance accompanied by   bronchiectasis and inflammatory changes.  This likely represents atypical   pneumonia.  No evidence of consolidation.  No evidence of active pleural   disease. 3. Mild right hilar lymphadenopathy.         Narrative   EXAMINATION:   ONE XRAY VIEW OF THE CHEST       5/27/2022 7:34 am       COMPARISON:   March 19, 2021 chest exam       HISTORY:   ORDERING SYSTEM PROVIDED HISTORY: dyspnea   TECHNOLOGIST PROVIDED HISTORY:   dyspnea       FINDINGS:   Stable normal cardiac silhouette       There are no significant pleural, parenchymal or mediastinal findings           Impression   No acute cardiopulmonary findings             Medical Decision Qacdlx-Tyspioty-[id number]:       Medical Decision Making-Other:     Note:  · Labs, medications, radiologic studies were reviewed with personal review of films  · Large amounts of data were reviewed  · Discussed with nursing Staff, Discharge planner  · Infection Control and Prevention measures reviewed  · All prior entries were reviewed  · Administer medications as ordered  · Prognosis: Guarded  · Discharge planning reviewed      Thank you for allowing us to participate in the care of this patient. Please call with questions.     Gadiel Aden MD           Pager: [phone] - Office: [phone]

## 2022-05-28 NOTE — PROGRESS NOTES
RESPIRATORY ASSESSMENT PROTOCOL                                                                                              Patient Name: Radha Montez Room#: 6391/3639-17 : 1950     Admitting diagnosis: Hypoxia [R09.02]  Atrial fibrillation, unspecified type ([place] [address].) [I48.91]  Pneumonia due to COVID-19 virus [U07.1, J12.82]       Medical History:   Past Medical History:   Diagnosis Date    CAD (coronary artery disease)     Stent placement     COPD (chronic obstructive pulmonary disease) ([place] [address].)     Essential hypertension     Hyperlipidemia     Prostate cancer ([place] [address].)     Wears glasses        PATIENT ASSESSMENT    LABORATORY DATA  Hematology:   Lab Results   Component Value Date    WBC 11.8 2022    RBC 3.88 2022    HGB 12.1 2022    HCT 35.5 2022     2022     Chemistry:    Lab Results   Component Value Date    PHART 7.496 2022    NZL6CLE 41.5 2022    PO2ART 83.2 2022    O6JYYXZI 96.9 2022    CDF8KFP 31.3 2022    PBEA 7.4 2022       VITALS  Heart Rate: 93   Resp: 20  BP: 129/73  SpO2: 93 % O2 Device: None (Room air)  Temp: 97.4 °F (36.3 °C)    SKIN COLOR  [x] Normal  [] Pale  [] Dusky  [] Cyanotic    RESPIRATORY PATTERN  [x] Normal  [] Dyspnea  [] Cheyne-Jensen  [] Kussmaul  [] Biots    AMBULATORY  [] Yes  [] No  [x] With Assistance    PEAK FLOW  Predicted:     Personal Best:        VITAL CAPACITY  Predicted value:  ml  Actual Value:  ml  30% of Predicted:  ml  Patient Acuity 0 1 2 3 4 Score   Level of Concious (LOC) [x]  Alert & Oriented or Pt normal LOC []  Confused;follows directions []  Confused & uncooper-ative []  Obtunded []  Comatose 0   Respiratory Rate  (RR) [x]  Reg. rate & pattern. 12 - 20 bpm  []  Increased RR.  Greater than 20 bpm   []  SOB w/ exertion or RR greater than 24 bpm []  Access- ory muscle use at rest. Abn.  resp. []  SOB at rest.   0   Bilateral Breath Sounds (BBS) []  Clear []  Diminish-ed bases [x]  Diminish-ed t/o, or rales   []  Sporadic, scattered wheezes or rhonchi []  Persistentwheezes and, or absent BBS 2   Cough []  Strong, effective, & non-prod. [x]  Effective & prod. Less than 25 ml (2 TBSP) over past 24 hrs []  Ineffective & non-prod to less than 25 ML over past 24 hrs []  Ineffective and, or greater than 25 ml sputum prod. past 24 hrs. []  Nonspon- taneous; Requires suctioning 1   Pulmonary History  (PULM HX) []  No smoking and no chronic pulmonaryhistory []  Former smoker. Quit over 12 mos. ago []  Current smoker or quit w/ in 12 mos []  Pulm. History and, or 20 pk/yr smoking hx [x]  Admitted w/ acute pulm. dx and, or has been admitted w/ pulm. dx 2 or more times over past 12 mos 4   Surgical History this Admit  (SURG HX) [x]  No surgery []  General surgery []  Lower abdominal []  Thoracic or upper abdominal   []  Thoracic w/ pulm. disease 0   Chest X-Ray (CXR)/CT Scan []  Clear or not applicable []  Not available [x]  Atelect- asis or pleural effusions []  Localized infiltrate or pulm. edema []  Con-solidated Infiltrates, bilateral, or in more than 1 lobe 2   Slow or Forced VC, FEV1 OR PEFR (PULM FXN)  [x]  80% or greater, or not indicated []  Pt. unable to perform []  FEV1 or PEFR or VC 51-79%. []  FEV1 or PEFR or VC  30-49%   []  FEV1 or PEFR or VC less than 30%   0   TOTAL ACUITY: 9       CARE PLAN    If Acuity Level is 2, 3, or 4 in any of the following:    [] BILATERAL BREATH SOUNDS (BBS)     [x] PULMONARY HISTORY (PULM HX)  [] PULMONARY FUNCTION (PULM FX)    Goal: Improve respiratory functions in patients with airway disease and decrease WOB    [x] AEROSOL PROTOCOL    Total Acuity:   16-32  []  Secondary Assessment in 24 hrs Total Acuity:  9-15  [x]  Secondary Assessment in 24 hrs Total Acuity:  4-8  []  Secondary Assessment in 48 hrs Total Acuity:  0-3  []  Secondary Assessment in 72 hrs   HHN AEROSOL THERAPY with  [physician-ordered bronchodilator(s)] q 4 & Albuterol PRN q2 hrs. Breath-Actuated Neb if BBS Acuity = 4, and pt. can use MP. Notify physician if condition deteriorates. HHN AEROSOL THERAPY with  [physician-ordered bronchodilator(s)]  QID and Albuterol PRN q4 hrs. Breath-Actuated Neb if BBS Acuity = 4, and pt. can use MP. Notify physician if condition deteriorates. MDI THERAPY with  2 actuations of [physician-ordered bronchodilator(s)] via spacer TID Albuterol and PRNq4 hrs. If unable to utilize MDI: HHN [physician-ordered bronchodilator(s)] TID and Albuterol PRN q4 hrs. Notify physician if condition deteriorates. MDI THERAPY with  [physician-ordered bronchodilator(s)] via spacer TID PRN. If unable to utilize MDI: HHN [physician-ordered bronchodilator(s)] TID PRN. Notify physician if condition deteriorates. If Acuity Level is 2, 3, or 4 in any of the following:    [] COUGH     [] SURGICAL HISTORY (SURG HX)  [] CHEST XRAY (CXR)    Goal: Improvement in sputum mobilization in patients with ineffective airway clearance. Reverse atelectasis. [] Bronchopulmonary Hygiene Protocol    Total Acuity:   16-32  []  Secondary Assessment in 24 hrs Total Acuity:  9-15  []  Secondary Assessment in 24 hrs Total Acuity:  4-8  []  Secondary Assessment in 48 hrs Total Acuity:  0-3  []  Secondary Assessment in 72 hrs   METANEB QID with [physician-ordered bronchodilator(s)] if CXR Acuity = 4; otherwise:  PD&P, PEP, or Vest QID & PRN  NT Sxn PRN for ineffective cough  METANEB QID with [physician-ordered bronchodilator(s)] if CXR Acuity = 4; otherwise:  PD&P, PEP, or Vest TID & PRN  NT Sxn PRN for ineffective cough  Instruct patient to self-perform IS q1hr WA   Directed Cough self-performed q1hr WA     If Acuity Level is 2 or above in the following:    [] PULMONARY HISTORY (PULM HX)    Goal: Assist patient in quitting smoking to slow or stop the progression of lung disease.     [] Smoking Cessation Protocol    SMOKING CESSATION EDUCATION provided according to policy [id number]: laney with an X)  ____Yes    ____ No     ____ NA    Smoking Cessation Booklet given:  ____Yes  ____No ____Patient Eugenio Guadarrama

## 2022-05-28 NOTE — PROGRESS NOTES
[address], [address]    Progress Note    Date:   5/28/2022  Patient name:  Carlene Crespo  Date of admission:  5/27/2022  7:13 AM  MRN:   [id number]  YOB: 1950    SUBJECTIVE/Last 24 hours update:     Patient seen and examined at the bed side , no new acute events overnight, no new complains. VSS Afebrile, currently off oxygen at this time in the room. Patient notes that his shortness of breath is improved since yesterday. Notes from nursing staff and Consults had been reviewed, and the overnight progress had been checked with the nursing staff as well. REVIEW OF SYSTEMS:      CONSTITUTIONAL:  no fevers, no headcahes  EYES: negative for blury vision  HEENT: No headaches, No nasal congestion, no difficulty swallowing  RESPIRATORY:negative for wheezing, no Cough, chronic dyspnea  CARDIOVASCULAR: negative for chest pain, no palpitations  GASTROINTESTINAL: no nausea, no vomiting, no change in bowel habits, no abdominal pain   GENITOURINARY: negative for dysuria, no hematuria   MUSCULOSKELETAL: no joint pains, no muscle aches, no swelling of joints or extremities  NEUROLOGICAL: No  Weakness or numbness    PAST MEDICAL HISTORY:      has a past medical history of CAD (coronary artery disease), COPD (chronic obstructive pulmonary disease) ([address] Essential hypertension, Hyperlipidemia, Prostate cancer ([facility] [address] and Wears glasses. PAST SURGICAL HISTORY:      has a past surgical history that includes hernia repair (Right, 1975); Colonoscopy (2015); Ankle surgery; Carotid stent placement (Left, 2019); and other surgical history. SOCIAL HISTORY:      reports that he quit smoking about 8 years ago. His smoking use included cigarettes. He has a 30.00 pack-year smoking history. He has never used smokeless tobacco. He reports current alcohol use of about 1.0 standard drink of alcohol per week. He reports that he does not use drugs.     TOBACCO:   reports that he quit smoking about 8 years ago. His smoking use included cigarettes. He has a 30.00 pack-year smoking history. He has never used smokeless tobacco.  ETOH:   reports current alcohol use of about 1.0 standard drink of alcohol per week. FAMILY HISTORY:     family history includes Cancer in his mother; No Known Problems in his father. Problem Relation Age of Onset    No Known Problems Father     Cancer Mother        HOME MEDICATIONS:      Prior to Admission medications    Medication Sig Start Date End Date Taking? Authorizing Provider   predniSONE (DELTASONE) 10 MG tablet 4 tabs daily for 3 days, 3 tabs daily for 3 days, 2 tabs daily for 3 days, 1 tabs daily for 3 days  Patient taking differently:  At present 10mg every other day 9/15/21   EVER Kerr CNP   carvedilol (COREG) 25 MG tablet Take 1 tablet by mouth 2 times daily (with meals) 9/8/21   Chris Chong MD   NIFEdipine (ADALAT CC) 30 MG extended release tablet Take 1 tablet by mouth daily 3/21/21   Armida Armenta MD   ferrous sulfate (IRON 325) 325 (65 Fe) MG tablet Take 1 tablet by mouth every other day  Patient not taking: Reported on 5/27/2022 3/21/21   Armida Armenta MD   docusate sodium (COLACE, DULCOLAX) 100 MG CAPS Take 100 mg by mouth 2 times daily 3/20/21   Armida Armenta MD   atorvastatin (LIPITOR) 80 MG tablet Take 1 tablet by mouth daily 7/1/19   Chris Chong MD   hydrochlorothiazide (HYDRODIURIL) 25 MG tablet Take 1 tablet by mouth daily 6/21/19   Chris Chong MD   Cholecalciferol (VITAMIN D3) 5000 units CAPS Take 1,000 Units by mouth daily    Historical Provider, MD   albuterol sulfate  (90 Base) MCG/ACT inhaler Inhale 2 puffs into the lungs every 6 hours as needed for Wheezing or Shortness of Breath 12/19/18   EVER Newell CNP   albuterol (PROVENTIL) (2.5 MG/3ML) 0.083% nebulizer solution Take 3 mLs by nebulization every 4 hours as needed for Wheezing 12/19/18   Raphael Mukherjee EVER Barbour - CNP   budesonide-formoterol (SYMBICORT) 160-4.5 MCG/ACT AERO Inhale 2 puffs into the lungs 2 times daily    Historical Provider, MD   tiotropium (SPIRIVA RESPIMAT) 1.25 MCG/ACT AERS inhaler Inhale 2 puffs into the lungs daily    Historical Provider, MD   cetirizine (ZYRTEC) 10 MG tablet Take 10 mg by mouth daily    Historical Provider, MD   montelukast (SINGULAIR) 10 MG tablet Take 10 mg by mouth nightly    Historical Provider, MD       ALLERGIES:     Patient has no known allergies. OBJECTIVE:       Vitals:    05/28/22 1005 05/28/22 1037 05/28/22 1158 05/28/22 1303   BP:    129/73   Pulse:  73  83   Resp:  18  18   Temp:    97.4 °F (36.3 °C)   TempSrc:    Temporal   SpO2: 98% 95% 94% 93%   Weight:       Height:             Intake/Output Summary (Last 24 hours) at 5/28/2022 1317  Last data filed at 5/28/2022 0800  Gross per 24 hour   Intake 540 ml   Output 650 ml   Net -110 ml       PHYSICAL EXAM:  General Appearance  Alert , awake , not in acute distress  HEENT - Head is normocephalic, atraumatic. Lungs - Bilateral equal air entry , rales or rhonchi, aeration good, slight wheezing to posterior lung fields bilaterally  Cardiovascular - Heart sounds are normal.  Regular rhythm, normal rate without murmur, gallop or rub.   Abdomen - Soft, nontender, nondistended, no masses or organomegaly  Neurologic - There are no new focal motor or sensory deficits  Skin - No bruising or bleeding on exposed skin area  Extremities - No cyanosis, clubbing or edema    DIAGNOSTICS:     Laboratory Testing:    Recent Results (from the past 24 hour(s))   CBC Auto Differential    Collection Time: 05/28/22  6:37 AM   Result Value Ref Range    WBC 11.8 (H) 3.5 - 11.3 k/uL    RBC 3.88 (L) 4.21 - 5.77 m/uL    Hemoglobin 12.1 (L) 13.0 - 17.0 g/dL    Hematocrit 35.5 (L) 40.7 - 50.3 %    MCV 91.5 82.6 - 102.9 fL    MCH 31.2 25.2 - 33.5 pg    MCHC 34.1 28.4 - 34.8 g/dL    RDW 13.4 11.8 - 14.4 %    Platelets 945 012 - 077 k/uL    MPV 10.8 8.1 - 13.5 fL    NRBC Automated 0.0 0.0 per 100 WBC    Seg Neutrophils 84 (H) 36 - 65 %    Lymphocytes 6 (L) 24 - 43 %    Monocytes 8 3 - 12 %    Eosinophils % 0 (L) 1 - 4 %    Basophils 0 0 - 2 %    Immature Granulocytes 2 (H) 0 %    Segs Absolute 9.93 (H) 1.50 - 8.10 k/uL    Absolute Lymph # 0.71 (L) 1.10 - 3.70 k/uL    Absolute Mono # 0.89 0.10 - 1.20 k/uL    Absolute Eos # <0.03 0.00 - 0.44 k/uL    Basophils Absolute 0.04 0.00 - 0.20 k/uL    Absolute Immature Granulocyte 0.21 0.00 - 0.30 k/uL   Comprehensive Metabolic Panel w/ Reflex to MG    Collection Time: 05/28/22  6:37 AM   Result Value Ref Range    Glucose 155 (H) 70 - 99 mg/dL    BUN 40 (H) 8 - 23 mg/dL    CREATININE 1.43 (H) 0.70 - 1.20 mg/dL    Bun/Cre Ratio 28 (H) 9 - 20    Calcium 9.4 8.6 - 10.4 mg/dL    Sodium 137 135 - 144 mmol/L    Potassium 3.3 (L) 3.7 - 5.3 mmol/L    Chloride 95 (L) 98 - 107 mmol/L    CO2 31 20 - 31 mmol/L    Anion Gap 11 9 - 17 mmol/L    Alkaline Phosphatase 96 40 - 129 U/L    ALT 15 5 - 41 U/L    AST 14 <40 U/L    Total Bilirubin <0.10 (L) 0.3 - 1.2 mg/dL    Total Protein 7.6 6.4 - 8.3 g/dL    Albumin 3.7 3.5 - 5.2 g/dL    Albumin/Globulin Ratio 0.9 (L) 1.0 - 2.5    GFR Non- 49 (L) >60 mL/min    GFR  59 (L) >60 mL/min    GFR Comment          GFR Staging         APTT    Collection Time: 05/28/22  6:37 AM   Result Value Ref Range    PTT 31.3 26.8 - 34.8 sec   Fibrinogen    Collection Time: 05/28/22  6:37 AM   Result Value Ref Range    Fibrinogen 1109 (H) 179 - 518 mg/dL   D-Dimer, Quantitative    Collection Time: 05/28/22  6:37 AM   Result Value Ref Range    D-Dimer, Quant 0.51 0.00 - 0.59 mg/L FEU   C-Reactive Protein    Collection Time: 05/28/22  6:37 AM   Result Value Ref Range    .6 (H) 0.0 - 5.0 mg/L   Magnesium    Collection Time: 05/28/22  6:37 AM   Result Value Ref Range    Magnesium 2.5 1.6 - 2.6 mg/dL     Current Facility-Administered Medications   Medication Dose Route Frequency Provider Last Rate Last Admin    albuterol sulfate  (90 Base) MCG/ACT inhaler 2 puff  2 puff Inhalation Q6H PRN EVER Vera CNP        atorvastatin (LIPITOR) tablet 80 mg  80 mg Oral Daily EVER Vera CNP   80 mg at 05/28/22 0802    mometasone-formoterol (DULERA) 200-5 MCG/ACT inhaler 2 puff  2 puff Inhalation BID EVER Vera CNP   2 puff at 05/28/22 1038    carvedilol (COREG) tablet 25 mg  25 mg Oral BID WC EVER Vera CNP   25 mg at 05/28/22 0802    cetirizine (ZYRTEC) tablet 10 mg  10 mg Oral Daily EVER Vera CNP   10 mg at 05/28/22 0802    docusate sodium (COLACE) capsule 100 mg  100 mg Oral BID EVER Vera CNP   100 mg at 05/28/22 0802    hydroCHLOROthiazide (HYDRODIURIL) tablet 25 mg  25 mg Oral Daily EVER Vera CNP   25 mg at 05/28/22 0802    montelukast (SINGULAIR) tablet 10 mg  10 mg Oral Nightly EVER Vera CNP        NIFEdipine (ADALAT CC) extended release tablet 30 mg  30 mg Oral Daily EVER Vera CNP   30 mg at 05/28/22 0802    acetaminophen (TYLENOL) tablet 650 mg  650 mg Oral Q6H PRN EVER Vera CNP        Or    acetaminophen (TYLENOL) suppository 650 mg  650 mg Rectal Q6H PRN EVER Vera CNP        enoxaparin Sodium (LOVENOX) injection 30 mg  30 mg SubCUTAneous BID EVER Vera CNP   30 mg at 05/28/22 0802    guaiFENesin-dextromethorphan (ROBITUSSIN DM) 100-10 MG/5ML syrup 5 mL  5 mL Oral Q4H PRN EVER Vera CNP        ascorbic acid (VITAMIN C) tablet 1,000 mg  1,000 mg Oral 4x Daily EVER Vera CNP   1,000 mg at 05/28/22 1304    finasteride (PROSCAR) tablet 10 mg  10 mg Oral Daily EVER Vera CNP   10 mg at 05/28/22 0802    zinc sulfate (ZINCATE) capsule 100 mg  100 mg Oral Daily EVER Vera CNP   100 mg at 05/28/22 0801    vitamin D (ERGOCALCIFEROL) Although every effort was made to ensure the accuracy of this automated transcription, some errors in transcription may have occurred.     Princess Rojas MD  5/28/2022 1:17 PM

## 2022-05-28 NOTE — PROGRESS NOTES
Patient is awake, alert and oriented. Patient is not complaining of chest pain and shortness of breath. Vital signs taken. Assessment done. Call light within reach.

## 2022-05-29 VITALS
TEMPERATURE: 96.8 F | BODY MASS INDEX: 31.18 KG/M2 | WEIGHT: 194 LBS | OXYGEN SATURATION: 95 % | RESPIRATION RATE: 18 BRPM | HEIGHT: 66 IN | HEART RATE: 54 BPM | SYSTOLIC BLOOD PRESSURE: 172 MMHG | DIASTOLIC BLOOD PRESSURE: 78 MMHG

## 2022-05-29 LAB
ABSOLUTE EOS #: 0 K/UL (ref 0–0.44)
ABSOLUTE IMMATURE GRANULOCYTE: 0.12 K/UL (ref 0–0.3)
ABSOLUTE LYMPH #: 2.26 K/UL (ref 1.1–3.7)
ABSOLUTE MONO #: 1.19 K/UL (ref 0.1–1.2)
ALBUMIN SERPL-MCNC: 3.8 G/DL (ref 3.5–5.2)
ALBUMIN/GLOBULIN RATIO: 1.1 (ref 1–2.5)
ALP BLD-CCNC: 91 U/L (ref 40–129)
ALT SERPL-CCNC: 17 U/L (ref 5–41)
ANION GAP SERPL CALCULATED.3IONS-SCNC: 13 MMOL/L (ref 9–17)
AST SERPL-CCNC: 15 U/L
BASOPHILS # BLD: 0 % (ref 0–2)
BASOPHILS ABSOLUTE: 0 K/UL (ref 0–0.2)
BILIRUB SERPL-MCNC: 0.17 MG/DL (ref 0.3–1.2)
BUN BLDV-MCNC: 47 MG/DL (ref 8–23)
BUN/CREAT BLD: 31 (ref 9–20)
C-REACTIVE PROTEIN: 130.1 MG/L (ref 0–5)
CALCIUM SERPL-MCNC: 9.7 MG/DL (ref 8.6–10.4)
CHLORIDE BLD-SCNC: 90 MMOL/L (ref 98–107)
CO2: 30 MMOL/L (ref 20–31)
CREAT SERPL-MCNC: 1.53 MG/DL (ref 0.7–1.2)
D-DIMER QUANTITATIVE: 0.42 MG/L FEU (ref 0–0.59)
EOSINOPHILS RELATIVE PERCENT: 0 % (ref 1–4)
FERRITIN: 242 NG/ML (ref 30–400)
FIBRINOGEN: 1014 MG/DL (ref 179–518)
GFR AFRICAN AMERICAN: 55 ML/MIN
GFR NON-AFRICAN AMERICAN: 45 ML/MIN
GFR SERPL CREATININE-BSD FRML MDRD: ABNORMAL ML/MIN/{1.73_M2}
GFR SERPL CREATININE-BSD FRML MDRD: ABNORMAL ML/MIN/{1.73_M2}
GLUCOSE BLD-MCNC: 113 MG/DL (ref 70–99)
HCT VFR BLD CALC: 37.7 % (ref 40.7–50.3)
HEMOGLOBIN: 12.8 G/DL (ref 13–17)
IMMATURE GRANULOCYTES: 1 %
LYMPHOCYTES # BLD: 19 % (ref 24–43)
MAGNESIUM: 2.5 MG/DL (ref 1.6–2.6)
MCH RBC QN AUTO: 31.1 PG (ref 25.2–33.5)
MCHC RBC AUTO-ENTMCNC: 34 G/DL (ref 28.4–34.8)
MCV RBC AUTO: 91.5 FL (ref 82.6–102.9)
MONOCYTES # BLD: 10 % (ref 3–12)
MORPHOLOGY: NORMAL
NRBC AUTOMATED: 0 PER 100 WBC
PARTIAL THROMBOPLASTIN TIME: 31.7 SEC (ref 26.8–34.8)
PDW BLD-RTO: 13.3 % (ref 11.8–14.4)
PLATELET # BLD: 313 K/UL (ref 138–453)
PMV BLD AUTO: 11 FL (ref 8.1–13.5)
POTASSIUM SERPL-SCNC: 3.2 MMOL/L (ref 3.7–5.3)
RBC # BLD: 4.12 M/UL (ref 4.21–5.77)
SEG NEUTROPHILS: 70 % (ref 36–65)
SEGMENTED NEUTROPHILS ABSOLUTE COUNT: 8.33 K/UL (ref 1.5–8.1)
SODIUM BLD-SCNC: 133 MMOL/L (ref 135–144)
TOTAL PROTEIN: 7.4 G/DL (ref 6.4–8.3)
WBC # BLD: 11.9 K/UL (ref 3.5–11.3)

## 2022-05-29 PROCEDURE — 82728 ASSAY OF FERRITIN: CPT

## 2022-05-29 PROCEDURE — 85025 COMPLETE CBC W/AUTO DIFF WBC: CPT

## 2022-05-29 PROCEDURE — 85384 FIBRINOGEN ACTIVITY: CPT

## 2022-05-29 PROCEDURE — 6360000002 HC RX W HCPCS: Performed by: INTERNAL MEDICINE

## 2022-05-29 PROCEDURE — 36415 COLL VENOUS BLD VENIPUNCTURE: CPT

## 2022-05-29 PROCEDURE — G0378 HOSPITAL OBSERVATION PER HR: HCPCS

## 2022-05-29 PROCEDURE — 80053 COMPREHEN METABOLIC PANEL: CPT

## 2022-05-29 PROCEDURE — 6370000000 HC RX 637 (ALT 250 FOR IP): Performed by: NURSE PRACTITIONER

## 2022-05-29 PROCEDURE — 94640 AIRWAY INHALATION TREATMENT: CPT

## 2022-05-29 PROCEDURE — 6360000002 HC RX W HCPCS: Performed by: NURSE PRACTITIONER

## 2022-05-29 PROCEDURE — 85730 THROMBOPLASTIN TIME PARTIAL: CPT

## 2022-05-29 PROCEDURE — 96372 THER/PROPH/DIAG INJ SC/IM: CPT

## 2022-05-29 PROCEDURE — 83735 ASSAY OF MAGNESIUM: CPT

## 2022-05-29 PROCEDURE — 86140 C-REACTIVE PROTEIN: CPT

## 2022-05-29 PROCEDURE — 94669 MECHANICAL CHEST WALL OSCILL: CPT

## 2022-05-29 PROCEDURE — 85379 FIBRIN DEGRADATION QUANT: CPT

## 2022-05-29 PROCEDURE — 94761 N-INVAS EAR/PLS OXIMETRY MLT: CPT

## 2022-05-29 RX ORDER — GUAIFENESIN/DEXTROMETHORPHAN 100-10MG/5
5 SYRUP ORAL EVERY 4 HOURS PRN
Qty: 120 ML | Refills: 1 | Status: SHIPPED | OUTPATIENT
Start: 2022-05-29 | End: 2022-06-08

## 2022-05-29 RX ORDER — AZITHROMYCIN 250 MG/1
250 TABLET, FILM COATED ORAL DAILY
Qty: 2 TABLET | Refills: 0 | Status: SHIPPED | OUTPATIENT
Start: 2022-05-30 | End: 2022-06-09 | Stop reason: SDUPTHER

## 2022-05-29 RX ORDER — ERGOCALCIFEROL 1.25 MG/1
50000 CAPSULE ORAL WEEKLY
Qty: 5 CAPSULE | Refills: 0 | Status: SHIPPED | OUTPATIENT
Start: 2022-06-03 | End: 2022-07-21

## 2022-05-29 RX ORDER — DEXAMETHASONE 6 MG/1
6 TABLET ORAL DAILY
Qty: 8 TABLET | Refills: 0 | Status: SHIPPED | OUTPATIENT
Start: 2022-05-30 | End: 2022-06-07

## 2022-05-29 RX ORDER — ZINC SULFATE 50(220)MG
100 CAPSULE ORAL DAILY
Qty: 30 CAPSULE | Refills: 3 | COMMUNITY
Start: 2022-05-30 | End: 2022-07-21

## 2022-05-29 RX ADMIN — FINASTERIDE 10 MG: 5 TABLET, FILM COATED ORAL at 07:43

## 2022-05-29 RX ADMIN — ZINC SULFATE 220 MG (50 MG) CAPSULE 100 MG: CAPSULE at 07:43

## 2022-05-29 RX ADMIN — AZITHROMYCIN MONOHYDRATE 250 MG: 250 TABLET ORAL at 07:43

## 2022-05-29 RX ADMIN — NIFEDIPINE 30 MG: 30 TABLET, EXTENDED RELEASE ORAL at 07:42

## 2022-05-29 RX ADMIN — DEXAMETHASONE 6 MG: 4 TABLET ORAL at 07:43

## 2022-05-29 RX ADMIN — ATORVASTATIN CALCIUM 80 MG: 40 TABLET, FILM COATED ORAL at 07:43

## 2022-05-29 RX ADMIN — DOCUSATE SODIUM 100 MG: 100 CAPSULE, LIQUID FILLED ORAL at 07:43

## 2022-05-29 RX ADMIN — HYDROCHLOROTHIAZIDE 25 MG: 25 TABLET ORAL at 07:43

## 2022-05-29 RX ADMIN — CETIRIZINE HYDROCHLORIDE 10 MG: 10 TABLET, FILM COATED ORAL at 07:43

## 2022-05-29 RX ADMIN — OXYCODONE HYDROCHLORIDE AND ACETAMINOPHEN 1000 MG: 500 TABLET ORAL at 07:43

## 2022-05-29 RX ADMIN — ALBUTEROL SULFATE 2.5 MG: 2.5 SOLUTION RESPIRATORY (INHALATION) at 05:02

## 2022-05-29 RX ADMIN — ENOXAPARIN SODIUM 30 MG: 100 INJECTION SUBCUTANEOUS at 07:43

## 2022-05-29 RX ADMIN — FAMOTIDINE 20 MG: 20 TABLET ORAL at 07:43

## 2022-05-29 RX ADMIN — TIOTROPIUM BROMIDE INHALATION SPRAY 1 PUFF: 3.12 SPRAY, METERED RESPIRATORY (INHALATION) at 10:06

## 2022-05-29 RX ADMIN — CARVEDILOL 25 MG: 25 TABLET, FILM COATED ORAL at 07:43

## 2022-05-29 RX ADMIN — MOMETASONE FUROATE AND FORMOTEROL FUMARATE DIHYDRATE 2 PUFF: 200; 5 AEROSOL RESPIRATORY (INHALATION) at 10:06

## 2022-05-29 NOTE — PROGRESS NOTES
Shift assessment and vitals obtained at this time as charted. Blood pressure elevated, vitals otherwise WNL. SpO2 95% on room air. Patient is alert and oriented, assessment otherwise as charted. Morning medications given at this time as well. Patient is resting in the chair, denies any other needs. Will continue to monitor.

## 2022-05-29 NOTE — DISCHARGE SUMMARY
[address], [facility]    Discharge Summary      NAME:  Carlene Crespo  :  1950  MRN:  [id number]    Admit date:  2022  Discharge date:  2022    Admitting Physician:  Ray Ernst MD    Primary Diagnosis on Admission:   Present on Admission:   Pneumonia due to COVID-19 virus   COPD exacerbation ([facility] [address]   Essential hypertension, benign   Atrial fibrillation (HCC)   Stage 3a chronic kidney disease ([facility] [address]   Acute respiratory failure with hypoxia ([facility]      Secondary Diagnoses:  does not have any pertinent problems on file. Admission Condition:  stable     Discharged Condition: stable    Hospital Course: The patient was admitted for the management of acute respiratory failure with hypoxia. Patient was admitted, ID was consulted, he was started on dexamethasone and Azithromycin for COVID19 and AECOPD. We provided supplemental oxygen and breathing treatments. Labs were monitored, home medications were resumed, vitals were stable and the patient was monitored on room air. Patient states that his symptoms improved and that he is back to his baseline. Patient will continue take the azithromycin and dexamethasone on an outpatient basis. He will also have close follow-up. Today on day of discharge pt feels better with no further complaints. Vitals and Labs are stable. Consults:  ID    Significant Diagnostic/theraputic interventions: IVF, Abx, Steroids, Supplemental oxygen      Disposition:   home    Instructions to Patient:      Follow up with Ray Ernst MD in  1 weeks    Discharge Medications:       Medication List      START taking these medications    ascorbic acid 1000 MG tablet  Commonly known as: VITAMIN C  Take 1 tablet by mouth 4 times daily     azithromycin 250 MG tablet  Commonly known as: ZITHROMAX  Take 1 tablet by mouth daily for 2 doses  Start taking on:  May 30, 2022     dexamethasone 6 MG tablet  Commonly known as: DECADRON  Take 1 tablet by mouth daily for 8 doses  Start taking on: May 30, 2022     guaiFENesin-dextromethorphan 100-10 MG/5ML syrup  Commonly known as: ROBITUSSIN DM  Take 5 mLs by mouth every 4 hours as needed for Cough     Vitamin D (Ergocalciferol) 48078 units Caps  Take 50,000 Units by mouth once a week  Start taking on: [place] 3, 2022     zinc sulfate 220 (50 Zn) MG capsule  Commonly known as: ZINCATE  Take 2 capsules by mouth daily  Start taking on: May 30, 2022        CONTINUE taking these medications    * albuterol sulfate  (90 Base) MCG/ACT inhaler  Inhale 2 puffs into the lungs every 6 hours as needed for Wheezing or Shortness of Breath     * albuterol (2.5 MG/3ML) 0.083% nebulizer solution  Commonly known as: PROVENTIL  Take 3 mLs by nebulization every 4 hours as needed for Wheezing     atorvastatin 80 MG tablet  Commonly known as: LIPITOR  Take 1 tablet by mouth daily     carvedilol 25 MG tablet  Commonly known as: COREG  Take 1 tablet by mouth 2 times daily (with meals)     cetirizine 10 MG tablet  Commonly known as: ZYRTEC     docusate 100 MG Caps  Commonly known as: COLACE, DULCOLAX  Take 100 mg by mouth 2 times daily     ferrous sulfate 325 (65 Fe) MG tablet  Commonly known as: IRON 325  Take 1 tablet by mouth every other day     hydroCHLOROthiazide 25 MG tablet  Commonly known as: HYDRODIURIL  Take 1 tablet by mouth daily     montelukast 10 MG tablet  Commonly known as: SINGULAIR     NIFEdipine 30 MG extended release tablet  Commonly known as: ADALAT CC  Take 1 tablet by mouth daily     Spiriva Respimat 1.25 MCG/ACT Aers inhaler  Generic drug: tiotropium     Symbicort 160-4.5 MCG/ACT Aero  Generic drug: budesonide-formoterol     vitamin D3 125 MCG (5000 UT) Caps         * This list has 2 medication(s) that are the same as other medications prescribed for you. Read the directions carefully, and ask your doctor or other care provider to review them with you.             STOP taking these medications    predniSONE 10 MG tablet  Commonly known as: Alyce Standing           Where to Get Your Medications      These medications were sent to [address]  [address] [facility] [address] [place] [address]    Phone: [phone]   · ascorbic acid 1000 MG tablet  · azithromycin 250 MG tablet  · dexamethasone 6 MG tablet  · guaiFENesin-dextromethorphan 100-10 MG/5ML syrup  · Vitamin D (Ergocalciferol) 99388 units Caps     You can get these medications from any pharmacy    You don't need a prescription for these medications  · zinc sulfate 220 (50 Zn) MG capsule         Send Copies to: Priscilla Amos MD    Patient Instructions:   · Activity: activity as tolerated  · Diet: cardiac diet  · Follow up with Prisiclla Amos MD in 1-2 weeks     Total time spent on discharge services: 35 minutes  Including the following activities:  · Evaluation and Management of patient  · Discussion with patient and/or surrogate about current care plan  · Coordination with Case Management and/or   · Coordination of care with Consultants (if applicable)   · Coordination of care with Receiving Facility Physician (if applicable)  · Completion of DME forms (if applicable)  · Preparation of Discharge Summary  · Preparation of Medication Reconciliation  · Preparation of Discharge Prescriptions    Please note that this chart was generated using voice recognition Dragon dictation software. Although every effort was made to ensure the accuracy of this automated transcription, some errors in transcription may have occurred.     Fausto Reza MD  5/29/2022 1:24 PM

## 2022-05-29 NOTE — PROGRESS NOTES
Patient is awake, alert and oriented. Patient is on room air. Patient is not complaining of shortness of breath and chest pain at this moment. Call light within reach.

## 2022-05-29 NOTE — DISCHARGE INSTR - DIET

## 2022-05-29 NOTE — PROGRESS NOTES
[address], [address]    Progress Note    Date:   5/29/2022  Patient name:  Carlene Crespo  Date of admission:  5/27/2022  7:13 AM  MRN:   [id number]  YOB: 1950    SUBJECTIVE/Last 24 hours update:     Patient seen and examined at the bed side , no new acute events overnight, no new complains. VSS Afebrile, off oxygen and tolerating this well. He is getting breathing treatments. Notes from nursing staff and Consults had been reviewed, and the overnight progress had been checked with the nursing staff as well. REVIEW OF SYSTEMS:      CONSTITUTIONAL:  no fevers, no headcahes  EYES: negative for blury vision  HEENT: No headaches, No nasal congestion, no difficulty swallowing  RESPIRATORY:negative for wheezing, no Cough, chronic dyspnea  CARDIOVASCULAR: negative for chest pain, no palpitations  GASTROINTESTINAL: no nausea, no vomiting, no change in bowel habits, no abdominal pain   GENITOURINARY: negative for dysuria, no hematuria   MUSCULOSKELETAL: no joint pains, no muscle aches, no swelling of joints or extremities  NEUROLOGICAL: No  Weakness or numbness    PAST MEDICAL HISTORY:      has a past medical history of CAD (coronary artery disease), COPD (chronic obstructive pulmonary disease) ([facility] [address] Essential hypertension, Hyperlipidemia, Prostate cancer ([facility] [address] and Wears glasses. PAST SURGICAL HISTORY:      has a past surgical history that includes hernia repair (Right, 1975); Colonoscopy (2015); Ankle surgery; Carotid stent placement (Left, 2019); and other surgical history. SOCIAL HISTORY:      reports that he quit smoking about 8 years ago. His smoking use included cigarettes. He has a 30.00 pack-year smoking history. He has never used smokeless tobacco. He reports current alcohol use of about 1.0 standard drink of alcohol per week. He reports that he does not use drugs. TOBACCO:   reports that he quit smoking about 8 years ago.  His smoking use included cigarettes. He has a 30.00 pack-year smoking history. He has never used smokeless tobacco.  ETOH:   reports current alcohol use of about 1.0 standard drink of alcohol per week. FAMILY HISTORY:     family history includes Cancer in his mother; No Known Problems in his father. Problem Relation Age of Onset    No Known Problems Father     Cancer Mother        HOME MEDICATIONS:      Prior to Admission medications    Medication Sig Start Date End Date Taking?  Authorizing Provider   ascorbic acid (VITAMIN C) 1000 MG tablet Take 1 tablet by mouth 4 times daily 5/29/22  Yes Tiffanie Simms MD   azithromycin (ZITHROMAX) 250 MG tablet Take 1 tablet by mouth daily for 2 doses 5/30/22 6/1/22 Yes Tiffanie Simms MD   dexamethasone (DECADRON) 6 MG tablet Take 1 tablet by mouth daily for 8 doses 5/30/22 6/7/22 Yes Tiffanie Simms MD   guaiFENesin-dextromethorphan (ROBITUSSIN DM) 100-10 MG/5ML syrup Take 5 mLs by mouth every 4 hours as needed for Cough 5/29/22 6/8/22 Yes Tiffanei Simms MD   Ergocalciferol (VITAMIN D) 89539 units CAPS Take 50,000 Units by mouth once a week 6/3/22  Yes Tiffanie Simms MD   zinc sulfate (ZINCATE) 220 (50 Zn) MG capsule Take 2 capsules by mouth daily 5/30/22  Yes Tiffanie Simms MD   carvedilol (COREG) 25 MG tablet Take 1 tablet by mouth 2 times daily (with meals) 9/8/21   Gus Freeman MD   NIFEdipine (ADALAT CC) 30 MG extended release tablet Take 1 tablet by mouth daily 3/21/21   Becka Love MD   ferrous sulfate (IRON 325) 325 (65 Fe) MG tablet Take 1 tablet by mouth every other day  Patient not taking: Reported on 5/27/2022 3/21/21   Becka Love MD   docusate sodium (COLACE, DULCOLAX) 100 MG CAPS Take 100 mg by mouth 2 times daily 3/20/21   Becka Love MD   atorvastatin (LIPITOR) 80 MG tablet Take 1 tablet by mouth daily 7/1/19   Gus Freeman MD   hydrochlorothiazide (HYDRODIURIL) 25 MG tablet Take 1 tablet by mouth daily 6/21/19   Gus Freeman MD Cholecalciferol (VITAMIN D3) 5000 units CAPS Take 1,000 Units by mouth daily    Historical Provider, MD   albuterol sulfate  (90 Base) MCG/ACT inhaler Inhale 2 puffs into the lungs every 6 hours as needed for Wheezing or Shortness of Breath 12/19/18   EVER Das CNP   albuterol (PROVENTIL) (2.5 MG/3ML) 0.083% nebulizer solution Take 3 mLs by nebulization every 4 hours as needed for Wheezing 12/19/18   EVER Das CNP   budesonide-formoterol (SYMBICORT) 160-4.5 MCG/ACT AERO Inhale 2 puffs into the lungs 2 times daily    Historical Provider, MD   tiotropium (SPIRIVA RESPIMAT) 1.25 MCG/ACT AERS inhaler Inhale 2 puffs into the lungs daily    Historical Provider, MD   cetirizine (ZYRTEC) 10 MG tablet Take 10 mg by mouth daily    Historical Provider, MD   montelukast (SINGULAIR) 10 MG tablet Take 10 mg by mouth nightly    Historical Provider, MD       ALLERGIES:     Patient has no known allergies. OBJECTIVE:       Vitals:    05/29/22 0130 05/29/22 0400 05/29/22 0500 05/29/22 0740   BP: (!) 164/93   (!) 172/78   Pulse: 59  68 54   Resp: 20  18 18   Temp: 97 °F (36.1 °C)   96.8 °F (36 °C)   TempSrc: Temporal   Temporal   SpO2: 92%  91% 95%   Weight:  194 lb (88 kg)     Height:             Intake/Output Summary (Last 24 hours) at 5/29/2022 0949  Last data filed at 5/29/2022 1488  Gross per 24 hour   Intake 450 ml   Output --   Net 450 ml       PHYSICAL EXAM:  General Appearance  Alert , awake , not in acute distress  HEENT - Head is normocephalic, atraumatic. Lungs - Bilateral equal air entry , rales or rhonchi, aeration good, slight wheezing to posterior lung fields bilaterally  Cardiovascular - Heart sounds are normal.  Regular rhythm, normal rate without murmur, gallop or rub.   Abdomen - Soft, nontender, nondistended, no masses or organomegaly  Neurologic - There are no new focal motor or sensory deficits  Skin - No bruising or bleeding on exposed skin area  Extremities - No cyanosis, clubbing or edema    DIAGNOSTICS:     Laboratory Testing:    Recent Results (from the past 24 hour(s))   CBC Auto Differential    Collection Time: 05/29/22  6:48 AM   Result Value Ref Range    WBC 11.9 (H) 3.5 - 11.3 k/uL    RBC 4.12 (L) 4.21 - 5.77 m/uL    Hemoglobin 12.8 (L) 13.0 - 17.0 g/dL    Hematocrit 37.7 (L) 40.7 - 50.3 %    MCV 91.5 82.6 - 102.9 fL    MCH 31.1 25.2 - 33.5 pg    MCHC 34.0 28.4 - 34.8 g/dL    RDW 13.3 11.8 - 14.4 %    Platelets 185 535 - 320 k/uL    MPV 11.0 8.1 - 13.5 fL    NRBC Automated 0.0 0.0 per 100 WBC    Seg Neutrophils 70 (H) 36 - 65 %    Lymphocytes 19 (L) 24 - 43 %    Monocytes 10 3 - 12 %    Eosinophils % 0 (L) 1 - 4 %    Immature Granulocytes 1 (H) 0 %    Basophils 0 0 - 2 %    Segs Absolute 8.33 (H) 1.50 - 8.10 k/uL    Absolute Lymph # 2.26 1.10 - 3.70 k/uL    Absolute Mono # 1.19 0.10 - 1.20 k/uL    Absolute Eos # 0.00 0.00 - 0.44 k/uL    Absolute Immature Granulocyte 0.12 0.00 - 0.30 k/uL    Basophils Absolute 0.00 0.0 - 0.2 k/uL    Morphology Normal    Comprehensive Metabolic Panel w/ Reflex to MG    Collection Time: 05/29/22  6:48 AM   Result Value Ref Range    Glucose 113 (H) 70 - 99 mg/dL    BUN 47 (H) 8 - 23 mg/dL    CREATININE 1.53 (H) 0.70 - 1.20 mg/dL    Bun/Cre Ratio 31 (H) 9 - 20    Calcium 9.7 8.6 - 10.4 mg/dL    Sodium 133 (L) 135 - 144 mmol/L    Potassium 3.2 (L) 3.7 - 5.3 mmol/L    Chloride 90 (L) 98 - 107 mmol/L    CO2 30 20 - 31 mmol/L    Anion Gap 13 9 - 17 mmol/L    Alkaline Phosphatase 91 40 - 129 U/L    ALT 17 5 - 41 U/L    AST 15 <40 U/L    Total Bilirubin 0.17 (L) 0.3 - 1.2 mg/dL    Total Protein 7.4 6.4 - 8.3 g/dL    Albumin 3.8 3.5 - 5.2 g/dL    Albumin/Globulin Ratio 1.1 1.0 - 2.5    GFR Non- 45 (L) >60 mL/min    GFR  55 (L) >60 mL/min    GFR Comment          GFR Staging         Fibrinogen    Collection Time: 05/29/22  6:48 AM   Result Value Ref Range    Fibrinogen 1014 (H) 179 - 518 mg/dL   D-Dimer, Quantitative    Collection Time: 05/29/22  6:48 AM   Result Value Ref Range    D-Dimer, Quant 0.42 0.00 - 0.59 mg/L FEU   C-Reactive Protein    Collection Time: 05/29/22  6:48 AM   Result Value Ref Range    .1 (H) 0.0 - 5.0 mg/L   Magnesium    Collection Time: 05/29/22  6:48 AM   Result Value Ref Range    Magnesium 2.5 1.6 - 2.6 mg/dL     Current Facility-Administered Medications   Medication Dose Route Frequency Provider Last Rate Last Admin    albuterol sulfate  (90 Base) MCG/ACT inhaler 2 puff  2 puff Inhalation Q6H PRN Ricka Amelia, APRN - CNP        atorvastatin (LIPITOR) tablet 80 mg  80 mg Oral Daily Rio Cisneros, APRN - CNP   80 mg at 05/29/22 0743    mometasone-formoterol (DULERA) 200-5 MCG/ACT inhaler 2 puff  2 puff Inhalation BID Desmonda Amelia, APRN - CNP   2 puff at 05/28/22 1038    carvedilol (COREG) tablet 25 mg  25 mg Oral BID WC Rio Cisneros, APRN - CNP   25 mg at 05/29/22 0743    cetirizine (ZYRTEC) tablet 10 mg  10 mg Oral Daily Rio Cisneros, APRN - CNP   10 mg at 05/29/22 0743    docusate sodium (COLACE) capsule 100 mg  100 mg Oral BID Desmonda Amelia, APRN - CNP   100 mg at 05/29/22 0743    hydroCHLOROthiazide (HYDRODIURIL) tablet 25 mg  25 mg Oral Daily Rio Cisneros, APRN - CNP   25 mg at 05/29/22 0743    montelukast (SINGULAIR) tablet 10 mg  10 mg Oral Nightly Desmonda Amelia, APRN - CNP   10 mg at 05/28/22 2125    NIFEdipine (ADALAT CC) extended release tablet 30 mg  30 mg Oral Daily Desmonda Amelia, APRN - CNP   30 mg at 05/29/22 8013    acetaminophen (TYLENOL) tablet 650 mg  650 mg Oral Q6H PRN Rio Cisneros, APRN - CNP        Or    acetaminophen (TYLENOL) suppository 650 mg  650 mg Rectal Q6H PRN Rio Cisneros, APRN - CNP        enoxaparin Sodium (LOVENOX) injection 30 mg  30 mg SubCUTAneous BID EVER Oliveira CNP   30 mg at 05/29/22 0743    guaiFENesin-dextromethorphan (ROBITUSSIN DM) 100-10 MG/5ML syrup 5 mL  5 mL Oral Q4H PRN Jessi Jorge, APRN - CNP        ascorbic acid (VITAMIN C) tablet 1,000 mg  1,000 mg Oral 4x Daily Jessi Jorge, APRN - CNP   1,000 mg at 05/29/22 4823    finasteride (PROSCAR) tablet 10 mg  10 mg Oral Daily Jessi Jorge, APRN - CNP   10 mg at 05/29/22 0432    zinc sulfate (ZINCATE) capsule 100 mg  100 mg Oral Daily Jessi Jorge, APRN - CNP   100 mg at 05/29/22 2009    vitamin D (ERGOCALCIFEROL) capsule 50,000 Units  50,000 Units Oral Weekly Jessi Jorge, APRN - CNP        famotidine (PEPCID) tablet 20 mg  20 mg Oral Daily Jessi Jorge, APRN - CNP   20 mg at 05/29/22 0743    tiotropium (SPIRIVA RESPIMAT) 2.5 MCG/ACT inhaler 1 puff  1 puff Inhalation Daily Jessi Jorge, APRN - CNP   1 puff at 05/28/22 1038    azithromycin (ZITHROMAX) tablet 250 mg  250 mg Oral Daily Jessi Jorge, APRN - CNP   250 mg at 05/29/22 0743    albuterol (PROVENTIL) nebulizer solution 2.5 mg  2.5 mg Nebulization 4x daily Kristian Mcarthur MD   2.5 mg at 05/29/22 0502    albuterol (PROVENTIL) nebulizer solution 2.5 mg  2.5 mg Nebulization Q4H PRN Kristian Mcarthur MD        dexamethasone (DECADRON) tablet 6 mg  6 mg Oral Daily Kristian Mcarthur MD   6 mg at 05/29/22 0140       ASSESSMENT:     Principal Problem:    Pneumonia due to COVID-19 virus  Active Problems:    Acute respiratory failure with hypoxia (HCC)    COPD exacerbation ([address]    Stage 3a chronic kidney disease ([facility] [address]    Essential hypertension, benign    Atrial fibrillation ([address]  Resolved Problems:    * No resolved hospital problems. *      PLAN:     Acute respiratory failure due to COVID-19, HTN, Acute COPD exacerbation, CKD Stage 3a, Hyperlipidemia  ID consulted and following closely  On Dexamethasone  Supplementation with C, D and zinc  Supplemental oxygen was dc'ed and patient was monitored.   Albuterol breathing treatments  Dulera, Singulair, Spiriva  Robitussin  Monitor CBC/CMP, labs  Azithromycin for 5 days for COPD exacerbation  Lipitor 80 mg daily  Coreg 25 twice daily, hydrochlorothiazide 25 daily, nifedipine 30 mg daily  Colace twice daily, Pepcid daily  Lovenox 30 mg twice daily  Proscar 10 mg  Low-sodium diet  Droplet plus isolation    D/C Home today with close OP follow-up. Discussed care plan with nurse after getting their input. Above plan discussed with the patient who agreed to the above plan     Please note that this chart was generated using voice recognition Dragon dictation software. Although every effort was made to ensure the accuracy of this automated transcription, some errors in transcription may have occurred.     Satish Cole MD  5/29/2022 9:49 AM

## 2022-05-29 NOTE — PLAN OF CARE
Problem: Pain  Goal: Verbalizes/displays adequate comfort level or baseline comfort level  Outcome: Progressing  Flowsheets (Taken 5/29/2022 0005)  Verbalizes/displays adequate comfort level or baseline comfort level:   Encourage patient to monitor pain and request assistance   Administer analgesics based on type and severity of pain and evaluate response   Assess pain using appropriate pain scale   Implement non-pharmacological measures as appropriate and evaluate response

## 2022-05-31 ENCOUNTER — CARE COORDINATION (OUTPATIENT)
Dept: CASE MANAGEMENT | Age: 72
End: 2022-05-31

## 2022-05-31 DIAGNOSIS — U07.1 PNEUMONIA DUE TO COVID-19 VIRUS: Primary | ICD-10-CM

## 2022-05-31 DIAGNOSIS — J12.82 PNEUMONIA DUE TO COVID-19 VIRUS: Primary | ICD-10-CM

## 2022-05-31 PROCEDURE — 1111F DSCHRG MED/CURRENT MED MERGE: CPT | Performed by: INTERNAL MEDICINE

## 2022-05-31 NOTE — CARE COORDINATION
[facility] Transitions Initial Follow Up Call    Call within 2 business days of discharge: Yes    Patient: Elizabeth Velásquez Patient : 1950   MRN: [id number]  Reason for Admission: Pneumonia due to Covid-19  Discharge Date: 22 RARS: Readmission Risk Score: 16.8 ( )      Last Discharge [facility]       Complaint Diagnosis Description Type Department Provider    22 Shortness of Breath Pneumonia due to COVID-19 virus . .. ED to Hosp-Admission (Discharged) (Jani Sessions) Stefania King MD; [address] To. .. Spoke with: [address]: Hayley Bright    Was able to contact Bull Morales for initial Egnyte. He stated that he was doing \"fine\". He said that his breathing was better, but not back to its baseline. He said that his oxygen saturation was 98% at rest on room air. He denied fever/chills, dizziness,or any other symptom, except for fatigue. He stated that he obtained all his medications and will be calling PCP for follow up. He is currently attending cardiac rehab for his COPD. He said that the staff reviewed quarantine guidelines with him  He had no questions/concerns or needs at this time. Non-face-to-face services provided:  Obtained and reviewed discharge summary and/or continuity of care documents  Assessment and support for treatment adherence and medication management-reviewed     Transitions of Care Initial Call    Was this an external facility discharge? No Discharge Facility: [address] to be reviewed by the provider   Additional needs identified to be addressed with provider: No  none             Method of communication with provider : none    Advance Care Planning:   Does patient have an Advance Directive: noted in chart that pt stated that he had ACP doc. Care Transition Nurse contacted the patient by telephone to perform post hospital discharge assessment. Verified name and  with patient as identifiers.  Provided introduction to self, and explanation of the CTN role. CTN reviewed discharge instructions, medical action plan and red flags with patient who verbalized understanding. Patient given an opportunity to ask questions and does not have any further questions or concerns at this time. Were discharge instructions available to patient? Yes. Reviewed appropriate site of care based on symptoms and resources available to patient including: PCP  When to call 911. The patient agrees to contact the PCP office for questions related to their healthcare. Medication reconciliation was performed with patient, who verbalizes understanding of administration of home medications. Advised obtaining a 90-day supply of all daily and as-needed medications. Was patient discharged with a pulse oximeter? pt has own personal oximeter and checks is oxygenation    CTN provided contact information. Plan for follow-up call in 3-5 days based on severity of symptoms and risk factors. Plan for next call: symptom management-Review any covid symptoms, was FU appointment made with PCP.         Care Transitions 24 Hour Call    Do you have a copy of your discharge instructions?: Yes  Do you have all of your prescriptions and are they filled?: Yes  Have you been contacted by a [address]?: No  Have you scheduled your follow up appointment?: No  Do you feel like you have everything you need to keep you well at home?: Yes  Care Transitions Interventions         Follow Up  Future Appointments   Date Time Provider Kike Self   6/3/2022  8:00 AM [facility] CARDIOPULM REHAB RM [address] [place]   6/7/2022  8:00 AM [facility] CARDIOPULM REHAB RM 3 [facility] CARDIAC [place]   6/10/2022  8:00 AM M[facility] CARDIOPULM REHAB RM 3 [facility] CARDIAC [place]   6/14/2022  8:00 AM M[facility] CARDIOPULM REHAB RM 3 [facility] CARDIAC [place]   7/21/2022  9:30 AM [facility]   7/25/2022 10:30 AM Chuyita Sorto [facility]   8/12/2022  9:10 AM Jed Molina MD Michael Weston RN

## 2022-06-01 ENCOUNTER — CARE COORDINATION (OUTPATIENT)
Dept: OTHER | Facility: CLINIC | Age: 72
End: 2022-06-01

## 2022-06-01 LAB
CULTURE: NORMAL
Lab: NORMAL
SPECIMEN DESCRIPTION: NORMAL

## 2022-06-01 NOTE — CARE COORDINATION
[facility] Transitions Follow Up Call    2022    Patient: Martin Sutton  Patient : 1950   MRN: [id number]  Reason for Admission: COVID-19 PNA  Discharge Date: 22 RARS: Readmission Risk Score: 16.8 ( )         Spoke with: Claude Pierce    Transitions of Care Initial Call    Spoke to Claude Pierce for COVID-19 transitions call. Pt stated he is doing fine, no new/worsening COVID symptoms. Pt completed atb, continues on prednisone, makes him a little \"jumpy\". Discussed common side effects of steroids. Pt taking Robitussin as needed. Pt stated he is out of quarantine tomorrow, will continue to mask when he is out in public. Pt has PCP appt on 6/3/22. Stated he will retrun to Cardiac rehab as scheduled. No immediate needs or concerns. Challenges to be reviewed by the provider   Additional needs identified to be addressed with provider: No  none             Method of communication with provider : none      Care Transition Nurse contacted the patient by telephone to perform post hospital discharge assessment. Verified name and  with patient as identifiers. Provided introduction to self, and explanation of the CTN role. CTN reviewed discharge instructions, medical action plan and red flags with patient who verbalized understanding. Patient given an opportunity to ask questions and does not have any further questions or concerns at this time. Were discharge instructions available to patient? Yes. Reviewed appropriate site of care based on symptoms and resources available to patient including: PCP  Specialist  When to call [address] The patient agrees to contact the PCP office for questions related to their healthcare. Medication reconciliation was performed with patient, who verbalizes understanding of administration of home medications. Advised obtaining a 90-day supply of all daily and as-needed medications. Was patient discharged with a pulse oximeter?  no    CTN provided contact information. Plan for follow-up call in 5-7 days based on severity of symptoms and risk factors. Plan for next call: symptom management-COVID-19  follow up appointment-PCP review from 6/3/22, cardiac rehab        Care Transitions Subsequent and Final Call    Subsequent and Final Calls  Do you have any ongoing symptoms?: No  Have your medications changed?: No  Do you have any questions related to your medications?: No  Do you currently have any active services?: Yes  Are you currently active with any services?: Outpatient/Community Services  Do you have any needs or concerns that I can assist you with?: No  Identified Barriers: None  Care Transitions Interventions  Other Interventions:            Follow Up  Future Appointments   Date Time Provider Kike Self   6/3/2022  8:00 AM [facility] CARDIOPULM REHAB RM 3 [facility] CARDIAC [place]   6/3/2022  9:00 AM EVER Oliver CNP   6/7/2022  8:00 AM [facility] CARDIOPULM REHAB RM 3 [facility] CARDIAC [place]   6/10/2022  8:00 AM [facility] CARDIOPULM REHAB RM 3 [facility] CARDIAC [place]   6/14/2022  8:00 AM [facility] CARDIOPULM REHAB RM 3 [facility] CARDIAC [place]   7/21/2022  9:30 AM [facility]idal [facility]   7/25/2022 10:30 AM Sudha Dickerson MD [place] [facility]UL[facility]   8/12/2022  9:10 AM MD Michael Simpson RN

## 2022-06-06 ENCOUNTER — HOSPITAL ENCOUNTER (OUTPATIENT)
Age: 72
Discharge: HOME | End: 2022-06-08
Payer: MEDICARE

## 2022-06-06 ENCOUNTER — HOSPITAL ENCOUNTER (OUTPATIENT)
Dept: GENERAL RADIOLOGY | Age: 72
Discharge: HOME | End: 2022-06-08
Payer: MEDICARE

## 2022-06-06 DIAGNOSIS — J12.82 PNEUMONIA DUE TO COVID-19 VIRUS: ICD-10-CM

## 2022-06-06 DIAGNOSIS — U07.1 PNEUMONIA DUE TO COVID-19 VIRUS: ICD-10-CM

## 2022-06-06 PROCEDURE — 71046 X-RAY EXAM CHEST 2 VIEWS: CPT

## 2022-06-08 ENCOUNTER — TELEPHONE (OUTPATIENT)
Dept: PULMONOLOGY | Age: 72
End: 2022-06-08

## 2022-06-08 ENCOUNTER — CARE COORDINATION (OUTPATIENT)
Dept: CASE MANAGEMENT | Age: 72
End: 2022-06-08

## 2022-06-08 DIAGNOSIS — J44.1 COPD EXACERBATION (HCC): ICD-10-CM

## 2022-06-08 NOTE — CARE COORDINATION
[facility] Transitions Follow Up Call    2022    Patient: Tasha Bingham  Patient : 1950   MRN: [id number]  Reason for Admission: Pneumonia due to Covid-19  Discharge Date: 22 RARS: Readmission Risk Score: 16.8 ( )         Spoke with: Tasha Bingham    Was able to contact Helene Traore for [place]. He stated that yesterday and today, he was having increased shortness of breath. He said that he was doing is aerosols and taking his medications as prescribed. He said that his oxygen saturation have been 90-91% on room air. He said that he usually runs in the mid to upper 90's. He is orthopneic at times. He said that his cough was improving and no F/C, chest pain or dizziness/lightheadedness. He said that he finished his Decadron on . Asked if he had the air conditioning on and he said that he did not. Encouraged him to turn it on to decrease the humidity and allergens in the air. We was receptive to have writer to call his pulmonologist to inform him of complaints. Attempted to call office of Dr Ludivina Delgado, but office out to lunch. Will attempt during working hours. Call pulmonologist office placed and spoke with Justin Mcmahon. Explained about pt's complaint of increased shortness of breath. She stated that she will send message to Dr Ludivina Delgado and call the pt with his response. Care Transitions Follow Up Call    Needs to be reviewed by the provider   Additional needs identified to be addressed with provider: Yes [facility]  call to update pulmologist about pt's complaints             Method of communication with provider : phone      Care Transition Nurse contacted the patient by telephone to follow up after admission on 22. Verified name and  with patient as identifiers. Addressed changes since last contact: none  Discussed follow-up appointments.      CTN reviewed discharge instructions, medical action plan and red flags with patient and discussed any barriers to care and/or understanding of plan of care after discharge. Discussed appropriate site of care based on symptoms and resources available to patient including: PCP  Specialist  When to call 911. The patient agrees to contact the PCP office for questions related to their healthcare. Patients top risk factors for readmission: medical condition-COPD/Covid  Interventions to address risk factors: Obtained and reviewed discharge summary and/or continuity of care documents      Non-[facility] follow up appointment(s):     CTN provided contact information for future needs. Plan for follow-up call in 3-5 days based on severity of symptoms and risk factors. Plan for next call: symptom management-assess breathing and response from pulmonologist            Care Transitions Subsequent and Final Call    Subsequent and Final Calls  Do you have any ongoing symptoms?: Yes  Onset of Patient-reported symptoms: Yesterday  Patient-reported symptoms: Shortness of Breath  Interventions for patient-reported symptoms: Notified PCP/Physician  Have your medications changed?: No  Do you have any questions related to your medications?: No  Do you currently have any active services?: Yes  Are you currently active with any services?: Outpatient/Community Services  Do you have any needs or concerns that I can assist you with?: No  Care Transitions Interventions  Other Interventions:            Follow Up  Future Appointments   Date Time Provider Kike Self   6/10/2022  8:00 AM [facility] CARDIOPULM REHAB RM 3 [facility] [place]   6/14/2022  8:00 AM [facility] CARDIOPULM REHAB RM 3 [facility]ARDIAC [place]   7/21/2022  9:30 AM [facility] Peck - [facility]   7/25/2022 10:30 AM Dennis Betancur MD [facility]   8/12/2022  9:10 AM MD NELL CodyVenetieRuslan Multani RN

## 2022-06-08 NOTE — TELEPHONE ENCOUNTER
Patient is still coughing and wheezing. I did pending orders . If you agree please sign. Patient was informed.

## 2022-06-09 ENCOUNTER — CARE COORDINATION (OUTPATIENT)
Dept: CASE MANAGEMENT | Age: 72
End: 2022-06-09

## 2022-06-09 RX ORDER — PREDNISONE 10 MG/1
TABLET ORAL
Qty: 30 TABLET | Refills: 0 | Status: SHIPPED | OUTPATIENT
Start: 2022-06-09 | End: 2022-07-21 | Stop reason: ALTCHOICE

## 2022-06-09 RX ORDER — AZITHROMYCIN 250 MG/1
250 TABLET, FILM COATED ORAL DAILY
Qty: 2 TABLET | Refills: 0 | Status: SHIPPED | OUTPATIENT
Start: 2022-06-09 | End: 2022-06-11

## 2022-06-09 NOTE — CARE COORDINATION
[facility] Transitions Follow Up Call    2022    Patient: Ronny Flores  Patient : 1950   MRN: [id number]  Reason for Admission: Pneumonia due to Covid-19  Discharge Date: 22 RARS: Readmission Risk Score: 16.8 ( )         Spoke with: Ronny Flores    Was able to contact Nilda Hawkins for follow up. He stated that he was doing about the same as he was yesterday. He has not heard from the pulmonology office yet. Explained that noted telephone encounter that new medications are to be ordered. [address] and they stated that they have not received any new orders. Attempted to contact pulmonologist office, but out to lunch. Will attempt during business hours. [facility] called and could not get through to staff. 3 attempt to call, noted that medications that were pending were signed and sent to pharmacy. Called Nilda Glenwood and updated on medications being signed to  his new medications later today. No further questions or concerns. Care Transitions Follow Up Call          Needs to be reviewed by the provider    Additional needs identified to be addressed with provider: Yes [facility]  call to update pulmologist about pt's complaints                 Method of communication with provider : phone        Care Transition Nurse contacted the patient by telephone to follow up after admission on 22. Verified name and  with patient as identifiers.     Addressed changes since last contact: none  Discussed follow-up appointments.      CTN reviewed discharge instructions, medical action plan and red flags with patient and discussed any barriers to care and/or understanding of plan of care after discharge. Discussed appropriate site of care based on symptoms and resources available to patient including: PCP  Specialist  When to call 911.  The patient agrees to contact the PCP office for questions related to their healthcare.      Patients top risk factors for readmission: medical condition-COPD/Covid  Interventions to address risk factors: Obtained and reviewed discharge summary and/or continuity of care documents        Non-[facility] follow up appointment(s):      CTN provided contact information for future needs. Plan for follow-up call in 3-5 days based on severity of symptoms and risk factors. Plan for next call: symptom management-assess breathing and response from pulmonologist           Care Transitions Subsequent and Final Call    Subsequent and Final Calls  Do you have any ongoing symptoms?: Yes  Patient-reported symptoms: Shortness of Breath  Have your medications changed?: Yes  Patient Reports: zithromax and prednisone was added  Do you have any questions related to your medications?: No  Do you currently have any active services?: No  Are you currently active with any services?: Outpatient/Community Services  Do you have any needs or concerns that I can assist you with?: No  Care Transitions Interventions  Other Interventions:            Follow Up  Future Appointments   Date Time Provider Kike Self   6/10/2022  8:00 AM [facility] CARDIOPULM REHAB RM 3 MTHZ CARDIAC [place]   6/14/2022  8:00 AM [facility] CARDIOPULM REHAB RM 3 MTHZ CARDIAC [place]   7/21/2022  9:30 AM EVER Martinez - CNP [place] UROLOGY [facility]   7/25/2022 10:30 AM Rosy Ledezma MD [place] PULM [facility]   8/12/2022  9:10 AM Sandro Leonardo MD [facility] Urbano Jerome RN

## 2022-06-13 ENCOUNTER — CARE COORDINATION (OUTPATIENT)
Dept: CASE MANAGEMENT | Age: 72
End: 2022-06-13

## 2022-06-13 NOTE — CARE COORDINATION
[place] [address] Transitions Follow Up Call    2022    Patient: Roxana Perez  Patient : 1950   MRN: [id number]  Reason for Admission: Pneumonia due to Covid-19  Discharge Date: 22 RARS: Readmission Risk Score: 16.8 ( )         Spoke with: Roxana Perez    Was able to contact Yanira Nathan for Covid-19 follow up. He stated that he was doing better. He said that his breathing, his cough and his saturations were better after starting the prednisone and Zithromax. He said that he is still not at his baseline thought. He saturations have been 93-97%. He will be going to [id number]. He had no further questions or concerns. Care Transitions Follow Up Call             Needs to be reviewed by the provider     Additional needs identified to be addressed with provider: NO [facility]   none                 Method of communication with provider : none        Care Transition Nurse contacted the patient by telephone to follow up after admission on 22. Verified name and  with patient as identifiers.     Addressed changes since last contact: none  Discussed follow-up appointments.      CTN reviewed discharge instructions, medical action plan and red flags with patient and discussed any barriers to care and/or understanding of plan of care after discharge. Discussed appropriate site of care based on symptoms and resources available to patient including: PCP  Specialist  When to call 911. The patient agrees to contact the PCP office for questions related to their healthcare.      Patients top risk factors for readmission: medical condition-COPD/Covid  Interventions to address risk factors: Obtained and reviewed discharge summary and/or continuity of care documents        Non-[facility] follow up appointment(s):      CTN provided contact information for future needs. Plan for follow-up call in 3-5 days based on severity of symptoms and risk factors.   Plan for next call: symptom management-assess breathing and response from pulmonologist        Care Transitions Subsequent and Final Call    Subsequent and Final Calls  Do you have any ongoing symptoms?: No  Have your medications changed?: Yes  Patient Reports: added zithromax and prednisone  Do you have any questions related to your medications?: No  Do you currently have any active services?: Yes  Are you currently active with any services?: Outpatient/Community Services  Do you have any needs or concerns that I can assist you with?: No  Care Transitions Interventions  Other Interventions:            Follow Up  Future Appointments   Date Time Provider Kike Self   6/14/2022  8:00 AM [facility] CARDIOPULM REHAB  3 MTHZ CARDIAC [place]   7/21/2022  9:30 [facility]   7/25/2022 10:30 AM Dyan Henderson MD [place] PULM [facility]   8/12/2022  9:10 AM Ailyn Doyle MD [facility] Dante Pang RN

## 2022-06-14 ENCOUNTER — HOSPITAL ENCOUNTER (OUTPATIENT)
Dept: CARDIAC REHAB | Age: 72
Discharge: HOME | End: 2022-06-14

## 2022-06-16 ENCOUNTER — CARE COORDINATION (OUTPATIENT)
Dept: CASE MANAGEMENT | Age: 72
End: 2022-06-16

## 2022-06-16 NOTE — CARE COORDINATION
[facility] Transitions Follow Up Call    2022    Patient: Tara Greene  Patient : 1950   MRN: [id number]  Reason for Admission: Pneumonia due to Covid-19  Discharge Date: 22 RARS: Readmission Risk Score: 16.8 ( )         Spoke with: Tara Greene    Was able to contact Tuan Genao for transitional outreach. He stated that he was doing \"fine\". He said that his breathing was about back to 90% of his baseline, and his oxygen saturations were back to the mid 90's. He said that he has about 2 days left of his steroid to take and he did make it to his cardio/pulmonary rehab appointment. No questions or concerns. Care Transitions Follow Up Call             Needs to be reviewed by the provider     Additional needs identified to be addressed with provider: No   none                 Method of communication with provider : none        Care Transition Nurse contacted the patient by telephone to follow up after admission on 22. Verified name and  with patient as identifiers.     Addressed changes since last contact: none  Discussed follow-up appointments.      CTN reviewed discharge instructions, medical action plan and red flags with patient and discussed any barriers to care and/or understanding of plan of care after discharge. Discussed appropriate site of care based on symptoms and resources available to patient including: PCP  Specialist  When to call 911. The patient agrees to contact the PCP office for questions related to their healthcare.      Patients top risk factors for readmission: medical condition-COPD/Covid  Interventions to address risk factors: Obtained and reviewed discharge summary and/or continuity of care documents        Non-[facility] follow up appointment(s):      CTN provided contact information for future needs. Plan for follow-up call in 3-5 days based on severity of symptoms and risk factors. Plan for next call: symptom management-routine assessment.   Covid s/s          Care Transitions Subsequent and Final Call    Subsequent and Final Calls  Do you have any ongoing symptoms?: No  Have your medications changed?: No  Do you have any questions related to your medications?: No  Do you currently have any active services?: Yes  Are you currently active with any services?: Outpatient/Community Services  Do you have any needs or concerns that I can assist you with?: No  Care Transitions Interventions  Other Interventions:            Follow Up  Future Appointments   Date Time Provider Kike Self   7/21/2022  9:30 AM EVER Canchola CNP [place] UROLOGY [facility]   7/25/2022 10:30 AM Elizabeth Judd MD [place] PULM [facility]   8/12/2022  9:10 AM Leanna Saha MD [facility] Saul Jackson RN

## 2022-06-17 ENCOUNTER — HOSPITAL ENCOUNTER (OUTPATIENT)
Dept: CARDIAC REHAB | Age: 72
Discharge: HOME | End: 2022-06-17

## 2022-06-23 ENCOUNTER — CARE COORDINATION (OUTPATIENT)
Dept: CASE MANAGEMENT | Age: 72
End: 2022-06-23

## 2022-06-23 NOTE — CARE COORDINATION
[facility] Transitions Follow Up Call    2022    Patient: Marsha Clancy  Patient : 1950   MRN: [id number]  Reason for Admission: Pneumonia due to Covid-19  Discharge Date: 22 RARS: Readmission Risk Score: 16.8 ( )         Spoke with: Marsha Clancy    Was able to contact Zohreh Valdes for final transitional outreach. He stated that he was doing \"fine\" and was not experiencing any problems with breathing. He had no questions or concerns. Informed of final outreach. Care Transitions Follow Up Call             Needs to be reviewed by the provider     Additional needs identified to be addressed with provider: No   none                 Method of communication with provider : none        Care Transition Nurse contacted the patient by telephone to follow up after admission on 22. Verified name and  with patient as identifiers.     Addressed changes since last contact: none  Discussed follow-up appointments.      CTN reviewed discharge instructions, medical action plan and red flags with patient and discussed any barriers to care and/or understanding of plan of care after discharge. Discussed appropriate site of care based on symptoms and resources available to patient including: PCP  Specialist  When to call 911. The patient agrees to contact the PCP office for questions related to their healthcare.      Patients top risk factors for readmission: medical condition-COPD/Covid  Interventions to address risk factors: Obtained and reviewed discharge summary and/or continuity of care documents        Non-[facility] follow up appointment(s):      CTN provided contact information for future needs. No further outreaches identified. Final call episode ended.         Care Transitions Subsequent and Final Call    Subsequent and Final Calls  Do you have any ongoing symptoms?: No  Have your medications changed?: No  Do you have any questions related to your medications?: No  Do you currently have any active services?: Yes  Are you currently active with any services?: Outpatient/Community Services  Do you have any needs or concerns that I can assist you with?: No  Care Transitions Interventions  Other Interventions:            Follow Up  Future Appointments   Date Time Provider Kike Self   6/24/2022  8:00 AM [facility] CARDIOPULM REHAB RM 3 MTHZ CARDIAC [place]   6/28/2022  8:00 AM [facility] CARDIOPULM REHAB RM 3 MTHZ CARDIAC [place]   7/21/2022  9:30 AM [facility]   7/25/2022 10:30 AM Javier Jones MD [place] PULM [facility]   8/12/2022  9:10 AM Chiqui Alexandre MD [facility] Zulema Nicholas RN

## 2022-06-24 ENCOUNTER — HOSPITAL ENCOUNTER (OUTPATIENT)
Dept: CARDIAC REHAB | Age: 72
Discharge: HOME | End: 2022-06-24

## 2022-06-28 ENCOUNTER — HOSPITAL ENCOUNTER (OUTPATIENT)
Age: 72
Discharge: HOME | End: 2022-06-28
Payer: MEDICARE

## 2022-06-28 ENCOUNTER — HOSPITAL ENCOUNTER (OUTPATIENT)
Dept: CARDIAC REHAB | Age: 72
Discharge: HOME | End: 2022-06-28

## 2022-06-28 DIAGNOSIS — C61 PROSTATE CANCER (HCC): ICD-10-CM

## 2022-06-28 LAB — PROSTATE SPECIFIC ANTIGEN: <0.02 NG/ML

## 2022-06-28 PROCEDURE — 36415 COLL VENOUS BLD VENIPUNCTURE: CPT

## 2022-06-28 PROCEDURE — 84153 ASSAY OF PSA TOTAL: CPT

## 2022-07-01 ENCOUNTER — HOSPITAL ENCOUNTER (OUTPATIENT)
Dept: CARDIAC REHAB | Age: 72
Discharge: HOME | End: 2022-07-01

## 2022-07-05 ENCOUNTER — HOSPITAL ENCOUNTER (OUTPATIENT)
Dept: CARDIAC REHAB | Age: 72
Discharge: HOME | End: 2022-07-05

## 2022-07-05 PROCEDURE — 9900000065 HC CARDIAC REHAB PHASE 3 - 1 VISIT

## 2022-07-08 ENCOUNTER — HOSPITAL ENCOUNTER (OUTPATIENT)
Dept: CARDIAC REHAB | Age: 72
Discharge: HOME | End: 2022-07-08

## 2022-07-12 ENCOUNTER — HOSPITAL ENCOUNTER (OUTPATIENT)
Dept: CARDIAC REHAB | Age: 72
Discharge: HOME | End: 2022-07-12

## 2022-07-15 ENCOUNTER — HOSPITAL ENCOUNTER (OUTPATIENT)
Dept: CARDIAC REHAB | Age: 72
Discharge: HOME | End: 2022-07-15

## 2022-07-18 NOTE — PROGRESS NOTES
Subjective:      Patient ID: Chloe Ritter is a 66 y.o. male. HPI  Patient here for follow-up. Patient was hospitalized 5/27/2022 through 5/29/2022 with hypoxemic respiratory failure secondary to COVID-19 pneumonia. Patient was treated with dexamethasone and azithromycin. Patient was last seen in the office as a virtual visit in January 2022 per me and November 2022 per Dr. Oracio White. Patient with a history of COPD and had been referred to [facility] clinic to see Dr. Morgan Nava due to potential candidacy for [place] valve. Unfortunately patient did not end up seeing Dr. Morgan Nava yet. Unclear why patient has not been scheduled. We will follow-up with our [facility] office to ensure patient is scheduled for evaluation in the [place] valve. Previously had been discussed with Dr. Morgan Nava in January 2022 and at that time he was agreeable to seeing patient in the office to discuss candidacy. Patient gets his medications through the [place]. Since last in office patient did develop COVID in May 2022 he has since then been discontinued off the prednisone. Gradually resuming his usual activity level. He notes shortness of breath with activity. Cough is improved he reported purulent secretions when he was ill but not having any secretions at this time. Medications:   Albuterol  Symbicort  Spiriva  Singulair    PRIOR WORKUP:  PFT:  PFT 1/4/2022: PFT 1/4/2022: FVC 67% of predicted, FEV1 36% of predicted, FEV1/FVC ratio 53% of predicted. FEF 25- 7514% of predicted. No significant improvement in FVC, FEV1 after one-time dose of bronchodilator. TLC is 117% of predicted. RV is 197% of predicted. DLCO is 48% of predicted. Final impression: Severe stage III COPD with emphysema. No significant bronchodilator response. Severe air trapping and severe decrease in DLCO. 6-minute walk test was completed 1/4/2022: Patient did not qualify for oxygen.     Nocturnal oximetry 3/2/2020: SPO2 range 90 to 100% with no desaturations less than 90%. Patient did not qualify for nocturnal oxygen. 6-minute walk test 4/23/2019: Patient's SPO2 on room air was 94% at rest and remained above 88% during ambulation. Lowest SPO2 during ambulation was 91%. He did have progressive worsening dyspnea but did not drop below 89% did not qualify for home oxygen. PFT 4/23/2019: Spirometry shows an obstructive ventilatory pattern with stage IV obstruction which does not improve with bronchodilator. Lung volumes show air trapping. Diffusion capacity is decreased at 51% of predicted. Final impression: Stage IV COPD with no significant bronchodilator response. CT Imaging:  CT chest 5/27/2022: Negative for pulmonary embolism. Mild right hilar adenopathy with nodes measuring up to 1.1 cm. Interstitial pulmonary infiltrate seen in the upper and lower lobes with mild bronchiectatic changes as well. These are significantly worse compared to prior imaging. No evidence of active pleural disease. No evidence of dominant nodule or mass. CT chest 12/17/2021: Small blebs noted in the right apex. Small bullae measuring just over 2 cm noted in the anterior segment of each upper lobe. Subtle segmental focal inflammation identified in the posterior segment of the right upper lobe new from June 2021. Negative for pleural effusion, bronchiectasis, significant pleural thickening. Mild central peribronchial thickening possibly due to retained secretions from pulmonary emphysema. Hyperinflation with diaphragmatic flattening and small scattered coalesced alveoli noted throughout. Echocardiogram 12/17/2021: LVEF 55%, evidence of moderate diastolic dysfunction is noted. No significant valvular disease noted. CT lung screening 6/3/2021: No significant mediastinal, hilar or axillary lymphadenopathy. Mild centrilobular emphysema. Few scattered calcified tiny nodules in the right lower lobe. Minor scarring along the left major fissure. No significant nodules or masses. No focal consolidation, pleural effusion or pneumothorax. CT lung screening 5/15/2020: Mild emphysematous changes are present. Prior tree-in-bud pattern in the left lower lobe, posterior right upper lobe and right infrahilar areas of the right lower lobe has resolved. Cast-like calcifications are present in the anterior portion of the right lower lobe. No significant nodules or interval developing acute findings are noted. High resolution CT chest 4/23/2019: Negative for mediastinal adenopathy. Scattered tree-in-bud opacities are seen in the peripheral left lower lobe, posterior right upper lobe near the fissure, peripheral superior segment of the right lower lobe and infrahilar right lower lobe. Interval resolution of previously seen right upper lobe peribronchovascular opacities. Stable emphysematous changes. No focal consolidation or pulmonary edema. Negative for pleural effusion or pneumothorax. CT chest 12/14/2018: Negative for mediastinal lymphadenopathy. Partially calcified right hilar lymph nodes. Paraseptal and centrilobular emphysema. Ill-defined peribronchovascular opacities are identified involving the right upper lobe. This is seen to a lesser degree within the left lower lobe. No suspicious noncalcified lung nodule or mass. Calcified granulomas of the right lower lobe are noted. No focal consolidation or pulmonary edema. No evidence of pleural effusion or pneumothorax.      Sleep Study:    Laboratory Evaluation:  MANUEL 4/11/2019: Negative  ANCA myeloperoxidase 4/11/2019: 11  ANCA proteinase 4/11/2019: 17  CRP 4/11/2019: 11.1        Immunizations:   Immunization History   Administered Date(s) Administered    COVID-19, MODERNA BLUE border, Primary or Immunocompromised, (age 12y+), IM, 100 mcg/0.5mL 02/18/2021, 03/18/2021, 01/07/2022    COVID-19, MODERNA Booster BLUE border, (age 18y+), IM, 50mcg/0.25mL 01/07/2022    Influenza Virus Vaccine 10/03/2016    Influenza, High Dose (Fluzone 65 yrs and older) 2022    Influenza, High-dose, Quadv, 65 yrs +, IM (Fluzone) 10/23/2020    Influenza, Intradermal, Preservative free 10/28/2019    Influenza, Quadv, 6 mo and older, IM, PF (Flulaval, Fluarix) 10/31/2018    Pneumococcal Conjugate 13-valent ([id number]) 2015, 2016    Pneumococcal Polysaccharide ([id number]) 2016    Pneumococcal Vaccine 2015    Tdap (Boostrix, Adacel) 2016    Zoster Live (Zostavax) 2016    Zoster Recombinant (Shingrix) 2019        No flowsheet data found. BP (!) 152/96   Pulse 73   Temp (!) 96.4 °F (35.8 °C)   Resp 20   Ht 5' 6\" (1.676 m)   Wt 194 lb 1.6 oz (88 kg)   SpO2 97%   BMI 31.33 kg/m²     Past Medical History:   Diagnosis Date    CAD (coronary artery disease)     Stent placement     COPD (chronic obstructive pulmonary disease) (HCC)     Essential hypertension     Hyperlipidemia     Prostate cancer ([facility] [address]     Wears glasses      Past Surgical History:   Procedure Laterality Date    ANKLE SURGERY      CAROTID STENT PLACEMENT Left 2019    COLONOSCOPY      Normal    HERNIA REPAIR Right     OTHER SURGICAL HISTORY      cauterization [facility]estines[facility]     Family History   Problem Relation Age of Onset    No Known Problems Father     Cancer Mother        Social History     Socioeconomic History    Marital status:      Spouse name: Not on file    Number of children: Not on file    Years of education: Not on file    Highest education level: Not on file   Occupational History    Not on file   Tobacco Use    Smoking status: Former     Packs/day: 1.00     Years: 30.00     Pack years: 30.00     Types: Cigarettes     Quit date: 2013     Years since quittin.0    Smokeless tobacco: Never   Vaping Use    Vaping Use: Never used   Substance and Sexual Activity    Alcohol use:  Yes     Alcohol/week: 1.0 standard drink     Types: 1 Cans of beer per week     Comment: rare Drug use: No    Sexual activity: Not on file   Other Topics Concern    Not on file   Social History Narrative    Not on file     Social Determinants of Health     Financial Resource Strain: Low Risk     Difficulty of Paying Living Expenses: Not hard at all   Food Insecurity: No Food Insecurity    Worried About Running Out of Food in the Last Year: Never true    [address] in the Last Year: Never true   Transportation Needs: No Transportation Needs    Lack of Transportation (Medical): No    Lack of Transportation (Non-Medical): No   Physical Activity: Insufficiently Active    Days of Exercise per Week: 3 days    Minutes of Exercise per Session: 30 min   Stress: No Stress Concern Present    Feeling of Stress : Not at all   Social Connections: Moderately Integrated    Frequency of Communication with Friends and Family: Once a week    Frequency of Social Gatherings with Friends and Family: Once a week    Attends [employer] Services: More than 4 times per year    Active Member of [employer] Group or Organizations: Yes    Attends Club or Organization Meetings: 1 to 4 times per year    Marital Status:    Intimate Partner Violence: Not At Risk    Fear of Current or Ex-Partner: No    Emotionally Abused: No    Physically Abused: No    Sexually Abused: No   Housing Stability: Low Risk     Unable to Pay for Housing in the Last Year: No    Number of [place] in the Last Year: 1    Unstable Housing in the Last Year: No       Review of Systems  Constitutional:        Decreased activity tolerance secondary to exertional dyspnea. HENT: Negative. Eyes: Negative. Respiratory:        Exertional dyspnea, no significant cough or sputum production. Cardiovascular: Negative. Gastrointestinal: Negative. Endocrine: Negative. Genitourinary: Negative. Musculoskeletal: Negative. Skin: Negative. Allergic/Immunologic: Negative. Neurological: Negative. Hematological: Negative.     Psychiatric/Behavioral: Negative. Objective:       Physical Exam  General appearance - alert, well appearing, and in no distress, oriented to person, place, and time and overweight  Mental status - alert, oriented to person, place, and time, normal mood, behavior, speech, dress, motor activity, and thought processes  Eyes - pupils equal and reactive, extraocular eye movements intact  Ears - not examined  Nose - normal and patent, no erythema, discharge or polyps  Mouth - mucous membranes moist, pharynx normal without lesions  Neck - supple, no significant adenopathy  Chest -increased AP diameter, decreased thoracic expansion and excursion, prolonged expiratory phase. No adventitious sounds appreciated. Lung sounds generally quiet throughout. Heart -normal rate, regular rhythm, normal S1, S2, no murmurs, rubs, clicks or gallops  Abdomen - soft, nontender, nondistended, no masses or organomegaly  Neuro- alert, oriented, normal speech, no focal findings or movement disorder noted}  Extremities - peripheral pulses normal, no pedal edema, no clubbing or cyanosis  Skin - normal coloration and turgor, no rashes, no suspicious skin lesions noted     Wt Readings from Last 3 Encounters:   08/11/22 194 lb 1.6 oz (88 kg)   07/21/22 191 lb (86.6 kg)   06/03/22 192 lb (87.1 kg)       Results for orders placed or performed during the hospital encounter of 06/28/22   PSA, Diagnostic   Result Value Ref Range    PSA <0.02 <4.1 ng/mL       No results found. Assessment:      1. History of 2019 novel coronavirus disease (COVID-19)    2. Pulmonary emphysema, unspecified emphysema type ([address]    3. RUDY on CPAP    4. COPD, severe ([address]    5. History of smoking 30 or more pack years    6. Exertional dyspnea          Plan:      Medications reviewed, continue as ordered. Educated and clarified the medication use. Recommend flu vaccination in the fall annually. Patient is up-to-date with pneumococcal vaccinations from pulmonary perspective.   Patient has received Covid vaccination. Recommended booster when eligible. Discussed strategies to protect against Covid 19. Maintain an active lifestyle. Patient's questions were answered to his satisfaction. Message left with office to determine barrier to patient being scheduled for follow-up for [place] valve. RT is going to work with Dr. Tena Valdez to get patient on the schedule. Pulmonary function tests were reviewed per physician/ordered. Chest x-ray was reviewed/ordered. CT scan of the chest was reviewed  We'll see the patient back in 3 months with Dr. Tena Valdez as previously recommended for [place] valve evaluation.           Electronically signed by EVER Stephens CNP on 8/11/2022 at 1:01 PM

## 2022-07-19 ENCOUNTER — HOSPITAL ENCOUNTER (OUTPATIENT)
Dept: CARDIAC REHAB | Age: 72
Discharge: HOME | End: 2022-07-19

## 2022-07-21 ENCOUNTER — OFFICE VISIT (OUTPATIENT)
Dept: UROLOGY | Age: 72
End: 2022-07-21
Payer: MEDICARE

## 2022-07-21 VITALS
TEMPERATURE: 97.8 F | WEIGHT: 191 LBS | SYSTOLIC BLOOD PRESSURE: 126 MMHG | HEIGHT: 66 IN | BODY MASS INDEX: 30.7 KG/M2 | DIASTOLIC BLOOD PRESSURE: 70 MMHG

## 2022-07-21 DIAGNOSIS — C61 PROSTATE CANCER (HCC): Primary | ICD-10-CM

## 2022-07-21 PROCEDURE — 99213 OFFICE O/P EST LOW 20 MIN: CPT | Performed by: NURSE PRACTITIONER

## 2022-07-21 PROCEDURE — 1123F ACP DISCUSS/DSCN MKR DOCD: CPT | Performed by: NURSE PRACTITIONER

## 2022-07-21 RX ORDER — MULTIVIT-MIN/IRON/FOLIC ACID/K 18-600-40
CAPSULE ORAL
COMMUNITY

## 2022-07-21 ASSESSMENT — ENCOUNTER SYMPTOMS
CONSTIPATION: 0
WHEEZING: 0
COUGH: 0
VOMITING: 0
NAUSEA: 0
BACK PAIN: 0
COLOR CHANGE: 0
ABDOMINAL PAIN: 0
SHORTNESS OF BREATH: 0
EYE REDNESS: 0

## 2022-07-21 NOTE — PROGRESS NOTES
HPI:          Patient is a 66 y.o. male in no acute distress. He is alert and oriented to person, place, and time. History  5/2018 Prostate biopsy, two cores Aditya 3+4. PSA 14.59   PSA  7/2022 - <0.02  1/2022 - <0.02  7/2021 - <0.02  1/2021 - <0.01  3/2020 - <0.01  10/2019 - <0.01  6/2019 - <0.01  2/2019 - 0.08  11/2018 - 0.36  3/2018 - 14.59     8/2018 Started [place] Matter in conjunction with IMRT radiation. 11/2018 [facility]anged from [facility] to [facility]     11/2020 Stopped ADT after ADT for 24 months     Hospitalized for rectal bleeding and has been through several cauterization surgeries to stop the bleeding. The surgeon did attribute this to a side effect of radiation. Today  Here today for a 6-month follow-up for prostate cancer. Most recent PSA is <0.02. He denies unintentional weight loss, decreased energy or appetite, new or worsening bone/hip/back pain. He denies any lower extremity numbness and tingling. He denies new or worsening LUTS. He denies gross hematuria or dysuria.       Past Medical History:   Diagnosis Date    CAD (coronary artery disease)     Stent placement 2019    COPD (chronic obstructive pulmonary disease) (HCC)     Essential hypertension     Hyperlipidemia     Prostate cancer ([facility] [address]     Wears glasses      Past Surgical History:   Procedure Laterality Date    ANKLE SURGERY      CAROTID STENT PLACEMENT Left 2019    COLONOSCOPY  2015    Normal    HERNIA REPAIR Right 1975    OTHER SURGICAL HISTORY      cauterization [facility]     Outpatient Encounter Medications as of 7/21/2022   Medication Sig Dispense Refill    Vitamin D, Cholecalciferol, 25 MCG (1000 UT) TABS Take by mouth      carvedilol (COREG) 25 MG tablet Take 1 tablet by mouth 2 times daily (with meals) 14 tablet 0    NIFEdipine (ADALAT CC) 30 MG extended release tablet Take 1 tablet by mouth daily 30 tablet 3    docusate sodium (COLACE, DULCOLAX) 100 MG CAPS Take 100 mg by mouth 2 times daily 30 capsule 0 atorvastatin (LIPITOR) 80 MG tablet Take 1 tablet by mouth daily 90 tablet 3    hydrochlorothiazide (HYDRODIURIL) 25 MG tablet Take 1 tablet by mouth daily 30 tablet 5    Cholecalciferol (VITAMIN D3) 5000 units CAPS Take 1,000 Units by mouth daily      albuterol sulfate  (90 Base) MCG/ACT inhaler Inhale 2 puffs into the lungs every 6 hours as needed for Wheezing or Shortness of Breath 1 Inhaler 11    albuterol (PROVENTIL) (2.5 MG/3ML) 0.083% nebulizer solution Take 3 mLs by nebulization every 4 hours as needed for Wheezing 120 each 3    budesonide-formoterol (SYMBICORT) 160-4.5 MCG/ACT AERO Inhale 2 puffs into the lungs 2 times daily      tiotropium (SPIRIVA RESPIMAT) 1.25 MCG/ACT AERS inhaler Inhale 2 puffs into the lungs daily      cetirizine (ZYRTEC) 10 MG tablet Take 10 mg by mouth daily      montelukast (SINGULAIR) 10 MG tablet Take 10 mg by mouth nightly      [DISCONTINUED] predniSONE (DELTASONE) 10 MG tablet Take 4 tabs QD for 3 days, then 3 tabs QD for 3 days, then 2 tabs QD for 3 days, then 1 tab QD for 3 days then Stop 30 tablet 0    [DISCONTINUED] ascorbic acid (VITAMIN C) 1000 MG tablet Take 1 tablet by mouth 4 times daily (Patient not taking: Reported on 7/21/2022) 30 tablet 3    [DISCONTINUED] Ergocalciferol (VITAMIN D) 52613 units CAPS Take 50,000 Units by mouth once a week (Patient not taking: Reported on 7/21/2022) 5 capsule 0    [DISCONTINUED] zinc sulfate (ZINCATE) 220 (50 Zn) MG capsule Take 2 capsules by mouth daily (Patient not taking: No sig reported) 30 capsule 3    [DISCONTINUED] ferrous sulfate (IRON 325) 325 (65 Fe) MG tablet Take 1 tablet by mouth every other day (Patient not taking: No sig reported) 30 tablet 3     No facility-administered encounter medications on file as of 7/21/2022.       Current Outpatient Medications on File Prior to Visit   Medication Sig Dispense Refill    Vitamin D, Cholecalciferol, 25 MCG (1000 UT) TABS Take by mouth      carvedilol (COREG) 25 MG tablet Take 1 tablet by mouth 2 times daily (with meals) 14 tablet 0    NIFEdipine (ADALAT CC) 30 MG extended release tablet Take 1 tablet by mouth daily 30 tablet 3    docusate sodium (COLACE, DULCOLAX) 100 MG CAPS Take 100 mg by mouth 2 times daily 30 capsule 0    atorvastatin (LIPITOR) 80 MG tablet Take 1 tablet by mouth daily 90 tablet 3    hydrochlorothiazide (HYDRODIURIL) 25 MG tablet Take 1 tablet by mouth daily 30 tablet 5    Cholecalciferol (VITAMIN D3) 5000 units CAPS Take 1,000 Units by mouth daily      albuterol sulfate  (90 Base) MCG/ACT inhaler Inhale 2 puffs into the lungs every 6 hours as needed for Wheezing or Shortness of Breath 1 Inhaler 11    albuterol (PROVENTIL) (2.5 MG/3ML) 0.083% nebulizer solution Take 3 mLs by nebulization every 4 hours as needed for Wheezing 120 each 3    budesonide-formoterol (SYMBICORT) 160-4.5 MCG/ACT AERO Inhale 2 puffs into the lungs 2 times daily      tiotropium (SPIRIVA RESPIMAT) 1.25 MCG/ACT AERS inhaler Inhale 2 puffs into the lungs daily      cetirizine (ZYRTEC) 10 MG tablet Take 10 mg by mouth daily      montelukast (SINGULAIR) 10 MG tablet Take 10 mg by mouth nightly       No current facility-administered medications on file prior to visit. Patient has no known allergies. Family History   Problem Relation Age of Onset    No Known Problems Father     Cancer Mother      Social History     Tobacco Use   Smoking Status Former    Packs/day: 1.00    Years: 30.00    Pack years: 30.00    Types: Cigarettes    Quit date: 2013    Years since quittin.9   Smokeless Tobacco Never       Social History     Substance and Sexual Activity   Alcohol Use Yes    Alcohol/week: 1.0 standard drink    Types: 1 Cans of beer per week    Comment: rare       Review of Systems   Constitutional:  Negative for appetite change, chills and fever. Eyes:  Negative for redness and visual disturbance. Respiratory:  Negative for cough, shortness of breath and wheezing. Cardiovascular:  Negative for chest pain and leg swelling. Gastrointestinal:  Negative for abdominal pain, constipation, nausea and vomiting. Genitourinary:  Negative for decreased urine volume, difficulty urinating, dysuria, enuresis, flank pain, frequency, hematuria, penile discharge, penile pain, scrotal swelling, testicular pain and urgency. Musculoskeletal:  Negative for back pain, joint swelling and myalgias. Skin:  Negative for color change, rash and wound. Neurological:  Negative for dizziness, tremors and numbness. Hematological:  Negative for adenopathy. Does not bruise/bleed easily. /70 (Site: Right Upper Arm, Position: Sitting, Cuff Size: Large Adult)   Temp 97.8 °F (36.6 °C)   Ht 5' 6\" (1.676 m)   Wt 191 lb (86.6 kg)   BMI 30.83 kg/m²       PHYSICAL EXAM:  Constitutional: Patient in no acute distress; Neuro: alert and oriented to person place and time. Psych: Mood and affect normal.  Skin: Normal  Lungs: Respiratory effort normal  Cardiovascular:  Normal peripheral pulses  Abdomen: Soft, non-tender, non-distended with no CVA, flank pain  Bladder non-tender and not distended. Lab Results   Component Value Date    BUN 47 (H) 05/29/2022     Lab Results   Component Value Date    CREATININE 1.53 (H) 05/29/2022     Lab Results   Component Value Date    PSA <0.02 06/28/2022    PSA <0.02 01/14/2022    PSA <0.02 07/13/2021       ASSESSMENT:   Diagnosis Orders   1. [facility]  PSA, Diagnostic            PLAN:  We will see patient in 6 months with a PSA prior. He will call sooner if needed for any new or worsening symptoms.

## 2022-07-22 ENCOUNTER — HOSPITAL ENCOUNTER (OUTPATIENT)
Dept: CARDIAC REHAB | Age: 72
Discharge: HOME | End: 2022-07-22

## 2022-07-26 ENCOUNTER — HOSPITAL ENCOUNTER (OUTPATIENT)
Dept: CARDIAC REHAB | Age: 72
Discharge: HOME | End: 2022-07-26

## 2022-07-29 ENCOUNTER — HOSPITAL ENCOUNTER (OUTPATIENT)
Dept: CARDIAC REHAB | Age: 72
Discharge: HOME | End: 2022-07-29

## 2022-08-02 ENCOUNTER — HOSPITAL ENCOUNTER (OUTPATIENT)
Dept: CARDIAC REHAB | Age: 72
Discharge: HOME | End: 2022-08-02

## 2022-08-05 ENCOUNTER — HOSPITAL ENCOUNTER (OUTPATIENT)
Dept: CARDIAC REHAB | Age: 72
Discharge: HOME | End: 2022-08-05

## 2022-08-09 ENCOUNTER — HOSPITAL ENCOUNTER (OUTPATIENT)
Dept: CARDIAC REHAB | Age: 72
Discharge: HOME | End: 2022-08-09

## 2022-08-09 PROCEDURE — 9900000065 HC CARDIAC REHAB PHASE 3 - 1 VISIT

## 2022-08-11 ENCOUNTER — OFFICE VISIT (OUTPATIENT)
Dept: PULMONOLOGY | Age: 72
End: 2022-08-11
Payer: MEDICARE

## 2022-08-11 ENCOUNTER — TELEPHONE (OUTPATIENT)
Dept: PULMONOLOGY | Age: 72
End: 2022-08-11

## 2022-08-11 VITALS
OXYGEN SATURATION: 97 % | WEIGHT: 194.1 LBS | HEART RATE: 73 BPM | TEMPERATURE: 96.4 F | RESPIRATION RATE: 20 BRPM | SYSTOLIC BLOOD PRESSURE: 152 MMHG | HEIGHT: 66 IN | DIASTOLIC BLOOD PRESSURE: 96 MMHG | BODY MASS INDEX: 31.19 KG/M2

## 2022-08-11 DIAGNOSIS — J43.9 PULMONARY EMPHYSEMA, UNSPECIFIED EMPHYSEMA TYPE (HCC): ICD-10-CM

## 2022-08-11 DIAGNOSIS — R06.09 EXERTIONAL DYSPNEA: ICD-10-CM

## 2022-08-11 DIAGNOSIS — Z99.89 OSA ON CPAP: ICD-10-CM

## 2022-08-11 DIAGNOSIS — Z87.891 HISTORY OF SMOKING 30 OR MORE PACK YEARS: ICD-10-CM

## 2022-08-11 DIAGNOSIS — Z86.16 HISTORY OF 2019 NOVEL CORONAVIRUS DISEASE (COVID-19): Primary | ICD-10-CM

## 2022-08-11 DIAGNOSIS — G47.33 OSA ON CPAP: ICD-10-CM

## 2022-08-11 DIAGNOSIS — J44.9 COPD, SEVERE (HCC): ICD-10-CM

## 2022-08-11 PROCEDURE — 99214 OFFICE O/P EST MOD 30 MIN: CPT | Performed by: NURSE PRACTITIONER

## 2022-08-11 PROCEDURE — 1123F ACP DISCUSS/DSCN MKR DOCD: CPT | Performed by: NURSE PRACTITIONER

## 2022-08-11 NOTE — PATIENT INSTRUCTIONS
SURVEY:    You may be receiving a survey from [employer] regarding your visit today. Please complete the survey to enable us to provide the highest quality of care to you and your family. If you cannot score us a very good on any question, please call the office to discuss how we could have made your experience a very good one. Thank you. Please recheck your blood pressure when you go home and make sure you take your prescribed medication if applicable . Please follow up with your PCP or ER if needed.

## 2022-08-11 NOTE — TELEPHONE ENCOUNTER
Chely Ruiz called asking about patient to see if we can get him for a [facility]ph[facility] consult. Will call patient and make appt. Talked to Bandar Carmichael she stated no specific [facility] clinic at the moment to just put patient in on [facility] regular schedule.

## 2022-08-12 PROBLEM — J12.82 PNEUMONIA DUE TO COVID-19 VIRUS: Status: RESOLVED | Noted: 2022-05-27 | Resolved: 2022-08-12

## 2022-08-12 PROBLEM — U07.1 PNEUMONIA DUE TO COVID-19 VIRUS: Status: RESOLVED | Noted: 2022-05-27 | Resolved: 2022-08-12

## 2022-08-16 ENCOUNTER — HOSPITAL ENCOUNTER (OUTPATIENT)
Dept: CARDIAC REHAB | Age: 72
Discharge: HOME | End: 2022-08-16

## 2022-08-19 ENCOUNTER — HOSPITAL ENCOUNTER (OUTPATIENT)
Dept: CARDIAC REHAB | Age: 72
Setting detail: THERAPIES SERIES
Discharge: HOME | End: 2022-08-19

## 2022-08-23 ENCOUNTER — HOSPITAL ENCOUNTER (OUTPATIENT)
Dept: CARDIAC REHAB | Age: 72
Setting detail: THERAPIES SERIES
Discharge: HOME | End: 2022-08-23

## 2022-08-26 ENCOUNTER — HOSPITAL ENCOUNTER (OUTPATIENT)
Dept: CARDIAC REHAB | Age: 72
Setting detail: THERAPIES SERIES
Discharge: HOME | End: 2022-08-26

## 2022-08-30 ENCOUNTER — HOSPITAL ENCOUNTER (OUTPATIENT)
Dept: CARDIAC REHAB | Age: 72
Setting detail: THERAPIES SERIES
Discharge: HOME | End: 2022-08-30

## 2022-09-16 ENCOUNTER — HOSPITAL ENCOUNTER (OUTPATIENT)
Dept: CARDIAC REHAB | Age: 72
Setting detail: THERAPIES SERIES
Discharge: HOME | End: 2022-09-16

## 2022-09-19 ENCOUNTER — HOSPITAL ENCOUNTER (OUTPATIENT)
Dept: VASCULAR LAB | Age: 72
Discharge: HOME | End: 2022-09-21
Payer: MEDICARE

## 2022-09-19 DIAGNOSIS — I73.9 CLAUDICATION OF BOTH LOWER EXTREMITIES (HCC): ICD-10-CM

## 2022-09-19 PROCEDURE — 93922 UPR/L XTREMITY ART 2 LEVELS: CPT

## 2022-10-05 ENCOUNTER — OFFICE VISIT (OUTPATIENT)
Dept: VASCULAR SURGERY | Age: 72
End: 2022-10-05
Payer: MEDICARE

## 2022-10-05 VITALS
DIASTOLIC BLOOD PRESSURE: 72 MMHG | HEART RATE: 67 BPM | BODY MASS INDEX: 31.07 KG/M2 | HEIGHT: 66 IN | SYSTOLIC BLOOD PRESSURE: 120 MMHG | WEIGHT: 193.3 LBS | RESPIRATION RATE: 16 BRPM | TEMPERATURE: 97.7 F

## 2022-10-05 DIAGNOSIS — I70.213 ATHEROSCLEROSIS OF NATIVE ARTERIES OF EXTREMITIES WITH INTERMITTENT CLAUDICATION, BILATERAL LEGS (HCC): ICD-10-CM

## 2022-10-05 PROCEDURE — 99204 OFFICE O/P NEW MOD 45 MIN: CPT | Performed by: SURGERY

## 2022-10-05 PROCEDURE — 1123F ACP DISCUSS/DSCN MKR DOCD: CPT | Performed by: SURGERY

## 2022-10-05 RX ORDER — CILOSTAZOL 100 MG/1
100 TABLET ORAL 2 TIMES DAILY
Qty: 180 TABLET | Refills: 3 | Status: SHIPPED | OUTPATIENT
Start: 2022-10-05

## 2022-10-05 NOTE — PROGRESS NOTES
[id number] [facility]  [address] Andra uQevedo  1950  71 y.o.male       Chief Complaint:  Chief Complaint   Patient presents with    Claudication     New patient ref. For evaluation and tx. . reporting tight , painful calf muscles       History of present Illness:  Pt is here today for evaluation and treatment of bilateral lower extremity claudication. This is a 41-year-old male with complaints of cramping in both of his calves. He states he is able to walk from the parking lot here to the office but has to stop. He had noninvasive vascular studies which show bilateral ABIs in the 0.5-0.6 range. We spent time discussing peripheral arterial disease. His symptoms are consistent with SFA occlusion bilaterally. He denies any rest pain and has no ulcerations on his feet. We first talked about noninvasive treatment for peripheral arterial disease. Walking for 30 minutes 3 times per week has been shown that he can almost double his walking distance with exercise and lifestyle modification alone. He is not a smoker but we also discussed the use of Pletal which can also help. Currently he is in cardiac rehab and would like to try the walking regimen 30 minutes 3 times per week. If he were to develop an ulcer on his foot that is nonhealing he needs to see me sooner and to consider an arteriogram for revascularization. Past Medical History:  has a past medical history of CAD (coronary artery disease), COPD (chronic obstructive pulmonary disease) ([address] Essential hypertension, Hyperlipidemia, Prostate cancer ([address] and Wears glasses.     Past Surgical History:   Past Surgical History:   Procedure Laterality Date    ANKLE SURGERY      CAROTID STENT PLACEMENT Left 2019    COLONOSCOPY  2015    Normal    HERNIA REPAIR Right 1975    OTHER SURGICAL HISTORY      cauterization [facility]       Social History:  reports that he quit smoking about 9 years ago. His smoking use included cigarettes. He has a 30.00 pack-year smoking history. He has never used smokeless tobacco. He reports current alcohol use of about 1.0 standard drink per week. He reports that he does not use drugs. Family History: family history includes Cancer in his mother; No Known Problems in his father. Review of Systems:   Constitutional:  negative for  fevers, chills, sweats, fatigue, and weight loss  HEENT:  negative for vision or hearing changes,   Respiratory:  negative for shortness of breath, cough, or congestion  Cardiovascular:  negative for  chest pain, palpitations  Gastrointestinal:  negative for nausea, vomiting, diarrhea, constipation, abdominal pain  Genitourinary:  negative for frequency, dysuria  Integument/Breast:  negative for rash, skin lesions  Musculoskeletal:  negative for muscle aches or joint pain  Neurological:  negative for headaches, dizziness, lightheadedness, numbness, pain and tingling extremities  Behavior/Psych:  negative for depression and anxiety    Allergies: Patient has no known allergies. Current Meds:  Current Outpatient Medications:     cilostazol (PLETAL) 100 MG tablet, Take 1 tablet by mouth 2 times daily, Disp: 180 tablet, Rfl: 3    fluticasone-salmeterol (ADVIAR) 100-50 MCG/ACT AEPB diskus inhaler, Inhale 1 puff into the lungs every 12 hours, Disp: , Rfl:     gabapentin (NEURONTIN) 100 MG capsule, Take 2 capsules by mouth 2 times daily for 30 days.  Intended supply: 30 days, Disp: 120 capsule, Rfl: 0    Vitamin D, Cholecalciferol, 25 MCG (1000 UT) TABS, Take by mouth, Disp: , Rfl:     carvedilol (COREG) 25 MG tablet, Take 1 tablet by mouth 2 times daily (with meals), Disp: 14 tablet, Rfl: 0    NIFEdipine (ADALAT CC) 30 MG extended release tablet, Take 1 tablet by mouth daily, Disp: 30 tablet, Rfl: 3    docusate sodium (COLACE, DULCOLAX) 100 MG CAPS, Take 100 mg by mouth 2 times daily, Disp: 30 capsule, Rfl: 0    atorvastatin (LIPITOR) 80 MG tablet, Take 1 tablet by mouth daily, Disp: 90 tablet, Rfl: 3    hydrochlorothiazide (HYDRODIURIL) 25 MG tablet, Take 1 tablet by mouth daily, Disp: 30 tablet, Rfl: 5    Cholecalciferol (VITAMIN D3) 5000 units CAPS, Take 1,000 Units by mouth daily, Disp: , Rfl:     albuterol sulfate  (90 Base) MCG/ACT inhaler, Inhale 2 puffs into the lungs every 6 hours as needed for Wheezing or Shortness of Breath, Disp: 1 Inhaler, Rfl: 11    albuterol (PROVENTIL) (2.5 MG/3ML) 0.083% nebulizer solution, Take 3 mLs by nebulization every 4 hours as needed for Wheezing, Disp: 120 each, Rfl: 3    tiotropium (SPIRIVA RESPIMAT) 1.25 MCG/ACT AERS inhaler, Inhale 2 puffs into the lungs daily, Disp: , Rfl:     cetirizine (ZYRTEC) 10 MG tablet, Take 10 mg by mouth daily, Disp: , Rfl:     montelukast (SINGULAIR) 10 MG tablet, Take 10 mg by mouth nightly, Disp: , Rfl:     Vital Signs:  Vitals:    10/05/22 0934   BP: 120/72   Pulse: 67   Resp: 16   Temp: 97.7 °F (36.5 °C)       Physical Exam:  General appearance - alert, well appearing and in no acute distress  Mental status - oriented to person, place and time with normal affect  Head - normocephalic and atraumatic  Eyes - pupils equal and reactive, extraocular eye movements intact, conjunctiva clear  Ears - hearing appears to be intact  Nose - no drainage noted  Mouth - mucous membranes moist  Neck - supple, no carotid bruits, thyroid not palpable, no JVD  Chest - clear to auscultation, normal effort  Heart - normal rate, regular rhythm, no murmurs  Abdomen - soft, non-tender, non-distended, bowel sounds present all four quadrants, no masses, hepatomegaly, splenomegaly or aortic enlargement  Neurological - normal speech, no focal findings or movement disorder noted, cranial nerves II through XII grossly intact  Extremities -pulses are nonpalpable in the feet. Normal capillary refill. DP and PT Doppler signals noted. Skin - no gross lesions, rashes, or induration noted    Labs:   Lab Results   Component Value Date/Time    WBC 11.9 05/29/2022 06:48 AM    HGB 12.8 05/29/2022 06:48 AM    HCT 37.7 05/29/2022 06:48 AM    MCV 91.5 05/29/2022 06:48 AM     05/29/2022 06:48 AM     Lab Results   Component Value Date/Time     05/29/2022 06:48 AM    K 3.2 05/29/2022 06:48 AM    CL 90 05/29/2022 06:48 AM    CO2 30 05/29/2022 06:48 AM    BUN 47 05/29/2022 06:48 AM    CREATININE 1.53 05/29/2022 06:48 AM    GLUCOSE 113 05/29/2022 06:48 AM    CALCIUM 9.7 05/29/2022 06:48 AM     Lab Results   Component Value Date/Time    ALKPHOS 91 05/29/2022 06:48 AM    ALT 17 05/29/2022 06:48 AM    AST 15 05/29/2022 06:48 AM    PROT 7.4 05/29/2022 06:48 AM    BILITOT 0.17 05/29/2022 06:48 AM    BILIDIR <0.08 05/27/2022 07:25 AM    LABALBU 3.8 05/29/2022 06:48 AM     Lab Results   Component Value Date/Time    LACTA 1.1 10/29/2018 06:40 AM     No results found for: AMYLASE  No results found for: LIPASE  Lab Results   Component Value Date/Time    INR 1.0 05/27/2022 07:25 AM         Assessment:  68-year-old male with peripheral arterial disease and lower extremity claudication    1. Atherosclerosis of native arteries of extremities with intermittent claudication, bilateral legs (HCC)        Plan:   I have started him on cilostazol and recommend he walk for 30 minutes 3 times per week. This should help increase his overall walking distance prior to his legs cramping  Follow-up in 1year with a repeat JOSEP study.     Orders Placed This Encounter   Procedures    VL JOSEP BILATERAL LIMITED 1-2 LEVELS           Electronically signed by Lennie Brush MD on 10/5/2022 at 9:56 AM     Copy sent to Dr. Naima Sidhu MD

## 2022-10-05 NOTE — PATIENT INSTRUCTIONS
SURVEY:    You may be receiving a survey from [employer] regarding your visit today. Please complete the survey to enable us to provide the highest quality of care to you and your family. If you cannot score us a very good on any question, please call the office to discuss how we could have made your experience a very good one. Thank you.

## 2022-10-11 ENCOUNTER — HOSPITAL ENCOUNTER (OUTPATIENT)
Dept: CARDIAC REHAB | Age: 72
Setting detail: THERAPIES SERIES
Discharge: HOME | End: 2022-10-11

## 2022-10-25 ENCOUNTER — HOSPITAL ENCOUNTER (OUTPATIENT)
Dept: CARDIAC REHAB | Age: 72
Setting detail: THERAPIES SERIES
Discharge: HOME | End: 2022-10-25

## 2022-11-04 ENCOUNTER — HOSPITAL ENCOUNTER (OUTPATIENT)
Dept: CARDIAC REHAB | Age: 72
Setting detail: THERAPIES SERIES
Discharge: HOME | End: 2022-11-04

## 2022-11-08 ENCOUNTER — HOSPITAL ENCOUNTER (OUTPATIENT)
Dept: CARDIAC REHAB | Age: 72
Setting detail: THERAPIES SERIES
Discharge: HOME | End: 2022-11-08

## 2022-11-11 ENCOUNTER — HOSPITAL ENCOUNTER (OUTPATIENT)
Dept: CARDIAC REHAB | Age: 72
Discharge: HOME | End: 2022-11-11

## 2022-11-15 ENCOUNTER — OFFICE VISIT (OUTPATIENT)
Dept: PULMONOLOGY | Age: 72
End: 2022-11-15
Payer: MEDICARE

## 2022-11-15 ENCOUNTER — HOSPITAL ENCOUNTER (OUTPATIENT)
Age: 72
Setting detail: SPECIMEN
Discharge: HOME | End: 2022-11-15

## 2022-11-15 VITALS
DIASTOLIC BLOOD PRESSURE: 80 MMHG | OXYGEN SATURATION: 99 % | RESPIRATION RATE: 16 BRPM | HEIGHT: 66 IN | TEMPERATURE: 97.2 F | WEIGHT: 198 LBS | HEART RATE: 68 BPM | SYSTOLIC BLOOD PRESSURE: 180 MMHG | BODY MASS INDEX: 31.82 KG/M2

## 2022-11-15 DIAGNOSIS — Z99.89 OSA ON CPAP: ICD-10-CM

## 2022-11-15 DIAGNOSIS — J44.9 COPD, SEVERE (HCC): ICD-10-CM

## 2022-11-15 DIAGNOSIS — G47.33 OSA ON CPAP: ICD-10-CM

## 2022-11-15 DIAGNOSIS — J43.2 CENTRILOBULAR EMPHYSEMA (HCC): ICD-10-CM

## 2022-11-15 DIAGNOSIS — J43.2 CENTRILOBULAR EMPHYSEMA (HCC): Primary | ICD-10-CM

## 2022-11-15 PROCEDURE — 99214 OFFICE O/P EST MOD 30 MIN: CPT | Performed by: INTERNAL MEDICINE

## 2022-11-15 PROCEDURE — 3074F SYST BP LT 130 MM HG: CPT | Performed by: INTERNAL MEDICINE

## 2022-11-15 PROCEDURE — 3078F DIAST BP <80 MM HG: CPT | Performed by: INTERNAL MEDICINE

## 2022-11-15 PROCEDURE — 1123F ACP DISCUSS/DSCN MKR DOCD: CPT | Performed by: INTERNAL MEDICINE

## 2022-11-15 NOTE — PROGRESS NOTES
Asha Boland  11/15/2022      Asha Boland  presented for follow up for   Chief Complaint   Patient presents with    COPD     Parks valve consult      Sob after walking  quarter mile , progressive . No hemoptysis . Has chronic cough . On Nicola Franco and jyotsna . Ct chest basal > apical emphysema . Review of Systems -  General ROS: negative for - chills, fatigue, fever or weight loss  ENT ROS: negative for - headaches, oral lesions or sore throat  Cardiovascular ROS: no chest pain , orthopnea or pnd   Gastrointestinal ROS: no abdominal pain, change in bowel habits, or black or bloody stools  Skin - no rash   Neuro - no blurry vision , no loc .  No focal weakness   msk - no jt tenderness or swelling    Vascular - + claudication , rest completed and negative   Lymphatic - complete and negative          Immunization   Immunization History   Administered Date(s) Administered    COVID-19, MODERNA [place] border, Primary or Immunocompromised, (age 12y+), IM, 100 mcg/0.5mL 02/18/2021, 03/18/2021, 01/07/2022    COVID-19, MODERNA Booster BLUE border, (age 18y+), IM, 50mcg/0.25mL 01/07/2022    Influenza Virus Vaccine 10/03/2016    Influenza, FLUAD, (age 72 y+), Adjuvanted, 0.5mL 10/05/2022    Influenza, FLUZONE (age 72 y+), High Dose, 0.7mL 10/23/2020    Influenza, High Dose (Fluzone 65 yrs and older) 01/07/2022    Influenza, Intradermal, Preservative free 10/28/2019    Influenza, Quadv, 6 mo and older, IM, PF (Flulaval, Fluarix) 10/31/2018    Pneumococcal Conjugate 13-valent (Toshia Boron) 12/28/2015, 09/28/2016    Pneumococcal Polysaccharide ([id number]) 12/05/2016    Pneumococcal Vaccine 01/20/2015    Tdap (Boostrix, Adacel) 09/28/2016    Zoster Live (Zostavax) 03/01/2016    Zoster Recombinant (Shingrix) 11/20/2019        PAST MEDICAL HISTORY:         Diagnosis Date    CAD (coronary artery disease)     Stent placement 2019    COPD (chronic obstructive pulmonary disease) (HCC)     Essential hypertension Hyperlipidemia     Prostate [facility] [address] [address]     Wears glasses        Family History:       Problem Relation Age of Onset    No Known Problems Father     Cancer Mother        SURGICAL HISTORY:   Past Surgical History:   Procedure Laterality Date    ANKLE SURGERY      CAROTID STENT PLACEMENT Left 2019    COLONOSCOPY      Normal    HERNIA REPAIR Right     OTHER SURGICAL HISTORY      cauterization intestines-[facility]              Not in a hospital admission. No Known Allergies  Social History     Tobacco Use   Smoking Status Former    Packs/day: 1.00    Years: 30.00    Pack years: 30.00    Types: Cigarettes    Quit date: 2013    Years since quittin.3   Smokeless Tobacco Never     Prior to Admission medications    Medication Sig Start Date End Date Taking?  Authorizing Provider   cilostazol (PLETAL) 100 MG tablet Take 1 tablet by mouth 2 times daily 10/5/22  Yes Gifty Lenz MD   fluticasone-salmeterol (ADVIAR) 100-50 MCG/ACT AEPB diskus inhaler Inhale 1 puff into the lungs every 12 hours   Yes Historical Provider, MD   Vitamin D, Cholecalciferol, 25 MCG (1000 UT) TABS Take by mouth   Yes Historical Provider, MD   carvedilol (COREG) 25 MG tablet Take 1 tablet by mouth 2 times daily (with meals) 21  Yes Juana Griffin MD   NIFEdipine (ADALAT CC) 30 MG extended release tablet Take 1 tablet by mouth daily 3/21/21  Yes [address], MD   docusate sodium (COLACE, DULCOLAX) 100 MG CAPS Take 100 mg by mouth 2 times daily 3/20/21  Yes [address], MD   atorvastatin (LIPITOR) 80 MG tablet Take 1 tablet by mouth daily 19  Yes Juana Griffin MD   hydrochlorothiazide (HYDRODIURIL) 25 MG tablet Take 1 tablet by mouth daily 19  Yes Juana Griffin MD   Cholecalciferol (VITAMIN D3) 5000 units CAPS Take 1,000 Units by mouth daily   Yes Historical Provider, MD   albuterol sulfate  (90 Base) MCG/ACT inhaler Inhale 2 puffs into the lungs every 6 hours as needed for Wheezing or Shortness of Breath 12/19/18  Yes EVER Upton CNP   albuterol (PROVENTIL) (2.5 MG/3ML) 0.083% nebulizer solution Take 3 mLs by nebulization every 4 hours as needed for Wheezing 12/19/18  Yes EVER Upton CNP   tiotropium (SPIRIVA RESPIMAT) 1.25 MCG/ACT AERS inhaler Inhale 2 puffs into the lungs daily   Yes Historical Provider, MD   cetirizine (ZYRTEC) 10 MG tablet Take 10 mg by mouth daily   Yes Historical Provider, MD   montelukast (SINGULAIR) 10 MG tablet Take 10 mg by mouth nightly   Yes Historical Provider, MD   gabapentin (NEURONTIN) 100 MG capsule Take 2 capsules by mouth 2 times daily for 30 days. Intended supply: 30 days 9/13/22 10/13/22  Darren Watson MD         Physical Exam  Head and neck atraumatic, normocephalic    Lymph nodes-no cervical, supraclavicular lymphadenopathy    Neck-no JVP elevation    Lungs - AP diameter of chest increased. Thoracic expansion and diaphragmatic excursion diminished. BS diminished and expiratory phase prolonged. No dullness to percussion or tenderness to palpation. No bronchial breath sounds . CVS- S1, S2 regular. No S3 no S4, no murmurs    Abdomen-nontender, nondistended. Bowel sounds are present. No organomegaly    Lower extremity-no edema    Upper extremity-no edema    Neurological-grossly normal cranial nerves. No overt motor deficit      BP (!) 180/80 (Site: Right Lower Arm, Position: Sitting)   Pulse 68   Temp 97.2 °F (36.2 °C)   Resp 16   Ht 5' 6\" (1.676 m)   Wt 198 lb (89.8 kg)   SpO2 99% Comment: room air at rest  BMI 31.96 kg/m²     REASON FOR TEST:  Emphysema. SUMMARY:  1. The respiratory therapist reports the patient's effort as being  good, although having trouble with diffusion portion, best results  reported. 2.  The forced vital capacity is decreased at 67% of predicted. 3.  The forced exhaled volume in 1 second is decreased at 36% of  predicted.   4.  The forced exhaled volume in 1 second/forced vital capacity is  decreased at 53% of predicted. 5.  The forced exhaled flow at 25-75% is decreased at 14% of predicted. 6.  A significant improvement was not seen in the forced vital capacity  or forced exhaled volume in 1 second after one-time dose of  bronchodilator. 7.  Total lung capacity is at 117% of predicted. 8.  The residual volume is increased at 197% of predicted with the  RV/total lung capacity being increased at 168% of predicted. 9.  The DLCO is decreased at 48% of predicted, the DLCO/VA is decreased  at 59% of predicted. IMPRESSION:  1. Severe obstructive pulmonary disease, stage III (emphysema). 2.  No significant improvement was seen in the forced vital capacity or  forced exhaled volume in 1 second after one-time dose of bronchodilator. 3.  Severe air trapping. 4.  The DLCO was severely decreased, the DLCO/VA is moderately  decreased. DORA BACK     D: 01/05/2022 6:36:12       T: 01/05/2022 8:44:31     [id number]  Job#: [id number]           Assessment   Diagnosis Orders   1. Centrilobular emphysema (HCC)  Alpha-1-Antitrypsin w Phenotype      2. COPD, severe ([address]        3. RUDY on CPAP            Plan:  Good candidate for BLVR - discussed expectations and complications . He will reach out to office once he has made his decision to proceed . Will arrange abg , 6 min walk test ( distance ) and hrct chest once pt is agreeable to proceed . Continue cardio pulmonary rehab     MMRC dyspnea scale                                  --  3 ( 400 m )    BMI (<35 )                                                   --- Body mass index is 31.96 kg/m².                                                                     Smoking status - no     Pulmonary function tests - Date - 1/4/22    FEV1  ?15% predicted but ?45% predicted)--36 % predicted post bronchodilator   TLC (?100% predicted)                              ---117 % predicted   RV (?175% predicted) ---197 % predicted   6MWD( ?100 m and <500 m )                    ---pending      ABG                                                           ----pending      Pulmonary Rehab                                     ----yes     HRCT                                                       ----pending     TTE - unstable cardiac arrhythmia, myocardial infarction, stroke Severe hypercapnia: PaCO2 >60 mmHg (8 kPa) Severe hypoxemia: PaO2 <45 mmHg (6 kPa) Active pulmonary infection - no pulmonary htn , ef normal     Allergy to nitinol, nickel, titanium, or silicone - negative     Large bullae >30% either lung                     -- negative             Exclusion criteria   Prior lung transplant, LVRS, median sternotomy, lobectomy   Heart failure (LVEF <45%), unstable cardiac arrhythmia, myocardial infarction, stroke   Severe hypercapnia: PaCO2 >60 mmHg (8 kPa)   Severe hypoxemia: PaO2 <45 mmHg (6 kPa)   Active pulmonary infection   Allergy to nitinol, nickel, titanium, or silicone   Large bullae >30% either lung   Contraindications to bronchoscopy or high risk postoperative morbidity or mortality

## 2022-11-18 ENCOUNTER — HOSPITAL ENCOUNTER (OUTPATIENT)
Dept: CARDIAC REHAB | Age: 72
Discharge: HOME | End: 2022-11-18

## 2022-11-19 LAB
ALPHA-1 ANTITRYPSIN PHENOTYPE: NORMAL
ALPHA-1 ANTITRYPSIN: 192 MG/DL (ref 90–200)

## 2022-11-22 ENCOUNTER — HOSPITAL ENCOUNTER (OUTPATIENT)
Dept: CARDIAC REHAB | Age: 72
Discharge: HOME | End: 2022-11-22

## 2022-11-25 ENCOUNTER — HOSPITAL ENCOUNTER (OUTPATIENT)
Dept: CARDIAC REHAB | Age: 72
Discharge: HOME | End: 2022-11-25

## 2022-11-29 ENCOUNTER — HOSPITAL ENCOUNTER (OUTPATIENT)
Dept: CARDIAC REHAB | Age: 72
Discharge: HOME | End: 2022-11-29

## 2022-12-02 ENCOUNTER — HOSPITAL ENCOUNTER (OUTPATIENT)
Dept: CARDIAC REHAB | Age: 72
Discharge: HOME | End: 2022-12-02

## 2022-12-06 ENCOUNTER — HOSPITAL ENCOUNTER (OUTPATIENT)
Dept: CARDIAC REHAB | Age: 72
Discharge: HOME | End: 2022-12-06

## 2022-12-09 ENCOUNTER — HOSPITAL ENCOUNTER (OUTPATIENT)
Dept: CARDIAC REHAB | Age: 72
Discharge: HOME | End: 2022-12-09

## 2022-12-13 ENCOUNTER — HOSPITAL ENCOUNTER (OUTPATIENT)
Dept: CARDIAC REHAB | Age: 72
Discharge: HOME | End: 2022-12-13

## 2022-12-20 ENCOUNTER — HOSPITAL ENCOUNTER (OUTPATIENT)
Dept: CARDIAC REHAB | Age: 72
Discharge: HOME | End: 2022-12-20

## 2022-12-30 ENCOUNTER — HOSPITAL ENCOUNTER (OUTPATIENT)
Dept: CARDIAC REHAB | Age: 72
Discharge: HOME | End: 2022-12-30

## 2022-12-30 PROCEDURE — 9900000065 HC CARDIAC REHAB PHASE 3 - 1 VISIT

## 2023-01-03 ENCOUNTER — HOSPITAL ENCOUNTER (OUTPATIENT)
Dept: CARDIAC REHAB | Age: 73
Discharge: HOME | End: 2023-01-03

## 2023-01-06 ENCOUNTER — HOSPITAL ENCOUNTER (OUTPATIENT)
Dept: CARDIAC REHAB | Age: 73
Discharge: HOME | End: 2023-01-06

## 2023-01-10 ENCOUNTER — HOSPITAL ENCOUNTER (OUTPATIENT)
Dept: CARDIAC REHAB | Age: 73
Discharge: HOME | End: 2023-01-10

## 2023-01-13 ENCOUNTER — HOSPITAL ENCOUNTER (OUTPATIENT)
Dept: CARDIAC REHAB | Age: 73
Discharge: HOME | End: 2023-01-13

## 2023-01-17 ENCOUNTER — HOSPITAL ENCOUNTER (OUTPATIENT)
Age: 73
Discharge: HOME | End: 2023-01-17
Payer: MEDICARE

## 2023-01-17 ENCOUNTER — HOSPITAL ENCOUNTER (OUTPATIENT)
Dept: CARDIAC REHAB | Age: 73
Discharge: HOME | End: 2023-01-17

## 2023-01-17 DIAGNOSIS — C61 PROSTATE CANCER (HCC): ICD-10-CM

## 2023-01-17 DIAGNOSIS — Z13.220 SCREENING CHOLESTEROL LEVEL: ICD-10-CM

## 2023-01-17 DIAGNOSIS — N18.31 STAGE 3A CHRONIC KIDNEY DISEASE (HCC): Chronic | ICD-10-CM

## 2023-01-17 LAB
ALBUMIN SERPL-MCNC: 4 G/DL (ref 3.5–5.2)
ALBUMIN/GLOBULIN RATIO: 1.2 (ref 1–2.5)
ALP BLD-CCNC: 91 U/L (ref 40–129)
ALT SERPL-CCNC: 16 U/L (ref 5–41)
ANION GAP SERPL CALCULATED.3IONS-SCNC: 12 MMOL/L (ref 9–17)
AST SERPL-CCNC: 17 U/L
BILIRUB SERPL-MCNC: 0.2 MG/DL (ref 0.3–1.2)
BUN BLDV-MCNC: 45 MG/DL (ref 8–23)
BUN/CREAT BLD: 23 (ref 9–20)
CALCIUM SERPL-MCNC: 10.5 MG/DL (ref 8.6–10.4)
CHLORIDE BLD-SCNC: 102 MMOL/L (ref 98–107)
CHOLESTEROL, FASTING: 299 MG/DL
CHOLESTEROL/HDL RATIO: 6.4
CO2: 29 MMOL/L (ref 20–31)
CREAT SERPL-MCNC: 1.96 MG/DL (ref 0.7–1.2)
GFR SERPL CREATININE-BSD FRML MDRD: 35 ML/MIN/1.73M2
GLUCOSE FASTING: 130 MG/DL (ref 70–99)
HDLC SERPL-MCNC: 47 MG/DL
LDL CHOLESTEROL: 214 MG/DL (ref 0–130)
POTASSIUM SERPL-SCNC: 4 MMOL/L (ref 3.7–5.3)
PROSTATE SPECIFIC ANTIGEN: <0.02 NG/ML
SODIUM BLD-SCNC: 143 MMOL/L (ref 135–144)
TOTAL PROTEIN: 7.3 G/DL (ref 6.4–8.3)
TRIGLYCERIDE, FASTING: 189 MG/DL

## 2023-01-17 PROCEDURE — 80061 LIPID PANEL: CPT

## 2023-01-17 PROCEDURE — 84153 ASSAY OF PSA TOTAL: CPT

## 2023-01-17 PROCEDURE — 36415 COLL VENOUS BLD VENIPUNCTURE: CPT

## 2023-01-17 PROCEDURE — 80053 COMPREHEN METABOLIC PANEL: CPT

## 2023-01-20 ENCOUNTER — HOSPITAL ENCOUNTER (OUTPATIENT)
Dept: CARDIAC REHAB | Age: 73
Discharge: HOME | End: 2023-01-20

## 2023-01-23 ENCOUNTER — OFFICE VISIT (OUTPATIENT)
Dept: UROLOGY | Age: 73
End: 2023-01-23
Payer: MEDICARE

## 2023-01-23 VITALS
WEIGHT: 202 LBS | SYSTOLIC BLOOD PRESSURE: 152 MMHG | HEIGHT: 66 IN | BODY MASS INDEX: 32.47 KG/M2 | DIASTOLIC BLOOD PRESSURE: 78 MMHG | TEMPERATURE: 97.2 F

## 2023-01-23 DIAGNOSIS — C61 PROSTATE CANCER (HCC): Primary | ICD-10-CM

## 2023-01-23 PROCEDURE — 99213 OFFICE O/P EST LOW 20 MIN: CPT | Performed by: UROLOGY

## 2023-01-23 PROCEDURE — 3078F DIAST BP <80 MM HG: CPT | Performed by: UROLOGY

## 2023-01-23 PROCEDURE — 1123F ACP DISCUSS/DSCN MKR DOCD: CPT | Performed by: UROLOGY

## 2023-01-23 PROCEDURE — 3077F SYST BP >= 140 MM HG: CPT | Performed by: UROLOGY

## 2023-01-23 RX ORDER — GABAPENTIN 100 MG/1
100 CAPSULE ORAL 4 TIMES DAILY
COMMUNITY

## 2023-01-23 RX ORDER — FLUTICASONE PROPIONATE AND SALMETEROL 100; 50 UG/1; UG/1
1 POWDER RESPIRATORY (INHALATION) EVERY 12 HOURS
COMMUNITY

## 2023-01-23 RX ORDER — CHOLECALCIFEROL (VITAMIN D3) 1250 MCG
CAPSULE ORAL
COMMUNITY

## 2023-01-23 RX ORDER — CILOSTAZOL 100 MG/1
100 TABLET ORAL 2 TIMES DAILY
COMMUNITY

## 2023-01-23 NOTE — PROGRESS NOTES
HPI:          Patient is a 67 y.o. male in no acute distress. He is alert and oriented to person, place, and time. History  5/2018 Prostate biopsy, two cores Aditya 3+4. PSA 14.59     PSA  1/2023 - <0.02  7/2022 - <0.02  1/2022 - <0.02  7/2021 - <0.02  1/2021 - <0.01  3/2020 - <0.01  10/2019 - <0.01  6/2019 - <0.01  2/2019 - 0.08  11/2018 - 0.36  3/2018 - 14.59     8/2018 Started [employer] in conjunction with IMRT radiation. 11/2018 Changed from [facility] to [facility]      11/2020 Stopped ADT after ADT for 24 months      Hospitalized for rectal bleeding and has been through several cauterization surgeries to stop the bleeding. The surgeon did attribute this to a side effect of radiation. Currently  Patient is here today for 6-month follow-up. Patient did have a recent PSA. Patient's current PSA is as low as possible. It is measuring at less than 0.02. Patient is doing well. He has no current gross hematuria or dysuria. He has nocturia 1-2 times. He has been having some blood in his stools. He is seeing another provider for this. No current pain today. Past Medical History:   Diagnosis Date    CAD (coronary artery disease)     Stent placement 2019    COPD (chronic obstructive pulmonary disease) ([facility] [address]     Essential hypertension     Hyperlipidemia     Prostate cancer ([facility] [address]     Wears glasses      Past Surgical History:   Procedure Laterality Date    ANKLE SURGERY      CAROTID STENT PLACEMENT Left 2019    COLONOSCOPY  2015    Normal    HERNIA REPAIR Right 1975    OTHER SURGICAL HISTORY      [facility] intestines-[facility]     Outpatient Encounter Medications as of 1/23/2023   Medication Sig Dispense Refill    gabapentin (NEURONTIN) 100 MG capsule Take 100 mg by mouth 4 times daily.       Cholecalciferol (VITAMIN D3) 1.25 MG (46136 UT) CAPS Take by mouth      fluticasone-salmeterol (WIXELA INHUB) 100-50 MCG/ACT AEPB diskus inhaler Inhale 1 puff into the lungs every 12 hours      cilostazol (PLETAL) 100 MG tablet Take 100 mg by mouth 2 times daily      predniSONE (DELTASONE) 10 MG tablet 4 tabs daily for 3 days, 3 tabs daily for 3 days, 2 tabs daily for 3 days, 1 tabs daily for 3 days 30 tablet 0    cilostazol (PLETAL) 100 MG tablet Take 1 tablet by mouth 2 times daily 180 tablet 3    fluticasone-salmeterol (ADVIAR) 100-50 MCG/ACT AEPB diskus inhaler Inhale 1 puff into the lungs every 12 hours      Vitamin D, Cholecalciferol, 25 MCG (1000 UT) TABS Take by mouth      carvedilol (COREG) 25 MG tablet Take 1 tablet by mouth 2 times daily (with meals) 14 tablet 0    NIFEdipine (ADALAT CC) 30 MG extended release tablet Take 1 tablet by mouth daily 30 tablet 3    docusate sodium (COLACE, DULCOLAX) 100 MG CAPS Take 100 mg by mouth 2 times daily 30 capsule 0    atorvastatin (LIPITOR) 80 MG tablet Take 1 tablet by mouth daily 90 tablet 3    hydrochlorothiazide (HYDRODIURIL) 25 MG tablet Take 1 tablet by mouth daily 30 tablet 5    Cholecalciferol (VITAMIN D3) 5000 units CAPS Take 1,000 Units by mouth daily      albuterol sulfate  (90 Base) MCG/ACT inhaler Inhale 2 puffs into the lungs every 6 hours as needed for Wheezing or Shortness of Breath 1 Inhaler 11    albuterol (PROVENTIL) (2.5 MG/3ML) 0.083% nebulizer solution Take 3 mLs by nebulization every 4 hours as needed for Wheezing 120 each 3    tiotropium (SPIRIVA RESPIMAT) 1.25 MCG/ACT AERS inhaler Inhale 2 puffs into the lungs daily      cetirizine (ZYRTEC) 10 MG tablet Take 10 mg by mouth daily      montelukast (SINGULAIR) 10 MG tablet Take 10 mg by mouth nightly       No facility-administered encounter medications on file as of 1/23/2023. Current Outpatient Medications on File Prior to Visit   Medication Sig Dispense Refill    gabapentin (NEURONTIN) 100 MG capsule Take 100 mg by mouth 4 times daily.       Cholecalciferol (VITAMIN D3) 1.25 MG (27575 UT) CAPS Take by mouth      fluticasone-salmeterol (WIXELA INHUB) 100-50 MCG/ACT AEPB diskus inhaler Inhale 1 puff into the lungs every 12 hours      cilostazol (PLETAL) 100 MG tablet Take 100 mg by mouth 2 times daily      predniSONE (DELTASONE) 10 MG tablet 4 tabs daily for 3 days, 3 tabs daily for 3 days, 2 tabs daily for 3 days, 1 tabs daily for 3 days 30 tablet 0    cilostazol (PLETAL) 100 MG tablet Take 1 tablet by mouth 2 times daily 180 tablet 3    fluticasone-salmeterol (ADVIAR) 100-50 MCG/ACT AEPB diskus inhaler Inhale 1 puff into the lungs every 12 hours      Vitamin D, Cholecalciferol, 25 MCG (1000 UT) TABS Take by mouth      carvedilol (COREG) 25 MG tablet Take 1 tablet by mouth 2 times daily (with meals) 14 tablet 0    NIFEdipine (ADALAT CC) 30 MG extended release tablet Take 1 tablet by mouth daily 30 tablet 3    docusate sodium (COLACE, DULCOLAX) 100 MG CAPS Take 100 mg by mouth 2 times daily 30 capsule 0    atorvastatin (LIPITOR) 80 MG tablet Take 1 tablet by mouth daily 90 tablet 3    hydrochlorothiazide (HYDRODIURIL) 25 MG tablet Take 1 tablet by mouth daily 30 tablet 5    Cholecalciferol (VITAMIN D3) 5000 units CAPS Take 1,000 Units by mouth daily      albuterol sulfate  (90 Base) MCG/ACT inhaler Inhale 2 puffs into the lungs every 6 hours as needed for Wheezing or Shortness of Breath 1 Inhaler 11    albuterol (PROVENTIL) (2.5 MG/3ML) 0.083% nebulizer solution Take 3 mLs by nebulization every 4 hours as needed for Wheezing 120 each 3    tiotropium (SPIRIVA RESPIMAT) 1.25 MCG/ACT AERS inhaler Inhale 2 puffs into the lungs daily      cetirizine (ZYRTEC) 10 MG tablet Take 10 mg by mouth daily      montelukast (SINGULAIR) 10 MG tablet Take 10 mg by mouth nightly       No current facility-administered medications on file prior to visit. Patient has no known allergies.   Family History   Problem Relation Age of Onset    No Known Problems Father     Cancer Mother      Social History     Tobacco Use   Smoking Status Former    Packs/day: 1.00    Years: 30.00    Pack years: 30.00    Types: Cigarettes    Quit date: 2013    Years since quittin.4   Smokeless Tobacco Never       Social History     Substance and Sexual Activity   Alcohol Use Yes    Alcohol/week: 1.0 standard drink    Types: 1 Cans of beer per week    Comment: rare       Review of Systems    BP (!) 152/78 (Site: Left Upper Arm, Position: Sitting, Cuff Size: Medium Adult)   Temp 97.2 °F (36.2 °C) (Infrared)   Ht 5' 6\" (1.676 m)   Wt 202 lb (91.6 kg)   BMI 32.60 kg/m²       PHYSICAL EXAM:  Constitutional: Patient in no acute distress; Neuro: alert and oriented to person place and time. Psych: Mood and affect normal.  Skin: Normal  Lungs: Respiratory effort normal  Cardiovascular:  Normal peripheral pulses  Abdomen: Soft, non-tender, non-distended with no CVA, flank pain  Bladder non-tender and not distended. Lymphatics: no palpable lymphadenopathy  Penis normal  Urethral meatus normal  Scrotal exam normal  Testicles normal bilaterally  Epididymis normal bilaterally  No evidence of inguinal hernia      Lab Results   Component Value Date    BUN 45 (H) 2023     Lab Results   Component Value Date    CREATININE 1.96 (H) 2023     Lab Results   Component Value Date    PSA <0.02 2023    PSA <0.02 2022    PSA <0.02 2022       ASSESSMENT:  This is a 67 y.o. male with the following diagnoses:   Diagnosis Orders   1. Prostate cancer ([facility] [address]  PSA, Diagnostic           PLAN:  Patient does wish to move out to 1 year appointments. We did reorder his PSA to be done in 1 year.

## 2023-01-23 NOTE — PATIENT INSTRUCTIONS
SURVEY:    You may be receiving a survey from [employer] regarding your visit today.    Please complete the survey to enable us to provide the highest quality of care to you and your family.    If you cannot score us a very good on any question, please call the office to discuss how we could have made your experience a very good one.    Thank you.

## 2023-01-24 ENCOUNTER — HOSPITAL ENCOUNTER (OUTPATIENT)
Dept: CARDIAC REHAB | Age: 73
Discharge: HOME | End: 2023-01-24

## 2023-01-31 ENCOUNTER — HOSPITAL ENCOUNTER (OUTPATIENT)
Dept: CARDIAC REHAB | Age: 73
Discharge: HOME | End: 2023-01-31

## 2023-02-03 ENCOUNTER — HOSPITAL ENCOUNTER (OUTPATIENT)
Dept: CARDIAC REHAB | Age: 73
Discharge: HOME | End: 2023-02-03

## 2023-02-07 ENCOUNTER — HOSPITAL ENCOUNTER (OUTPATIENT)
Dept: CARDIAC REHAB | Age: 73
Discharge: HOME | End: 2023-02-07

## 2023-02-10 ENCOUNTER — HOSPITAL ENCOUNTER (OUTPATIENT)
Dept: CARDIAC REHAB | Age: 73
Discharge: HOME | End: 2023-02-10

## 2023-02-24 ENCOUNTER — HOSPITAL ENCOUNTER (OUTPATIENT)
Dept: CARDIAC REHAB | Age: 73
Discharge: HOME | End: 2023-02-24

## 2023-02-28 ENCOUNTER — HOSPITAL ENCOUNTER (OUTPATIENT)
Dept: CARDIAC REHAB | Age: 73
Discharge: HOME | End: 2023-02-28

## 2023-03-07 ENCOUNTER — HOSPITAL ENCOUNTER (OUTPATIENT)
Dept: CARDIAC REHAB | Age: 73
Discharge: HOME | End: 2023-03-07

## 2023-03-10 ENCOUNTER — HOSPITAL ENCOUNTER (OUTPATIENT)
Dept: CARDIAC REHAB | Age: 73
Discharge: HOME | End: 2023-03-10

## 2023-03-14 ENCOUNTER — HOSPITAL ENCOUNTER (OUTPATIENT)
Dept: CARDIAC REHAB | Age: 73
Discharge: HOME | End: 2023-03-14

## 2023-03-17 ENCOUNTER — HOSPITAL ENCOUNTER (OUTPATIENT)
Dept: CARDIAC REHAB | Age: 73
Discharge: HOME | End: 2023-03-17

## 2023-03-24 ENCOUNTER — HOSPITAL ENCOUNTER (OUTPATIENT)
Dept: CARDIAC REHAB | Age: 73
Discharge: HOME | End: 2023-03-24

## 2023-03-28 ENCOUNTER — HOSPITAL ENCOUNTER (OUTPATIENT)
Dept: CARDIAC REHAB | Age: 73
Discharge: HOME | End: 2023-03-28

## 2023-04-04 ENCOUNTER — HOSPITAL ENCOUNTER (OUTPATIENT)
Dept: CARDIAC REHAB | Age: 73
Discharge: HOME | End: 2023-04-04

## 2023-04-07 ENCOUNTER — HOSPITAL ENCOUNTER (OUTPATIENT)
Dept: CARDIAC REHAB | Age: 73
Discharge: HOME | End: 2023-04-07

## 2023-04-07 PROCEDURE — 9900000065 HC CARDIAC REHAB PHASE 3 - 1 VISIT

## 2023-04-18 ENCOUNTER — HOSPITAL ENCOUNTER (OUTPATIENT)
Dept: CARDIAC REHAB | Age: 73
Discharge: HOME | End: 2023-04-18

## 2023-04-25 ENCOUNTER — HOSPITAL ENCOUNTER (OUTPATIENT)
Dept: CARDIAC REHAB | Age: 73
Discharge: HOME | End: 2023-04-25

## 2023-04-28 ENCOUNTER — HOSPITAL ENCOUNTER (OUTPATIENT)
Dept: CARDIAC REHAB | Age: 73
Discharge: HOME | End: 2023-04-28

## 2023-05-02 ENCOUNTER — HOSPITAL ENCOUNTER (OUTPATIENT)
Dept: CARDIAC REHAB | Age: 73
Discharge: HOME | End: 2023-05-02

## 2023-05-03 ENCOUNTER — TELEPHONE (OUTPATIENT)
Dept: ONCOLOGY | Age: 73
End: 2023-05-03

## 2023-05-05 ENCOUNTER — HOSPITAL ENCOUNTER (OUTPATIENT)
Dept: CARDIAC REHAB | Age: 73
Discharge: HOME | End: 2023-05-05

## 2023-05-09 ENCOUNTER — HOSPITAL ENCOUNTER (OUTPATIENT)
Dept: CARDIAC REHAB | Age: 73
Discharge: HOME | End: 2023-05-09

## 2023-05-12 ENCOUNTER — HOSPITAL ENCOUNTER (OUTPATIENT)
Dept: CARDIAC REHAB | Age: 73
Discharge: HOME | End: 2023-05-12

## 2023-05-16 ENCOUNTER — HOSPITAL ENCOUNTER (OUTPATIENT)
Dept: CARDIAC REHAB | Age: 73
Discharge: HOME | End: 2023-05-16

## 2023-05-19 ENCOUNTER — HOSPITAL ENCOUNTER (OUTPATIENT)
Dept: CARDIAC REHAB | Age: 73
Discharge: HOME | End: 2023-05-19

## 2023-05-23 ENCOUNTER — HOSPITAL ENCOUNTER (OUTPATIENT)
Dept: CARDIAC REHAB | Age: 73
Discharge: HOME | End: 2023-05-23

## 2023-05-30 ENCOUNTER — HOSPITAL ENCOUNTER (OUTPATIENT)
Dept: CARDIAC REHAB | Age: 73
Discharge: HOME | End: 2023-05-30

## 2023-06-02 ENCOUNTER — HOSPITAL ENCOUNTER (OUTPATIENT)
Dept: CARDIAC REHAB | Age: 73
Discharge: HOME | End: 2023-06-02

## 2023-06-06 ENCOUNTER — HOSPITAL ENCOUNTER (OUTPATIENT)
Dept: CARDIAC REHAB | Age: 73
Discharge: HOME | End: 2023-06-06

## 2023-06-09 ENCOUNTER — HOSPITAL ENCOUNTER (OUTPATIENT)
Dept: CARDIAC REHAB | Age: 73
Discharge: HOME | End: 2023-06-09

## 2023-06-20 ENCOUNTER — HOSPITAL ENCOUNTER (OUTPATIENT)
Dept: CARDIAC REHAB | Age: 73
Discharge: HOME | End: 2023-06-20

## 2023-06-23 ENCOUNTER — HOSPITAL ENCOUNTER (OUTPATIENT)
Dept: CARDIAC REHAB | Age: 73
Discharge: HOME | End: 2023-06-23

## 2023-06-23 PROCEDURE — 9900000065 HC CARDIAC REHAB PHASE 3 - 1 VISIT

## 2023-06-27 ENCOUNTER — HOSPITAL ENCOUNTER (OUTPATIENT)
Dept: CARDIAC REHAB | Age: 73
Discharge: HOME | End: 2023-06-27

## 2023-06-30 ENCOUNTER — HOSPITAL ENCOUNTER (OUTPATIENT)
Dept: CARDIAC REHAB | Age: 73
Discharge: HOME | End: 2023-06-30

## 2023-07-11 ENCOUNTER — HOSPITAL ENCOUNTER (OUTPATIENT)
Dept: CARDIAC REHAB | Age: 73
Discharge: HOME | End: 2023-07-11

## 2023-07-14 ENCOUNTER — HOSPITAL ENCOUNTER (OUTPATIENT)
Dept: CARDIAC REHAB | Age: 73
Discharge: HOME | End: 2023-07-14

## 2023-07-18 ENCOUNTER — HOSPITAL ENCOUNTER (OUTPATIENT)
Dept: CARDIAC REHAB | Age: 73
Discharge: HOME | End: 2023-07-18

## 2023-07-21 ENCOUNTER — HOSPITAL ENCOUNTER (OUTPATIENT)
Dept: CARDIAC REHAB | Age: 73
Discharge: HOME | End: 2023-07-21

## 2023-07-25 ENCOUNTER — HOSPITAL ENCOUNTER (OUTPATIENT)
Dept: CARDIAC REHAB | Age: 73
Discharge: HOME | End: 2023-07-25

## 2023-07-28 ENCOUNTER — HOSPITAL ENCOUNTER (OUTPATIENT)
Dept: CARDIAC REHAB | Age: 73
Discharge: HOME | End: 2023-07-28

## 2023-08-01 ENCOUNTER — HOSPITAL ENCOUNTER (OUTPATIENT)
Dept: CARDIAC REHAB | Age: 73
Discharge: HOME | End: 2023-08-01

## 2023-08-04 ENCOUNTER — HOSPITAL ENCOUNTER (OUTPATIENT)
Dept: CARDIAC REHAB | Age: 73
Discharge: HOME | End: 2023-08-04

## 2023-08-08 ENCOUNTER — HOSPITAL ENCOUNTER (OUTPATIENT)
Dept: CARDIAC REHAB | Age: 73
Discharge: HOME | End: 2023-08-08

## 2023-08-11 ENCOUNTER — HOSPITAL ENCOUNTER (OUTPATIENT)
Dept: CARDIAC REHAB | Age: 73
Discharge: HOME | End: 2023-08-11

## 2023-08-15 ENCOUNTER — HOSPITAL ENCOUNTER (OUTPATIENT)
Dept: CARDIAC REHAB | Age: 73
Discharge: HOME | End: 2023-08-15

## 2023-08-25 ENCOUNTER — HOSPITAL ENCOUNTER (OUTPATIENT)
Dept: CARDIAC REHAB | Age: 73
Discharge: HOME | End: 2023-08-25

## 2023-08-29 ENCOUNTER — HOSPITAL ENCOUNTER (OUTPATIENT)
Dept: CARDIAC REHAB | Age: 73
Discharge: HOME | End: 2023-08-29

## 2023-09-01 ENCOUNTER — HOSPITAL ENCOUNTER (OUTPATIENT)
Dept: CARDIAC REHAB | Age: 73
Discharge: HOME | End: 2023-09-01

## 2023-09-05 ENCOUNTER — HOSPITAL ENCOUNTER (OUTPATIENT)
Dept: CARDIAC REHAB | Age: 73
Discharge: HOME | End: 2023-09-05

## 2023-09-06 ENCOUNTER — TRANSCRIBE ORDERS (OUTPATIENT)
Dept: ADMINISTRATIVE | Age: 73
End: 2023-09-06

## 2023-09-06 DIAGNOSIS — I70.213 ATHSCL NATIVE ARTERIES OF EXTRM W INTRMT CLAUD, BI LEGS (HCC): Primary | ICD-10-CM

## 2023-09-08 ENCOUNTER — HOSPITAL ENCOUNTER (OUTPATIENT)
Dept: CARDIAC REHAB | Age: 73
Discharge: HOME | End: 2023-09-08

## 2023-09-08 DIAGNOSIS — I73.9 PERIPHERAL VASCULAR DISEASE, UNSPECIFIED (HCC): Primary | ICD-10-CM

## 2023-09-11 ENCOUNTER — HOSPITAL ENCOUNTER (OUTPATIENT)
Dept: VASCULAR LAB | Age: 73
Discharge: HOME | End: 2023-09-13
Attending: SURGERY
Payer: MEDICARE

## 2023-09-11 DIAGNOSIS — I70.213 ATHSCL NATIVE ARTERIES OF EXTRM W INTRMT CLAUD, BI LEGS (HCC): ICD-10-CM

## 2023-09-11 PROCEDURE — 93922 UPR/L XTREMITY ART 2 LEVELS: CPT | Performed by: SURGERY

## 2023-09-11 PROCEDURE — 93922 UPR/L XTREMITY ART 2 LEVELS: CPT

## 2023-09-12 ENCOUNTER — HOSPITAL ENCOUNTER (OUTPATIENT)
Dept: CARDIAC REHAB | Age: 73
Discharge: HOME | End: 2023-09-12

## 2023-09-12 LAB
VAS LEFT ABI: 0.36
VAS LEFT ARM BP: 179 MMHG
VAS LEFT DORSALIS PEDIS BP: 65 MMHG
VAS LEFT PTA BP: 60 MMHG
VAS RIGHT ABI: 0.57
VAS RIGHT ARM BP: 171 MMHG
VAS RIGHT DORSALIS PEDIS BP: 102 MMHG
VAS RIGHT PTA BP: 80 MMHG

## 2023-09-15 ENCOUNTER — HOSPITAL ENCOUNTER (OUTPATIENT)
Dept: CARDIAC REHAB | Age: 73
Discharge: HOME | End: 2023-09-15

## 2023-09-22 ENCOUNTER — HOSPITAL ENCOUNTER (OUTPATIENT)
Dept: CARDIAC REHAB | Age: 73
Discharge: HOME | End: 2023-09-22

## 2023-09-26 ENCOUNTER — HOSPITAL ENCOUNTER (OUTPATIENT)
Dept: CARDIAC REHAB | Age: 73
Discharge: HOME | End: 2023-09-26

## 2023-09-29 ENCOUNTER — HOSPITAL ENCOUNTER (OUTPATIENT)
Dept: CARDIAC REHAB | Age: 73
Discharge: HOME | End: 2023-09-29

## 2023-10-03 ENCOUNTER — HOSPITAL ENCOUNTER (OUTPATIENT)
Dept: CARDIAC REHAB | Age: 73
Discharge: HOME | End: 2023-10-03

## 2023-10-06 ENCOUNTER — HOSPITAL ENCOUNTER (OUTPATIENT)
Dept: CARDIAC REHAB | Age: 73
Discharge: HOME | End: 2023-10-06

## 2023-10-10 ENCOUNTER — HOSPITAL ENCOUNTER (OUTPATIENT)
Dept: CARDIAC REHAB | Age: 73
Discharge: HOME | End: 2023-10-10

## 2023-10-17 ENCOUNTER — HOSPITAL ENCOUNTER (OUTPATIENT)
Dept: CARDIAC REHAB | Age: 73
Discharge: HOME | End: 2023-10-17

## 2023-10-20 ENCOUNTER — HOSPITAL ENCOUNTER (OUTPATIENT)
Dept: CARDIAC REHAB | Age: 73
Discharge: HOME | End: 2023-10-20

## 2023-10-24 ENCOUNTER — HOSPITAL ENCOUNTER (OUTPATIENT)
Dept: CARDIAC REHAB | Age: 73
Discharge: HOME | End: 2023-10-24

## 2023-10-25 ENCOUNTER — OFFICE VISIT (OUTPATIENT)
Dept: VASCULAR SURGERY | Age: 73
End: 2023-10-25
Payer: MEDICARE

## 2023-10-25 VITALS
WEIGHT: 201.6 LBS | RESPIRATION RATE: 16 BRPM | HEART RATE: 63 BPM | TEMPERATURE: 96.8 F | SYSTOLIC BLOOD PRESSURE: 159 MMHG | DIASTOLIC BLOOD PRESSURE: 79 MMHG | BODY MASS INDEX: 32.4 KG/M2 | HEIGHT: 66 IN

## 2023-10-25 DIAGNOSIS — I65.23 BILATERAL CAROTID ARTERY STENOSIS: Primary | ICD-10-CM

## 2023-10-25 DIAGNOSIS — I70.213 ATHEROSCLER OF NATIVE ARTERY OF BOTH LEGS WITH INTERMIT CLAUDICATION (HCC): ICD-10-CM

## 2023-10-25 PROCEDURE — 3078F DIAST BP <80 MM HG: CPT | Performed by: SURGERY

## 2023-10-25 PROCEDURE — 1123F ACP DISCUSS/DSCN MKR DOCD: CPT | Performed by: SURGERY

## 2023-10-25 PROCEDURE — 3077F SYST BP >= 140 MM HG: CPT | Performed by: SURGERY

## 2023-10-25 PROCEDURE — 99204 OFFICE O/P NEW MOD 45 MIN: CPT | Performed by: SURGERY

## 2023-10-25 RX ORDER — EZETIMIBE 10 MG/1
10 TABLET ORAL DAILY
COMMUNITY

## 2023-10-25 ASSESSMENT — ENCOUNTER SYMPTOMS
TROUBLE SWALLOWING: 0
EYE PAIN: 0
ABDOMINAL PAIN: 0
SHORTNESS OF BREATH: 0
CHEST TIGHTNESS: 0
VOICE CHANGE: 0
ABDOMINAL DISTENTION: 0
COLOR CHANGE: 0
VOMITING: 0
COUGH: 0

## 2023-10-25 NOTE — PATIENT INSTRUCTIONS
SURVEY:    You may be receiving a survey from [employer] regarding your visit today. Please complete the survey to enable us to provide the highest quality of care to you and your family. If you cannot score us a very good on any question, please call the office to discuss how we could have made your experience a very good one. Thank you. Please recheck your blood pressure when you go home and make sure you take your prescribed medication if applicable . Please follow up with your PCP or ER if needed.

## 2023-10-25 NOTE — PROGRESS NOTES
Conjunctiva/sclera: Conjunctivae normal.   Neck:      Thyroid: No thyroid mass or thyromegaly. Cardiovascular:      Comments: On examination he has a low rumbling bruit on the left cervical region. He has no bruit on the right. His heart has a regular rate and rhythm with no murmurs rubs or gallops. His abdomen is soft nontender nondistended. I cannot palpate his aorta. He has palpable femoral pulse but no popliteal dorsalis pedis or posterior tibial pulses on either side. He has no dependent rubor. There is no ulceration. There is no gangrene. Pulmonary:      Effort: No respiratory distress. Breath sounds: No rales. Abdominal:      General: There is no distension. Palpations: There is no mass. Tenderness: There is no abdominal tenderness. There is no guarding or rebound. Musculoskeletal:      Cervical back: No rigidity or tenderness. Lymphadenopathy:      Cervical: No cervical adenopathy. Skin:     Coloration: Skin is not jaundiced. Findings: No rash. Neurological:      General: No focal deficit present. Mental Status: He is alert and oriented to person, place, and time. Cranial Nerves: No cranial nerve deficit. Psychiatric:         Mood and Affect: Mood normal.         Assessment:  1. Bilateral carotid artery stenosis    2. Atheroscler of native artery of both legs with intermit claudication ([address])          Plan: At this point since he has only claudication I would advocate for continued medical management with aspirin. He cannot tolerate atorvastatin. If he were to develop rest pain or ulceration or gangrene then something would need to be done. He understands that his risks for amputation are slightly higher with intervention for claudication then without. I would rather him continue walking and developed collaterals. He understands and agrees.   His carotid stent needs to be followed and we will arrange for JOSEP and PVR as well as lower extremity arterial

## 2023-10-27 ENCOUNTER — HOSPITAL ENCOUNTER (OUTPATIENT)
Dept: CARDIAC REHAB | Age: 73
Discharge: HOME | End: 2023-10-27

## 2023-10-31 ENCOUNTER — HOSPITAL ENCOUNTER (OUTPATIENT)
Dept: CARDIAC REHAB | Age: 73
Discharge: HOME | End: 2023-10-31

## 2023-11-07 ENCOUNTER — HOSPITAL ENCOUNTER (OUTPATIENT)
Dept: CARDIAC REHAB | Age: 73
Discharge: HOME | End: 2023-11-07

## 2023-11-14 ENCOUNTER — HOSPITAL ENCOUNTER (OUTPATIENT)
Dept: CARDIAC REHAB | Age: 73
Discharge: HOME | End: 2023-11-14

## 2023-11-17 ENCOUNTER — HOSPITAL ENCOUNTER (OUTPATIENT)
Dept: CARDIAC REHAB | Age: 73
Discharge: HOME | End: 2023-11-17

## 2023-11-21 ENCOUNTER — HOSPITAL ENCOUNTER (OUTPATIENT)
Dept: CARDIAC REHAB | Age: 73
Discharge: HOME | End: 2023-11-21

## 2023-11-28 ENCOUNTER — HOSPITAL ENCOUNTER (OUTPATIENT)
Dept: CARDIAC REHAB | Age: 73
Discharge: HOME | End: 2023-11-28

## 2023-12-05 ENCOUNTER — TELEPHONE (OUTPATIENT)
Dept: PAIN MANAGEMENT | Age: 73
End: 2023-12-05

## 2023-12-05 ENCOUNTER — APPOINTMENT (OUTPATIENT)
Dept: GENERAL RADIOLOGY | Age: 73
End: 2023-12-05
Payer: MEDICARE

## 2023-12-05 ENCOUNTER — HOSPITAL ENCOUNTER (EMERGENCY)
Age: 73
Discharge: HOME | End: 2023-12-05
Attending: EMERGENCY MEDICINE
Payer: MEDICARE

## 2023-12-05 VITALS
TEMPERATURE: 98 F | RESPIRATION RATE: 18 BRPM | HEIGHT: 66 IN | BODY MASS INDEX: 32.14 KG/M2 | DIASTOLIC BLOOD PRESSURE: 83 MMHG | OXYGEN SATURATION: 91 % | SYSTOLIC BLOOD PRESSURE: 169 MMHG | HEART RATE: 77 BPM | WEIGHT: 200 LBS

## 2023-12-05 DIAGNOSIS — J44.1 COPD EXACERBATION (HCC): ICD-10-CM

## 2023-12-05 DIAGNOSIS — K62.5 RECTAL BLEEDING: Primary | ICD-10-CM

## 2023-12-05 LAB
ABO + RH BLD: NORMAL
ALBUMIN SERPL-MCNC: 4.1 G/DL (ref 3.5–5.2)
ALBUMIN/GLOB SERPL: 1.1 {RATIO} (ref 1–2.5)
ALP SERPL-CCNC: 99 U/L (ref 40–129)
ALT SERPL-CCNC: 11 U/L (ref 5–41)
ANION GAP SERPL CALCULATED.3IONS-SCNC: 15 MMOL/L (ref 9–17)
ARM BAND NUMBER: NORMAL
AST SERPL-CCNC: 12 U/L
BASOPHILS # BLD: 0.06 K/UL (ref 0–0.2)
BASOPHILS NFR BLD: 0 % (ref 0–2)
BILIRUB SERPL-MCNC: 0.3 MG/DL (ref 0.3–1.2)
BLOOD BANK SAMPLE EXPIRATION: NORMAL
BLOOD GROUP ANTIBODIES SERPL: NEGATIVE
BUN SERPL-MCNC: 39 MG/DL (ref 8–23)
BUN/CREAT SERPL: 20 (ref 9–20)
CALCIUM SERPL-MCNC: 10.2 MG/DL (ref 8.6–10.4)
CHLORIDE SERPL-SCNC: 99 MMOL/L (ref 98–107)
CO2 SERPL-SCNC: 25 MMOL/L (ref 20–31)
CREAT SERPL-MCNC: 2 MG/DL (ref 0.7–1.2)
EKG ATRIAL RATE: 74 BPM
EKG P AXIS: 89 DEGREES
EKG P-R INTERVAL: 226 MS
EKG Q-T INTERVAL: 414 MS
EKG QRS DURATION: 108 MS
EKG QTC CALCULATION (BAZETT): 459 MS
EKG R AXIS: -42 DEGREES
EKG T AXIS: 119 DEGREES
EKG VENTRICULAR RATE: 74 BPM
EOSINOPHIL # BLD: 0.23 K/UL (ref 0–0.44)
EOSINOPHILS RELATIVE PERCENT: 2 % (ref 1–4)
ERYTHROCYTE [DISTWIDTH] IN BLOOD BY AUTOMATED COUNT: 13.2 % (ref 11.8–14.4)
GFR SERPL CREATININE-BSD FRML MDRD: 34 ML/MIN/1.73M2
GLUCOSE SERPL-MCNC: 163 MG/DL (ref 70–99)
HCT VFR BLD AUTO: 36.1 % (ref 40.7–50.3)
HGB BLD-MCNC: 12.2 G/DL (ref 13–17)
IMM GRANULOCYTES # BLD AUTO: 0.07 K/UL (ref 0–0.3)
IMM GRANULOCYTES NFR BLD: 1 %
INR PPP: 1
LYMPHOCYTES NFR BLD: 1.14 K/UL (ref 1.1–3.7)
LYMPHOCYTES RELATIVE PERCENT: 8 % (ref 24–43)
MCH RBC QN AUTO: 31.8 PG (ref 25.2–33.5)
MCHC RBC AUTO-ENTMCNC: 33.8 G/DL (ref 28.4–34.8)
MCV RBC AUTO: 94 FL (ref 82.6–102.9)
MONOCYTES NFR BLD: 1.32 K/UL (ref 0.1–1.2)
MONOCYTES NFR BLD: 10 % (ref 3–12)
NEUTROPHILS NFR BLD: 79 % (ref 36–65)
NEUTS SEG NFR BLD: 11.1 K/UL (ref 1.5–8.1)
NRBC BLD-RTO: 0 PER 100 WBC
PLATELET # BLD AUTO: 300 K/UL (ref 138–453)
PMV BLD AUTO: 10.3 FL (ref 8.1–13.5)
POTASSIUM SERPL-SCNC: 3.7 MMOL/L (ref 3.7–5.3)
PROT SERPL-MCNC: 7.8 G/DL (ref 6.4–8.3)
PROTHROMBIN TIME: 13.5 SEC (ref 11.9–14.8)
RBC # BLD AUTO: 3.84 M/UL (ref 4.21–5.77)
SARS-COV-2 RDRP RESP QL NAA+PROBE: NOT DETECTED
SODIUM SERPL-SCNC: 139 MMOL/L (ref 135–144)
SPECIMEN DESCRIPTION: NORMAL
TROPONIN I SERPL HS-MCNC: 37 NG/L (ref 0–22)
TROPONIN I SERPL HS-MCNC: 42 NG/L (ref 0–22)
WBC OTHER # BLD: 13.9 K/UL (ref 3.5–11.3)

## 2023-12-05 PROCEDURE — 85025 COMPLETE CBC W/AUTO DIFF WBC: CPT

## 2023-12-05 PROCEDURE — 99285 EMERGENCY DEPT VISIT HI MDM: CPT

## 2023-12-05 PROCEDURE — 86901 BLOOD TYPING SEROLOGIC RH(D): CPT

## 2023-12-05 PROCEDURE — 87635 SARS-COV-2 COVID-19 AMP PRB: CPT

## 2023-12-05 PROCEDURE — 36415 COLL VENOUS BLD VENIPUNCTURE: CPT

## 2023-12-05 PROCEDURE — 86900 BLOOD TYPING SEROLOGIC ABO: CPT

## 2023-12-05 PROCEDURE — 93010 ELECTROCARDIOGRAM REPORT: CPT | Performed by: INTERNAL MEDICINE

## 2023-12-05 PROCEDURE — 96374 THER/PROPH/DIAG INJ IV PUSH: CPT

## 2023-12-05 PROCEDURE — 6360000002 HC RX W HCPCS: Performed by: EMERGENCY MEDICINE

## 2023-12-05 PROCEDURE — 71046 X-RAY EXAM CHEST 2 VIEWS: CPT

## 2023-12-05 PROCEDURE — 2580000003 HC RX 258: Performed by: EMERGENCY MEDICINE

## 2023-12-05 PROCEDURE — 86850 RBC ANTIBODY SCREEN: CPT

## 2023-12-05 PROCEDURE — 93005 ELECTROCARDIOGRAM TRACING: CPT | Performed by: EMERGENCY MEDICINE

## 2023-12-05 PROCEDURE — 84484 ASSAY OF TROPONIN QUANT: CPT

## 2023-12-05 PROCEDURE — 85610 PROTHROMBIN TIME: CPT

## 2023-12-05 PROCEDURE — 80053 COMPREHEN METABOLIC PANEL: CPT

## 2023-12-05 RX ORDER — SODIUM CHLORIDE 0.9 % (FLUSH) 0.9 %
3 SYRINGE (ML) INJECTION EVERY 8 HOURS
Status: DISCONTINUED | OUTPATIENT
Start: 2023-12-05 | End: 2023-12-05 | Stop reason: HOSPADM

## 2023-12-05 RX ORDER — ALBUTEROL SULFATE 2.5 MG/3ML
2.5 SOLUTION RESPIRATORY (INHALATION) ONCE
Status: COMPLETED | OUTPATIENT
Start: 2023-12-05 | End: 2023-12-05

## 2023-12-05 RX ORDER — PREDNISONE 50 MG/1
50 TABLET ORAL DAILY
Qty: 4 TABLET | Refills: 0 | Status: SHIPPED | OUTPATIENT
Start: 2023-12-05 | End: 2023-12-09

## 2023-12-05 RX ORDER — METHYLPREDNISOLONE SODIUM SUCCINATE 125 MG/2ML
125 INJECTION, POWDER, LYOPHILIZED, FOR SOLUTION INTRAMUSCULAR; INTRAVENOUS ONCE
Status: COMPLETED | OUTPATIENT
Start: 2023-12-05 | End: 2023-12-05

## 2023-12-05 RX ADMIN — SODIUM CHLORIDE, PRESERVATIVE FREE 3 ML: 5 INJECTION INTRAVENOUS at 07:30

## 2023-12-05 RX ADMIN — METHYLPREDNISOLONE SODIUM SUCCINATE 125 MG: 125 INJECTION INTRAMUSCULAR; INTRAVENOUS at 09:13

## 2023-12-05 RX ADMIN — ALBUTEROL SULFATE 2.5 MG: 2.5 SOLUTION RESPIRATORY (INHALATION) at 07:54

## 2023-12-05 ASSESSMENT — LIFESTYLE VARIABLES
HOW OFTEN DO YOU HAVE A DRINK CONTAINING ALCOHOL: MONTHLY OR LESS
HOW MANY STANDARD DRINKS CONTAINING ALCOHOL DO YOU HAVE ON A TYPICAL DAY: 1 OR 2

## 2023-12-05 ASSESSMENT — PAIN - FUNCTIONAL ASSESSMENT: PAIN_FUNCTIONAL_ASSESSMENT: NONE - DENIES PAIN

## 2023-12-05 NOTE — DISCHARGE INSTRUCTIONS
Please follow up with Dr. Dena Newman office, they should be contacting you to schedule further evaluation for your rectal bleeding today, for likely procedure tomorrow, if bleeding worsening or you feel light headed or dizzy then return to ER immediately. Take the steroids that were prescribed for your COPD exacerbation, and do your home albuterol treatments, If breathing gets worse, then return to er at that time.

## 2023-12-05 NOTE — ED PROVIDER NOTES
[facility] ED  Emergency Department Encounter  Emergency Medicine Resident     Pt Name:Sahil Win  MRN: [id number]  [address] [facility] 1950  Date of evaluation: 12/5/23  PCP:  Harriet Cardozo MD  7:18 AM EST      CHIEF COMPLAINT       Chief Complaint   Patient presents with    Rectal Bleeding    Shortness of Breath       HISTORY OF PRESENT ILLNESS  (Location/Symptom, Timing/Onset, Context/Setting, Quality, Duration, Modifying Factors, Severity.)      Ricky Alvarado is a 75 y.o. male who presents with 3 days of rectal bleeding, patient s/p prostate CA treatment with radiation, prior lower GI bleed, requiring colonoscopy and intervention, which was 3 years ago, also required blood transfusion. Patient reports this morning started feeling shortnes of breath. He does have a h/o stage IV COPd and tried neb treatment at home with minimal relief and decided to come in, no chest pain, no dizziness, g/o htN and CAD, and HLD,   Not on oxygen. Did not take [facility] today. Denies any abdominal pain, no nausea or vomiting. PAST MEDICAL / SURGICAL / SOCIAL / FAMILY HISTORY      has a past medical history of CAD (coronary artery disease), COPD (chronic obstructive pulmonary disease) ([address]), Essential hypertension, Hyperlipidemia, Prostate cancer ([address]), and Wears glasses. has a past surgical history that includes hernia repair (Right, 1975); Colonoscopy (2015); Ankle surgery; Carotid stent placement (Left, 2019); and other surgical history.       Social History     Socioeconomic History    Marital status:      Spouse name: Not on file    Number of children: Not on file    Years of education: Not on file    Highest education level: Not on file   Occupational History    Not on file   Tobacco Use    Smoking status: Former     Packs/day: 1.00     Years: 30.00     Additional pack years: 0.00     Total pack years: 30.00     Types: Cigarettes     Quit date: 7/30/2013     Years since quitting: 10.3

## 2023-12-05 NOTE — TELEPHONE ENCOUNTER
Received call from [facility] with surgery in regards to scheduling the patient with Dr. Pham Blackwell per provider request for Monday Add Ons. Patient will need to be added to schedule for DX : Radiation Prostatitis- Flex Sig. Please advise. Thank you.

## 2023-12-06 ENCOUNTER — ANESTHESIA EVENT (OUTPATIENT)
Dept: ENDOSCOPY | Age: 73
End: 2023-12-06
Payer: MEDICARE

## 2023-12-06 ASSESSMENT — COPD QUESTIONNAIRES: CAT_SEVERITY: SEVERE

## 2023-12-06 NOTE — TELEPHONE ENCOUNTER
[address] [facility]   [address], [place]    Phone [phone] Fax [phone]      Pamella Elizabeth 1950 male    [address]   Marital Status:          Home Phone: [phone]      Cell Phone:    Telephone Information:   Mobile [phone]          Surgeon: Karime Carter Surgery Date: 12/11/23   Time:     Procedure: Flex Sig     Diagnosis: radiation prostatitis     Important Medical History:  In [facility]    Special Inst/Equip: Regular    CPT Codes:    23451  Latex Allergy: No     Cardiac Device:  No    Anesthesia:  MAC          Admission Type:  Same Day                        Admit Prior to Day of Surgery: No    Case Location:  Ambulatory            Preadmission Testing:  Phone Call          PAT Date and Time:     Need Preop Cardiac Clearance: No    Does Patient have Cardiologist/physician?    No

## 2023-12-07 RX ORDER — ASPIRIN 81 MG/1
81 TABLET, CHEWABLE ORAL DAILY
COMMUNITY

## 2023-12-07 NOTE — PROGRESS NOTES
[facility]   Preadmission Testing    Name: Jaylin Cote  : 1950  Patient Phone: [phone] (home)     Procedure: flex sigmoid anal procto  Date of Procedure:   Surgeon: José Miguel Cote MD    Ht:  167.6 cm (5' 6\")  Wt: 90.3 kg (199 lb)  Wt method: Stated    Allergies: No Known Allergies             There were no vitals filed for this visit. No LMP for male patient. Do you take blood thinners? [x] Yes    [] No         Instructed to stop blood thinners prior to procedure? [x] Yes    [] No      [] N/A   Do you have sleep apnea? [x] Yes    [] No     Do you have acid reflux ? [] Yes    [x] No     Do you have  hiatal hernia? [] Yes    [x] No    Do you ever experience motion sickness? [] Yes    [x] No     Have you had a respiratory infection or sore throat in last 4 weeks before surgery? [x] Yes    [] No     Do you have poorly controlled asthma or COPD? Difficulty with intubation in past? [x] Yes    [] No      [] Yes    [x] No       Do you have a history of angina in the last month or symptomatic arrhythmia? [] Yes    [x] No     Do you have significant central nervous system disease? [] Yes    [x] No     Have you had an EKG, labs, or chest xray in last 12 months? If yes provide copies to anesthesia   [x] Yes    [] No       [] Lab    [x] EKG    [] CXR     Have you had a stress test?     [] Yes    [x] No    When/where:    Was it normal?    [] Yes    [] No     Do you or your family have a history of Malignant Hyperthermia? [] Yes    [x] No           Do you smoke? [] Yes    [x] No      Please refrain from smoking on the day of surgery. Patient instructed on: [x] NPO Status   [x] Meds to Take  [x] Ride Home  [x]No Jewelry/Contact Lenses/Nail [place]  [x] Prep/Lax/Clear Liquids    [] Chlorhexidene     DOS Patient Needs [] HCG   [] Blood Sugar  [] PT/INR    [] T&S       COVID Vaccinated?  [] Yes    [x] No                     Patient instructed on the pre-operative,

## 2023-12-08 ENCOUNTER — TELEPHONE (OUTPATIENT)
Dept: GASTROENTEROLOGY | Age: 73
End: 2023-12-08

## 2023-12-11 ENCOUNTER — HOSPITAL ENCOUNTER (OUTPATIENT)
Age: 73
Setting detail: OUTPATIENT SURGERY
Discharge: HOME | End: 2023-12-11
Attending: INTERNAL MEDICINE | Admitting: INTERNAL MEDICINE
Payer: MEDICARE

## 2023-12-11 ENCOUNTER — ANESTHESIA (OUTPATIENT)
Dept: ENDOSCOPY | Age: 73
End: 2023-12-11
Payer: MEDICARE

## 2023-12-11 ENCOUNTER — TELEPHONE (OUTPATIENT)
Dept: GASTROENTEROLOGY | Age: 73
End: 2023-12-11

## 2023-12-11 VITALS
TEMPERATURE: 97.6 F | HEART RATE: 66 BPM | HEIGHT: 66 IN | DIASTOLIC BLOOD PRESSURE: 90 MMHG | BODY MASS INDEX: 31.93 KG/M2 | RESPIRATION RATE: 18 BRPM | SYSTOLIC BLOOD PRESSURE: 184 MMHG | WEIGHT: 198.7 LBS | OXYGEN SATURATION: 96 %

## 2023-12-11 PROCEDURE — 7100000010 HC PHASE II RECOVERY - FIRST 15 MIN: Performed by: INTERNAL MEDICINE

## 2023-12-11 PROCEDURE — 2500000003 HC RX 250 WO HCPCS: Performed by: NURSE ANESTHETIST, CERTIFIED REGISTERED

## 2023-12-11 PROCEDURE — 6360000002 HC RX W HCPCS: Performed by: NURSE ANESTHETIST, CERTIFIED REGISTERED

## 2023-12-11 PROCEDURE — 2720000010 HC SURG SUPPLY STERILE: Performed by: INTERNAL MEDICINE

## 2023-12-11 PROCEDURE — 3700000001 HC ADD 15 MINUTES (ANESTHESIA): Performed by: INTERNAL MEDICINE

## 2023-12-11 PROCEDURE — 3609156700 HC PROCTOSIGMOIDOSCOPY DX: Performed by: INTERNAL MEDICINE

## 2023-12-11 PROCEDURE — 3700000000 HC ANESTHESIA ATTENDED CARE: Performed by: INTERNAL MEDICINE

## 2023-12-11 PROCEDURE — 2709999900 HC NON-CHARGEABLE SUPPLY: Performed by: INTERNAL MEDICINE

## 2023-12-11 PROCEDURE — 2580000003 HC RX 258: Performed by: INTERNAL MEDICINE

## 2023-12-11 PROCEDURE — 7100000011 HC PHASE II RECOVERY - ADDTL 15 MIN: Performed by: INTERNAL MEDICINE

## 2023-12-11 RX ORDER — MAGNESIUM HYDROXIDE 1200 MG/15ML
LIQUID ORAL PRN
Status: DISCONTINUED | OUTPATIENT
Start: 2023-12-11 | End: 2023-12-11 | Stop reason: ALTCHOICE

## 2023-12-11 RX ORDER — LIDOCAINE HYDROCHLORIDE 10 MG/ML
INJECTION, SOLUTION EPIDURAL; INFILTRATION; INTRACAUDAL; PERINEURAL PRN
Status: DISCONTINUED | OUTPATIENT
Start: 2023-12-11 | End: 2023-12-11 | Stop reason: SDUPTHER

## 2023-12-11 RX ORDER — PROPOFOL 10 MG/ML
INJECTION, EMULSION INTRAVENOUS CONTINUOUS PRN
Status: DISCONTINUED | OUTPATIENT
Start: 2023-12-11 | End: 2023-12-11 | Stop reason: SDUPTHER

## 2023-12-11 RX ORDER — SODIUM CHLORIDE, SODIUM LACTATE, POTASSIUM CHLORIDE, CALCIUM CHLORIDE 600; 310; 30; 20 MG/100ML; MG/100ML; MG/100ML; MG/100ML
INJECTION, SOLUTION INTRAVENOUS CONTINUOUS
Status: DISCONTINUED | OUTPATIENT
Start: 2023-12-11 | End: 2023-12-11 | Stop reason: HOSPADM

## 2023-12-11 RX ADMIN — SODIUM CHLORIDE, POTASSIUM CHLORIDE, SODIUM LACTATE AND CALCIUM CHLORIDE: 600; 310; 30; 20 INJECTION, SOLUTION INTRAVENOUS at 09:48

## 2023-12-11 RX ADMIN — PROPOFOL 100 MCG/KG/MIN: 10 INJECTION, EMULSION INTRAVENOUS at 10:30

## 2023-12-11 RX ADMIN — LIDOCAINE HYDROCHLORIDE 50 MG: 10 INJECTION, SOLUTION EPIDURAL; INFILTRATION; INTRACAUDAL; PERINEURAL at 10:30

## 2023-12-11 ASSESSMENT — PAIN - FUNCTIONAL ASSESSMENT: PAIN_FUNCTIONAL_ASSESSMENT: NONE - DENIES PAIN

## 2023-12-11 ASSESSMENT — COPD QUESTIONNAIRES: CAT_SEVERITY: MODERATE

## 2023-12-11 NOTE — OP NOTE
[facility]    COLONOSCOPY    PROCEDURE DATE: 12/11/23    REFERRING PHYSICIAN: No ref. provider found     PRIMARY CARE PROVIDER: Darren Flores MD    ATTENDING PHYSICIAN: Beto Campo MD     HISTORY: Mr. Fiorella Harrison is a 75 y.o. male who presents to the  endoscopy unit for colonoscopy. The patient's clinical history is remarkable for prostate cancer. He is currently medically stable and appropriate for the planned procedure. PREOPERATIVE DIAGNOSIS: rectal bleeding. PROCEDURES:   Transanal Colonoscopy with control of bleeding    POSTPROCEDURE DIAGNOSIS:  Proctitis    MEDICATIONS:   MAC per anesthesia      EBL 2 ml      INSTRUMENT: Olympus CF-H190 AL Pediatric flexible Colonoscope. PREPARATION: The nature and character of the procedure as well as risks, benefits, and alternatives were discussed with the patient and informed consent was obtained. Complications were said to include, but were not limited to: medication allergy, medication reaction, cardiovascular and respiratory problems, bleeding, perforation, infection, and/or missed diagnosis. Following arrival in the endoscopy room, the patient was placed in the left lateral decubitus position and final time-out accomplished in the presence of the nursing staff. Baseline vital signs were obtained and reviewed, and IV sedation was subsequently initiated. FINDINGS: Rectal examination demonstrated no significant visible external abnormality and digital palpation was unremarkable. Following adequate conscious sedation the colonoscope was introduced and advanced under direct visualization to 30 cm from the anorectal verge. The oozing vessels were noted 5 cm into the anorectal verge. 7Fr Gold probe was used to cauterize the oozing lesions which were above the dentate line. Retroflexion was not performed due to a contracted rectum.        IMPRESSION:   Radiation proctitis     RECOMMENDATIONS:   1) Needs colon cancer screening as he

## 2023-12-11 NOTE — ANESTHESIA POSTPROCEDURE EVALUATION
Department of Anesthesiology  Postprocedure Note    Patient: Abdoulaye Baumann  MRN: [id number]  [address] [facility] 1950  Date of evaluation: 12/11/2023      Procedure Summary       Date: 12/11/23 Room / Location: [address] [place] [address] ENDOSCOPY    Anesthesia Start: 1031 Anesthesia Stop: 1072    Procedure: ANAL PROCTO SIGMOIDOSCOPY FLEXIBLE (Abdomen) Diagnosis:       Radiation proctitis      (Radiation proctitis [K62.7])    Surgeons: Zulma Carrington MD Responsible Provider: EVER Hdez CRNA    Anesthesia Type: MAC ASA Status: 2            Anesthesia Type: MAC    Janeen Phase I: Janeen Score: 10    Janeen Phase II:        Anesthesia Post Evaluation    Patient location during evaluation: bedside  Level of consciousness: awake and alert  Pain score: 0  Airway patency: patent  Nausea & Vomiting: no vomiting  Complications: no  Cardiovascular status: blood pressure returned to baseline  Respiratory status: acceptable and room air  Hydration status: stable  Pain management: adequate and satisfactory to patient

## 2023-12-11 NOTE — H&P
History and Physical    Patient's Name/Date of Birth: Mayelin Mehta / 1950 (89 y.o.)    MRN: [id number]     Date: December 11, 2023       CHIEF COMPLAINT:  History of radiation proctitis    Maria Guadalupe Clayton is a 75year old man with a past medical history of coronary artery disease, hypertension, prostate cancer s/p radiation who presented to the ED on 12/5/2023 for rectal bleeding. His hemoglobin was found to be 12.2 and he was discharged with plans for an outpatient flexible sigmoidoscopy with treatment. He reports intermittent rectal bleeding and no syncopal episode.      Past Medical History:   Diagnosis Date    CAD (coronary artery disease)     Stent placement 2019    COPD (chronic obstructive pulmonary disease) ([facility])     Essential hypertension     Hyperlipidemia     Prostate cancer ([address])     Wears glasses      Past Surgical History:   Procedure Laterality Date    ANKLE SURGERY      CAROTID STENT PLACEMENT Left 2019    COLONOSCOPY  2015    Normal    HERNIA REPAIR Right 1975    OTHER SURGICAL HISTORY      cauterization intestines-[facility]     Current Facility-Administered Medications   Medication Dose Route Frequency Provider Last Rate Last Admin    lactated ringers IV soln infusion   IntraVENous Continuous oLuis Evans MD         No Known Allergies  Family History   Problem Relation Age of Onset    No Known Problems Father     Cancer Mother      Social History     Socioeconomic History    Marital status:      Spouse name: Not on file    Number of children: Not on file    Years of education: Not on file    Highest education level: Not on file   Occupational History    Not on file   Tobacco Use    Smoking status: Former     Packs/day: 1.00     Years: 30.00     Additional pack years: 0.00     Total pack years: 30.00     Types: Cigarettes     Quit date: 7/30/2013     Years since quitting: 10.3    Smokeless tobacco: Never   Vaping Use    Vaping Use: Never used   Substance and Sexual Activity    Alcohol use:

## 2023-12-11 NOTE — TELEPHONE ENCOUNTER
[address] [facility]   [address], [place]    Phone [phone] Fax [phone]      Blaise Augie 1950 male    [address] [facility]   Marital Status:          Home Phone: [phone]      Cell Phone:    Telephone Information:   Mobile [phone]          Surgeon: CHANCE   Surgery Date: 5/14/24     Time:     Procedure: Colonoscopy    Diagnosis: Z12.11 Screening colon     Important Medical History:  In [facility]    Special Inst/Equip: Regular    CPT Codes:    90365    Latex Allergy: No       Cardiac Device:  No    Anesthesia:  MAC          Admission Type:  Same Day                          Admit Prior to Day of Surgery: No    Case Location:  Ambulatory            Preadmission Testing:  Phone Call          PAT Date and Time:     Need Preop Cardiac Clearance: No    Does Patient have Cardiologist/physician?     No

## 2024-01-02 ENCOUNTER — HOSPITAL ENCOUNTER (OUTPATIENT)
Dept: CARDIAC REHAB | Age: 74
Discharge: HOME | End: 2024-01-02

## 2024-01-05 ENCOUNTER — HOSPITAL ENCOUNTER (OUTPATIENT)
Dept: CARDIAC REHAB | Age: 74
Discharge: HOME | End: 2024-01-05

## 2024-01-18 ENCOUNTER — HOSPITAL ENCOUNTER (OUTPATIENT)
Age: 74
Discharge: HOME | End: 2024-01-18
Payer: MEDICARE

## 2024-01-18 DIAGNOSIS — C61 PROSTATE CANCER (HCC): ICD-10-CM

## 2024-01-18 LAB — PSA SERPL-MCNC: <0.03 NG/ML (ref 0–4)

## 2024-01-18 PROCEDURE — 84153 ASSAY OF PSA TOTAL: CPT

## 2024-01-18 PROCEDURE — 36415 COLL VENOUS BLD VENIPUNCTURE: CPT

## 2024-01-23 ENCOUNTER — TELEPHONE (OUTPATIENT)
Dept: UROLOGY | Age: 74
End: 2024-01-23

## 2024-01-26 ENCOUNTER — HOSPITAL ENCOUNTER (OUTPATIENT)
Dept: CARDIAC REHAB | Age: 74
Discharge: HOME | End: 2024-01-26

## 2024-01-30 ENCOUNTER — HOSPITAL ENCOUNTER (OUTPATIENT)
Dept: CARDIAC REHAB | Age: 74
Discharge: HOME | End: 2024-01-30

## 2024-02-02 ENCOUNTER — HOSPITAL ENCOUNTER (OUTPATIENT)
Dept: CARDIAC REHAB | Age: 74
Discharge: HOME | End: 2024-02-02

## 2024-02-06 ENCOUNTER — HOSPITAL ENCOUNTER (OUTPATIENT)
Dept: CARDIAC REHAB | Age: 74
Discharge: HOME | End: 2024-02-06

## 2024-02-09 ENCOUNTER — HOSPITAL ENCOUNTER (OUTPATIENT)
Dept: CARDIAC REHAB | Age: 74
Discharge: HOME | End: 2024-02-09

## 2024-02-13 ENCOUNTER — HOSPITAL ENCOUNTER (OUTPATIENT)
Dept: CARDIAC REHAB | Age: 74
Discharge: HOME | End: 2024-02-13

## 2024-02-13 ENCOUNTER — OFFICE VISIT (OUTPATIENT)
Dept: UROLOGY | Age: 74
End: 2024-02-13
Payer: MEDICARE

## 2024-02-13 VITALS
SYSTOLIC BLOOD PRESSURE: 158 MMHG | DIASTOLIC BLOOD PRESSURE: 73 MMHG | WEIGHT: 200 LBS | TEMPERATURE: 98.5 F | BODY MASS INDEX: 32.28 KG/M2 | HEART RATE: 69 BPM

## 2024-02-13 DIAGNOSIS — C61 PROSTATE CANCER (HCC): Primary | ICD-10-CM

## 2024-02-13 PROCEDURE — 3077F SYST BP >= 140 MM HG: CPT | Performed by: PHYSICIAN ASSISTANT

## 2024-02-13 PROCEDURE — 1123F ACP DISCUSS/DSCN MKR DOCD: CPT | Performed by: PHYSICIAN ASSISTANT

## 2024-02-13 PROCEDURE — 3078F DIAST BP <80 MM HG: CPT | Performed by: PHYSICIAN ASSISTANT

## 2024-02-13 PROCEDURE — 99213 OFFICE O/P EST LOW 20 MIN: CPT | Performed by: PHYSICIAN ASSISTANT

## 2024-02-13 NOTE — PROGRESS NOTES
HPI:      Patient is a 73 y.o. male in no acute distress.  He is alert and oriented to person, place, and time.       History  5/2018 Prostate biopsy, two cores [place] 3+4. PSA 14.59     8/2018 Started Firmagon in conjunction with IMRT radiation.      11/2018 Changed from firmagon to Eligard      11/2020 Stopped ADT after ADT for 24 months      Hospitalized for rectal bleeding and has been through several cauterization surgeries to stop the bleeding.  The surgeon did attribute this to a side effect of radiation.     PSA  1/2024 - <0.03  1/2023 - <0.02  7/2022 - <0.02  1/2022 - <0.02  7/2021 - <0.02  1/2021 - <0.01  3/2020 - <0.01  10/2019 - <0.01  6/2019 - <0.01  2/2019 - 0.08  11/2018 - 0.36  3/2018 - 14.59    Today  Patient is here today for follow-up prostate cancer.  Patient did have a recent PSA which is undetectable.  He denies unintentional weight loss, decreased energy or appetite, new or worsening bone/hip/back pain. He denies any lower extremity numbness and tingling. He denies new or worsening LUTS. He denies gross hematuria or dysuria.       Past Medical History:   Diagnosis Date    CAD (coronary artery disease)     Stent placement 2019    COPD (chronic obstructive pulmonary disease) (HCC)     Essential hypertension     Hyperlipidemia     Prostate cancer (HCC)     Wears glasses      Past Surgical History:   Procedure Laterality Date    ANKLE SURGERY      CAROTID STENT PLACEMENT Left 2019    COLONOSCOPY  2015    Normal    HERNIA REPAIR Right 1975    OTHER SURGICAL HISTORY      cauterization intestines-[facility]    PROCTOSIGMOIDOSCOPY N/A 12/11/2023    ANAL PROCTO SIGMOIDOSCOPY FLEXIBLE performed by Lexus Evans MD at [facility] ENDOSCOPY     Outpatient Encounter Medications as of 2/13/2024   Medication Sig Dispense Refill    aspirin 81 MG chewable tablet Take 1 tablet by mouth daily      ezetimibe (ZETIA) 10 MG tablet Take 1 tablet by mouth daily      fluticasone-salmeterol (WIXELA INHUB) 500-50 MCG/ACT AEPB

## 2024-02-16 ENCOUNTER — HOSPITAL ENCOUNTER (OUTPATIENT)
Dept: CARDIAC REHAB | Age: 74
Discharge: HOME | End: 2024-02-16

## 2024-03-01 ENCOUNTER — APPOINTMENT (OUTPATIENT)
Dept: CARDIAC REHAB | Age: 74
End: 2024-03-01

## 2024-03-01 PROBLEM — J96.01 ACUTE RESPIRATORY FAILURE WITH HYPOXIA (HCC): Status: RESOLVED | Noted: 2018-02-22 | Resolved: 2024-03-01

## 2024-03-05 ENCOUNTER — APPOINTMENT (OUTPATIENT)
Dept: CARDIAC REHAB | Age: 74
End: 2024-03-05

## 2024-03-08 ENCOUNTER — HOSPITAL ENCOUNTER (OUTPATIENT)
Dept: CARDIAC REHAB | Age: 74
Setting detail: THERAPIES SERIES
Discharge: HOME | End: 2024-03-08

## 2024-03-12 ENCOUNTER — HOSPITAL ENCOUNTER (OUTPATIENT)
Dept: CARDIAC REHAB | Age: 74
Setting detail: THERAPIES SERIES
Discharge: HOME | End: 2024-03-12

## 2024-03-15 ENCOUNTER — APPOINTMENT (OUTPATIENT)
Dept: CARDIAC REHAB | Age: 74
End: 2024-03-15

## 2024-03-19 ENCOUNTER — HOSPITAL ENCOUNTER (OUTPATIENT)
Dept: CARDIAC REHAB | Age: 74
Setting detail: THERAPIES SERIES
Discharge: HOME | End: 2024-03-19

## 2024-03-22 ENCOUNTER — APPOINTMENT (OUTPATIENT)
Dept: CARDIAC REHAB | Age: 74
End: 2024-03-22

## 2024-03-26 ENCOUNTER — HOSPITAL ENCOUNTER (OUTPATIENT)
Dept: CARDIAC REHAB | Age: 74
Setting detail: THERAPIES SERIES
Discharge: HOME | End: 2024-03-26

## 2024-03-26 PROCEDURE — 9900000065 HC CARDIAC REHAB PHASE 3 - 1 VISIT

## 2024-03-29 ENCOUNTER — HOSPITAL ENCOUNTER (OUTPATIENT)
Dept: CARDIAC REHAB | Age: 74
Setting detail: THERAPIES SERIES
Discharge: HOME | End: 2024-03-29

## 2024-04-05 ENCOUNTER — HOSPITAL ENCOUNTER (OUTPATIENT)
Dept: CARDIAC REHAB | Age: 74
Setting detail: THERAPIES SERIES
Discharge: HOME | End: 2024-04-05

## 2024-04-09 ENCOUNTER — HOSPITAL ENCOUNTER (OUTPATIENT)
Dept: CARDIAC REHAB | Age: 74
Setting detail: THERAPIES SERIES
Discharge: HOME | End: 2024-04-09

## 2024-04-16 ENCOUNTER — HOSPITAL ENCOUNTER (OUTPATIENT)
Dept: CARDIAC REHAB | Age: 74
Setting detail: THERAPIES SERIES
Discharge: HOME | End: 2024-04-16

## 2024-04-19 ENCOUNTER — HOSPITAL ENCOUNTER (OUTPATIENT)
Dept: VASCULAR LAB | Age: 74
End: 2024-04-19
Attending: SURGERY
Payer: MEDICARE

## 2024-04-19 DIAGNOSIS — I65.23 BILATERAL CAROTID ARTERY STENOSIS: ICD-10-CM

## 2024-04-19 DIAGNOSIS — I70.213 ATHEROSCLER OF NATIVE ARTERY OF BOTH LEGS WITH INTERMIT CLAUDICATION (HCC): ICD-10-CM

## 2024-04-19 PROCEDURE — 93923 UPR/LXTR ART STDY 3+ LVLS: CPT

## 2024-04-19 PROCEDURE — 93925 LOWER EXTREMITY STUDY: CPT

## 2024-04-19 PROCEDURE — 93880 EXTRACRANIAL BILAT STUDY: CPT

## 2024-04-21 LAB
VAS LEFT ABI: 0.43
VAS LEFT ARM BP: 147 MMHG
VAS LEFT ATA MID PSV: 16.5 CM/S
VAS LEFT BULB EDV: 15.4 CM/S
VAS LEFT BULB PSV: 84 CM/S
VAS LEFT CCA DIST EDV: 15.4 CM/S
VAS LEFT CCA DIST PSV: 107.3 CM/S
VAS LEFT CCA MID EDV: 12.81 CM/S
VAS LEFT CCA MID PSV: 82.69 CM/S
VAS LEFT CCA PROX EDV: 16.7 CM/S
VAS LEFT CCA PROX PSV: 89.2 CM/S
VAS LEFT CFA PROX PSV: 173.7 CM/S
VAS LEFT DORSALIS PEDIS BP: 68 MMHG
VAS LEFT ECA EDV: 13.65 CM/S
VAS LEFT ECA PSV: 200.3 CM/S
VAS LEFT ICA DIST EDV: 35.3 CM/S
VAS LEFT ICA DIST PSV: 100.3 CM/S
VAS LEFT ICA MID EDV: 43.2 CM/S
VAS LEFT ICA MID PSV: 141.7 CM/S
VAS LEFT ICA PROX EDV: 16 CM/S
VAS LEFT ICA PROX PSV: 79 CM/S
VAS LEFT ICA/CCA PSV: 1.32 NO UNITS
VAS LEFT PERONEAL MID PSV: 12.1 CM/S
VAS LEFT PFA PROX PSV: 225.2 CM/S
VAS LEFT POP A DIST PSV: 15.9 CM/S
VAS LEFT POP A PROX PSV: 28.8 CM/S
VAS LEFT POP A PROX VEL RATIO: 1.48
VAS LEFT PTA BP: 64 MMHG
VAS LEFT PTA MID PSV: 7 CM/S
VAS LEFT SFA DIST PSV: 19.5 CM/S
VAS LEFT SFA DIST VEL RATIO: 0.56
VAS LEFT SFA MID PSV: 35 CM/S
VAS LEFT SFA MID VEL RATIO: 0.44
VAS LEFT SFA PROX PSV: 79.7 CM/S
VAS LEFT SFA PROX VEL RATIO: 0.46
VAS LEFT TBI: 0.49
VAS LEFT TOE PRESSURE: 78 MMHG
VAS LEFT VERTEBRAL EDV: 11.32 CM/S
VAS LEFT VERTEBRAL PSV: 36.9 CM/S
VAS RIGHT ABI: 0.57
VAS RIGHT ARM BP: 159 MMHG
VAS RIGHT ATA MID PSV: 35.6 CM/S
VAS RIGHT BULB EDV: 13.1 CM/S
VAS RIGHT BULB PSV: 75.7 CM/S
VAS RIGHT CCA DIST EDV: 17.5 CM/S
VAS RIGHT CCA DIST PSV: 71.3 CM/S
VAS RIGHT CCA MID EDV: 15.28 CM/S
VAS RIGHT CCA MID PSV: 77.91 CM/S
VAS RIGHT CCA PROX EDV: 14.2 CM/S
VAS RIGHT CCA PROX PSV: 69.1 CM/S
VAS RIGHT CFA PROX PSV: 214.6 CM/S
VAS RIGHT DORSALIS PEDIS BP: 91 MMHG
VAS RIGHT ECA EDV: 19.07 CM/S
VAS RIGHT ECA PSV: 251.8 CM/S
VAS RIGHT ICA DIST EDV: 23.2 CM/S
VAS RIGHT ICA DIST PSV: 74.9 CM/S
VAS RIGHT ICA MID EDV: 21.9 CM/S
VAS RIGHT ICA MID PSV: 95.6 CM/S
VAS RIGHT ICA PROX EDV: 24.5 CM/S
VAS RIGHT ICA PROX PSV: 89.2 CM/S
VAS RIGHT ICA/CCA PSV: 1.3 NO UNITS
VAS RIGHT PERONEAL MID PSV: 24.5 CM/S
VAS RIGHT PFA PROX PSV: 490.3 CM/S
VAS RIGHT POP A DIST PSV: 48.1 CM/S
VAS RIGHT POP A PROX PSV: 52.7 CM/S
VAS RIGHT POP A PROX VEL RATIO: 1.19
VAS RIGHT PTA BP: 63 MMHG
VAS RIGHT PTA MID PSV: 55.1 CM/S
VAS RIGHT SFA DIST PSV: 44.2 CM/S
VAS RIGHT SFA DIST VEL RATIO: 0.43
VAS RIGHT SFA MID PSV: 103.3 CM/S
VAS RIGHT SFA MID VEL RATIO: 0.3
VAS RIGHT SFA PROX PSV: 311.3 CM/S
VAS RIGHT SFA PROX VEL RATIO: 1.5
VAS RIGHT TBI: 0.43
VAS RIGHT TOE PRESSURE: 69 MMHG
VAS RIGHT VERTEBRAL EDV: 16.7 CM/S
VAS RIGHT VERTEBRAL PSV: 72.4 CM/S

## 2024-04-21 PROCEDURE — 93880 EXTRACRANIAL BILAT STUDY: CPT | Performed by: STUDENT IN AN ORGANIZED HEALTH CARE EDUCATION/TRAINING PROGRAM

## 2024-04-21 PROCEDURE — 93923 UPR/LXTR ART STDY 3+ LVLS: CPT | Performed by: STUDENT IN AN ORGANIZED HEALTH CARE EDUCATION/TRAINING PROGRAM

## 2024-04-21 PROCEDURE — 93925 LOWER EXTREMITY STUDY: CPT | Performed by: STUDENT IN AN ORGANIZED HEALTH CARE EDUCATION/TRAINING PROGRAM

## 2024-04-23 ENCOUNTER — HOSPITAL ENCOUNTER (OUTPATIENT)
Dept: CARDIAC REHAB | Age: 74
Setting detail: THERAPIES SERIES
Discharge: HOME | End: 2024-04-23

## 2024-04-26 ENCOUNTER — HOSPITAL ENCOUNTER (OUTPATIENT)
Dept: CARDIAC REHAB | Age: 74
Setting detail: THERAPIES SERIES
Discharge: HOME | End: 2024-04-26

## 2024-04-30 ENCOUNTER — HOSPITAL ENCOUNTER (OUTPATIENT)
Dept: CARDIAC REHAB | Age: 74
Discharge: HOME | End: 2024-04-30

## 2024-05-01 ENCOUNTER — OFFICE VISIT (OUTPATIENT)
Dept: VASCULAR SURGERY | Age: 74
End: 2024-05-01
Payer: MEDICARE

## 2024-05-01 VITALS
SYSTOLIC BLOOD PRESSURE: 138 MMHG | TEMPERATURE: 97.8 F | HEIGHT: 66 IN | HEART RATE: 68 BPM | DIASTOLIC BLOOD PRESSURE: 73 MMHG | WEIGHT: 200 LBS | RESPIRATION RATE: 18 BRPM | BODY MASS INDEX: 32.14 KG/M2

## 2024-05-01 DIAGNOSIS — I70.213 ATHEROSCLER OF NATIVE ARTERY OF BOTH LEGS WITH INTERMIT CLAUDICATION (HCC): Primary | ICD-10-CM

## 2024-05-01 PROCEDURE — 99213 OFFICE O/P EST LOW 20 MIN: CPT | Performed by: SURGERY

## 2024-05-01 PROCEDURE — 3075F SYST BP GE 130 - 139MM HG: CPT | Performed by: SURGERY

## 2024-05-01 PROCEDURE — 1123F ACP DISCUSS/DSCN MKR DOCD: CPT | Performed by: SURGERY

## 2024-05-01 PROCEDURE — 3078F DIAST BP <80 MM HG: CPT | Performed by: SURGERY

## 2024-05-01 NOTE — PATIENT INSTRUCTIONS
SURVEY:    Thank you for allowing us to care for you today.    You may be receiving a survey from [facility] regarding your visit today- electronically or via mail.      Please help us by completing the survey as this will provide the needed feedback to ensure we are providing the very best care for you and your family.    If you cannot score us a very good on any question, please call the office to discuss how we could have made your experience a very good one.    Thank you.       STAFF:    Latha Bronson, Evy Balbuena, Carmita Aragon      CLINICAL STAFF:    Vee Hutton LPN, Gisell Richardson LPN, Albertina Stevens LPN, Billie Keller CMA

## 2024-05-01 NOTE — PROGRESS NOTES
[facility], [facility] VASCULAR PART OF [address]  [facility] [address]  Dept: [phone]     Patient: Sahil Motley  : 1950  MRN: [id number]  DOS: 2024            HPI:  Sahil Motley is a 73 y.o. male who returns to the office regarding his lower extremity claudication.  Recall that he has bilateral lower extremity claudication worse on the left than the right.  This occurs at approximately half a block.  He does not have rest pain.  He does not have ulceration or gangrene.  He had recent duplex examination revealing robust sharp monophasic signals in the common femoral arteries with continuation of those robust signals throughout the lower extremity on the right but on the left he has significant attenuation of those signals to weaker monophasic signals.  His claudication is worse on the left than the right.  His JOSEP is 0.4 on the left and 0.5-0.6 on the right.    Review of Systems    Vitals:    24 0811   BP: 138/73   Site: Left Upper Arm   Position: Sitting   Cuff Size: Medium Adult   Pulse: 68   Resp: 18   Temp: 97.8 °F (36.6 °C)   TempSrc: Temporal   Weight: 90.7 kg (200 lb)   Height: 1.676 m (5' 6\")          Physical Exam  On examination he has palpable femoral pulses bilaterally slightly weaker on the left than the right.  I cannot palpate popliteal dorsalis pedis or posterior tibial pulses in either lower extremity.  Assessment:  1. Atheroscler of native artery of both legs with intermit claudication (HCC)          Plan:  At this point we can continue to follow him at 6-month intervals with JOSEP and PVR.  He has no significant carotid stenosis on either side as examined by carotid duplex.  His velocities are in fact without significant stenosis.  We will see him at 6 months with JOSEP and PVR as well as lower extremity arterial duplex examination.    Electronically signed by:  Lakhwinder Norman

## 2024-05-03 ENCOUNTER — HOSPITAL ENCOUNTER (OUTPATIENT)
Dept: CARDIAC REHAB | Age: 74
Discharge: HOME | End: 2024-05-03

## 2024-05-07 ENCOUNTER — HOSPITAL ENCOUNTER (OUTPATIENT)
Dept: CARDIAC REHAB | Age: 74
Discharge: HOME | End: 2024-05-07

## 2024-05-09 ENCOUNTER — TELEPHONE (OUTPATIENT)
Dept: GASTROENTEROLOGY | Age: 74
End: 2024-05-09

## 2024-05-13 ENCOUNTER — TELEPHONE (OUTPATIENT)
Dept: GASTROENTEROLOGY | Age: 74
End: 2024-05-13

## 2024-05-13 NOTE — TELEPHONE ENCOUNTER
Received call from Surgery Dept stating patient will need cardiology clearance prior to scheduled procedure for tomorrow- Nathan Stark- Colonoscopy. Please advise. Thank you.

## 2024-05-13 NOTE — TELEPHONE ENCOUNTER
Patient is scheduled to have a colonoscopy procedure scheduled for 5/14.    Surgery is requiring patient to have cardiac clearance.     PLEASE INDICATE WHETHER PATIENT IS CLEARED FOR SURGERY.

## 2024-05-13 NOTE — TELEPHONE ENCOUNTER
Spoke to patient to cancel procedure on 5/14/24 with Dr. Evans due to needing cardiac clearance and was offered 5/28/24 to reschedule his procedure. Patient denied date and presented his frustration and stated that he would call back if he decides he wants to have the procedure done.

## 2024-05-17 ENCOUNTER — HOSPITAL ENCOUNTER (OUTPATIENT)
Dept: CARDIAC REHAB | Age: 74
Discharge: HOME | End: 2024-05-17

## 2024-05-17 NOTE — TELEPHONE ENCOUNTER
Please let me see her next week for surgical clearance.  I reviewed the chart and she was never seen by me before.  Thank you

## 2024-05-21 ENCOUNTER — HOSPITAL ENCOUNTER (OUTPATIENT)
Dept: CARDIAC REHAB | Age: 74
Discharge: HOME | End: 2024-05-21

## 2024-05-21 PROCEDURE — 9900000065 HC CARDIAC REHAB PHASE 3 - 1 VISIT

## 2024-05-24 ENCOUNTER — HOSPITAL ENCOUNTER (OUTPATIENT)
Dept: CARDIAC REHAB | Age: 74
Discharge: HOME | End: 2024-05-24

## 2024-05-28 ENCOUNTER — HOSPITAL ENCOUNTER (OUTPATIENT)
Dept: CARDIAC REHAB | Age: 74
Discharge: HOME | End: 2024-05-28

## 2024-05-30 ENCOUNTER — ENROLLMENT (OUTPATIENT)
Dept: PHARMACY | Facility: CLINIC | Age: 74
End: 2024-05-30

## 2024-05-31 ENCOUNTER — HOSPITAL ENCOUNTER (OUTPATIENT)
Dept: CARDIAC REHAB | Age: 74
Discharge: HOME | End: 2024-05-31

## 2024-06-03 ENCOUNTER — TELEPHONE (OUTPATIENT)
Dept: PHARMACY | Facility: CLINIC | Age: 74
End: 2024-06-03

## 2024-06-03 DIAGNOSIS — M79.10 MYALGIA: Primary | ICD-10-CM

## 2024-06-03 NOTE — TELEPHONE ENCOUNTER
[facility], [place] [facility]: STATIN THERAPY REVIEW  Identified statin use in persons with cardiovascular disease care gap per [employer]. Records dated: 5/1/24.     Last Visit: 3/1/24  Next Visit: 6/26/24    ASSESSMENT of Statin in Persons with Cardiovascular Disease Care Gap    Sahil Motley  has been identified as having a diagnosis for clinical ASCVD or event (e.g., inpatient hospitalization for MI, CABG, PCI or other revascularization procedures) in the measurement year and not currently filling a moderate or high intensity statin.     Currently Prescribed a statin: no  Per Reconcile Dispense History: atorvastatin 80mg daily last filled on 3/22/2020 for 90 day supply.   Removed from medication list 10/25/23 - per 10/25/23 vascular OV note: \"He does not tolerate atorvastatin.\"  Per chart: ezetimibe 10mg daily on current medication list as historical; appears 90ds [facility]    [facility]:  Allergies, Adverse Reactions, Alerts  Substance Category Reaction Severity Reaction type Status Date Reported Comments Source   ATORVASTATIN Propensity to adverse reactions to drug (finding) Muscle pain     active 07/03/2023   [facility]     Lab Results   Component Value Date/Time    CHOL 221 09/28/2016 12:00 AM    TRIG 71 09/28/2016 12:00 AM    HDL 47 01/17/2023 08:49 AM    CHOLFAST 299 01/17/2023 08:49 AM    TRIGLYCFAST 189 01/17/2023 08:49 AM     01/17/2023 08:49 AM     09/28/2016 12:00 AM     ALT   Date Value Ref Range Status   12/05/2023 11 5 - 41 U/L Final     AST   Date Value Ref Range Status   12/05/2023 12 <40 U/L Final     The 10-year ASCVD risk score (Emre PAINTING, et al., 2019) is: 34.7%    Values used to calculate the score:      Age: 73 years      Sex: Male      Is Non- : No      Diabetic: No      Tobacco smoker: No      Systolic Blood Pressure: 138 mmHg      Is BP treated: Yes      HDL Cholesterol: 47 mg/dL      Total

## 2024-06-04 ENCOUNTER — HOSPITAL ENCOUNTER (OUTPATIENT)
Dept: CARDIAC REHAB | Age: 74
Discharge: HOME | End: 2024-06-04

## 2024-06-11 ENCOUNTER — HOSPITAL ENCOUNTER (OUTPATIENT)
Dept: CARDIAC REHAB | Age: 74
Discharge: HOME | End: 2024-06-11

## 2024-06-14 ENCOUNTER — HOSPITAL ENCOUNTER (OUTPATIENT)
Dept: CARDIAC REHAB | Age: 74
Discharge: HOME | End: 2024-06-14

## 2024-06-18 ENCOUNTER — HOSPITAL ENCOUNTER (OUTPATIENT)
Dept: CARDIAC REHAB | Age: 74
Discharge: HOME | End: 2024-06-18

## 2024-06-21 ENCOUNTER — HOSPITAL ENCOUNTER (OUTPATIENT)
Dept: CARDIAC REHAB | Age: 74
Discharge: HOME | End: 2024-06-21

## 2024-06-28 ENCOUNTER — HOSPITAL ENCOUNTER (OUTPATIENT)
Dept: CARDIAC REHAB | Age: 74
Discharge: HOME | End: 2024-06-28

## 2024-06-28 PROCEDURE — 9900000065 HC CARDIAC REHAB PHASE 3 - 1 VISIT

## 2024-07-02 ENCOUNTER — HOSPITAL ENCOUNTER (OUTPATIENT)
Dept: CARDIAC REHAB | Age: 74
Discharge: HOME | End: 2024-07-02

## 2024-07-09 ENCOUNTER — HOSPITAL ENCOUNTER (OUTPATIENT)
Dept: CARDIAC REHAB | Age: 74
Discharge: HOME | End: 2024-07-09

## 2024-07-12 ENCOUNTER — HOSPITAL ENCOUNTER (OUTPATIENT)
Dept: CARDIAC REHAB | Age: 74
Discharge: HOME | End: 2024-07-12

## 2024-07-16 ENCOUNTER — HOSPITAL ENCOUNTER (OUTPATIENT)
Dept: CARDIAC REHAB | Age: 74
Discharge: HOME | End: 2024-07-16

## 2024-07-19 ENCOUNTER — HOSPITAL ENCOUNTER (OUTPATIENT)
Dept: CARDIAC REHAB | Age: 74
Discharge: HOME | End: 2024-07-19

## 2024-07-23 ENCOUNTER — HOSPITAL ENCOUNTER (OUTPATIENT)
Dept: CARDIAC REHAB | Age: 74
Setting detail: THERAPIES SERIES
Discharge: HOME | End: 2024-07-23

## 2024-07-26 ENCOUNTER — HOSPITAL ENCOUNTER (OUTPATIENT)
Dept: CARDIAC REHAB | Age: 74
Setting detail: THERAPIES SERIES
Discharge: HOME | End: 2024-07-26

## 2024-07-30 ENCOUNTER — HOSPITAL ENCOUNTER (OUTPATIENT)
Dept: CARDIAC REHAB | Age: 74
Setting detail: THERAPIES SERIES
Discharge: HOME | End: 2024-07-30

## 2024-08-02 ENCOUNTER — APPOINTMENT (OUTPATIENT)
Dept: CARDIAC REHAB | Age: 74
End: 2024-08-02

## 2024-08-06 ENCOUNTER — HOSPITAL ENCOUNTER (OUTPATIENT)
Dept: CARDIAC REHAB | Age: 74
Setting detail: THERAPIES SERIES
Discharge: HOME | End: 2024-08-06

## 2024-08-09 ENCOUNTER — APPOINTMENT (OUTPATIENT)
Dept: CARDIAC REHAB | Age: 74
End: 2024-08-09

## 2024-08-13 ENCOUNTER — APPOINTMENT (OUTPATIENT)
Dept: CARDIAC REHAB | Age: 74
End: 2024-08-13

## 2024-08-16 ENCOUNTER — HOSPITAL ENCOUNTER (OUTPATIENT)
Dept: CARDIAC REHAB | Age: 74
Setting detail: THERAPIES SERIES
Discharge: HOME | End: 2024-08-16

## 2024-08-16 PROCEDURE — 9900000065 HC CARDIAC REHAB PHASE 3 - 1 VISIT

## 2024-08-20 ENCOUNTER — HOSPITAL ENCOUNTER (OUTPATIENT)
Dept: CARDIAC REHAB | Age: 74
Setting detail: THERAPIES SERIES
Discharge: HOME | End: 2024-08-20

## 2024-08-23 ENCOUNTER — HOSPITAL ENCOUNTER (OUTPATIENT)
Dept: CARDIAC REHAB | Age: 74
Setting detail: THERAPIES SERIES
Discharge: HOME | End: 2024-08-23

## 2024-08-27 ENCOUNTER — APPOINTMENT (OUTPATIENT)
Dept: CARDIAC REHAB | Age: 74
End: 2024-08-27

## 2024-08-30 ENCOUNTER — APPOINTMENT (OUTPATIENT)
Dept: CARDIAC REHAB | Age: 74
End: 2024-08-30

## 2024-09-03 ENCOUNTER — HOSPITAL ENCOUNTER (OUTPATIENT)
Dept: CARDIAC REHAB | Age: 74
Setting detail: THERAPIES SERIES
Discharge: HOME | End: 2024-09-03

## 2024-09-10 ENCOUNTER — HOSPITAL ENCOUNTER (OUTPATIENT)
Dept: CARDIAC REHAB | Age: 74
Setting detail: THERAPIES SERIES
Discharge: HOME | End: 2024-09-10

## 2024-09-13 ENCOUNTER — HOSPITAL ENCOUNTER (OUTPATIENT)
Dept: CARDIAC REHAB | Age: 74
Setting detail: THERAPIES SERIES
Discharge: HOME | End: 2024-09-13

## 2024-09-17 ENCOUNTER — HOSPITAL ENCOUNTER (OUTPATIENT)
Dept: CARDIAC REHAB | Age: 74
Setting detail: THERAPIES SERIES
Discharge: HOME | End: 2024-09-17

## 2024-09-24 ENCOUNTER — HOSPITAL ENCOUNTER (OUTPATIENT)
Dept: CARDIAC REHAB | Age: 74
Setting detail: THERAPIES SERIES
Discharge: HOME | End: 2024-09-24

## 2024-09-27 ENCOUNTER — HOSPITAL ENCOUNTER (OUTPATIENT)
Dept: CARDIAC REHAB | Age: 74
Setting detail: THERAPIES SERIES
Discharge: HOME | End: 2024-09-27

## 2024-10-01 ENCOUNTER — HOSPITAL ENCOUNTER (OUTPATIENT)
Dept: CARDIAC REHAB | Age: 74
Setting detail: THERAPIES SERIES
Discharge: HOME | End: 2024-10-01

## 2024-10-04 ENCOUNTER — APPOINTMENT (OUTPATIENT)
Dept: CARDIAC REHAB | Age: 74
End: 2024-10-04

## 2024-10-08 ENCOUNTER — HOSPITAL ENCOUNTER (OUTPATIENT)
Dept: CARDIAC REHAB | Age: 74
Setting detail: THERAPIES SERIES
Discharge: HOME | End: 2024-10-08

## 2024-10-08 PROCEDURE — 9900000065 HC CARDIAC REHAB PHASE 3 - 1 VISIT

## 2024-10-11 ENCOUNTER — HOSPITAL ENCOUNTER (OUTPATIENT)
Dept: CARDIAC REHAB | Age: 74
Setting detail: THERAPIES SERIES
Discharge: HOME | End: 2024-10-11

## 2024-10-15 ENCOUNTER — HOSPITAL ENCOUNTER (OUTPATIENT)
Dept: CARDIAC REHAB | Age: 74
Discharge: HOME | End: 2024-10-15

## 2024-10-22 ENCOUNTER — HOSPITAL ENCOUNTER (OUTPATIENT)
Dept: CARDIAC REHAB | Age: 74
Discharge: HOME | End: 2024-10-22

## 2024-10-23 ENCOUNTER — OFFICE VISIT (OUTPATIENT)
Dept: VASCULAR SURGERY | Age: 74
End: 2024-10-23
Payer: MEDICARE

## 2024-10-23 ENCOUNTER — HOSPITAL ENCOUNTER (OUTPATIENT)
Dept: VASCULAR LAB | Age: 74
Discharge: HOME | End: 2024-10-25
Attending: SURGERY
Payer: MEDICARE

## 2024-10-23 VITALS
RESPIRATION RATE: 16 BRPM | BODY MASS INDEX: 32.82 KG/M2 | SYSTOLIC BLOOD PRESSURE: 127 MMHG | TEMPERATURE: 97 F | HEIGHT: 66 IN | WEIGHT: 204.2 LBS | DIASTOLIC BLOOD PRESSURE: 67 MMHG | HEART RATE: 77 BPM

## 2024-10-23 DIAGNOSIS — I70.213 ATHEROSCLER OF NATIVE ARTERY OF BOTH LEGS WITH INTERMIT CLAUDICATION (HCC): ICD-10-CM

## 2024-10-23 DIAGNOSIS — I70.213 ATHEROSCLER OF NATIVE ARTERY OF BOTH LEGS WITH INTERMIT CLAUDICATION (HCC): Primary | ICD-10-CM

## 2024-10-23 PROCEDURE — 3078F DIAST BP <80 MM HG: CPT | Performed by: SURGERY

## 2024-10-23 PROCEDURE — 3074F SYST BP LT 130 MM HG: CPT | Performed by: SURGERY

## 2024-10-23 PROCEDURE — 99213 OFFICE O/P EST LOW 20 MIN: CPT | Performed by: SURGERY

## 2024-10-23 PROCEDURE — 93923 UPR/LXTR ART STDY 3+ LVLS: CPT

## 2024-10-23 PROCEDURE — 1123F ACP DISCUSS/DSCN MKR DOCD: CPT | Performed by: SURGERY

## 2024-10-23 PROCEDURE — 1125F AMNT PAIN NOTED PAIN PRSNT: CPT | Performed by: SURGERY

## 2024-10-23 PROCEDURE — 1159F MED LIST DOCD IN RCRD: CPT | Performed by: SURGERY

## 2024-10-23 PROCEDURE — 93925 LOWER EXTREMITY STUDY: CPT

## 2024-10-23 NOTE — PROGRESS NOTES
[facility], [facility] VASCULAR PART OF [address]  [facility] [address]  Dept: [phone]     Patient: Sahil Motley  : 1950  MRN: [id number]  DOS: 10/23/2024            HPI:  Sahil Motley is a 73 y.o. male who returns to the office regarding his lower extremity arterial disease.  He had a duplex done today revealing an SFA occlusion on the left.  He was sent to the office urgently following that study.  He does not have rest pain.  He does claudicate at approximately 50 yards.  He does ambulate regularly to help his walking distance.  He is not smoking.  He has no ulcers.    Review of Systems    Vitals:    10/23/24 1054   BP: 127/67   Pulse: 77   Resp: 16   Temp: 97 °F (36.1 °C)   Weight: 92.6 kg (204 lb 3.2 oz)   Height: 1.676 m (5' 6\")          Physical Exam  On examination he has palpable femoral pulses bilaterally but no popliteal dorsalis pedis or posterior tibial pulses in either lower extremity.  His feet are warm and adequately perfused.  He has pallor with elevation and dependent rubor.  His dependent rubor is minimal and confined to the toes.  There are no ulcerations.  He has no gangrene.    Assessment:  1. Atheroscler of native artery of both legs with intermit claudication (HCC)          Plan:  At this point since he is not in a critical limb ischemia situation I would do nothing more except follow him at 6-month intervals with JOSEP and PVR.  He understands and agrees and we will see him at that time.  He also understands that if he needs my assistance sooner he can return to the office at any time with a phone call to make an appointment.    Electronically signed by:  Lakhwinder Dawkins MD

## 2024-10-24 LAB
VAS LEFT ABI: 0.35
VAS LEFT ABI: 0.35
VAS LEFT ARM BP: 152 MMHG
VAS LEFT ARM BP: 152 MMHG
VAS LEFT ATA MID PSV: 28.7 CM/S
VAS LEFT ATA MID PSV: 28.7 CM/S
VAS LEFT CFA DIST PSV: 278.8 CM/S
VAS LEFT CFA PROX PSV: 278.8 CM/S
VAS LEFT DORSALIS PEDIS BP: 60 MMHG
VAS LEFT DORSALIS PEDIS BP: 60 MMHG
VAS LEFT PERONEAL MID PSV: 21.2 CM/S
VAS LEFT PERONEAL MID PSV: 21.2 CM/S
VAS LEFT PFA PROX PSV: 355.3 CM/S
VAS LEFT PFA PROX PSV: 355.3 CM/S
VAS LEFT POP A DIST PSV: 54.6 CM/S
VAS LEFT POP A DIST PSV: 54.6 CM/S
VAS LEFT POP A PROX PSV: 36.3 CM/S
VAS LEFT POP A PROX PSV: 36.3 CM/S
VAS LEFT POP A PROX VEL RATIO: 1.29
VAS LEFT POP A PROX VEL RATIO: 1.29
VAS LEFT PTA BP: 0 MMHG
VAS LEFT PTA BP: 0 MMHG
VAS LEFT PTA MID PSV: 44.6 CM/S
VAS LEFT PTA MID PSV: 44.6 CM/S
VAS LEFT SFA DIST PSV: 28.1 CM/S
VAS LEFT SFA DIST PSV: 28.1 CM/S
VAS LEFT SFA DIST VEL RATIO: 1.64
VAS LEFT SFA DIST VEL RATIO: 1.64
VAS LEFT SFA MID PSV: 17.1 CM/S
VAS LEFT SFA MID PSV: 17.1 CM/S
VAS LEFT SFA MID VEL RATIO: 0.11
VAS LEFT SFA MID VEL RATIO: 0.11
VAS LEFT SFA PROX PSV: 160.9 CM/S
VAS LEFT SFA PROX PSV: 160.9 CM/S
VAS LEFT SFA PROX VEL RATIO: 0.58
VAS LEFT SFA PROX VEL RATIO: 0.58
VAS LEFT TBI: 0.22
VAS LEFT TBI: 0.22
VAS LEFT TOE PRESSURE: 37 MMHG
VAS LEFT TOE PRESSURE: 37 MMHG
VAS RIGHT ABI: 0.55
VAS RIGHT ABI: 0.55
VAS RIGHT ARM BP: 170 MMHG
VAS RIGHT ARM BP: 170 MMHG
VAS RIGHT ATA MID PSV: 45.6 CM/S
VAS RIGHT ATA MID PSV: 45.6 CM/S
VAS RIGHT CFA DIST PSV: 186.5 CM/S
VAS RIGHT CFA PROX PSV: 186.5 CM/S
VAS RIGHT DORSALIS PEDIS BP: 94 MMHG
VAS RIGHT DORSALIS PEDIS BP: 94 MMHG
VAS RIGHT PERONEAL MID PSV: 36.3 CM/S
VAS RIGHT PERONEAL MID PSV: 36.3 CM/S
VAS RIGHT PFA PROX PSV: 478.9 CM/S
VAS RIGHT PFA PROX PSV: 478.9 CM/S
VAS RIGHT POP A DIST PSV: 55.7 CM/S
VAS RIGHT POP A DIST PSV: 55.7 CM/S
VAS RIGHT POP A PROX PSV: 46.5 CM/S
VAS RIGHT POP A PROX PSV: 46.5 CM/S
VAS RIGHT POP A PROX VEL RATIO: 0.81
VAS RIGHT POP A PROX VEL RATIO: 0.81
VAS RIGHT PTA BP: 66 MMHG
VAS RIGHT PTA BP: 66 MMHG
VAS RIGHT PTA MID PSV: 30.8 CM/S
VAS RIGHT PTA MID PSV: 30.8 CM/S
VAS RIGHT SFA DIST PSV: 57.3 CM/S
VAS RIGHT SFA DIST PSV: 57.3 CM/S
VAS RIGHT SFA DIST VEL RATIO: 0.52
VAS RIGHT SFA DIST VEL RATIO: 0.52
VAS RIGHT SFA MID PSV: 110.9 CM/S
VAS RIGHT SFA MID PSV: 110.9 CM/S
VAS RIGHT SFA MID VEL RATIO: 0.4
VAS RIGHT SFA MID VEL RATIO: 0.4
VAS RIGHT SFA PROX PSV: 284.6 CM/S
VAS RIGHT SFA PROX PSV: 284.6 CM/S
VAS RIGHT SFA PROX VEL RATIO: 1.5
VAS RIGHT SFA PROX VEL RATIO: 1.5
VAS RIGHT TBI: 0.41
VAS RIGHT TBI: 0.41
VAS RIGHT TOE PRESSURE: 69 MMHG
VAS RIGHT TOE PRESSURE: 69 MMHG

## 2024-10-25 ENCOUNTER — HOSPITAL ENCOUNTER (OUTPATIENT)
Dept: CARDIAC REHAB | Age: 74
Discharge: HOME | End: 2024-10-25

## 2024-10-29 ENCOUNTER — HOSPITAL ENCOUNTER (OUTPATIENT)
Dept: CARDIAC REHAB | Age: 74
Discharge: HOME | End: 2024-10-29

## 2024-11-05 ENCOUNTER — HOSPITAL ENCOUNTER (OUTPATIENT)
Dept: CARDIAC REHAB | Age: 74
Discharge: HOME | End: 2024-11-05

## 2024-11-08 ENCOUNTER — HOSPITAL ENCOUNTER (OUTPATIENT)
Dept: CARDIAC REHAB | Age: 74
Discharge: HOME | End: 2024-11-08

## 2024-11-12 ENCOUNTER — HOSPITAL ENCOUNTER (OUTPATIENT)
Dept: CARDIAC REHAB | Age: 74
Discharge: HOME | End: 2024-11-12

## 2024-11-15 ENCOUNTER — HOSPITAL ENCOUNTER (OUTPATIENT)
Dept: CARDIAC REHAB | Age: 74
Discharge: HOME | End: 2024-11-15

## 2024-11-19 ENCOUNTER — HOSPITAL ENCOUNTER (OUTPATIENT)
Dept: CARDIAC REHAB | Age: 74
Discharge: HOME | End: 2024-11-19

## 2024-11-22 ENCOUNTER — HOSPITAL ENCOUNTER (OUTPATIENT)
Dept: CARDIAC REHAB | Age: 74
Discharge: HOME | End: 2024-11-22

## 2024-12-06 ENCOUNTER — HOSPITAL ENCOUNTER (OUTPATIENT)
Dept: CARDIAC REHAB | Age: 74
Discharge: HOME | End: 2024-12-06

## 2024-12-10 ENCOUNTER — HOSPITAL ENCOUNTER (OUTPATIENT)
Dept: CARDIAC REHAB | Age: 74
Discharge: HOME | End: 2024-12-10

## 2024-12-17 PROBLEM — J44.1 COPD EXACERBATION (HCC): Status: RESOLVED | Noted: 2018-10-29 | Resolved: 2024-12-17

## 2024-12-20 ENCOUNTER — HOSPITAL ENCOUNTER (OUTPATIENT)
Dept: CARDIAC REHAB | Age: 74
Discharge: HOME | End: 2024-12-20

## 2025-01-24 ENCOUNTER — HOSPITAL ENCOUNTER (OUTPATIENT)
Dept: CARDIAC REHAB | Age: 75
Discharge: HOME | End: 2025-01-24

## 2025-01-28 ENCOUNTER — HOSPITAL ENCOUNTER (OUTPATIENT)
Dept: CARDIAC REHAB | Age: 75
Discharge: HOME | End: 2025-01-28

## 2025-01-31 ENCOUNTER — HOSPITAL ENCOUNTER (OUTPATIENT)
Dept: CARDIAC REHAB | Age: 75
Discharge: HOME | End: 2025-01-31

## 2025-02-04 ENCOUNTER — HOSPITAL ENCOUNTER (OUTPATIENT)
Dept: CARDIAC REHAB | Age: 75
Discharge: HOME | End: 2025-02-04

## 2025-02-06 ENCOUNTER — TELEPHONE (OUTPATIENT)
Dept: UROLOGY | Age: 75
End: 2025-02-06

## 2025-02-06 DIAGNOSIS — C61 PROSTATE CANCER (HCC): Primary | ICD-10-CM

## 2025-02-06 NOTE — TELEPHONE ENCOUNTER
I rescheduled patient to 3/10/25. I need a new PSA order.  He will get it sometime between now and then.

## 2025-02-11 ENCOUNTER — HOSPITAL ENCOUNTER (OUTPATIENT)
Dept: CARDIAC REHAB | Age: 75
Discharge: HOME | End: 2025-02-11

## 2025-02-17 ENCOUNTER — HOSPITAL ENCOUNTER (OUTPATIENT)
Age: 75
Discharge: HOME | End: 2025-02-17
Attending: INTERNAL MEDICINE
Payer: MEDICARE

## 2025-02-17 ENCOUNTER — HOSPITAL ENCOUNTER (OUTPATIENT)
Dept: CT IMAGING | Age: 75
Discharge: HOME | End: 2025-02-19
Attending: INTERNAL MEDICINE
Payer: MEDICARE

## 2025-02-17 DIAGNOSIS — C61 PROSTATE CANCER (HCC): ICD-10-CM

## 2025-02-17 DIAGNOSIS — Z87.891 PERSONAL HISTORY OF TOBACCO USE: ICD-10-CM

## 2025-02-17 LAB — PSA SERPL-MCNC: <0.03 NG/ML (ref 0–4)

## 2025-02-17 PROCEDURE — 36415 COLL VENOUS BLD VENIPUNCTURE: CPT

## 2025-02-17 PROCEDURE — 71271 CT THORAX LUNG CANCER SCR C-: CPT

## 2025-02-17 PROCEDURE — 84153 ASSAY OF PSA TOTAL: CPT

## 2025-02-21 ENCOUNTER — HOSPITAL ENCOUNTER (OUTPATIENT)
Dept: CARDIAC REHAB | Age: 75
Discharge: HOME | End: 2025-02-21

## 2025-02-25 ENCOUNTER — HOSPITAL ENCOUNTER (OUTPATIENT)
Dept: CARDIAC REHAB | Age: 75
Discharge: HOME | End: 2025-02-25

## 2025-02-28 ENCOUNTER — HOSPITAL ENCOUNTER (OUTPATIENT)
Dept: CARDIAC REHAB | Age: 75
Discharge: HOME | End: 2025-02-28

## 2025-03-04 ENCOUNTER — HOSPITAL ENCOUNTER (OUTPATIENT)
Dept: CARDIAC REHAB | Age: 75
Discharge: HOME | End: 2025-03-04

## 2025-03-07 ENCOUNTER — HOSPITAL ENCOUNTER (OUTPATIENT)
Dept: CARDIAC REHAB | Age: 75
Discharge: HOME | End: 2025-03-07

## 2025-03-10 ENCOUNTER — OFFICE VISIT (OUTPATIENT)
Dept: UROLOGY | Age: 75
End: 2025-03-10
Payer: MEDICARE

## 2025-03-10 VITALS
TEMPERATURE: 97.1 F | BODY MASS INDEX: 33.41 KG/M2 | WEIGHT: 207 LBS | SYSTOLIC BLOOD PRESSURE: 136 MMHG | DIASTOLIC BLOOD PRESSURE: 70 MMHG

## 2025-03-10 DIAGNOSIS — C61 PROSTATE CANCER (HCC): Primary | ICD-10-CM

## 2025-03-10 PROCEDURE — 3078F DIAST BP <80 MM HG: CPT | Performed by: PHYSICIAN ASSISTANT

## 2025-03-10 PROCEDURE — 1159F MED LIST DOCD IN RCRD: CPT | Performed by: PHYSICIAN ASSISTANT

## 2025-03-10 PROCEDURE — 3075F SYST BP GE 130 - 139MM HG: CPT | Performed by: PHYSICIAN ASSISTANT

## 2025-03-10 PROCEDURE — 1123F ACP DISCUSS/DSCN MKR DOCD: CPT | Performed by: PHYSICIAN ASSISTANT

## 2025-03-10 PROCEDURE — 99214 OFFICE O/P EST MOD 30 MIN: CPT | Performed by: PHYSICIAN ASSISTANT

## 2025-03-10 NOTE — PROGRESS NOTES
HPI:      Patient is a 74 y.o. male in no acute distress.  He is alert and oriented to person, place, and time.       History  5/2018 Prostate biopsy, two cores [place] 3+4. PSA 14.59     8/2018 Started Firmagon in conjunction with IMRT radiation.      11/2018 Changed from firmagon to Eligard      11/2020 Stopped ADT after ADT for 24 months      Hospitalized for rectal bleeding and has been through several cauterization surgeries to stop the bleeding.  The surgeon did attribute this to a side effect of radiation.     PSA  2/2025 - <0.03  1/2024 - <0.03  1/2023 - <0.02  7/2022 - <0.02  1/2022 - <0.02  7/2021 - <0.02  1/2021 - <0.01  3/2020 - <0.01  10/2019 - <0.01  6/2019 - <0.01  2/2019 - 0.08  11/2018 - 0.36  3/2018 - 14.59    Today  Patient is here today for follow-up prostate cancer.  He is over 6 years status post radiation.  Patient did have a recent PSA which is undetectable.  He denies unintentional weight loss, decreased energy or appetite. Has leg pain secondary to claudication.  He denies any lower extremity numbness and tingling. He denies new or worsening LUTS. He denies gross hematuria or dysuria. Still having intermittent rectal bleeding.     Past Medical History:   Diagnosis Date    CAD (coronary artery disease)     Stent placement 2019    COPD (chronic obstructive pulmonary disease) (HCC)     Essential hypertension     Hyperlipidemia     Prostate cancer (HCC)     Wears glasses      Past Surgical History:   Procedure Laterality Date    ANKLE SURGERY      CAROTID STENT PLACEMENT Left 2019    COLONOSCOPY  2015    Normal    HERNIA REPAIR Right 1975    OTHER SURGICAL HISTORY      cauterization intestines-[facility]    PROCTOSIGMOIDOSCOPY N/A 12/11/2023    ANAL PROCTO SIGMOIDOSCOPY FLEXIBLE performed by Lexus Evans MD at [facility] ENDOSCOPY     Outpatient Encounter Medications as of 3/10/2025   Medication Sig Dispense Refill    pantoprazole (PROTONIX) 20 MG tablet Take 1 tablet by mouth daily      aspirin 81 MG

## 2025-03-11 ENCOUNTER — HOSPITAL ENCOUNTER (OUTPATIENT)
Dept: CARDIAC REHAB | Age: 75
Discharge: HOME | End: 2025-03-11

## 2025-03-14 ENCOUNTER — HOSPITAL ENCOUNTER (OUTPATIENT)
Dept: CARDIAC REHAB | Age: 75
Discharge: HOME | End: 2025-03-14

## 2025-03-18 ENCOUNTER — HOSPITAL ENCOUNTER (OUTPATIENT)
Dept: CARDIAC REHAB | Age: 75
Discharge: HOME | End: 2025-03-18

## 2025-03-21 ENCOUNTER — HOSPITAL ENCOUNTER (OUTPATIENT)
Dept: CARDIAC REHAB | Age: 75
Discharge: HOME | End: 2025-03-21

## 2025-04-04 ENCOUNTER — HOSPITAL ENCOUNTER (OUTPATIENT)
Dept: CARDIAC REHAB | Age: 75
Discharge: HOME | End: 2025-04-04

## 2025-04-08 ENCOUNTER — HOSPITAL ENCOUNTER (OUTPATIENT)
Dept: CARDIAC REHAB | Age: 75
Discharge: HOME | End: 2025-04-08

## 2025-04-08 PROCEDURE — 9900000065 HC CARDIAC REHAB PHASE 3 - 1 VISIT

## 2025-04-15 ENCOUNTER — HOSPITAL ENCOUNTER (OUTPATIENT)
Dept: CARDIAC REHAB | Age: 75
Discharge: HOME | End: 2025-04-15

## 2025-04-21 ENCOUNTER — HOSPITAL ENCOUNTER (OUTPATIENT)
Dept: VASCULAR LAB | Age: 75
Discharge: HOME | End: 2025-04-23
Attending: SURGERY
Payer: MEDICARE

## 2025-04-21 DIAGNOSIS — I70.213 ATHEROSCLER OF NATIVE ARTERY OF BOTH LEGS WITH INTERMIT CLAUDICATION: ICD-10-CM

## 2025-04-21 LAB
VAS LEFT ABI: 0.44
VAS LEFT ARM BP: 133 MMHG
VAS LEFT DORSALIS PEDIS BP: 51 MMHG
VAS LEFT PTA BP: 66 MMHG
VAS LEFT TBI: 0.25
VAS LEFT TOE PRESSURE: 37 MMHG
VAS RIGHT ABI: 0.54
VAS RIGHT ARM BP: 149 MMHG
VAS RIGHT DORSALIS PEDIS BP: 56 MMHG
VAS RIGHT PTA BP: 80 MMHG
VAS RIGHT TBI: 0.36
VAS RIGHT TOE PRESSURE: 53 MMHG

## 2025-04-21 PROCEDURE — 93923 UPR/LXTR ART STDY 3+ LVLS: CPT | Performed by: SURGERY

## 2025-04-21 PROCEDURE — 93923 UPR/LXTR ART STDY 3+ LVLS: CPT

## 2025-04-22 ENCOUNTER — HOSPITAL ENCOUNTER (OUTPATIENT)
Dept: CARDIAC REHAB | Age: 75
Discharge: HOME | End: 2025-04-22

## 2025-04-23 ENCOUNTER — OFFICE VISIT (OUTPATIENT)
Dept: VASCULAR SURGERY | Age: 75
End: 2025-04-23
Payer: MEDICARE

## 2025-04-23 VITALS
WEIGHT: 208 LBS | HEART RATE: 82 BPM | TEMPERATURE: 97.7 F | RESPIRATION RATE: 18 BRPM | HEIGHT: 66 IN | DIASTOLIC BLOOD PRESSURE: 80 MMHG | SYSTOLIC BLOOD PRESSURE: 156 MMHG | BODY MASS INDEX: 33.43 KG/M2

## 2025-04-23 DIAGNOSIS — I70.213 ATHEROSCLER OF NATIVE ARTERY OF BOTH LEGS WITH INTERMIT CLAUDICATION: Primary | ICD-10-CM

## 2025-04-23 PROCEDURE — 99213 OFFICE O/P EST LOW 20 MIN: CPT | Performed by: SURGERY

## 2025-04-23 PROCEDURE — 1123F ACP DISCUSS/DSCN MKR DOCD: CPT | Performed by: SURGERY

## 2025-04-23 PROCEDURE — 3077F SYST BP >= 140 MM HG: CPT | Performed by: SURGERY

## 2025-04-23 PROCEDURE — 1126F AMNT PAIN NOTED NONE PRSNT: CPT | Performed by: SURGERY

## 2025-04-23 PROCEDURE — 1159F MED LIST DOCD IN RCRD: CPT | Performed by: SURGERY

## 2025-04-23 PROCEDURE — 3079F DIAST BP 80-89 MM HG: CPT | Performed by: SURGERY

## 2025-04-23 NOTE — PROGRESS NOTES
[facility] PHYSICIANS [facility], [facility] VASCULAR PART OF [address]  [facility] [address]  Dept: [phone]     Patient: Sahil Motley  : 1950  MRN: [id number]  DOS: 2025            HPI:  Sahil Motley is a 74 y.o. male who returns to the office regarding his lower extremity claudication.  Recall that he claudicates at relatively short distances in both lower extremities.  He no longer smokes cigarettes but he smoked for quite some time resulting in significant arterial disease.  The cilostazol does not necessarily help his walking distance according to him.  He is also on aspirin.  He had JOSEP and PVR which are slightly better than his initial JOSEP and PVR.  He is JOSEP 0.54 on the right and 0.44 on the left.  He has no ulceration.  He has no gangrene.  He denies rest pain.    Review of Systems    Vitals:    25 0851 25 0856   BP: (!) 158/83 (!) 156/80   BP Site: Left Upper Arm Left Upper Arm   Patient Position: Sitting Sitting   BP Cuff Size: Large Adult Large Adult   Pulse: 83 82   Resp: 18    Temp: 97.7 °F (36.5 °C)    TempSrc: Temporal    Weight: 94.3 kg (208 lb)    Height: 1.676 m (5' 6\")           Physical Exam  On examination he has weakly palpable femoral pulses bilaterally.  There are no popliteal dorsalis pedis or posterior tibial pulses in either lower extremity.  He has minimal dependent rubor bilaterally.  He has no ulceration.  He has no gangrene.  He has no edema.  He has no varicosities.    Assessment:  1. Atheroscler of native artery of both legs with intermit claudication          Plan:  At this point when I asked regarding whether or not he wanted to pursue operative therapy he is still unsure.  I explained to him that claudication does not mandate surgical therapy and I would advise against SFA intervention for claudication alone.  SFA intervention does increase the risk of limb amputation

## 2025-04-23 NOTE — PATIENT INSTRUCTIONS
SURVEY:    Thank you for allowing us to care for you today.    You may be receiving a survey from [facility] regarding your visit today- electronically or via mail.      Please help us by completing the survey as this will provide the needed feedback to ensure we are providing the very best care for you and your family.    If you cannot score us a very good on any question, please call the office to discuss how we could have made your experience a very good one.    Thank you.       STAFF:    Michelle Ratliff, Carmita TEJEDA      CLINICAL STAFF:    Vee MONTENEGRO, Latha TEJEDA, Billie TEJEDA, Gisell MONTENEGRO

## 2025-04-25 ENCOUNTER — HOSPITAL ENCOUNTER (OUTPATIENT)
Dept: CARDIAC REHAB | Age: 75
Discharge: HOME | End: 2025-04-25

## 2025-05-09 ENCOUNTER — HOSPITAL ENCOUNTER (OUTPATIENT)
Dept: CARDIAC REHAB | Age: 75
Discharge: HOME | End: 2025-05-09

## 2025-05-13 ENCOUNTER — HOSPITAL ENCOUNTER (OUTPATIENT)
Dept: CARDIAC REHAB | Age: 75
Discharge: HOME | End: 2025-05-13

## 2025-05-20 ENCOUNTER — HOSPITAL ENCOUNTER (OUTPATIENT)
Dept: CARDIAC REHAB | Age: 75
Discharge: HOME | End: 2025-05-20

## 2025-05-30 ENCOUNTER — HOSPITAL ENCOUNTER (OUTPATIENT)
Dept: CARDIAC REHAB | Age: 75
Discharge: HOME | End: 2025-05-30

## 2025-06-06 ENCOUNTER — HOSPITAL ENCOUNTER (EMERGENCY)
Age: 75
Discharge: HOME | End: 2025-06-06
Attending: EMERGENCY MEDICINE
Payer: MEDICARE

## 2025-06-06 ENCOUNTER — APPOINTMENT (OUTPATIENT)
Age: 75
End: 2025-06-06
Attending: EMERGENCY MEDICINE
Payer: MEDICARE

## 2025-06-06 ENCOUNTER — APPOINTMENT (OUTPATIENT)
Dept: GENERAL RADIOLOGY | Age: 75
End: 2025-06-06
Payer: MEDICARE

## 2025-06-06 ENCOUNTER — APPOINTMENT (OUTPATIENT)
Dept: CT IMAGING | Age: 75
End: 2025-06-06
Payer: MEDICARE

## 2025-06-06 VITALS
DIASTOLIC BLOOD PRESSURE: 86 MMHG | OXYGEN SATURATION: 94 % | HEART RATE: 72 BPM | TEMPERATURE: 97.5 F | RESPIRATION RATE: 18 BRPM | SYSTOLIC BLOOD PRESSURE: 103 MMHG

## 2025-06-06 DIAGNOSIS — R42 DIZZINESS: Primary | ICD-10-CM

## 2025-06-06 DIAGNOSIS — I95.9 HYPOTENSION, UNSPECIFIED HYPOTENSION TYPE: ICD-10-CM

## 2025-06-06 LAB
ALBUMIN SERPL-MCNC: 4.1 G/DL (ref 3.5–5.2)
ALBUMIN/GLOB SERPL: 1.3 {RATIO} (ref 1–2.5)
ALP SERPL-CCNC: 100 U/L (ref 40–129)
ALT SERPL-CCNC: 18 U/L (ref 10–50)
ANION GAP SERPL CALCULATED.3IONS-SCNC: 13 MMOL/L (ref 9–16)
AST SERPL-CCNC: 18 U/L (ref 10–50)
BASOPHILS # BLD: 0.04 K/UL (ref 0–0.2)
BASOPHILS NFR BLD: 1 % (ref 0–2)
BILIRUB SERPL-MCNC: 0.2 MG/DL (ref 0–1.2)
BUN SERPL-MCNC: 39 MG/DL (ref 8–23)
BUN/CREAT SERPL: 19 (ref 9–20)
CALCIUM SERPL-MCNC: 9.7 MG/DL (ref 8.6–10.4)
CHLORIDE SERPL-SCNC: 103 MMOL/L (ref 98–107)
CO2 SERPL-SCNC: 24 MMOL/L (ref 20–31)
CREAT SERPL-MCNC: 2.1 MG/DL (ref 0.7–1.2)
EOSINOPHIL # BLD: 0.22 K/UL (ref 0–0.44)
EOSINOPHILS RELATIVE PERCENT: 3 % (ref 1–4)
ERYTHROCYTE [DISTWIDTH] IN BLOOD BY AUTOMATED COUNT: 13.4 % (ref 11.8–14.4)
GFR, ESTIMATED: 33 ML/MIN/1.73M2
GLUCOSE SERPL-MCNC: 156 MG/DL (ref 74–99)
HCT VFR BLD AUTO: 35.7 % (ref 40.7–50.3)
HGB BLD-MCNC: 12.4 G/DL (ref 13–17)
IMM GRANULOCYTES # BLD AUTO: 0.05 K/UL (ref 0–0.3)
IMM GRANULOCYTES NFR BLD: 1 %
LYMPHOCYTES NFR BLD: 1.21 K/UL (ref 1.1–3.7)
LYMPHOCYTES RELATIVE PERCENT: 16 % (ref 24–43)
MCH RBC QN AUTO: 32.6 PG (ref 25.2–33.5)
MCHC RBC AUTO-ENTMCNC: 34.7 G/DL (ref 28.4–34.8)
MCV RBC AUTO: 93.9 FL (ref 82.6–102.9)
MONOCYTES NFR BLD: 0.77 K/UL (ref 0.1–1.2)
MONOCYTES NFR BLD: 10 % (ref 3–12)
NEUTROPHILS NFR BLD: 69 % (ref 36–65)
NEUTS SEG NFR BLD: 5.54 K/UL (ref 1.5–8.1)
NRBC BLD-RTO: 0 PER 100 WBC
PLATELET # BLD AUTO: 268 K/UL (ref 138–453)
PMV BLD AUTO: 10.7 FL (ref 8.1–13.5)
POTASSIUM SERPL-SCNC: 4 MMOL/L (ref 3.7–5.3)
PROT SERPL-MCNC: 7.2 G/DL (ref 6.6–8.7)
RBC # BLD AUTO: 3.8 M/UL (ref 4.21–5.77)
SODIUM SERPL-SCNC: 140 MMOL/L (ref 136–145)
TROPONIN I SERPL HS-MCNC: 31 NG/L (ref 0–22)
TROPONIN I SERPL HS-MCNC: 33 NG/L (ref 0–22)
WBC OTHER # BLD: 7.8 K/UL (ref 3.5–11.3)

## 2025-06-06 PROCEDURE — 80053 COMPREHEN METABOLIC PANEL: CPT

## 2025-06-06 PROCEDURE — 84484 ASSAY OF TROPONIN QUANT: CPT

## 2025-06-06 PROCEDURE — 93243 EXT ECG>48HR<7D SCAN A/R: CPT

## 2025-06-06 PROCEDURE — 71045 X-RAY EXAM CHEST 1 VIEW: CPT

## 2025-06-06 PROCEDURE — 93005 ELECTROCARDIOGRAM TRACING: CPT | Performed by: EMERGENCY MEDICINE

## 2025-06-06 PROCEDURE — 85025 COMPLETE CBC W/AUTO DIFF WBC: CPT

## 2025-06-06 PROCEDURE — 70450 CT HEAD/BRAIN W/O DYE: CPT

## 2025-06-06 PROCEDURE — 99285 EMERGENCY DEPT VISIT HI MDM: CPT

## 2025-06-06 ASSESSMENT — PAIN - FUNCTIONAL ASSESSMENT: PAIN_FUNCTIONAL_ASSESSMENT: NONE - DENIES PAIN

## 2025-06-06 ASSESSMENT — LIFESTYLE VARIABLES
HOW OFTEN DO YOU HAVE A DRINK CONTAINING ALCOHOL: NEVER
HOW MANY STANDARD DRINKS CONTAINING ALCOHOL DO YOU HAVE ON A TYPICAL DAY: PATIENT DOES NOT DRINK

## 2025-06-06 NOTE — ED PROVIDER NOTES
[facility] EMERGENCY DEPARTMENT  EMERGENCY DEPARTMENT ENCOUNTER      Pt Name: Sahil Motley  MRN: [id number]  Birthdate 1950  Date of evaluation: 6/6/2025  Provider: Sherrie Alvarez MD    CHIEF COMPLAINT       Chief Complaint   Patient presents with    Dizziness     Onset this morning after breakfast, pt complains of dizziness, took BP at home and was in the 70/50s.          HISTORY OF PRESENT ILLNESS   (Location/Symptom, Timing/Onset, Context/Setting, Quality, Duration, Modifying Factors, Severity)  Note limiting factors.   Sahil Motley is a 74 y.o. male who presents to the emergency department      Very pleasant 74-year-old gentleman presented emergency department for evaluation after experiencing episode of dizziness and low blood pressure just after breakfast.  States he woke up a little bit later than usual and around 630 took his medications.  He had been doing fine this morning and did eat breakfast.  Reports he stood up to walk to another room when he reports he felt \"off\".  He felt as if his head was spinning and even a little bit confused.  Wife does not report any slurred speech or other speech disturbances.  Patient himself did not have any focal neurological deficits and did not have any headache.  He did take his blood pressure and states his blood pressure was 70/50.  He went into a room to lay down and states with time he did begin to feel better but he was still running low blood pressures denies any previous episodes of low blood pressures.  States he there is no way he accidentally took too many medications this morning.  He denies any recent illnesses.  He does have a fairly constant runny nose and mild cough which his wife attributes to seasonal allergies.  No previous history of cardiac arrhythmia.  Patient denies any previous cardiac history.  He states he did have stents placed in his carotid arteries in the past.    .  Patient did not feel his heart racing or beating irregularly.  No

## 2025-06-06 NOTE — DISCHARGE INSTRUCTIONS
Heart monitor for 7 days.  Continue current medications as prescribed.  Follow-up with your primary care provider for reevaluation.  Please return immediately should you develop any worsening symptoms or any other acute concerns

## 2025-06-06 NOTE — ED PROVIDER NOTES
[facility] EMERGENCY DEPARTMENT  EMERGENCY DEPARTMENT ENCOUNTER      Pt Name: Sahil Motley  MRN: [id number]  Birthdate 1950  Date of evaluation: 6/6/2025  Provider: Sherrie Alvarez MD    CHIEF COMPLAINT       Chief Complaint   Patient presents with    Dizziness     Onset this morning after breakfast, pt complains of dizziness, took BP at home and was in the 70/50s.          HISTORY OF PRESENT ILLNESS   (Location/Symptom, Timing/Onset, Context/Setting, Quality, Duration, Modifying Factors, Severity)  Note limiting factors.   Sahil Motley is a 74 y.o. male who presents to the emergency department      Very pleasant 74-year-old gentleman presented emergency department for evaluation after experiencing episode of dizziness and low blood pressure just after breakfast.  States he woke up a little bit later than usual and around 630 took his medications.  He had been doing fine this morning and did eat breakfast.  Reports he stood up to walk to another room when he reports he felt \"off\".  He felt as if his head was spinning and even a little bit confused.  Wife does not report any slurred speech or other speech disturbances.  Patient himself did not have any focal neurological deficits and did not have any headache.  He did take his blood pressure and states his blood pressure was 70/50.  He went into a room to lay down and states with time he did begin to feel better but he was still running low blood pressures denies any previous episodes of low blood pressures.  States he there is no way he accidentally took too many medications this morning.  He denies any recent illnesses.  He does have a fairly constant runny nose and mild cough which his wife attributes to seasonal allergies.  No previous history of cardiac arrhythmia.  Patient denies any previous cardiac history.  He states he did have stents placed in his carotid arteries in the past.    .  Patient did not feel his heart racing or beating irregularly.  No  (2/14/2023)    [employer] [place] [employer] - Occupational Stress Questionnaire     Feeling of Stress : Not at all   Social Connections: Unknown (6/26/2024)    Social Connection and Isolation Panel [NHANES]     Marital Status:    Intimate Partner Violence: Unknown (6/26/2024)    Humiliation, Afraid, Rape, and Kick questionnaire     Fear of Current or Ex-Partner: No     Sexually Abused: Patient declined   Housing Stability: Patient Declined (6/26/2024)    Housing Stability Vital Sign     Unable to Pay for Housing in the Last Year: Patient declined     Unstable Housing in the Last Year: Patient declined       SCREENINGS        [place] Coma Scale  Eye Opening: Spontaneous  Best Verbal Response: Oriented  Best Motor Response: Obeys commands  Becky Coma Scale Score: 15               PHYSICAL EXAM    (up to 7 for level 4, 8 or more for level 5)     ED Triage Vitals [06/06/25 1006]   BP Systolic BP Percentile Diastolic BP Percentile Temp Temp Source Pulse Respirations SpO2   103/86 -- -- 97.5 °F (36.4 °C) Oral 64 16 95 %      Height Weight         -- --             Physical Exam  Vitals and nursing note reviewed.   Constitutional:       General: He is not in acute distress.     Appearance: He is not toxic-appearing.   HENT:      Head: Normocephalic and atraumatic.   Cardiovascular:      Rate and Rhythm: Normal rate and regular rhythm.   Pulmonary:      Effort: Pulmonary effort is normal.      Breath sounds: Normal breath sounds.   Abdominal:      General: There is no distension.      Palpations: Abdomen is soft.      Tenderness: There is no abdominal tenderness.   Skin:     General: Skin is warm and dry.   Neurological:      General: No focal deficit present.      Mental Status: He is alert and oriented to person, place, and time.         DIAGNOSTIC RESULTS     EKG: All EKG's are interpreted by the Emergency Department Physician who either signs or Co-signs this chart in the absence of a

## 2025-06-07 LAB
EKG ATRIAL RATE: 74 BPM
EKG P AXIS: -14 DEGREES
EKG P-R INTERVAL: 192 MS
EKG Q-T INTERVAL: 400 MS
EKG QRS DURATION: 104 MS
EKG QTC CALCULATION (BAZETT): 444 MS
EKG R AXIS: 100 DEGREES
EKG T AXIS: -45 DEGREES
EKG VENTRICULAR RATE: 74 BPM

## 2025-06-07 PROCEDURE — 93010 ELECTROCARDIOGRAM REPORT: CPT | Performed by: FAMILY MEDICINE

## (undated) DEVICE — HYBRID TUBING WITH CO2 CONNECTION FOR OLYMPUS 140/160/180/190 SERIES GI ENDOSCOPES WITH OLYMPUS AFU-100 , OLYMPUS OFP: Brand: ENDOGATOR HYBRID IRRIGATION TUBING

## (undated) DEVICE — Device: Brand: DEFENDO VALVE AND CONNECTOR KIT

## (undated) DEVICE — BIPOLAR ELECTROHEMOSTASIS CATHETER: Brand: GOLD PROBE

## (undated) DEVICE — ENDO KIT W/SYRINGE: Brand: MEDLINE INDUSTRIES, INC.

## (undated) DEVICE — 4-PORT MANIFOLD: Brand: NEPTUNE 2

## (undated) DEVICE — SOLUTION IRRIG 1000ML STRL H2O USP PLAS POUR BTL